# Patient Record
Sex: MALE | Race: WHITE | NOT HISPANIC OR LATINO | Employment: UNEMPLOYED | URBAN - METROPOLITAN AREA
[De-identification: names, ages, dates, MRNs, and addresses within clinical notes are randomized per-mention and may not be internally consistent; named-entity substitution may affect disease eponyms.]

---

## 2023-03-19 ENCOUNTER — HOSPITAL ENCOUNTER (EMERGENCY)
Facility: CLINIC | Age: 28
Discharge: STILL ADMITTED OR ADMINISTRATIVE | End: 2023-03-26
Attending: EMERGENCY MEDICINE | Admitting: EMERGENCY MEDICINE
Payer: MEDICARE

## 2023-03-19 DIAGNOSIS — F22 DELUSIONAL DISORDER (H): ICD-10-CM

## 2023-03-19 DIAGNOSIS — F25.0 SCHIZOAFFECTIVE DISORDER, BIPOLAR TYPE (H): ICD-10-CM

## 2023-03-19 DIAGNOSIS — F23 ACUTE PSYCHOSIS (H): ICD-10-CM

## 2023-03-19 LAB
ALBUMIN SERPL BCG-MCNC: 4.8 G/DL (ref 3.5–5.2)
ALP SERPL-CCNC: 64 U/L (ref 40–129)
ALT SERPL W P-5'-P-CCNC: 67 U/L (ref 10–50)
ANION GAP SERPL CALCULATED.3IONS-SCNC: 11 MMOL/L (ref 7–15)
AST SERPL W P-5'-P-CCNC: 44 U/L (ref 10–50)
BASOPHILS # BLD AUTO: 0 10E3/UL (ref 0–0.2)
BASOPHILS NFR BLD AUTO: 1 %
BILIRUB SERPL-MCNC: 0.3 MG/DL
BUN SERPL-MCNC: 16 MG/DL (ref 6–20)
CALCIUM SERPL-MCNC: 9.7 MG/DL (ref 8.6–10)
CHLORIDE SERPL-SCNC: 99 MMOL/L (ref 98–107)
CREAT SERPL-MCNC: 0.88 MG/DL (ref 0.67–1.17)
DEPRECATED HCO3 PLAS-SCNC: 26 MMOL/L (ref 22–29)
EOSINOPHIL # BLD AUTO: 0.1 10E3/UL (ref 0–0.7)
EOSINOPHIL NFR BLD AUTO: 1 %
ERYTHROCYTE [DISTWIDTH] IN BLOOD BY AUTOMATED COUNT: 12.8 % (ref 10–15)
GFR SERPL CREATININE-BSD FRML MDRD: >90 ML/MIN/1.73M2
GLUCOSE SERPL-MCNC: 98 MG/DL (ref 70–99)
HCT VFR BLD AUTO: 43.6 % (ref 40–53)
HGB BLD-MCNC: 14.2 G/DL (ref 13.3–17.7)
IMM GRANULOCYTES # BLD: 0 10E3/UL
IMM GRANULOCYTES NFR BLD: 0 %
LITHIUM SERPL-SCNC: 0.1 MMOL/L (ref 0.6–1.2)
LYMPHOCYTES # BLD AUTO: 1.1 10E3/UL (ref 0.8–5.3)
LYMPHOCYTES NFR BLD AUTO: 16 %
MCH RBC QN AUTO: 30 PG (ref 26.5–33)
MCHC RBC AUTO-ENTMCNC: 32.6 G/DL (ref 31.5–36.5)
MCV RBC AUTO: 92 FL (ref 78–100)
MONOCYTES # BLD AUTO: 0.7 10E3/UL (ref 0–1.3)
MONOCYTES NFR BLD AUTO: 10 %
NEUTROPHILS # BLD AUTO: 5.1 10E3/UL (ref 1.6–8.3)
NEUTROPHILS NFR BLD AUTO: 72 %
NRBC # BLD AUTO: 0 10E3/UL
NRBC BLD AUTO-RTO: 0 /100
PLATELET # BLD AUTO: 221 10E3/UL (ref 150–450)
POTASSIUM SERPL-SCNC: 3.8 MMOL/L (ref 3.4–5.3)
PROT SERPL-MCNC: 7.6 G/DL (ref 6.4–8.3)
RBC # BLD AUTO: 4.74 10E6/UL (ref 4.4–5.9)
SODIUM SERPL-SCNC: 136 MMOL/L (ref 136–145)
WBC # BLD AUTO: 7 10E3/UL (ref 4–11)

## 2023-03-19 PROCEDURE — 80053 COMPREHEN METABOLIC PANEL: CPT | Performed by: EMERGENCY MEDICINE

## 2023-03-19 PROCEDURE — 36415 COLL VENOUS BLD VENIPUNCTURE: CPT | Performed by: EMERGENCY MEDICINE

## 2023-03-19 PROCEDURE — 80178 ASSAY OF LITHIUM: CPT | Performed by: EMERGENCY MEDICINE

## 2023-03-19 PROCEDURE — 85025 COMPLETE CBC W/AUTO DIFF WBC: CPT | Performed by: EMERGENCY MEDICINE

## 2023-03-19 PROCEDURE — 90791 PSYCH DIAGNOSTIC EVALUATION: CPT

## 2023-03-19 ASSESSMENT — ACTIVITIES OF DAILY LIVING (ADL)
ADLS_ACUITY_SCORE: 33
ADLS_ACUITY_SCORE: 35

## 2023-03-20 ENCOUNTER — TELEPHONE (OUTPATIENT)
Dept: BEHAVIORAL HEALTH | Facility: CLINIC | Age: 28
End: 2023-03-20
Payer: MEDICARE

## 2023-03-20 LAB
ATRIAL RATE - MUSE: 97 BPM
DIASTOLIC BLOOD PRESSURE - MUSE: NORMAL MMHG
HOLD SPECIMEN: NORMAL
INTERPRETATION ECG - MUSE: NORMAL
P AXIS - MUSE: 73 DEGREES
PR INTERVAL - MUSE: 130 MS
QRS DURATION - MUSE: 80 MS
QT - MUSE: 340 MS
QTC - MUSE: 431 MS
R AXIS - MUSE: 98 DEGREES
SYSTOLIC BLOOD PRESSURE - MUSE: NORMAL MMHG
T AXIS - MUSE: 72 DEGREES
VENTRICULAR RATE- MUSE: 97 BPM

## 2023-03-20 PROCEDURE — 250N000011 HC RX IP 250 OP 636: Performed by: EMERGENCY MEDICINE

## 2023-03-20 PROCEDURE — 93005 ELECTROCARDIOGRAM TRACING: CPT | Mod: RTG

## 2023-03-20 PROCEDURE — 96372 THER/PROPH/DIAG INJ SC/IM: CPT | Performed by: EMERGENCY MEDICINE

## 2023-03-20 PROCEDURE — 250N000013 HC RX MED GY IP 250 OP 250 PS 637: Performed by: EMERGENCY MEDICINE

## 2023-03-20 RX ORDER — PROPRANOLOL HYDROCHLORIDE 10 MG/1
10 TABLET ORAL 3 TIMES DAILY PRN
Status: ON HOLD | COMMUNITY
Start: 2022-11-28 | End: 2023-03-27

## 2023-03-20 RX ORDER — LORAZEPAM 2 MG/ML
2 INJECTION INTRAMUSCULAR EVERY 8 HOURS PRN
Status: DISCONTINUED | OUTPATIENT
Start: 2023-03-20 | End: 2023-03-26 | Stop reason: HOSPADM

## 2023-03-20 RX ORDER — DIPHENHYDRAMINE HYDROCHLORIDE 50 MG/ML
50 INJECTION INTRAMUSCULAR; INTRAVENOUS EVERY 8 HOURS PRN
Status: DISCONTINUED | OUTPATIENT
Start: 2023-03-20 | End: 2023-03-26 | Stop reason: HOSPADM

## 2023-03-20 RX ORDER — ATOMOXETINE 40 MG/1
40 CAPSULE ORAL 2 TIMES DAILY
Status: ON HOLD | COMMUNITY
Start: 2022-11-06 | End: 2023-03-27

## 2023-03-20 RX ORDER — OLANZAPINE 10 MG/2ML
10 INJECTION, POWDER, FOR SOLUTION INTRAMUSCULAR ONCE
Status: DISCONTINUED | OUTPATIENT
Start: 2023-03-20 | End: 2023-03-20

## 2023-03-20 RX ORDER — OLANZAPINE 10 MG/2ML
10 INJECTION, POWDER, FOR SOLUTION INTRAMUSCULAR ONCE
Status: COMPLETED | OUTPATIENT
Start: 2023-03-20 | End: 2023-03-20

## 2023-03-20 RX ORDER — OLANZAPINE 5 MG/1
10 TABLET, ORALLY DISINTEGRATING ORAL
Status: COMPLETED | OUTPATIENT
Start: 2023-03-20 | End: 2023-03-20

## 2023-03-20 RX ORDER — HALOPERIDOL 5 MG/ML
5 INJECTION INTRAMUSCULAR EVERY 8 HOURS PRN
Status: DISCONTINUED | OUTPATIENT
Start: 2023-03-20 | End: 2023-03-26 | Stop reason: HOSPADM

## 2023-03-20 RX ORDER — LORAZEPAM 1 MG/1
2 TABLET ORAL EVERY 8 HOURS PRN
Status: DISCONTINUED | OUTPATIENT
Start: 2023-03-20 | End: 2023-03-26 | Stop reason: HOSPADM

## 2023-03-20 RX ORDER — DIPHENHYDRAMINE HCL 50 MG
50 CAPSULE ORAL EVERY 8 HOURS PRN
Status: DISCONTINUED | OUTPATIENT
Start: 2023-03-20 | End: 2023-03-26 | Stop reason: HOSPADM

## 2023-03-20 RX ORDER — HALOPERIDOL 5 MG/1
5 TABLET ORAL EVERY 8 HOURS PRN
Status: DISCONTINUED | OUTPATIENT
Start: 2023-03-20 | End: 2023-03-26 | Stop reason: HOSPADM

## 2023-03-20 RX ADMIN — OLANZAPINE 10 MG: 10 INJECTION, POWDER, FOR SOLUTION INTRAMUSCULAR at 08:00

## 2023-03-20 RX ADMIN — DIPHENHYDRAMINE HYDROCHLORIDE 50 MG: 50 INJECTION, SOLUTION INTRAMUSCULAR; INTRAVENOUS at 13:43

## 2023-03-20 RX ADMIN — OLANZAPINE 10 MG: 5 TABLET, ORALLY DISINTEGRATING ORAL at 16:08

## 2023-03-20 RX ADMIN — HALOPERIDOL LACTATE 5 MG: 5 INJECTION, SOLUTION INTRAMUSCULAR at 13:43

## 2023-03-20 RX ADMIN — LORAZEPAM 2 MG: 2 INJECTION INTRAMUSCULAR; INTRAVENOUS at 13:43

## 2023-03-20 ASSESSMENT — ACTIVITIES OF DAILY LIVING (ADL)
ADLS_ACUITY_SCORE: 35

## 2023-03-20 ASSESSMENT — COLUMBIA-SUICIDE SEVERITY RATING SCALE - C-SSRS
ATTEMPT LIFETIME: NO
6. HAVE YOU EVER DONE ANYTHING, STARTED TO DO ANYTHING, OR PREPARED TO DO ANYTHING TO END YOUR LIFE?: NO
2. HAVE YOU ACTUALLY HAD ANY THOUGHTS OF KILLING YOURSELF?: NO
TOTAL  NUMBER OF INTERRUPTED ATTEMPTS LIFETIME: NO
1. HAVE YOU WISHED YOU WERE DEAD OR WISHED YOU COULD GO TO SLEEP AND NOT WAKE UP?: NO
TOTAL  NUMBER OF ABORTED OR SELF INTERRUPTED ATTEMPTS LIFETIME: NO

## 2023-03-20 NOTE — CONSULTS
Mental Health Transition and Triage-Extended Care  Civil Commitment Status Plan of Care    Current commitment status is: under 72 Hour Hold expiring 3/23/23 at 0748     Writer spoke to [facility] as [place]. If commitment is being pursued paperwork should be completed for [facility] and submitted to [facility] PPS.     Writer updated Dr. Goetz. Exam statement to be completed tomorrow.  Writer will submit collateral and other supporting documentation in the morning of 3/21/23.     Writer provided verbal report to [facility] today around 1330.      OLU TSANG, [facility] HospitalSW, Psychotherapist  Extended Care- Triage & Transition Services  Callback: [phone]

## 2023-03-20 NOTE — TELEPHONE ENCOUNTER
R:    No appropriate [facility] IP beds available.      [facility] Access Inpatient Bed Call Log 3/20/2023 7:56 am      Intake has called facilities that have not updated their bed status within the last 12 hours.        Adults:        [facility] is posting 0 beds.      Pt will remain on [facility] worklist until an appropriate IP[facility] bed is available.
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
## 2023-03-20 NOTE — ED NOTES
Pt continues to be calm and cooperative. Restraints removed. CATHERINE Acevedo tech present for safety ad monitoring.

## 2023-03-20 NOTE — ED NOTES
5209 this RN spoke with Dr. Young prior to restraint order expiration. Dr. Young will renew restraint order as patient is unsafe to be completely removed from restraints at this time
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
## 2023-03-20 NOTE — ED NOTES
Mom called did not give any info per privacy , talked to pt , pt wants to call his mother and he wants to talk to her later to give her an update. He said it was ok to let her know he is here and he will call her back.

## 2023-03-20 NOTE — ED NOTES
Within an hour after restraint an in person face to face assessment was completed at 1355, including an evaluation of the patient's immediate reaction to the intervention, behavioral assessment and review/assessment of history, drugs and medications, recent labs, etc., and behavioral condition.  The patient experienced: No adverse physical outcome from seclusion/restraint initiation.  The intervention of restraint or seclusion needs to continue.     Michael Young,   03/20/23 2005

## 2023-03-20 NOTE — PHARMACY-ADMISSION MEDICATION HISTORY
Admission Medication History Completed by Pharmacy    See [facility] Admission Navigator for allergy information, preferred outpatient pharmacy, prior to admission medications and immunization status.     Medication History Sources:     Medication list retrieved from discharge summary from [facility]. Patient is not currently available for interview for agitation and code 21 called    Changes made to PTA medication list (reason):    Added: all medications added this admission    Deleted: None    Changed: None    Additional Information:    None    Prior to Admission medications    Medication Sig Last Dose Taking? Auth Provider Long Term End Date   atomoxetine (STRATTERA) 40 MG capsule Take 40 mg by mouth 2 times daily Unknown Yes Unknown, Entered By History     propranolol (INDERAL) 10 MG tablet Take 10 mg by mouth 3 times daily as needed for anxiety Unknown Yes Unknown, Entered By History Yes    paliperidone (INVEGA SUSTENNA) 234 MG/1.5ML JOSE Inject 234 mg into the muscle every 30 days 3/2/2023  Unknown, Entered By History Yes        Date completed: 03/20/23    Medication history completed by: Raheem Hutchinson [facility]
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
## 2023-03-20 NOTE — CONSULTS
[facility] COURT- PROBATE/MENTAL HEALTH DIVISION  [facility]    _________________________________________________________________________  PETITION FOR JUDICIAL COMMITMENT    In the Matter of the Civil Commitment of:   Sophie Steen  1995    D.C. File No. [id number]__   C.A. File No. _____________     OLU TSANG, HANNAH of [facility] is interested in the respondent as a Licensed Mental Health Professional, and to the best of his/her knowledge, information, and belief, petitioner respectfully represents:     1. Respondent was born on 1995    2. Respondent lives at [address] [address]  and has residence in [place], [place] for the purpose of judicial commitment;     3. Respondent s spouse and nearest barbara are:    (Rel.) Home Phone Work Phone Mobile Phone   Alonzo Zapata (Friend) -- -- [phone]       4. The observations of respondent s behavior showing that respondent is Mentally Ill  and there is no suitable alternative to involuntary commitment are set forth in Exhibit A and the examiner s statement attached.     5. An examiner s statement supporting respondent s commitment is attached.     WHEREFORE, Petitioner prays the Court commit the respondent to the Commissioner of Human Services, or other treatment facility according to law.     I declare under penalty of perjury under the laws of the [facility] that the foregoing is true and correct.     Executed on March 20, 2023 in the [facility]  in the [facility].         Signature: ________________________________           Examiner s name: ASIF RAINES, HANNAH          [facility] EMERGENCY DEPARTMENT  [address]  [id number]  [phone]  [phone]

## 2023-03-20 NOTE — CONSULTS
The patient was briefly interviewed (very delusional) and discussed with his nurse.   He has received IM Zyprexa 10 this morning and this afternoon he had 5 mg of Haldol with 2 mg Ativan. He does respond to both, but not for long.   You can use up to 3 doses per day of the 10 mg Zyprexa and 3 doses of the Haldol/Ativan (EKG was ordered; QTc 431 ms)  I return to see him in the morning to complete a consult and do commitment paper work.    Page me as needed.  Mt Goetz M.D.   [facility]   Contact information available via [facility] Paging/Directory  If I am not available, then [facility] CL line ([phone]) should know who   Is covering our consult service.
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
## 2023-03-20 NOTE — CONSULTS
"Diagnostic Evaluation Consultation  Crisis Assessment    Patient was assessed: Linda  Patient location: [place]  Was a release of information signed: No. Reason: did not witness      Referral Data and Chief Complaint  Sophie is a 27 year old, who uses they/them pronouns, and presents to the ED alone. Patient is referred to the ED by self. Patient is presenting to the ED for the following concerns: Pt presents wanting lithium checked and for concerns of \"people focusing chakras on [place]\".      Informed Consent and Assessment Methods     Patient is their own guardian. Writer met with patient and explained the crisis assessment process, including applicable information disclosures and limits to confidentiality, assessed understanding of the process, and obtained consent to proceed with the assessment. Patient was observed to be able to participate in the assessment as evidenced by alert and oriented. Assessment methods included conducting a formal interview with patient, review of medical records, collaboration with medical staff, and obtaining relevant collateral information from family and community providers when available..     Over the course of this crisis assessment provided reassurance, offered validation, engaged patient in problem solving and disposition planning, worked with patient on safety and aftercare planning, assisted in processing patient's thoughts and feeling relating to wanting to recruit people in the IP unit and provided psychoeducation. Patient's response to interventions was pt appears willing and open to interventions.     Summary of Patient Situation     Pt presents wanting lithium checked and for concerns of \"people focusing chakras on [place]\".  Pt reports they were in the inpatient unit in [place] until they were discharged today due to a personal crisis. Pt refused to go into this crisis. Pt reports they are not suicidal or homicidal. Pt took a long pause to answer that question and " "said, \"I am joking\". Pt reports they are coming to the hospital today due to, \"recruiting people for the draft. They like controlled environments, and behavioral health units are the perfect place for that\". When pt was asked for more information, they state, \"that is classified, you aren't authorized to know that\". When asked about pt level of functioning, they state, \"I track my sleep in an excel spreadsheet and I wish I could invest more and discover more discoveries. But I can't do that because I have some obligations. I have a bunch of reading material\". Pt reports they want their phone and reading materials on the inpatient unit and requests to go to [employer], \"they don't care, they won't remember in a week\". Pt denies SI/SIB/SA/HI and denies plans, means, or intent to harm themselves or others. Pt denies current hallucinations or delusions. Pt jokes that they are experiencing hallucinations and delusions, and then states, \"I was just joking, I was trying to pull one over on you\". Pt presents with a blonde, short haired wig and lopsided aviator sunglasses. Throughout assessment, pt adjusted their disguise and when comfortable, took them off. Pt presents as alert, oriented, and delusional. Pt was agreeable and cooperative in assessment, and appears to have significant delusions about himself and reality.     Brief Psychosocial History     Pt reports that they live in [place] and are in [place] for, \"official business. I work in [employer]\". Pt reports, \"it's what I do\" when asked how they enjoy their job. Pt reports, they are staying at, \"[employer] with you guys\" and reports when they are in [place], he lives with biological parents. Pt, \"declines to answer, we don't talk too much\", when asked how they get along with their family. Pt reports they have an adopted family. 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 When asked who is in their support network, they states, \"Everyone I ever grew up with\". 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 Pt reports they decline to answer to answer any questions regarding their service, \"[employer]. [employer]. I don't want to answer more\".    Significant Clinical History    Per chart review, pt has 38 ED visits with 17 inpatient stays related to paranoia, delusions, hallucinations, and schizophrenia. Per chart review, pt has a history of becoming physically aggressive in the ED, often refuses to engage in medication management and does not follow through with recommendations. Per chart review, pt last admission was on 2/22/2023 and pt refused multiple times to take his medication and was put on an Invega shot to increase compliance with medications. Per chart review, pt is due for next Invega shot on 3/30/2023. Per chart review, pt often comes to the ED wanting help, then refuses the care offered and recommendations made.      Collateral Information  None collected and pt did not identify anyone that could be contacted, \"it is classified\". Pt has no emergency contacts listed in Epic.     Risk Assessment  [place] Suicide Severity Rating Scale Full Clinical Version: 3/19/2023  Suicidal Ideation  1. Wish to be Dead (Lifetime): No  2. Non-Specific Active Suicidal Thoughts (Lifetime): No     Suicidal Behavior  Actual Attempt (Lifetime): No  Has subject engaged in non-suicidal self-injurious behavior? (Lifetime): No  Interrupted Attempts (Lifetime): No  Aborted or Self-Interrupted Attempt (Lifetime): No  Preparatory Acts or Behavior (Lifetime): No  C-SSRS Risk (Lifetime/Recent)  Calculated C-SSRS Risk Score (Lifetime/Recent): No Risk Indicated    [place] Suicide Severity Rating Scale Since Last Contact: n/a       Validity of evaluation is impacted by presenting factors during interview.   Comments regarding subjective versus objective responses to [place] tool: pt appears delusional and may not have accurate answers to assessment questions.  Environmental or Psychosocial Events: " "challenging interpersonal relationships and geographic isolation from supports  Chronic Risk Factors: history of psychiatric hospitalization and chronic and ongoing sleep difficulties   Warning Signs: seeking access to means to hurt or kill self, talking or writing about death, dying, or suicide, hopelessness, acting reckless or engaging in risky activities, no reason for living, no sense of purpose in life and engaging in self-destructive behavior  Protective Factors: help seeking  Interpretation of Risk Scoring, Risk Mitigation Interventions and Safety Plan:  Pt is recommended to inpatient.       Does the patient have thoughts of harming others? No     Is the patient engaging in sexually inappropriate behavior?  no        Current Substance Abuse     Is there recent substance abuse? no     Was a urine drug screen or blood alcohol level obtained: No       Mental Status Exam     Affect: Appropriate   Appearance: Disheveled    Attention Span/Concentration: Attentive  Eye Contact: Variable   Fund of Knowledge: Appropriate    Language /Speech Content: Fluent   Language /Speech Volume: Normal    Language /Speech Rate/Productions: Normal    Recent Memory: Variable   Remote Memory: Variable   Mood: Normal    Orientation to Person: Yes    Orientation to Place: Yes   Orientation to Time of Day: Yes    Orientation to Date: Yes    Situation (Do they understand why they are here?): Yes    Psychomotor Behavior: Normal    Thought Content: Delusions   Thought Form: Intact      History of commitment: Yes \"it's in the history books, you can't get rid of that\"       Medication    Psychotropic medications: Yes. Pt is currently taking Lithium. Medication compliant: Yes. Recent medication changes: Yes reports lithium was lowered  Medication changes made in the last two weeks: No       Current Care Team    Primary Care Provider: No  Psychiatrist: Kyara Silveira and Joan James @ [facility] in [place]  Therapist: \"No I " "don't\". Pt later stated, \"my therapists name is Kalee\"  : \"I know a number of them but I don't communicate\"     CTSS or ARMHS: No  ACT Team: No  Other: No      Diagnosis    295.90  (F20.9) Schizophrenia     Clinical Summary and Substantiation of Recommendations    It is the recommendation of this clinician that pt admit to IP MH for safety and stabilization. Pt displays the following risk factors that support IP admission: pt is actively delusional about himself and reality. 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 Pt denies SI/SIB/SA/HI and denies plans, means, or intent to harm themself or others. Pt denies current hallucinations or delusions.. Pt is unable to engage in safety planning to mitigate risk level in a non-secure setting. Lower levels of care have not been successful in mitigating risk. Due to this IP is the least restrictive option of care for pt. Pt should remain in IP until deemed safe to return to the community and engage in OP MH supports. Pt will need assistance establishing OP MH services prior to discharge.   Admission to Inpatient Level of Care is indicated due to:    1. Patient risk of severity of behavioral health disorder is appropriate to proposed level of care as indicated by:   Behavioral health disorder is present and appropriate for inpatient care with both of the following:     Severe psychiatric, behavioral or other comorbid conditions are appropriate for management at inpatient mental health as indicated by at least one of the following:   o Hallucinations; delusions; positive symptoms    Severe dysfunction in daily living is present as indicated by at least one of the following:   o Other evidence of severe dysfunction    2. Inpatient mental health services are necessary to meet patient needs and at least one of the following:  Specific condition related to " admission diagnosis is present and judged likely to further improve at proposed level of care    3. Situation and expectations are appropriate for inpatient care, as indicated by one of the following:   Biopsychosocial stresses potentially contributing to clinical presentation (co morbidities) have been assessed and are absent or manageable at proposed level of care    Disposition    Recommended disposition: Inpatient Mental Health       Reviewed case and recommendations with attending provider. Attending Name: Dr. Andrews       Attending concurs with disposition: Yes       Patient and/or validated legal guardian concurs with disposition: Yes       Final disposition: Inpatient mental health .     Inpatient Details (if applicable):   Is patient admitted voluntarily:Yes      Patient aware of potential for transfer if there is not appropriate placement? Yes       Patient is willing to travel outside of the [facility] for placement? No      Behavioral Intake Notified? Yes: Date: 3/20/2023 Time: 12:20AM.       Assessment Details    Patient interview started at: 11:42PM and completed at: 12:01AM.     Total duration spent on the patient case in minutes: .50 hrs      CPT code(s) utilized: 62493 - Psychotherapy for Crisis - 60 (30-74*) min       SEN Fonseca, MS, LPCC, LADC, Psychotherapist  DEC - Triage & Transition Services  Callback: [phone]

## 2023-03-20 NOTE — TELEPHONE ENCOUNTER
0308 Bed Search Update:    Awaiting availability at [facility].  Remains on wait list.

## 2023-03-20 NOTE — ED TRIAGE NOTES
Pt walk in with C/O 'needing lithium levels checked' and luisito campos d/t 'people focusing chakras on [place]'.  Pt denies pain, HI/SI, HA/VA, or other acute complaints.  VSS, pt is alert and in no distress.     Triage Assessment     Row Name 03/19/23 2016       Triage Assessment (Adult)    Airway WDL WDL       Respiratory WDL    Respiratory WDL WDL       Skin Circulation/Temperature WDL    Skin Circulation/Temperature WDL WDL       Cardiac WDL    Cardiac WDL WDL       Peripheral/Neurovascular WDL    Peripheral Neurovascular WDL WDL       Cognitive/Neuro/Behavioral WDL    Cognitive/Neuro/Behavioral WDL WDL;mood/behavior    Mood/Behavior hyperactive (agitated, impulsive);restless

## 2023-03-20 NOTE — PLAN OF CARE
Sophie Steen  March 20, 2023  Plan of Care Hand-off Note     Patient Care Path: Inpatient Mental Health    Plan for Care:     It is the recommendation of this clinician that pt admit to IP MH for safety and stabilization. Pt displays the following risk factors that support IP admission: pt is actively delusional about himself and reality. 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 Pt denies SI/SIB/SA/HI and denies plans, means, or intent to harm themself or others. Pt denies current hallucinations or delusions.. Pt is unable to engage in safety planning to mitigate risk level in a non-secure setting. Lower levels of care have not been successful in mitigating risk. Due to this IP is the least restrictive option of care for pt. Pt should remain in IP until deemed safe to return to the community and engage in OP MH supports. Pt will need assistance establishing OP MH services prior to discharge    Critical Safety Issues: current delusions about himself and reality    Overview:  This patient is a child/adolescent: No    This patient has additional special visitor precautions: No    Legal Status: Voluntary    Contacts:   None provided    Psychiatry Consult:  Psychiatry Consult not requested because pt did not consent    Updated Attending Provider regarding plan of care.    SEN Fonseca
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
## 2023-03-20 NOTE — ED NOTES
Adult Psychiatry inpatient consult line called in order to speak to provider, message was left with ED callback number.  [facility] number called [phone]

## 2023-03-21 ENCOUNTER — TELEPHONE (OUTPATIENT)
Dept: BEHAVIORAL HEALTH | Facility: CLINIC | Age: 28
End: 2023-03-21
Payer: MEDICARE

## 2023-03-21 PROCEDURE — 250N000013 HC RX MED GY IP 250 OP 250 PS 637: Performed by: PSYCHIATRY & NEUROLOGY

## 2023-03-21 PROCEDURE — 250N000013 HC RX MED GY IP 250 OP 250 PS 637: Performed by: EMERGENCY MEDICINE

## 2023-03-21 PROCEDURE — 99222 1ST HOSP IP/OBS MODERATE 55: CPT | Performed by: PSYCHIATRY & NEUROLOGY

## 2023-03-21 RX ORDER — RISPERIDONE 1 MG/1
1 TABLET, ORALLY DISINTEGRATING ORAL 2 TIMES DAILY
Status: DISCONTINUED | OUTPATIENT
Start: 2023-03-21 | End: 2023-03-26 | Stop reason: HOSPADM

## 2023-03-21 RX ORDER — PROPRANOLOL HYDROCHLORIDE 10 MG/1
10 TABLET ORAL 3 TIMES DAILY
Status: DISCONTINUED | OUTPATIENT
Start: 2023-03-21 | End: 2023-03-26 | Stop reason: HOSPADM

## 2023-03-21 RX ORDER — LORAZEPAM 2 MG/ML
2 INJECTION INTRAMUSCULAR ONCE
Status: COMPLETED | OUTPATIENT
Start: 2023-03-21 | End: 2023-03-22

## 2023-03-21 RX ORDER — HALOPERIDOL 5 MG/ML
5 INJECTION INTRAMUSCULAR ONCE
Status: COMPLETED | OUTPATIENT
Start: 2023-03-21 | End: 2023-03-22

## 2023-03-21 RX ADMIN — PROPRANOLOL HYDROCHLORIDE 10 MG: 10 TABLET ORAL at 14:31

## 2023-03-21 RX ADMIN — PROPRANOLOL HYDROCHLORIDE 10 MG: 10 TABLET ORAL at 19:13

## 2023-03-21 RX ADMIN — HALOPERIDOL 5 MG: 5 TABLET ORAL at 15:34

## 2023-03-21 RX ADMIN — RISPERIDONE 1 MG: 0.5 TABLET, ORALLY DISINTEGRATING ORAL at 19:13

## 2023-03-21 RX ADMIN — LORAZEPAM 2 MG: 0.5 TABLET ORAL at 05:46

## 2023-03-21 ASSESSMENT — ACTIVITIES OF DAILY LIVING (ADL)
ADLS_ACUITY_SCORE: 35

## 2023-03-21 NOTE — CONSULTS
Civil Commitment Status    County of Responsibility:  [facility] Primary Contact Information:  For [facility] this should be the 's office at [phone] to speak with the  of the Day.    Current commitment status is: pre-petition screening and civil commitment request was supported by [employer]    72hr-Hold Started: 3/20/23 at 0748   72hr-Hold Conclude: 3/23/23 at 0748    Received a copy of the PPS report: No   Paperwork requested to be sent to Extended Care e-mail  Is patient currently on a court hold: No   Paperwork requested to be sent to Extended Care e-mail   Court hold paperwork received by the county: No             Paperwork requested to be sent to Extended Care e-mail  Court hold paperwork and PPS report was sent to Intake and Stat: No     Sophie Steen was served court hold and PPS report as indicated by the county: No Paperwork requested to be sent to Extended Care e-mail.    Sophie Steen is currently on the inpatient mental health waiting list: Yes  Was there any contact with [facility] Central Intake today: No     Court dates are as follows:   Preliminary Court Date (First Court Date): TBD  Final Court Date (Second Court Date): TBD    Next steps:   -Continue with 72 hh   -Continue with IP recommendation  -Extended Care LM to follow for disposition and support.     ASIF CASTAÑEDA MSW, LICSW   [facility] EMERGENCY DEPARTMENT  [address] [place] [address]  [phone]
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
## 2023-03-21 NOTE — TELEPHONE ENCOUNTER
Bed search ([facility] only) @ 4AM:    [facility] @ cap    Pt remains on wait list pending appropriate placement availability

## 2023-03-21 NOTE — CONSULTS
Mental Health Transition and Triage-Extended Care  Civil Commitment Status Plan of Care    Current commitment status is: under 72 Hour Hold expiring 3/23/23 at 0748    72hr-Hold Started: 3/20/23 at 0748   72hr-Hold Conclude: 3/23/23 at 0748    [place] of Responsibility:  [place]    Pre-petition screening paperwork has been sent to [facility] Intake via Encrypted E-mail at 1045 and includes:  PREPETITIONFORMS: Commitment Petition, Examiner's Statement, Exhibit A, Holman Note, Holman Petition     Supplemental documentation has also been send to the [facility] including: Patient Facesheet, Patient hospital encounter notes, Patient 72 hour hold order, Patient MAR and Patient Labs    Pre-Petitions forms have been sent to Intake and Stat to be scanned to the EMR.  Yes    Verbal report given to [facility] intake team via phone on 3/20/23 at 1330     Next steps:   -Continue with 72 hh   -Continue with IP recommendation  -Extended Care LMHP to follow for disposition and support.     **[place] has supported the PPS request per Sandy TARIQ today at 1335.     OLU TSANG, LICSW, Psychotherapist  Extended Care- Triage & Transition Services  Callback: [phone]

## 2023-03-21 NOTE — CONSULTS
[facility] COURT- PROBATE/MENTAL HEALTH DIVISION: FOURTH ([place])   _________________________________________________________________________    In the Matter of the Civil Commitment of: Sophie Steen    :1995     Respondent: Sophie Steen     WRITTEN REQUEST FOR AUTHORIZATION TO IMPOSE TREATMENT AND REQUEST FOR HEARING  DC File No.  [id number]___ [place]  File No. __________       I, Mt Goetz MD to the best of my knowledge, information, and belief respectfully represent:    1.I am a member of the treatment team for the respondent.    2.Respondent was born on 1995     3.Respondent is presently receiving care and treatment at: [facility] and [facility]     4.Diagnostic studies performed by Mt Goetz MD on respondent appear to indicate that respondent suffers from:    295.90  (F20.9) Schizophrenia, by current exam ;      5.   Mt Goetz MD  has determined neuroleptic medication(s) to be medically necessary.    6.   I have determined that the benefits of administration of the proposed treatment to the respondent outweigh the risks and therefore this procedure is reasonable.    7.   Respondent s written informed consent to the administration of the above procedure has notbeen obtained because of incompetency to make a rational decision regarding the proposedtreatment.    8.The objective of the proposed treatment is to treat the symptoms of the mental illness that interfere with the respondent s ability to function.    9.Petitioner requests that a hearing be scheduled, and that authorization to administer the proposed treatment be granted according to law.     Dated: 2023    Providers s Signature: _________________________  Print Name: Mt Goetz MD (Psychiatrist)    [facility] EMERGENCY DEPARTMENT  [address] [place] [address]  [place] [address]  [phone]

## 2023-03-21 NOTE — TELEPHONE ENCOUNTER
R: 8pm Bed Search update:  [facility] only    No appropriate beds.  Pt remains on waitlist pending available bed.

## 2023-03-21 NOTE — TELEPHONE ENCOUNTER
Bed search ([facility] only) @ 8:05AM:     [facility] @ cap     Pt remains on wait list pending appropriate placement availability

## 2023-03-21 NOTE — TELEPHONE ENCOUNTER
R: Per Tammy with extended care, [facility] is supporting petition.  Awaiting paperwork.  Pt remains on intake work list awaiting appropriate placement.

## 2023-03-22 ENCOUNTER — TELEPHONE (OUTPATIENT)
Dept: BEHAVIORAL HEALTH | Facility: CLINIC | Age: 28
End: 2023-03-22
Payer: MEDICARE

## 2023-03-22 LAB
AMPHETAMINES UR QL SCN: NORMAL
BARBITURATES UR QL SCN: NORMAL
BENZODIAZ UR QL SCN: NORMAL
BZE UR QL SCN: NORMAL
CANNABINOIDS UR QL SCN: NORMAL
OPIATES UR QL SCN: NORMAL
SARS-COV-2 RNA RESP QL NAA+PROBE: NEGATIVE

## 2023-03-22 PROCEDURE — 250N000011 HC RX IP 250 OP 636: Performed by: EMERGENCY MEDICINE

## 2023-03-22 PROCEDURE — U0003 INFECTIOUS AGENT DETECTION BY NUCLEIC ACID (DNA OR RNA); SEVERE ACUTE RESPIRATORY SYNDROME CORONAVIRUS 2 (SARS-COV-2) (CORONAVIRUS DISEASE [COVID-19]), AMPLIFIED PROBE TECHNIQUE, MAKING USE OF HIGH THROUGHPUT TECHNOLOGIES AS DESCRIBED BY CMS-2020-01-R: HCPCS | Performed by: FAMILY MEDICINE

## 2023-03-22 PROCEDURE — 96372 THER/PROPH/DIAG INJ SC/IM: CPT | Performed by: EMERGENCY MEDICINE

## 2023-03-22 PROCEDURE — 80307 DRUG TEST PRSMV CHEM ANLYZR: CPT | Performed by: FAMILY MEDICINE

## 2023-03-22 PROCEDURE — 250N000013 HC RX MED GY IP 250 OP 250 PS 637: Performed by: PSYCHIATRY & NEUROLOGY

## 2023-03-22 PROCEDURE — 99285 EMERGENCY DEPT VISIT HI MDM: CPT | Performed by: EMERGENCY MEDICINE

## 2023-03-22 PROCEDURE — C9803 HOPD COVID-19 SPEC COLLECT: HCPCS | Performed by: EMERGENCY MEDICINE

## 2023-03-22 PROCEDURE — 99291 CRITICAL CARE FIRST HOUR: CPT | Performed by: EMERGENCY MEDICINE

## 2023-03-22 RX ADMIN — PROPRANOLOL HYDROCHLORIDE 10 MG: 10 TABLET ORAL at 21:12

## 2023-03-22 RX ADMIN — HALOPERIDOL LACTATE 5 MG: 5 INJECTION, SOLUTION INTRAMUSCULAR at 00:03

## 2023-03-22 RX ADMIN — PROPRANOLOL HYDROCHLORIDE 10 MG: 10 TABLET ORAL at 13:03

## 2023-03-22 RX ADMIN — DIPHENHYDRAMINE HYDROCHLORIDE 50 MG: 50 INJECTION, SOLUTION INTRAMUSCULAR; INTRAVENOUS at 00:04

## 2023-03-22 RX ADMIN — RISPERIDONE 1 MG: 0.5 TABLET, ORALLY DISINTEGRATING ORAL at 13:03

## 2023-03-22 RX ADMIN — LORAZEPAM 2 MG: 2 INJECTION INTRAMUSCULAR; INTRAVENOUS at 00:04

## 2023-03-22 RX ADMIN — RISPERIDONE 1 MG: 0.5 TABLET, ORALLY DISINTEGRATING ORAL at 21:12

## 2023-03-22 ASSESSMENT — ACTIVITIES OF DAILY LIVING (ADL)
ADLS_ACUITY_SCORE: 35

## 2023-03-22 NOTE — ED NOTES
Received call from [facility].  Patient has been placed on a court hold.  Paperwork will be faxed to the ED.    HANNAH Loomis

## 2023-03-22 NOTE — PROGRESS NOTES
AM meds held due to BP 89/39 when checked this AM. Rechecked /72 once transport arrived. Pt belongings including 3 bags given to transport. Pt compliant with cares this AM.

## 2023-03-22 NOTE — TELEPHONE ENCOUNTER
R: 3am- Bed search : [facility] only     [facility] has no appropriate beds.  Pt remains on waitlist pending available bed.

## 2023-03-22 NOTE — TELEPHONE ENCOUNTER
R:  No appropriate [facility] beds are available.  Pt will remain on the IP worklist until appropriate bed is available.

## 2023-03-22 NOTE — ED PROVIDER NOTES
[facility] ED Mental Health Handoff Note:       Brief HPI:  This is a 27 year old male signed out to me by Dr. Hamlin.  See initial ED Provider note for full details of the presentation. Interval history is pertinent for patient transferred from the [place] for boarding in the [facility].  Awaiting inpatient bed.  Presented with psychosis and has required several code 21 episodes at the [place].  Transfer uneventful, currently calm and cooperative.  A petition for commitment is in process and per the court will likely be supported however paperwork may not be available today.    Home meds reviewed and ordered/administered: Yes    Medically stable for inpatient mental health admission: Yes.    Evaluated by mental health: Yes. The recommendation is for inpatient mental health treatment. Bed search in process    Safety concerns: At the time I received sign out, the patient had been aggressive/combative/agitated, but has calmed.    Hold Status:  Active Orders   Legal    Emergency Hospitalization Hold (72 Hr Hold)     Frequency: Effective Now     Start Date/Time: 03/20/23 0748      Number of Occurrences: Until Specified            Exam:   Patient Vitals for the past 24 hrs:   BP Temp Temp src Pulse Resp SpO2   03/22/23 0910 112/72 -- -- 81 -- 96 %   03/22/23 0849 (!) 89/39 -- -- -- -- --   03/21/23 1431 120/78 -- -- -- -- 97 %   03/21/23 1101 112/66 97.7  F (36.5  C) Oral 120 16 96 %       General: Patient is in no acute distress and is resting comfortably.  HEENT: Normocephalic atraumatic  Neck: Supple  Cardiovascular: Heart rate normal  Pulmonary: Patient is in no respiratory distress  Extremities: No signs of any significant or life-threatening trauma.  Neurologic: No new focal neurologic deficits.      ED Course:    Medications   haloperidol lactate (HALDOL) injection 5 mg (5 mg Intramuscular $Given 3/20/23 1343)     And   LORazepam (ATIVAN) injection 2 mg (2 mg Intramuscular $Given 3/20/23 1343)     And    diphenhydrAMINE (BENADRYL) injection 50 mg (50 mg Intramuscular $Given 3/22/23 0004)   haloperidol (HALDOL) tablet 5 mg (5 mg Oral $Given 3/21/23 1534)     And   LORazepam (ATIVAN) tablet 2 mg (2 mg Oral $Given 3/21/23 0546)     And   diphenhydrAMINE (BENADRYL) capsule 50 mg (has no administration in time range)   risperiDONE (risperDAL M-TABS) ODT tab 1 mg (1 mg Sublingual $Given 3/21/23 1913)   propranolol (INDERAL) tablet 10 mg (10 mg Oral $Given 3/21/23 1913)   OLANZapine (zyPREXA) injection 10 mg (10 mg Intramuscular $Given 3/20/23 0800)   OLANZapine zydis (zyPREXA) ODT tab 10 mg (10 mg Oral $Given 3/20/23 1608)   haloperidol lactate (HALDOL) injection 5 mg (5 mg Intramuscular $Given 3/22/23 0003)   LORazepam (ATIVAN) injection 2 mg (2 mg Intramuscular $Given 3/22/23 0004)            There were no significant events during my shift.    Patient was signed out to the oncoming provider      Impression:    ICD-10-CM    1. Delusional disorder (H)  F22       2. Schizoaffective disorder, bipolar type (H)  F25.0       3. Acute psychosis (H)  F23           Plan:    1. Awaiting inpatient mental health admission/transfer.      RESULTS:   No results found for this visit on 03/19/23 (from the past 24 hour(s)).          MD Matilda Price David, MD  03/22/23 8960

## 2023-03-22 NOTE — ED PROVIDER NOTES
[facility] ED Mental Health Handoff Note:       Brief HPI:  This is a 27 year old male signed out to me by Dr. Lui.  See initial ED Provider note for full details of the presentation.  Patient did have escalation of behavior overnight intermittently redirectable by verbal attempt however did require intramuscular medications which did de-escalate acute psychosis transiently.  No other issues reported overnight.    Home meds reviewed and ordered/administered: Yes    Medically stable for inpatient mental health admission: Yes.    Evaluated by mental health: Yes. The recommendation is for inpatient mental health treatment. Bed search in process    Safety concerns: At the time I received sign out, the patient had been aggressive/combative/agitated, but has calmed.    Hold Status:  Active Orders   Legal    Emergency Hospitalization Hold (72 Hr Hold)     Frequency: Effective Now     Start Date/Time: 03/20/23 0748      Number of Occurrences: Until Specified           Exam:   Patient Vitals for the past 24 hrs:   BP Temp Temp src Pulse Resp SpO2   03/21/23 1431 120/78 -- -- -- -- 97 %   03/21/23 1101 112/66 97.7  F (36.5  C) Oral 120 16 96 %     No visible signs of external trauma.  Patient alert without signs of infectious source, active withdrawal or specific toxidrome.    ED Course:    Medications   haloperidol lactate (HALDOL) injection 5 mg (5 mg Intramuscular $Given 3/20/23 1343)     And   LORazepam (ATIVAN) injection 2 mg (2 mg Intramuscular $Given 3/20/23 1343)     And   diphenhydrAMINE (BENADRYL) injection 50 mg (50 mg Intramuscular $Given 3/22/23 0004)   haloperidol (HALDOL) tablet 5 mg (5 mg Oral $Given 3/21/23 1534)     And   LORazepam (ATIVAN) tablet 2 mg (2 mg Oral $Given 3/21/23 0546)     And   diphenhydrAMINE (BENADRYL) capsule 50 mg (has no administration in time range)   risperiDONE (risperDAL M-TABS) ODT tab 1 mg (1 mg Sublingual $Given 3/21/23 1913)   propranolol (INDERAL) tablet 10 mg (10 mg  Oral $Given 3/21/23 1913)   OLANZapine (zyPREXA) injection 10 mg (10 mg Intramuscular $Given 3/20/23 0800)   OLANZapine zydis (zyPREXA) ODT tab 10 mg (10 mg Oral $Given 3/20/23 1608)   haloperidol lactate (HALDOL) injection 5 mg (5 mg Intramuscular $Given 3/22/23 0003)   LORazepam (ATIVAN) injection 2 mg (2 mg Intramuscular $Given 3/22/23 0004)            I was informed by nursing leadership that tentative plan was to transfer patient to [facility] for more appropriate placement awaiting inpatient psychiatric hospitalization.  Patient at this time is remained calm and cooperative.  Case discussed with my colleague at [facility] emergency department with tentative plan.  Patient remains on a hold through tomorrow at 7:45 AM.  Psychiatry following.      Impression:    ICD-10-CM    1. Delusional disorder (H)  F22       2. Schizoaffective disorder, bipolar type (H)  F25.0       3. Acute psychosis (H)  F23           Plan:    1. Awaiting inpatient mental health admission/transfer.      RESULTS:   No results found for this visit on 03/19/23 (from the past 24 hour(s)).          MD Yakelin Rao, Alonzo Koenig MD  03/22/23 1003

## 2023-03-22 NOTE — PROGRESS NOTES
"Triage & Transition Services, Extended Care     Sophie Steen  March 22, 2023    Sophie is followed related to Long wait time for admission: 66+ hours in the ED and 72 Hour Hold: expires 3/23/23 at 0748. Please see initial DEC Crisis Assessment  for complete assessment information. Medical record is reviewed. Additional notes include: schizophrenia, actively delusional.     Writer attempted to see patient at 11:00am. Patient was asleep. Per report from bedside psych associate patient was recently transferred from [place] and has been cooperative since arrival. Patient     Writer saw patient in-person in room ED15 at 200pm. Patient was disengaged and often closed his eyes and turned his head away from Writer. When asked how he was patient reported \"im waiting on my phone and shoes\".     Plan:  Inpatient Mental Health: for safety and stabilization  72 hour hold expires 3/23/2023 at 0748  [facility] has supported petition for committment. Currently awaiting court hold.     Plan for Care reviewed with Assigned Medical Provider? Yes. Provider, Dr. Grey, response: acknowledged    Extended Care will follow and meet with patient/family/care team as able or requested.     Tammy Calvin, [facility], Extended Care   [phone]        "

## 2023-03-22 NOTE — ED PROVIDER NOTES
Over the past 2 hours, patient has been wandering the hallways in his had staff attempted to redirect him, which has been intermittently successful..     At this time, patient has declined taking oral medications multiple times and has made somewhat threatening stances at people but has not physically assaulted any staff currently.  Given patient's history during this observation of violent behavior, we will call security for physical backup and will need to provide patient with IM sedation of Haldol and Ativan.           Eugene Costello MD  03/21/23 0042

## 2023-03-22 NOTE — ED NOTES
Pt arrived on unit, slept most of morning. Pt was cooperative with admission, took medications, ate lunch and met with . Pt denied SI/HI. Pt appeared calm in mood, disheveled, requested use of shower and ability to pace the halls when feeling anxious.

## 2023-03-22 NOTE — TELEPHONE ENCOUNTER
R: 9pm- Bed search : [facility] only    [facility] has no appropriate beds.  Pt remains on waitlist pending available bed.

## 2023-03-23 ENCOUNTER — TELEPHONE (OUTPATIENT)
Dept: BEHAVIORAL HEALTH | Facility: CLINIC | Age: 28
End: 2023-03-23
Payer: MEDICARE

## 2023-03-23 PROCEDURE — 250N000013 HC RX MED GY IP 250 OP 250 PS 637: Performed by: PSYCHIATRY & NEUROLOGY

## 2023-03-23 PROCEDURE — 250N000013 HC RX MED GY IP 250 OP 250 PS 637: Performed by: INTERNAL MEDICINE

## 2023-03-23 PROCEDURE — 250N000013 HC RX MED GY IP 250 OP 250 PS 637: Performed by: EMERGENCY MEDICINE

## 2023-03-23 RX ORDER — ATOMOXETINE 40 MG/1
40 CAPSULE ORAL 2 TIMES DAILY
Status: DISCONTINUED | OUTPATIENT
Start: 2023-03-23 | End: 2023-03-26 | Stop reason: HOSPADM

## 2023-03-23 RX ORDER — ATOMOXETINE 40 MG/1
40 CAPSULE ORAL DAILY
Status: DISCONTINUED | OUTPATIENT
Start: 2023-03-23 | End: 2023-03-23

## 2023-03-23 RX ORDER — CALCIUM CARBONATE 500 MG/1
500 TABLET, CHEWABLE ORAL DAILY PRN
Status: DISCONTINUED | OUTPATIENT
Start: 2023-03-23 | End: 2023-03-26 | Stop reason: HOSPADM

## 2023-03-23 RX ADMIN — ATOMOXETINE 40 MG: 40 CAPSULE ORAL at 13:56

## 2023-03-23 RX ADMIN — PROPRANOLOL HYDROCHLORIDE 10 MG: 10 TABLET ORAL at 09:03

## 2023-03-23 RX ADMIN — PROPRANOLOL HYDROCHLORIDE 10 MG: 10 TABLET ORAL at 13:57

## 2023-03-23 RX ADMIN — ATOMOXETINE 40 MG: 40 CAPSULE ORAL at 19:07

## 2023-03-23 RX ADMIN — PROPRANOLOL HYDROCHLORIDE 10 MG: 10 TABLET ORAL at 19:07

## 2023-03-23 RX ADMIN — RISPERIDONE 1 MG: 0.5 TABLET, ORALLY DISINTEGRATING ORAL at 09:03

## 2023-03-23 RX ADMIN — RISPERIDONE 1 MG: 0.5 TABLET, ORALLY DISINTEGRATING ORAL at 19:07

## 2023-03-23 ASSESSMENT — ACTIVITIES OF DAILY LIVING (ADL)
ADLS_ACUITY_SCORE: 35

## 2023-03-23 NOTE — PROGRESS NOTES
"  Triage & Transition Services, Extended Care     Care Coordination Progress Note    Patient: Sophie goes by \"Sophie,\" uses they/them pronouns  Date of Service: March 23, 2023  Site of Service: [facility] ED    Individuals Present: Sophie & Pauline May    Session start: 5:00P  Session end: 5:10P  Session duration in minutes: 10min  Patient was seen in-person.     Sophie is being followed by Extended Care. Please see full DEC Assessment done by Tracey Pedraza on 03/19/2023 for further detail.     Details of Therapeutic Interaction:   Pt was pleasant and engaged. Pt shared areas of interest and long-term goals. Pt stated that they intend to drive back to [place] after his stay here.     Therapeutic Goals Addressed During Session:   Build Rapport, Exploration of perspective    Plan:  Recommended by [facility] for Inpatient Mental Health: for safety and stabilization  72 hour hold expires 3/23/2023 at 0748  [facility] has supported petition for committment. Currently awaiting court hold.     Pauline May 3/23/2023 6:59 PM  Extended Care Coordinator- [phone]            "

## 2023-03-23 NOTE — TELEPHONE ENCOUNTER
R: The patient is currently in the [place] ED awaiting placement. Patient is on 72HH and commitment has been filed.      Intake afternpon Bed Search (Done at 2:39 PM) Facilities that have not updated their [facility] access site in the last 12 hours have been contacted for bed availability.      [facility] is posting 0 beds.   [facility] is posting 0 beds.  [facility] is [facility] beds.  [facility] is posting 0 beds.  [facility] is [facility] 0 beds.  [facility] is posting 0 beds.  [facility] is posting 0 beds.  [facility] is posting 0 beds. Low acuity.     [facility] is posting 1 bed. Mixed unit 12+. Low acuity only. Patient not appropriate d/t acuity    [facility] is posting 0 beds. No aggression.   [facility] is posting 0 beds.   [facility] is posting 1 bed. Low acuity only. Patient not appropriate d/t acuity   [facility] is posting 0 beds. Low acuity only.   [facility] is posting 0 beds. 0 acute, 0 med psych, 0 mood disorder- very low acuity.   [facility] is posting 1 bed. Low acuity only. 72 hr hold required. Patient is not appropriate for open bed: commitment is exclusionary at facility.   [facility] is posting 0 beds. Low acuity.     [facility] is posting 4 beds. Vol only, No Hx of aggression, violence, or assault. No sexual offenders. No 72 hr holds.  Patient is not appropriate for open bed: patient on 72HH and commitment- not voluntary   [facility] is posting 0 beds. (Must have the cognitive ability to do programming. No aggressive or violent behavior or recent HX in the last 2 yrs. MH must be primary).  [facility] is posting 0 beds. Low acuity only. Violence and aggression capped.   [facility] is posting 0 beds. Low acuity, Neg Covid.   [facility] is posting 1 bed. Covid neg. Vol only. Combined adolescent and adult unit. No aggressive or violent behavior. No registered sex  offenders.  Patient is not appropriate for open bed: patient on 72HH and commitment- not voluntary   [facility] is posting 8 beds. No Covid needed. Call for details.  Patient is not appropriate for open bed: [place] and [place] be placed in [place].   [facility] is posting 3 beds. Mixed Unit (13+). Low acuity only. Patient not appropriate d/t acuity      [facility] location at capacity. The patient remains on the waitlist. Intake continues to identify appropriate bed placement.

## 2023-03-23 NOTE — ED PROVIDER NOTES
[facility] ED Mental Health Handoff Note:       Brief HPI:  Patient was signed out to me by previous physician see initial ED Provider note for full details of the presentation.   Home meds reviewed and ordered/administered: Yes    Medically stable for inpatient mental health admission: Yes.    Evaluated by mental health: Yes. The recommendation is for inpatient mental health treatment. Bed search in process    Safety concerns: At the time I received sign out, there were no safety concerns.  Emergency department course:  Patient was signed out to me by previous physician.  Currently awaiting transfer or admission for mental health.  There were no issues during my shift.  Patient care will be signed out to the oncoming physician.       Cristobal Steven,   03/23/23 0800

## 2023-03-23 NOTE — TELEPHONE ENCOUNTER
[facility] Access Inpatient Bed Call Log 3/23/2023 2:08 AM    Facilities that have not updated websites in the last 12 hours have been called.       Adults:    [facility] is posting 0 beds.  Negative covid.    [facility] is posting 0 beds. Negative covid.    [facility] is posting 0 beds. 72HH preferred. - 2:11 AM Per Mikayla, they are capped.      [facility] is posting 0 beds.     [facility] is posting 0 beds. Negative covid.    [facility] is posting 0 beds.     [facility] is posting 0 beds. Negative covid. *Low acuity*       [facility] is posting 2 beds. Mixed unit 12+. Low acuity only. Negative covid.     [facility] is positing 0 beds. No aggression.  Negative covid.     [facility] is posting 0 beds.  Negative covid.     [facility] is posting 1 beds. Low acuity only.  Negative covid.     [facility] is posting 0 beds. Negative covid.    [facility] is posting 0 beds. Low acuity. Negative covid.     [facility] is posting 0 beds. 72 HH hold preferred. Low acuity Only. - 2:13AM Per Astrid, they are capped. Call after 10am.      [facility] is posting 0 beds. Low acuity. Negative covid.     [facility] is posting 4 beds. Negative covid. Vol only, No Hx of aggression, violence, or assault. No sexual offenders. No 72 HH holds.        [facility] is posting 0 beds. Negative covid. (Must have the cognitive ability to do programming. No aggressive or violent behavior or recent HX in the last 2 yrs. MH must be primary.)      [facility] is posting 0 beds. Negative Covid. Low acuity only. Violence and aggression capped.       [facility] is posting 0 beds. Low acuity, Negative Covid.    [facility] is posting 0 beds. Covid Negative. Vol only. Combined adolescent and adult unit. No aggressive or violent behavior. No registered sex offenders.     [facility] is posting 6 beds. No covid test required. Per policy,  Intake does not present pt s on a 72HH to [facility].      [facility] is posting 3 beds.  Negative covid. (No lines, drains, or tubes (oxygen, CPAP, IV, etc.)     Pt remains on work list pending appropriate bed availability.

## 2023-03-23 NOTE — ED NOTES
Pt denied any thoughts of SI and hallucination. Alert and orientated x4. Wanted to know where he will be placed after here and how long it will take. Writer told him, they did not have a definite answer and placement was dependent on beds. Pt continues to have 1 to 1 attendant and ambulated in hallway multiple times. Steady on feet and independent with ADL. Behavior has been appropriate  and thoughts have been logical.

## 2023-03-24 ENCOUNTER — TELEPHONE (OUTPATIENT)
Dept: BEHAVIORAL HEALTH | Facility: CLINIC | Age: 28
End: 2023-03-24
Payer: MEDICARE

## 2023-03-24 PROCEDURE — 250N000013 HC RX MED GY IP 250 OP 250 PS 637: Performed by: INTERNAL MEDICINE

## 2023-03-24 PROCEDURE — 250N000013 HC RX MED GY IP 250 OP 250 PS 637: Performed by: PSYCHIATRY & NEUROLOGY

## 2023-03-24 RX ADMIN — PROPRANOLOL HYDROCHLORIDE 10 MG: 10 TABLET ORAL at 08:24

## 2023-03-24 RX ADMIN — ATOMOXETINE 40 MG: 40 CAPSULE ORAL at 08:24

## 2023-03-24 RX ADMIN — RISPERIDONE 1 MG: 0.5 TABLET, ORALLY DISINTEGRATING ORAL at 19:57

## 2023-03-24 RX ADMIN — PROPRANOLOL HYDROCHLORIDE 10 MG: 10 TABLET ORAL at 19:58

## 2023-03-24 RX ADMIN — RISPERIDONE 1 MG: 0.5 TABLET, ORALLY DISINTEGRATING ORAL at 08:24

## 2023-03-24 RX ADMIN — PROPRANOLOL HYDROCHLORIDE 10 MG: 10 TABLET ORAL at 14:22

## 2023-03-24 RX ADMIN — ATOMOXETINE 40 MG: 40 CAPSULE ORAL at 14:22

## 2023-03-24 ASSESSMENT — ACTIVITIES OF DAILY LIVING (ADL)
ADLS_ACUITY_SCORE: 35

## 2023-03-24 NOTE — ED NOTES
Patient is requesting to take second dose of Strattera at this time. RN waiting to speak to provider.

## 2023-03-24 NOTE — TELEPHONE ENCOUNTER
[facility] Access Inpatient Bed Call Log 3/24/2023 2:02 AM    Facilities that have not updated websites in the last 12 hours have been called.       Adults:    [facility] is posting 0 beds.  Negative covid.    [facility] is posting 0 beds. Negative covid.    [facility] is posting 0 beds. 72HH preferred.     [facility] is posting 0 beds.     [facility] is posting 0 beds. Negative covid.    [facility] is posting 0 beds.     [facility] is posting 0 beds. Negative covid. *Low acuity*       [facility] is posting 0 beds. Mixed unit 12+. Low acuity only. Negative covid.     [facility] is positing 0 beds. No aggression.  Negative covid.     [facility] is posting 0 beds.  Negative covid.     [facility] is posting 0 beds. Low acuity only.  Negative covid.     [facility] is posting 0 beds. Negative covid.    [facility] is posting 0 beds. Low acuity. Negative covid.     [facility] is posting 0 beds. 72 HH hold preferred. Low acuity Only.     [facility] is posting 0 beds. Low acuity. Negative covid.     [facility] is posting 1 beds. Negative covid. Vol only, No Hx of aggression, violence, or assault. No sexual offenders. No 72 HH holds.        [facility] is posting 4 beds. Negative covid. (Must have the cognitive ability to do programming. No aggressive or violent behavior or recent HX in the last 2 yrs. MH must be primary.)      [facility] is posting 0 beds. Negative Covid. Low acuity only. Violence and aggression capped.       [facility] is posting 0 beds. Low acuity, Negative Covid.    [facility] is posting 0 beds. Covid Negative. Vol only. Combined adolescent and adult unit. No aggressive or violent behavior. No registered sex offenders.     [facility] is posting 8 beds. No covid test required. Per policy, Intake does not present pt s on a 72HH to [facility].      [facility] is posting 3 beds.   Negative covid. (No lines, drains, or tubes (oxygen, CPAP, IV, etc.)     Pt remains on work list pending appropriate bed availability.

## 2023-03-24 NOTE — TELEPHONE ENCOUNTER
Inpatient Bed Call Log 3.20.23 6:30 pm    Adults:    [facility] is posting 0 bed.      [facility] is posting 0 beds.     [facility] is posting 0 beds.     [facility] is posting 0 beds.     [facility] is posting 0 beds.     [facility] is posting 0 beds.     [facility] is posting 0 beds.     [facility] is posting 0 beds.        [facility] is posting 0 bed. Mixed unit 12+. Low acuity only.      [facility] is positing 0 beds. No aggression.      [facility] is posting 0 beds.      [facility] is posting 2 bed. Low acuity only.     [facility] is posting 1 bed.     [facility] is posting 2 bed. Low acuity.      [facility] is posting 1 bed. 72 hr hold required. Low acuity. Neg COVID.     [facility] is posting 0 beds.  Low acuity only.    [facility] is posting 4 beds. Vol only, No Hx of aggression, violence, or assault. No sexual offenders. No 72 hr holds.     [facility] is posting 3 beds. (Must have the cognitive ability to do programming. No aggressive or violent behavior or recent HX in the last 2 yrs. MH must be primary.)     [facility] is posting 0 beds. Low acuity only. Violence and aggression capped.      [facility] is posting 1 beds. Low acuity, Neg Covid.      [facility] is posting 1 beds. Covid neg. Vol only. Combined adolescent and adult unit. No aggressive or violent behavior. No registered sex offenders.      [facility] is posting 12 beds. Call for details.     [facility] is posting 3 beds.     The pt remains on the waitlist. Intake continues to identify appropriate bed placement.

## 2023-03-24 NOTE — TELEPHONE ENCOUNTER
No appropriate beds within [place].  Bed search update 11:40PM    [facility]: @ cap per website  [facility]: @ cap per website  [facility]: @ cap per website  [facility]: @ cap per website  [facility]: @ cap per website  [facility]: @ cap per website  [facility]: @ cap per website  [facility]:  @ cap per website  [facility]: 0 beds posted  Mixed unit with children.  Not appropriate  [facility]: @ cap per website  [facility]: @ cap per website  [facility]: 2 beds posted.  No beds.    [facility]: 1 LOW ACUITY bed posted.  Not appropriate based on acuity.  [facility]: @ cap per website  [facility]: 1 LOW ACUITY bed posted.  Per Chantal, not appropriate.    [facility] [place] [facility]: @ [facility]: 4 LOW ACUITY beds available.  Voluntary only.  Not appropriate  [facility]: 4 LOW ACUITY beds available.  Not appropriate.  [facility]:  No beds available  [facility]: 0 beds posted  [facility]: 1 bed available.  Voluntary pts only.  Mixed unit with children.   Not appropriate  [facility]: 8 beds posted.  Out of state.  Not appropriate  [facility]: 3 LOW ACUITY beds posted.   Mixed unit with children.  Not appropriate    Pt remains on intake work list awaiting appropriate placement.

## 2023-03-24 NOTE — PHARMACY-ADMISSION MEDICATION HISTORY
Please see Pharmacy-Admission Medication History completed on 3/20/2023 under previous encounter at [facility] for information regarding prior to admission medications.     Burt Hdz, PharmD

## 2023-03-25 ENCOUNTER — TELEPHONE (OUTPATIENT)
Dept: BEHAVIORAL HEALTH | Facility: CLINIC | Age: 28
End: 2023-03-25
Payer: MEDICARE

## 2023-03-25 PROCEDURE — 250N000013 HC RX MED GY IP 250 OP 250 PS 637: Performed by: PSYCHIATRY & NEUROLOGY

## 2023-03-25 PROCEDURE — 250N000013 HC RX MED GY IP 250 OP 250 PS 637: Performed by: INTERNAL MEDICINE

## 2023-03-25 RX ADMIN — RISPERIDONE 1 MG: 0.5 TABLET, ORALLY DISINTEGRATING ORAL at 20:08

## 2023-03-25 RX ADMIN — PROPRANOLOL HYDROCHLORIDE 10 MG: 10 TABLET ORAL at 08:07

## 2023-03-25 RX ADMIN — ATOMOXETINE 40 MG: 40 CAPSULE ORAL at 14:12

## 2023-03-25 RX ADMIN — PROPRANOLOL HYDROCHLORIDE 10 MG: 10 TABLET ORAL at 20:08

## 2023-03-25 RX ADMIN — ATOMOXETINE 40 MG: 40 CAPSULE ORAL at 09:34

## 2023-03-25 RX ADMIN — RISPERIDONE 1 MG: 0.5 TABLET, ORALLY DISINTEGRATING ORAL at 08:07

## 2023-03-25 ASSESSMENT — ACTIVITIES OF DAILY LIVING (ADL)
ADLS_ACUITY_SCORE: 35

## 2023-03-25 NOTE — TELEPHONE ENCOUNTER
No appropriate beds are currently available within the  system. Bed search update (statewide) @ 5:20PM        [facility]: @ cap per website  [facility]: @ cap per website  [facility]: @ cap per website. Per Thomas @ 5:20PM they are full and don t expect any openings until Monday  [facility]: @ cap per website  [facility]: @ cap per website. Per Adela @ 5:21PM they do not have beds avail  [facility]: @ cap per website  [facility]: @ cap per website  [facility]: @ cap per website  [facility]: Posting 1 bed (Mixed unit/Low acuity only). Pt not appropriate d/t acuity  [facility]: @ cap per website  [facility]: @ cap per website  [facility]: @ cap per website  [facility]: @ cap per website  [facility]: Posting beds in [place] and [place]. Per Ariane @ 5:44PM, they only have LOW acuity beds in [place] and cannot accommodate pts with a hx of aggression. Pt not appropriate for current beds  [facility]: Posting 4 beds (No hx of aggression. Voluntary admissions only). Meets exclusionary d/t involuntary status  [facility]: Posting 5 beds (Low acuity only. Must have the cognitive ability to do programming. No aggressive or violent behavior or recent HX in the last 2 yrs. MH must be primary). Pt not appropriate d/t acuity   [facility]: @ cap per website  [facility]placed by [facility]: Posting 1 bed; Low acuity, Neg Covid. Per Linda @ 5:48PM, call back after 7:30PM  [facility]: Posting 1 bed (Covid neg. Voluntary only. Mixed unit. No aggressive or violent behavior. No registered sex offenders). Meets exclusionary d/t involuntary status  [facility]: Posting 12 beds. Facility is in [place]/out of state - pt is on a 72HH and commitment, cannot cross state lines  [facility]: Posting 3 beds (Mixed unit). Per call @ 5:49PM they do not have an appropriate (higher acuity) bed available tonight        Pt remains on work list  until appropriate placement is available

## 2023-03-25 NOTE — PROGRESS NOTES
"  Triage & Transition Services, Extended Care     Care Coordination Progress Note    Patient: Sophie goes by \"Ion,\" uses they/them pronouns  Date of Service: March 25, 2023  Site of Service: [facility]    Individuals Present: Sophie & Ariela Joseph    Session start: 4:48  Session end: 5:15  Session duration in minutes: 27  Patient was seen in-person.     Sophie is being followed by Extended Care. Please see full DEC Assessment done by Tracey Pedraza on 3/19/23 for further detail.     Details of Therapeutic Interaction:   I met with patient to complete a therapeutic check in.  I sought clarification of their name, and they stated that, while their given name was Liliana, they went by Ion.  They stated that they were missing their girlfriend.  I asked exploratory questions and they stated that they had \"just met\" in the \"[employer]\" and that she worked in a behavioral area.  They indicated that they had just met but that they didn't  Keep track of how long they were dating.  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  They stated that they were able to do so with their nephew as well.  They stated that their nephew a\was five at the time, but they have not been able to see their nephew in 3 years.  They expressed some sadness about this.  I validated.  I explored his plan moving forward and they stated that he had a 10 year plan to develop a game that can teach people at the same time.  They stated, more immediately, that they wanted to get up on a MH unit and that they wanted to spend more time thier girlfriend.  I thanked them for meeting with me and for their patience while waiting for a MH bed.  They were receptive    Therapeutic Goals Addressed During Session:   Rapport building, reflective listening, address if there were any concerns    Plan:  Recommended by [facility]: Tracey Joseph " 3/25/2023 5:48 PM  [facility]- [phone]

## 2023-03-25 NOTE — TELEPHONE ENCOUNTER
No appropriate beds are currently available within the  system. Bed search update @ 1AM:       [facility]: @ cap per website  [facility]: @ cap per website  [facility]: @ cap per website  [facility]: @ cap per website  [facility]: @ cap per website  [facility]: @ cap per website  [facility]: @ cap per website  [facility]: @ cap per website  [facility]: @ cap per website  [facility]: @ cap per website  [facility]: @ cap per website  [facility]: Posting 1 bed. Per Kitty @ 01:38 they are full and won t have an update until 10AM  [facility]: @ cap per website  [facility]: Posting beds in [place] and [place]. Per Cammie @ 01:02 they only have very low acuity beds available (No psychosis, aggression, catatonia). Pt not appropriate for current beds  [facility]: Posting 3 beds (No hx of aggression. Voluntary admissions only).   [facility]: Posting 3 beds (Low acuity only. Must have the cognitive ability to do programming. No aggressive or violent behavior or recent HX in the last 2 yrs. MH must be primary).  [facility]: @ cap per website  [facility]: Posting 1 bed. Low acuity, Neg Covid. Per call @ 0:18 they are full but suggests calling back later this AM  [facility]: Posting 1 bed (Covid neg. Voluntary only. Mixed unit. No aggressive or violent behavior. No registered sex offenders).   [facility]: @ cap per website    Pt remains on work list until appropriate placement is available

## 2023-03-25 NOTE — PROGRESS NOTES
Patient was calm and and cooperative during the shift. She denied pain and other psych symptoms. Patient contracted for safety. Patient was medication compliant. She attended group and participated. No major concerns during the shift. Will continue to monitor for behaviors.

## 2023-03-25 NOTE — ED NOTES
"28 yo Trans using \"They, Them\" admitted from ED per wheelchair. 1 to 1 staff in place w/ them. Disheveled , flat but w/ eye contact. Ate a sandwich and drink  then given orientation to unit . They ate well then began pacing up and down unit in a ritualistic manner; such as touching a particular spot on the wall every time. Occasionally they  will stop for a while but not interacting much. Stated they slept as much as they needed but 1 to 1 staff stated they did not sleep last night.   "

## 2023-03-25 NOTE — TELEPHONE ENCOUNTER
R: The patient is currently in the [facility] awaiting placement. Patient is on commitment.     Intake morning Bed Search (Done at 7:57 AM) Facilities that have not updated their [facility] access site in the last 12 hours have been contacted for bed availability.     [facility] is posting 0 beds. 7:57 AM Per Erik, no beds today, working on their own waitlist   [facility] is posting 0 beds.  [facility] is posting 0 beds. 7:57 AM Per Mikayla, no beds at this time   [facility] is posting 0 beds.  [facility] is posting 0 beds.  [facility] is posting 0 beds.  [facility] is posting 0 beds.   is posting 0 beds. Low acuity.    [facility] is posting 1 bed. Mixed unit 12+. Low acuity only. Patient not appropriate due to acuity  [facility] is posting 0 beds. No aggression.   [facility] is posting 0 beds.   [facility] is posting 1 bed. Low acuity only. 7:58 AM Per Pat, no beds at this time [facility] is posting 0 beds. Low acuity only.   [facility] is posting 1 bed. 0 acute, 0 med psych, 0 mood disorder- very low acuity. 7:58 AM Per Pat, no beds at this time [facility] is posting 0 beds. Low acuity only. 72 hr hold required.   [facility] is posting 0 beds. Low acuity. 7:58 AM Per Pat, no beds at this time [facility] [place] [facility] is posting 4 beds. Vol only, No Hx of aggression, violence, or assault. No sexual offenders. No 72 hr holds. Patient not appropriate for open bed: on 72HH   [facility] is posting 5 beds. (Must have the cognitive ability to do programming. No aggressive or violent behavior or recent HX in the last 2 yrs. MH must be primary).  [facility] is posting 0 beds. Low acuity only. Violence and aggression capped.   [facility] is posting 1 bed. Low acuity, Neg Covid. 7:52 AM Per call, facility [facility] is posting 1 bed. Covid  neg. Vol only. Combined adolescent and adult unit. No aggressive or violent behavior. No registered sex offenders. Patient not appropriate for open bed: on 72HH   [facility] [place] is posting 12 beds. No Covid needed. Call for details. Patient not appropriate for open bed: on 72HH and [place] commitment and must be placed in [place]. Facility in [place], [place]. [facility] is posting 3 beds. Mixed Unit (13+). Low acuity only. Patient not consenting to placement here, facility requires patient to have own ride home before review.     [facility] and [place] location at capacity. The patient remains on the waitlist. Intake continues to identify appropriate bed placement.

## 2023-03-25 NOTE — PROGRESS NOTES
"  Triage & Transition Services, Extended Care     Care Coordination Progress Note    Patient: Sophie goes by \"Sophie,\" uses they/them pronouns  Date of Service: March 24, 2023  Site of Service: [facility]    Individuals Present: Sophie & Kristina Loco    Session start: 7:30pm  Session end: 7:35  Session duration in minutes: 5  Patient was seen in-person.     Sophie is being followed by Extended Care. Please see full DEC Assessment done by Tracey Pedraza on 03/19/2023 for further detail.     Details of Therapeutic Interaction:   Writer attempted to meet with patient multiple times. Patient was sleeping. They engaged minimally with writer. Staff state patient is having a good day. Patient declines all items to distract offered at the moment. Encouraged patient to reach out if they wanted to talk again.     Therapeutic Goals Addressed During Session:   Build Rapport.    Plan:  Recommended by [facility]: for safety and stabilization  72 hour hold expires 3/23/2023 at 0748  [facility] has supported petition for committment. Currently awaiting court hold.    Kristina Loco 3/24/2023 8:06 PM  Extended Care Coordinator- [phone]        "

## 2023-03-25 NOTE — ED PROVIDER NOTES
[facility] ED Mental Health Handoff Note:       Brief HPI:  This is a 27 year old male signed out to me.  See initial ED Provider note for full details of the presentation. Interval history is pertinent for psychosis.  They/them pronouns.  On a court hold.    Home meds reviewed and ordered/administered: Yes    Medically stable for inpatient mental health admission: Yes.    Evaluated by mental health: Yes. The recommendation is for inpatient mental health treatment. Bed search in process    Safety concerns: At the time I received sign out, there were no safety concerns.    Hold Status:  Active Orders   Legal    Emergency Hospitalization Hold (72 Hr Hold)     Frequency: Effective Now     Start Date/Time: 03/20/23 0748      Number of Occurrences: Until Specified           Exam:   Patient Vitals for the past 24 hrs:   BP Temp Temp src Pulse Resp SpO2   03/25/23 0809 113/72 97.9  F (36.6  C) Oral 85 18 96 %   03/25/23 0528 110/65 -- -- 76 18 --   03/24/23 2000 115/62 -- -- 105 16 95 %   03/24/23 1524 113/74 98.2  F (36.8  C) Oral 91 -- 96 %           ED Course:    Medications   haloperidol lactate (HALDOL) injection 5 mg (5 mg Intramuscular $Given 3/20/23 1343)     And   LORazepam (ATIVAN) injection 2 mg (2 mg Intramuscular $Given 3/20/23 1343)     And   diphenhydrAMINE (BENADRYL) injection 50 mg (50 mg Intramuscular $Given 3/22/23 0004)   haloperidol (HALDOL) tablet 5 mg (5 mg Oral $Given 3/21/23 1534)     And   LORazepam (ATIVAN) tablet 2 mg (2 mg Oral $Given 3/21/23 0546)     And   diphenhydrAMINE (BENADRYL) capsule 50 mg (has no administration in time range)   risperiDONE (risperDAL M-TABS) ODT tab 1 mg (1 mg Sublingual $Given 3/25/23 0807)   propranolol (INDERAL) tablet 10 mg (10 mg Oral $Given 3/25/23 0807)   calcium carbonate (TUMS) chewable tablet 500 mg (has no administration in time range)   atomoxetine (STRATTERA) capsule 40 mg (40 mg Oral $Given 3/25/23 0934)   OLANZapine (zyPREXA) injection 10 mg  (10 mg Intramuscular $Given 3/20/23 0800)   OLANZapine zydis (zyPREXA) ODT tab 10 mg (10 mg Oral $Given 3/20/23 1608)   haloperidol lactate (HALDOL) injection 5 mg (5 mg Intramuscular $Given 3/22/23 0003)   LORazepam (ATIVAN) injection 2 mg (2 mg Intramuscular $Given 3/22/23 0004)            There were no significant events during my shift.    Patient was signed out to the oncoming provider.       Impression:    ICD-10-CM    1. Delusional disorder (H)  F22       2. Schizoaffective disorder, bipolar type (H)  F25.0       3. Acute psychosis (H)  F23           Plan:    1. Awaiting inpatient mental health admission/transfer.      RESULTS:   No results found for this visit on 03/19/23 (from the past 24 hour(s)).          MD Dc Guevara Cara, MD  03/25/23 8494

## 2023-03-26 ENCOUNTER — TELEPHONE (OUTPATIENT)
Dept: BEHAVIORAL HEALTH | Facility: CLINIC | Age: 28
End: 2023-03-26
Payer: MEDICARE

## 2023-03-26 ENCOUNTER — HOSPITAL ENCOUNTER (INPATIENT)
Facility: HOSPITAL | Age: 28
LOS: 16 days | Discharge: HOME | DRG: 885 | End: 2023-04-11
Attending: STUDENT IN AN ORGANIZED HEALTH CARE EDUCATION/TRAINING PROGRAM | Admitting: STUDENT IN AN ORGANIZED HEALTH CARE EDUCATION/TRAINING PROGRAM
Payer: MEDICARE

## 2023-03-26 VITALS
DIASTOLIC BLOOD PRESSURE: 74 MMHG | HEART RATE: 108 BPM | RESPIRATION RATE: 16 BRPM | BODY MASS INDEX: 22.35 KG/M2 | TEMPERATURE: 97.9 F | HEIGHT: 72 IN | WEIGHT: 165 LBS | OXYGEN SATURATION: 93 % | SYSTOLIC BLOOD PRESSURE: 110 MMHG

## 2023-03-26 DIAGNOSIS — F20.3 UNDIFFERENTIATED SCHIZOPHRENIA (H): Primary | ICD-10-CM

## 2023-03-26 PROCEDURE — 250N000013 HC RX MED GY IP 250 OP 250 PS 637: Performed by: INTERNAL MEDICINE

## 2023-03-26 PROCEDURE — 250N000013 HC RX MED GY IP 250 OP 250 PS 637: Performed by: PSYCHIATRY & NEUROLOGY

## 2023-03-26 PROCEDURE — 124N000001 HC R&B MH

## 2023-03-26 RX ORDER — ACETAMINOPHEN 325 MG/1
650 TABLET ORAL EVERY 4 HOURS PRN
Status: DISCONTINUED | OUTPATIENT
Start: 2023-03-26 | End: 2023-04-11 | Stop reason: HOSPADM

## 2023-03-26 RX ORDER — ATOMOXETINE 40 MG/1
40 CAPSULE ORAL 2 TIMES DAILY
Status: DISCONTINUED | OUTPATIENT
Start: 2023-03-26 | End: 2023-03-27

## 2023-03-26 RX ORDER — AMOXICILLIN 250 MG
1 CAPSULE ORAL 2 TIMES DAILY PRN
Status: DISCONTINUED | OUTPATIENT
Start: 2023-03-26 | End: 2023-04-11 | Stop reason: HOSPADM

## 2023-03-26 RX ORDER — TRAZODONE HYDROCHLORIDE 50 MG/1
50 TABLET, FILM COATED ORAL
Status: DISCONTINUED | OUTPATIENT
Start: 2023-03-26 | End: 2023-04-11 | Stop reason: HOSPADM

## 2023-03-26 RX ORDER — PROPRANOLOL HYDROCHLORIDE 10 MG/1
10 TABLET ORAL 3 TIMES DAILY
Status: DISCONTINUED | OUTPATIENT
Start: 2023-03-26 | End: 2023-04-11 | Stop reason: HOSPADM

## 2023-03-26 RX ORDER — OLANZAPINE 10 MG/1
10 TABLET ORAL EVERY 6 HOURS PRN
Status: DISCONTINUED | OUTPATIENT
Start: 2023-03-26 | End: 2023-04-02

## 2023-03-26 RX ORDER — HYDROXYZINE HYDROCHLORIDE 25 MG/1
25 TABLET, FILM COATED ORAL EVERY 6 HOURS PRN
Status: DISCONTINUED | OUTPATIENT
Start: 2023-03-26 | End: 2023-04-11 | Stop reason: HOSPADM

## 2023-03-26 RX ORDER — RISPERIDONE 1 MG/1
1 TABLET ORAL 2 TIMES DAILY
Status: DISCONTINUED | OUTPATIENT
Start: 2023-03-26 | End: 2023-03-29

## 2023-03-26 RX ORDER — MAGNESIUM HYDROXIDE/ALUMINUM HYDROXICE/SIMETHICONE 120; 1200; 1200 MG/30ML; MG/30ML; MG/30ML
30 SUSPENSION ORAL EVERY 4 HOURS PRN
Status: DISCONTINUED | OUTPATIENT
Start: 2023-03-26 | End: 2023-04-11 | Stop reason: HOSPADM

## 2023-03-26 RX ORDER — OLANZAPINE 10 MG/2ML
10 INJECTION, POWDER, FOR SOLUTION INTRAMUSCULAR EVERY 6 HOURS PRN
Status: DISCONTINUED | OUTPATIENT
Start: 2023-03-26 | End: 2023-04-02

## 2023-03-26 RX ADMIN — ATOMOXETINE 40 MG: 40 CAPSULE ORAL at 09:07

## 2023-03-26 RX ADMIN — PROPRANOLOL HYDROCHLORIDE 10 MG: 10 TABLET ORAL at 14:14

## 2023-03-26 RX ADMIN — ATOMOXETINE 40 MG: 40 CAPSULE ORAL at 14:15

## 2023-03-26 RX ADMIN — RISPERIDONE 1 MG: 0.5 TABLET, ORALLY DISINTEGRATING ORAL at 09:07

## 2023-03-26 RX ADMIN — PROPRANOLOL HYDROCHLORIDE 10 MG: 10 TABLET ORAL at 09:09

## 2023-03-26 ASSESSMENT — ACTIVITIES OF DAILY LIVING (ADL)
TOILETING_ISSUES: NO
ADLS_ACUITY_SCORE: 35
ADLS_ACUITY_SCORE: 35
DIFFICULTY_EATING/SWALLOWING: NO
ADLS_ACUITY_SCORE: 35
CHANGE_IN_FUNCTIONAL_STATUS_SINCE_ONSET_OF_CURRENT_ILLNESS/INJURY: NO
DRESSING/BATHING_DIFFICULTY: NO
DRESS: SCRUBS (BEHAVIORAL HEALTH);INDEPENDENT
ADLS_ACUITY_SCORE: 35
DOING_ERRANDS_INDEPENDENTLY_DIFFICULTY: NO
ADLS_ACUITY_SCORE: 35
ADLS_ACUITY_SCORE: 35
WEAR_GLASSES_OR_BLIND: NO
CONCENTRATING,_REMEMBERING_OR_MAKING_DECISIONS_DIFFICULTY: NO
FALL_HISTORY_WITHIN_LAST_SIX_MONTHS: NO
HYGIENE/GROOMING: INDEPENDENT
ORAL_HYGIENE: INDEPENDENT
WALKING_OR_CLIMBING_STAIRS_DIFFICULTY: NO
ADLS_ACUITY_SCORE: 41
LAUNDRY: UNABLE TO COMPLETE
ADLS_ACUITY_SCORE: 28

## 2023-03-26 NOTE — ED PROVIDER NOTES
[facility] ED Mental Health Handoff Note:       Brief HPI:  This is a 27 year old signed out to me by the previous provider.  See initial ED Provider note for full details of the presentation.   Interval history is pertinent for persistent psychosis.  They them pronouns.  Currently on a court order, but agreeable    Home meds reviewed and ordered/administered: Yes    Medically stable for inpatient mental health admission: Yes    Evaluated by mental health: Yes    Safety concerns: At the time I received sign out, there were no safety concerns.    Hold Status:  Active Orders   Legal    Emergency Hospitalization Hold (72 Hr Hold)     Frequency: Effective Now     Start Date/Time: 03/20/23 0748      Number of Occurrences: Until Specified       Exam:   Patient Vitals for the past 24 hrs:   BP Temp Temp src Pulse Resp SpO2   03/26/23 1032 113/74 -- -- 106 16 97 %   03/25/23 2008 104/72 97.9  F (36.6  C) Oral 120 17 95 %       GEN: resting in bed, calm  PULM: breathing comfortably, in no respiratory distress  PSYCH: Calm and cooperative, interactive    ED Course:    Medications   haloperidol lactate (HALDOL) injection 5 mg (5 mg Intramuscular $Given 3/20/23 1343)     And   LORazepam (ATIVAN) injection 2 mg (2 mg Intramuscular $Given 3/20/23 1343)     And   diphenhydrAMINE (BENADRYL) injection 50 mg (50 mg Intramuscular $Given 3/22/23 0004)   haloperidol (HALDOL) tablet 5 mg (5 mg Oral $Given 3/21/23 1534)     And   LORazepam (ATIVAN) tablet 2 mg (2 mg Oral $Given 3/21/23 0546)     And   diphenhydrAMINE (BENADRYL) capsule 50 mg (has no administration in time range)   risperiDONE (risperDAL M-TABS) ODT tab 1 mg (1 mg Sublingual $Given 3/26/23 0907)   propranolol (INDERAL) tablet 10 mg (10 mg Oral $Given 3/26/23 0909)   calcium carbonate (TUMS) chewable tablet 500 mg (has no administration in time range)   atomoxetine (STRATTERA) capsule 40 mg (40 mg Oral $Given 3/26/23 0907)   OLANZapine (zyPREXA) injection 10 mg (10  mg Intramuscular $Given 3/20/23 0800)   OLANZapine zydis (zyPREXA) ODT tab 10 mg (10 mg Oral $Given 3/20/23 1608)   haloperidol lactate (HALDOL) injection 5 mg (5 mg Intramuscular $Given 3/22/23 0003)   LORazepam (ATIVAN) injection 2 mg (2 mg Intramuscular $Given 3/22/23 0004)            There were no significant events during my shift.    Patient was signed out to the oncoming provider.      Impression:    ICD-10-CM    1. Delusional disorder (H)  F22       2. Schizoaffective disorder, bipolar type (H)  F25.0       3. Acute psychosis (H)  F23           Plan:    1. Awaiting inpatient mental health admission/transfer.      RESULTS:   No results found for this visit on 03/19/23 (from the past 24 hour(s)).    MD Zarina Slaughter Ashley, MD  03/26/23 4016

## 2023-03-26 NOTE — TELEPHONE ENCOUNTER
R:  No beds within [place].  Bed search completed @ 8:20am    [facility] has 0 appropriate beds available. Phone: [phone]  [facility] posting 0 available beds. Phone: [phone]  [facility] posting 0 available beds. Phone: [phone]  [facility] posting 0 available beds. Phone: [phone]  [place] posting 0 available beds. Phone: [phone]  [facility] posting 0 available beds. Phone:[phone]  [facility] posting 0 available beds. Phone: [phone]. Negative covid required.  [place] posting 0 available beds. Phone: [phone] Negative covid required. Low acuity only.   [facility] posting 0 available beds. Phone: [phone]. Need negative covid. Mixed unit 12+, low acuity  [place] posting 0 available beds. Phone: [phone]. No aggression. Negative covid required.   [facility] posting 0 available beds. Phone: [phone]  [facility] posting 0 available beds. Phone: [phone]. Covid negative.   [place] posting 0 available beds. Phone: [phone]. No current aggression. Negative covid required.   [facility] posting 0 available beds. Phone:[phone]  [facility] posting 0 available beds. Phone:[phone]  [facility] posting 0 available beds. Phone: [phone]  [facility] posting 4 available beds. Phone: [phone]. Voluntary patients only. Negative covid required.   [facility] posting 4 available beds. Phone: [phone]. No aggression, neg covid, low acuity, no CD (shared Mixed Unit)  Pt too high acute  [facility] posting 0 available beds. Phone: [phone]  [facility] posting 1 available beds. Phone: [phone]. Low acuity only. Negative covid required.   [facility] posting 1 available beds. Phone: [phone]. Covid negative, no CD, no aggression, NO 72HH-  Pt on a commitment  [facility] posting 1F Shared bed- Low acuity F. Phone: [phone]  [facility] posting 3 available beds. Phone: [phone]  (Pt on a  commitment)      [facility] called Intake @ 10:19am and have a SDU bed available due to a discharge.  Kerri reviewing pt for IPMH @ 10:20am -   Awaiting determination    Kerri called back from [facility] @ 11:50am and feels pt may be a good fit, but waiting now for MD to review chart.  Will call back after final review  -    Kerri called back from [facility] with determination @ 12:36 and accepted pt for IPMH.  Pt placed in queue and HI 5 unit called with dispositions.   [facility] B[facility] Nurse updated with placement

## 2023-03-26 NOTE — ED NOTES
Patient slept calmly through out the night shift and did not get up to use the bathroom. No signs of respiratory distress noted or observed. No behavioral concerns at this time. Continues on 1:1. Staff will continue to monitor.

## 2023-03-26 NOTE — ED NOTES
"Patient prefers to be called \"Ion\" and prefers to be referred to by he/him pronouns. Patient spent the evening out in the lounge pacing and talking to staff. Patient would go to each corner of the lounge and tap on the wall. Patient currently denies SI/HI and hallucinations. Patient requested to leave because he works for the [employer] and \"duty calls.\" It was explained to the patient that the court has supported a petition for commitment and because of that the patient is not able to leave at this time.The patient was accepting of this and said he had no other questions at this time. 1:1 staff is still maintained at this time for safety. VSS; med compliant. Patient has been calm, pleasant, and cooperative.  "

## 2023-03-26 NOTE — TELEPHONE ENCOUNTER
No appropriate beds are currently available within the  system. Bed search update (statewide) @ 12AM:        [facility]: @ cap per website  [facility]: @ cap per website  [facility]: @ cap per website. Per previous call w/ Thomas @ 5:20PM they are full and don t expect any openings until Monday  [facility]: @ cap per website  [facility]: @ cap per website  [facility]: @ cap per website  [facility]: @ cap per website  [facility]: @ cap per website  [facility]: @ cap per website  [facility]: @ cap per website  [facility]: @ cap per website  [facility]: @ cap per website  [facility]: @ cap per website  [facility]: Posting beds in [place] and [place]. Lindy @ 00:25 reports that all locations are completely at capacity tonight for adult beds.   [facility]: Posting 4 beds (No hx of aggression. Voluntary admissions only). Meets exclusionary d/t involuntary status   [facility]: Posting 5 beds (Low acuity only. Must have the cognitive ability to do programming. No aggressive or violent behavior or recent HX in the last 2 yrs. MH must be primary). Pt not appropriate d/t acuity  [facility]: @ cap per website  [facility]: Posting 1 bed; Low acuity, Neg Covid. Per Yue @ 00:26 they are not able to accept admissions overnight but suggests calling back after 8AM  [facility]: Posting 1 bed (Covid neg. Voluntary only. Mixed unit. No aggressive or violent behavior. No registered sex offenders). Meets exclusionary d/t involuntary status  [facility]: Posting 3 beds (Mixed unit). Per previous call @ 5:49PM they do not have any higher acuity beds available tonight        Pt remains on work list until appropriate placement is available

## 2023-03-26 NOTE — ED NOTES
Patient continues on a 1:1 observation for safety and has been asleep since the start of the shift. Respirations even and unlabored. Patient shows no signs of distress or discomfort at this time. Staff will continue to monitor.

## 2023-03-26 NOTE — PROGRESS NOTES
"  Triage & Transition Services, Extended Care     Care Coordination Progress Note    Patient: Sophie goes by \"Sophie,\" uses they/them pronouns  Date of Service: March 26, 2023  Site of Service: [facility]    Individuals Present: Sophie & Pauline May    Session start: 1:05P  Session end: 1:20P  Session duration in minutes: 15min  Patient was seen in-person.     Sophie is being followed by Extended Care. Please see full DEC Assessment done by Tracey Pedraza on 3/19/23 for further detail.     Details of Therapeutic Interaction:   Pt was walking in the lounge when writer approached. Pt was pleasant and engaged. Pt shared areas of interest with writer. Pt stated that they felt anxious d/t the long wait for an IP bed. Pt also stated that they missed their girlfriend and not having access to their phone has been difficult. Pt and writer discussed healthy coping skills and distraction activities.     Therapeutic Goals Addressed During Session:   Build Rapport, Provided active listening    Plan:  Recommended by [facility] for Inpatient Mental Health: for safety and stabilization  72 hour hold expires 3/23/2023 at 0748  [facility] has supported petition for committment. Currently awaiting court hold.     Pauline May 3/26/2023 3:03 PM  Extended Care Coordinator- [phone]            "

## 2023-03-26 NOTE — PROGRESS NOTES
Patient was pacing the unit most of the shift. She denied pain and other psych symptoms. He was medication compliant.  patient contracted for safety. Patient denied SI/HI, SIB. Patient is going to be transferred to [place] this afternoon. He remains on 1:1 for safety. Will continue to monitor for behaviors.

## 2023-03-27 PROCEDURE — 250N000013 HC RX MED GY IP 250 OP 250 PS 637: Performed by: NURSE PRACTITIONER

## 2023-03-27 PROCEDURE — 250N000011 HC RX IP 250 OP 636: Performed by: NURSE PRACTITIONER

## 2023-03-27 PROCEDURE — 250N000013 HC RX MED GY IP 250 OP 250 PS 637: Performed by: STUDENT IN AN ORGANIZED HEALTH CARE EDUCATION/TRAINING PROGRAM

## 2023-03-27 PROCEDURE — 99223 1ST HOSP IP/OBS HIGH 75: CPT | Mod: AI | Performed by: NURSE PRACTITIONER

## 2023-03-27 PROCEDURE — 250N000011 HC RX IP 250 OP 636: Performed by: STUDENT IN AN ORGANIZED HEALTH CARE EDUCATION/TRAINING PROGRAM

## 2023-03-27 PROCEDURE — 124N000001 HC R&B MH

## 2023-03-27 RX ORDER — ATOMOXETINE 40 MG/1
40 CAPSULE ORAL 2 TIMES DAILY
Status: ON HOLD | COMMUNITY
End: 2023-04-11

## 2023-03-27 RX ORDER — ONDANSETRON 4 MG/1
4 TABLET, ORALLY DISINTEGRATING ORAL EVERY 8 HOURS PRN
Status: DISCONTINUED | OUTPATIENT
Start: 2023-03-27 | End: 2023-04-11 | Stop reason: HOSPADM

## 2023-03-27 RX ORDER — ATOMOXETINE 40 MG/1
40 CAPSULE ORAL 2 TIMES DAILY
Status: DISCONTINUED | OUTPATIENT
Start: 2023-03-27 | End: 2023-03-29

## 2023-03-27 RX ADMIN — OLANZAPINE 10 MG: 10 INJECTION, POWDER, FOR SOLUTION INTRAMUSCULAR at 21:05

## 2023-03-27 RX ADMIN — ATOMOXETINE 40 MG: 40 CAPSULE ORAL at 16:02

## 2023-03-27 RX ADMIN — PROPRANOLOL HYDROCHLORIDE 10 MG: 10 TABLET ORAL at 14:00

## 2023-03-27 RX ADMIN — ALUMINUM HYDROXIDE, MAGNESIUM HYDROXIDE, AND SIMETHICONE 30 ML: 200; 200; 20 SUSPENSION ORAL at 18:57

## 2023-03-27 RX ADMIN — ONDANSETRON 4 MG: 4 TABLET, ORALLY DISINTEGRATING ORAL at 20:03

## 2023-03-27 RX ADMIN — ALUMINUM HYDROXIDE, MAGNESIUM HYDROXIDE, AND SIMETHICONE 30 ML: 200; 200; 20 SUSPENSION ORAL at 08:23

## 2023-03-27 RX ADMIN — RISPERIDONE 1 MG: 1 TABLET ORAL at 08:23

## 2023-03-27 RX ADMIN — ATOMOXETINE 40 MG: 40 CAPSULE ORAL at 08:23

## 2023-03-27 RX ADMIN — PROPRANOLOL HYDROCHLORIDE 10 MG: 10 TABLET ORAL at 08:23

## 2023-03-27 ASSESSMENT — ACTIVITIES OF DAILY LIVING (ADL)
ADLS_ACUITY_SCORE: 28
ORAL_HYGIENE: INDEPENDENT
ADLS_ACUITY_SCORE: 28
HYGIENE/GROOMING: INDEPENDENT
ADLS_ACUITY_SCORE: 28
DRESS: SCRUBS (BEHAVIORAL HEALTH)
ADLS_ACUITY_SCORE: 28
LAUNDRY: UNABLE TO COMPLETE

## 2023-03-27 NOTE — PLAN OF CARE
"Social Service Psychosocial Assessment    Presenting Problem: Pt presented from [facility] with increased mental health status including paranoia, delusions, and psychotic symptoms. Pt states he drove up form [place] but does not state why. He shares this is \"classified\". Pt came on a court hold with court dates for commitment through [facility].     Marital Status: Single     Spouse / Children: None     Psychiatric TX HX: This is the pt's first time on our unit. The pt has a hx of a previous hospitalization in [place] before coming to [place] and admitting himself in [facility].      Suicide Risk Assessment: Pt denies SI on admit. He denies a hx of SI, SIB, and SA's. Pt denies SI today during assessment.     Access to Lethal Means (explain): Denies     Family Psych HX: Unknown       A & Ox: x4      Medication Adherence: See H&P    Medical Issues: See H&P      Visual -Motor Functioning: Good    Communication Skills /Needs: Good    Ethnicity: White      Spirituality/Confucianist Affiliation: Unknown     Clergy Request: No      History: None reported      Living Situation: Pt states he lives with his parents in [place]     ADL s: Independent       Education: Unknown     Financial Situation: Pt states he receives [employer]    Occupation: Unemployed. Pt states \"it's classified\". In court pt states that he worked for the [employer] and was on a mission, hence why he was in [place].      Leisure & Recreation: Video Games     Childhood History: Stated in court \"it's classified.\"     Trauma Abuse HX: Stated in court \"it's classified.\"    Relationship / Sexuality: Pt shares that he has a relationship with a girl from the mental health unit in [place]. \"She looks like Sydney [facility]\".    Substance Use/ Abuse: Denies. utox negative.     Chemical Dependency Treatment HX: Denies     Legal Issues: Denies     Significant Life Events: Unknown     Strengths: Ability to communicate needs, in a safe environment, has insurance.     Challenges " /Limitation: Poor coping skills, current mental health symptoms, no services.     Patient Support Contact (Include name, relationship, number, and summary of conversation): No SABRINA's signed at this time.      Interventions:           Community-Based Programs- Would benefit       Medical/Dental Care- No PCP listed       Medication Management- Would benefit      Individual Therapy- Would benefit       Case Management- Will have through commitment       Insurance Coverage- Medicare/ Partly [employer] PPO      Financial Assistance- SSI      Commit/Holman Screening- Confined through [facility]      Suicide Risk Assessment- Pt denies SI on admit. He denies a hx of SI, SIB, and SA's. Pt denies SI today during assessment.      High Risk Safety Plan- Talk to supports; Call crisis lines; Go to local ER if feeling suicidal.    WHITLEY GRATN  3/27/2023  2:52 PM

## 2023-03-27 NOTE — PLAN OF CARE
"  Problem: Adult Behavioral Health Plan of Care  Goal: Patient-Specific Goal (Individualization)  Description: Patient will eat at least 50% of meals.   Patient will sleep at least 6 hours of sleep each NOC.   Patient will attend at least 50% of groups when able to wean out to open unit.   Patient will be compliant with treatment team recommendations.     3/27/2023: Patient may to wean at this time as long as behavior remains appropriate. Treatment team to reassess daily.     Patient is pleasant and cooperative. He is restless, he paces the MHICU lounge. Affect is full range, mood is calm. He denies SI, HI, anxiety, depression, hallucinations, and pain. States \"I've never had hallucinations, and I'm not delusional, I have PTSD\". States he slept well last night. He was given his book to read. Patient is able to wean to the open unit, and attend groups before lunch. Has been appropriate with staff and peers.    Face to face end of shift report communicated to oncoming RN.     Sonia Zurita RN  3/27/2023  2:52 PM    Outcome: Progressing     Problem: Thought Process Alteration  Goal: Optimal Thought Clarity  Outcome: Progressing   Goal Outcome Evaluation:    Plan of Care Reviewed With: patient                   "
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
## 2023-03-27 NOTE — PROGRESS NOTES
Pt had their examination and confinement hearing this afternoon. Pt presented cooperative though paranoid with delusions. Pt was confined to [place] on 3/27. Next hearing is commitment and Holman on 3/30 @ 2:15 PM.

## 2023-03-27 NOTE — PLAN OF CARE
Problem: Adult Behavioral Health Plan of Care  Goal: Patient-Specific Goal (Individualization)  Description: Patient will eat at least 50% of meals.   Patient will sleep at least 6 hours of sleep each NOC.   Patient will attend at least 50% of groups when able to wean out to open unit.   Patient will be compliant with treatment team recommendations.     3/26/2023: Patient unable to wean at this time d/t new admission, unpredictable behavior. Treatment team to reassess daily.   Outcome: Progressing   Goal Outcome Evaluation:       Face to face shift report received from RN. Rounding completed, pt observed.Client rested in room for 7 hours with eyes closed and respirations noted.Face to face report will be communicated to oncoming RN.    Terry Rubio RN  3/27/2023  5:54 AM

## 2023-03-27 NOTE — CARE PLAN
Prior to Admission Medication Reconciliation:     Medications added:   [x] None  [] As listed below:    Medications deleted:   [] None  [x] As listed below:    Propranolol 10 mg TID prn- discontinued while hospitalized 2/22-3/7 at [facility], pt confirmed not taking, was receiving doses at transferring facility     Changes made to existing medications:   [x] None  [] Updated time of day, strengths and frequencies to most current.     Pertinent notes/medications patient takes different than prescribed:     strattera- discontinued when pt hospitalized 2/22-3/7, pt reports it was restarted after.     invega- pt reports they no longer want to be on because they don't like the way it makes them feel.     Pt inquired about vitamin d supplementation.     Pt is not a good historian at this time.     Last times/dates taken verified with patient:  [x] Yes- completed myself   [] Nurse completed, no changes made (double checked entries)  [] Unable to review with patient at this time:    Allergy review:    []Did not review: reviewed by nursing  [x]Allergies reviewed and updated if needed.     Medication reconciliation sources:   [x]Patient  []Patient family member/emergency contact: **  [][facility] Report Review  [x]Epic Chart Review  [x]Care Everywhere review  [x]Pharmacy med list/phone call: [facility] (historical- all discontinued when pt was hospitalized at [facility] and [facility])    Ready for : strattera 10 mg daily, 40 mg daily, lithium carb er 300 mg at bedtime     invega sustenna 234 mg monthly never filled    risperdone 2 mg 2 tabs BID never filled    Lithium carb 450 mg at bedtime er never filled    prozac 10 mg daily never filled     Vitamin d 3 1000 units daily never filled   []Fill dates reflect compliancy. No concerns.  []Fill dates do not reflect compliancy on the following medications:   []Outside meds dispense report:   []Fill dates reflect compliancy. No concerns.  []Fill dates do not reflect  compliancy on the following medications:       []HomeCare medlist, Nursing home or Assisted Living MAR:  []Behavioral Health Provider:      []Other: **    Pharmacy desired at discharge: [facility]    Is patient on coumadin?   [x]No  []Yes      Fill dates and reported compliancy:  [] Compliant: fill dates reflect reported compliancy.   [] Not applicable. Patient is not taking any prescribed maintenance medications at this time.   [] Fill dates do not coincide with compliancy, but reports compliancy.    [] Fill dates reflect compliancy, but reports non-compliancy.   [x] Cannot assess at this time.     Historian accuracy:  [] Excellent- alert and oriented, understands why meds were prescribed and how to take, able to answer specifics  [] Good- alert and oriented, understands why meds were prescribed and how to take, some confusion   [x] Fair- alert and oriented, doesn't know medications without list, cannot answer specifics about medications, but has a decent process for which to take at home  [] Poor- does not know medications, may not have a process to take at home, may be cognitively unable to review at this time  []Medication management done by family member or facility, no concerns about historian accuracy.   [] Cannot assess at this time.     Medication Management:  [] Manages meds independently  [] Family member/ other party manages meds/assists:  [] Meds managed by staff at facility  [] Meds set up by home care, family/other party helps administer  [] Meds set up by home care, self administers  [x] Cannot assess at this time.     Other medications aside from PTA:  [x] Denies taking any other medications aside from those listed in PTA meds, this includes over-the-counter vitamins, supplements and analgesics.   [] Unable to confirm at this time.     Santa Del Cid on 3/27/2023 at 3:04 PM     Notifying appropriate party of changes/additions/discrepancies:  [x]No pertinent changes made, notification not necessary.   []  Notified attending provider via text page/phone call/sticky note or other:  [] Notified other:  [] Medications have not been reconciled by a provider yet, notification not necessary  [] Pt is not admitted to floor yet or patient is boarding, PTA meds completed before admission.     Medications Prior to Admission   Medication Sig Dispense Refill Last Dose     atomoxetine (STRATTERA) 40 MG capsule Take 40 mg by mouth 2 times daily   3/26/2023 at 1415 ED dose     [START ON 3/30/2023] paliperidone (INVEGA SUSTENNA) 234 MG/1.5ML JOSE Inject 234 mg into the muscle every 28 days   3/2/2023 at 1110

## 2023-03-27 NOTE — DISCHARGE INSTRUCTIONS
Behavioral Discharge Planning and Instructions    Summary: Sophie is a 27 year old patient who presented to the [facility] ED on 3/19 for admission to the behavioral health unit.    Main Diagnosis: Schizophrenia    Health Care Follow-up:     Homeless Shelter's in your area:    [facility]  Address: [address] [place] [address]  Phone: [phone]    [facility]  [address] [place] [address]  Phone: [phone]    [facility]  [address] [place] [address]  Phone: [phone]    [facility]  [address] [place] [address]  Phone: [phone]    Attend all scheduled appointments with your outpatient providers. Call at least 24 hours in advance if you need to reschedule an appointment to ensure continued access to your outpatient providers.     Major Treatments, Procedures and Findings:  You were provided with: a psychiatric assessment, assessed for medical stability, medication evaluation and/or management, group therapy, family therapy, individual therapy, CD evaluation/assessment, milieu management, and medical interventions    Symptoms to Report: feeling more aggressive, increased confusion, losing more sleep, mood getting worse, or thoughts of suicide    Early warning signs can include: increased depression or anxiety sleep disturbances increased thoughts or behaviors of suicide or self-harm  increased unusual thinking, such as paranoia or hearing voices    Safety and Wellness:  Take all medicines as directed.  Make no changes unless your doctor suggests them.      Follow treatment recommendations.  Refrain from alcohol and non-prescribed drugs.  Ask your support system to help you reduce your access to items that could harm yourself or others. Items could include:  Firearms  Medicines (both prescribed and over-the-counter)  Knives and other sharp objects  Ropes and like materials  Car keys  If there is a  "concern for safety, call 911. If there is a concern for safety, call 911.    Resources:   Crisis Intervention: 873.982.7429 or 486-373-5304 (TTY: 879.500.8395).  Call anytime for help.  National Hull on Mental Illness (www.[place].beka.org): 270.974.6591 or 491-525-1879.  [place] Association for Children's Mental Health (www.mac.org): 812.305.6925.  Alcoholics Anonymous (www.alcoholics-anonymous.org): Check your phone book for your local chapter.  Suicide Awareness Voices of Education (SAVE) (www.save.org): 532-335-UJJC (1005)  National Suicide Prevention Line (www.mentalhealthmn.org): 425-922-PKZI (4579)  Mental Health Consumer/Survivor Network of [place] (www.mhcsn.net): 306.790.3458 or 433-451-7113  Mental Health Association of [place] (www.mentalhealth.org): 970.967.5830 or 706-268-8841  Self- Management and Recovery Training., [employer]-- Toll free: 715.906.4310  www.[employer]  Text 4 Life: txt \"LIFE\" to 33885 for immediate support and crisis intervention  Crisis text line: Text \"[place]\" to 852301. Free, confidential, 24/7.  Crisis Intervention: 314.328.5145 or 382-342-0613. Call anytime for help.     General Medication Instructions:   See your medication sheet(s) for instructions.   Take all medicines as directed.  Make no changes unless your doctor suggests them.   Go to all your doctor visits.  Be sure to have all your required lab tests. This way, your medicines can be refilled on time.  Do not use any drugs not prescribed by your doctor.  Avoid alcohol.    Advance Directives:   Scanned document on file with [facility]? No scanned doc  Is document scanned? No. Copy Requested.  Honoring Choices Your Rights Handout: Informed and given  Was more information offered? Materials given    The Treatment team has appreciated the opportunity to work with you. If you have any questions or concerns about your recent admission, you can contact the unit which can receive your call 24 hours a day, 7 days a week. They will be able to get " in touch with a Provider if needed. The unit number is [phone] .
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
## 2023-03-28 PROCEDURE — 250N000013 HC RX MED GY IP 250 OP 250 PS 637: Performed by: NURSE PRACTITIONER

## 2023-03-28 PROCEDURE — 124N000001 HC R&B MH

## 2023-03-28 PROCEDURE — 99233 SBSQ HOSP IP/OBS HIGH 50: CPT | Performed by: NURSE PRACTITIONER

## 2023-03-28 PROCEDURE — 250N000013 HC RX MED GY IP 250 OP 250 PS 637: Performed by: STUDENT IN AN ORGANIZED HEALTH CARE EDUCATION/TRAINING PROGRAM

## 2023-03-28 RX ORDER — LOPERAMIDE HCL 2 MG
2 CAPSULE ORAL 4 TIMES DAILY PRN
Status: DISCONTINUED | OUTPATIENT
Start: 2023-03-28 | End: 2023-04-11 | Stop reason: HOSPADM

## 2023-03-28 RX ADMIN — PROPRANOLOL HYDROCHLORIDE 10 MG: 10 TABLET ORAL at 13:50

## 2023-03-28 RX ADMIN — PROPRANOLOL HYDROCHLORIDE 10 MG: 10 TABLET ORAL at 08:37

## 2023-03-28 RX ADMIN — ATOMOXETINE 40 MG: 40 CAPSULE ORAL at 13:50

## 2023-03-28 RX ADMIN — ATOMOXETINE 40 MG: 40 CAPSULE ORAL at 08:37

## 2023-03-28 RX ADMIN — LOPERAMIDE HYDROCHLORIDE 2 MG: 2 CAPSULE ORAL at 12:23

## 2023-03-28 RX ADMIN — RISPERIDONE 1 MG: 1 TABLET ORAL at 20:33

## 2023-03-28 RX ADMIN — PROPRANOLOL HYDROCHLORIDE 10 MG: 10 TABLET ORAL at 20:33

## 2023-03-28 RX ADMIN — LOPERAMIDE HYDROCHLORIDE 2 MG: 2 CAPSULE ORAL at 13:50

## 2023-03-28 RX ADMIN — RISPERIDONE 1 MG: 1 TABLET ORAL at 08:37

## 2023-03-28 ASSESSMENT — ACTIVITIES OF DAILY LIVING (ADL)
ADLS_ACUITY_SCORE: 28
HYGIENE/GROOMING: INDEPENDENT
ADLS_ACUITY_SCORE: 28
ADLS_ACUITY_SCORE: 28
HYGIENE/GROOMING: INDEPENDENT
ADLS_ACUITY_SCORE: 28
ORAL_HYGIENE: INDEPENDENT
DRESS: INDEPENDENT
ORAL_HYGIENE: INDEPENDENT
ADLS_ACUITY_SCORE: 28
DRESS: SCRUBS (BEHAVIORAL HEALTH);INDEPENDENT
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
## 2023-03-28 NOTE — PLAN OF CARE
Face to face shift report received from Terry OH RN. Rounding completed, pt observed.    Problem: Adult Behavioral Health Plan of Care  Goal: Patient-Specific Goal (Individualization)  Description: Patient will eat at least 50% of meals.   Patient will sleep at least 6 hours of sleep each NOC.   Patient will attend at least 50% of groups when able to wean out to open unit.   Patient will be compliant with treatment team recommendations.   Patient will maintain appropriate boundaries with peers and staff.  3/28/2023: Patient may not wean r/t unpredictable sexual behavior.Treatment team to reassess daily.   Outcome: Progressing  Note: Shift Summary:   Patient remains in a room in the MHICU r/t unpredictable behavior and poor boundaries.  Patient quiet and did not answer all assessment questions.  Compliant with AM scheduled medications.  C/o diarrhea x2.  Offered and accepted Imodium 2 mg po at 1223.  No wean this shift r/t inappropriate sexual behavior last PM shift.  Gait is steady and balanced.   1350:  Patient reported another loose stool.  Requested and given Imodium 2 mg po along with his regularly scheduled 1400 meds.  Patient requested one of his books from [employer].  Patient allowed one paperback book to read from his [employer] after safety check.  Problem: Thought Process Alteration  Goal: Optimal Thought Clarity  Description: Patient will report a decrease in auditory hallucinations by discharge.  Patient will be able to hold a reality based conversation.  Outcome: Progressing  Face to face report will be communicated to oncoming RN.    Evie Tavera RN  3/28/2023

## 2023-03-28 NOTE — PLAN OF CARE
"  Problem: Adult Behavioral Health Plan of Care  Goal: Patient-Specific Goal (Individualization)  Description: Patient will eat at least 50% of meals.   Patient will sleep at least 6 hours of sleep each NOC.   Patient will attend at least 50% of groups when able to wean out to open unit.   Patient will be compliant with treatment team recommendations.   Patient will maintain appropriate boundaries with peers and staff.  3/28/2023: Patient may not wean r/t unpredictable sexual behavior.Treatment team to reassess daily.   Outcome: Progressing  Note:     1700 Patient remains in room. Discussed with patient boundries and keeping hands to self. Patient responses are concerning. Discussed possibly weaning from MHICU with staff and patient to stay in MHICU at this time due to responses of \"yeah, I won't do that.\" Patient Has a blunted and intense facial affect. States he continues to have problems with diarrhea today but is hungry for supper meal also. Patient tells writer that he has this problem from time to time. Patient short with writer and others, irritable. Ate well for supper meal.    2100 Patient has remained in his room throughout the evening. Patient took HS medications without any issue. Patient denies having any need at this time. Eye contact is good and appropriate interaction with staff.    Face to face end of shift report communicated to 11-7 shift RN.     Maritza Hale RN  3/28/2023  9:32 PM          Problem: Thought Process Alteration  Goal: Optimal Thought Clarity  Description: Patient will report a decrease in auditory hallucinations by discharge.  Patient will be able to hold a reality based conversation.  Outcome: Progressing     Problem: Suicidal Behavior  Goal: Suicidal Behavior is Absent or Managed  Outcome: Progressing      Goal Outcome Evaluation:    Plan of Care Reviewed With: patient                   "

## 2023-03-28 NOTE — PLAN OF CARE
Problem: Adult Behavioral Health Plan of Care  Goal: Patient-Specific Goal (Individualization)  Description: Patient will eat at least 50% of meals.   Patient will sleep at least 6 hours of sleep each NOC.   Patient will attend at least 50% of groups when able to wean out to open unit.   Patient will be compliant with treatment team recommendations.     3/27/2023: Patient may to wean at this time as long as behavior remains appropriate. Treatment team to reassess daily.   Outcome: Progressing   Goal Outcome Evaluation:       Face to face shift report received from RN. Rounding completed, pt observed.Client rested in room for 7 hours with eyes closed and respirations noted.Face to face report will be communicated to oncoming RN.    Terry Rubio RN  3/28/2023  6:16 AM

## 2023-03-29 PROCEDURE — 250N000013 HC RX MED GY IP 250 OP 250 PS 637: Performed by: STUDENT IN AN ORGANIZED HEALTH CARE EDUCATION/TRAINING PROGRAM

## 2023-03-29 PROCEDURE — 250N000013 HC RX MED GY IP 250 OP 250 PS 637: Performed by: NURSE PRACTITIONER

## 2023-03-29 PROCEDURE — 124N000001 HC R&B MH

## 2023-03-29 PROCEDURE — 99233 SBSQ HOSP IP/OBS HIGH 50: CPT | Performed by: NURSE PRACTITIONER

## 2023-03-29 RX ORDER — RISPERIDONE 2 MG/1
2 TABLET ORAL AT BEDTIME
Status: DISCONTINUED | OUTPATIENT
Start: 2023-03-30 | End: 2023-03-31

## 2023-03-29 RX ORDER — RISPERIDONE 1 MG/1
1 TABLET ORAL DAILY
Status: DISCONTINUED | OUTPATIENT
Start: 2023-03-30 | End: 2023-03-31

## 2023-03-29 RX ORDER — RISPERIDONE 1 MG/1
1 TABLET ORAL AT BEDTIME
Status: COMPLETED | OUTPATIENT
Start: 2023-03-29 | End: 2023-03-29

## 2023-03-29 RX ORDER — RISPERIDONE 2 MG/1
2 TABLET ORAL 2 TIMES DAILY
Status: DISCONTINUED | OUTPATIENT
Start: 2023-03-29 | End: 2023-03-29

## 2023-03-29 RX ADMIN — LOPERAMIDE HYDROCHLORIDE 2 MG: 2 CAPSULE ORAL at 18:46

## 2023-03-29 RX ADMIN — LOPERAMIDE HYDROCHLORIDE 2 MG: 2 CAPSULE ORAL at 12:21

## 2023-03-29 RX ADMIN — PROPRANOLOL HYDROCHLORIDE 10 MG: 10 TABLET ORAL at 08:30

## 2023-03-29 RX ADMIN — OLANZAPINE 10 MG: 10 TABLET, FILM COATED ORAL at 12:21

## 2023-03-29 RX ADMIN — LOPERAMIDE HYDROCHLORIDE 2 MG: 2 CAPSULE ORAL at 09:04

## 2023-03-29 RX ADMIN — PROPRANOLOL HYDROCHLORIDE 10 MG: 10 TABLET ORAL at 20:39

## 2023-03-29 RX ADMIN — RISPERIDONE 2 MG: 2 TABLET ORAL at 08:30

## 2023-03-29 RX ADMIN — HYDROXYZINE HYDROCHLORIDE 25 MG: 25 TABLET, FILM COATED ORAL at 08:30

## 2023-03-29 RX ADMIN — PROPRANOLOL HYDROCHLORIDE 10 MG: 10 TABLET ORAL at 13:46

## 2023-03-29 RX ADMIN — LOPERAMIDE HYDROCHLORIDE 2 MG: 2 CAPSULE ORAL at 22:52

## 2023-03-29 RX ADMIN — RISPERIDONE 1 MG: 1 TABLET ORAL at 20:39

## 2023-03-29 ASSESSMENT — ACTIVITIES OF DAILY LIVING (ADL)
HYGIENE/GROOMING: INDEPENDENT
ADLS_ACUITY_SCORE: 28
ADLS_ACUITY_SCORE: 28
ORAL_HYGIENE: INDEPENDENT
ADLS_ACUITY_SCORE: 28
HYGIENE/GROOMING: INDEPENDENT
DRESS: INDEPENDENT

## 2023-03-29 NOTE — PLAN OF CARE
Problem: Adult Behavioral Health Plan of Care  Goal: Patient-Specific Goal (Individualization)  Description: Patient will eat at least 50% of meals.   Patient will sleep at least 6 hours of sleep each NOC.   Patient will attend at least 50% of groups when able to wean out to open unit.   Patient will be compliant with treatment team recommendations.   Patient will maintain appropriate boundaries with peers and staff.  3/28/2023: Patient may not wean r/t unpredictable sexual behavior.Treatment team to reassess daily.   Outcome: Progressing   Goal Outcome Evaluation:         Face to face shift report received from RN. Rounding completed, pt observed.Client rested in room for 7 hours with eyes closed and respirations noted.      Addendum: hours of sleep update by split-shift RN.

## 2023-03-29 NOTE — PROGRESS NOTES
Provided pt with their copy of their confinement paperwork this morning. Pt is quiet with a flat affect. He does not speak to this writer when prompted with questions. He denies signing an SABRINA for his parents this morning as well. Pt has their final hearing tomorrow 3/30 @ 2:15 PM.     Sent updated notes to [facility].

## 2023-03-29 NOTE — PLAN OF CARE
Face to face shift report received from Faith GATES RN. Rounding completed, pt observed.    Problem: Adult Behavioral Health Plan of Care  Goal: Patient-Specific Goal (Individualization)  Description: Patient will eat at least 50% of meals.   Patient will sleep at least 6 hours of sleep each NOC.   Patient will attend at least 50% of groups when able to wean out to open unit.   Patient will be compliant with treatment team recommendations.   Patient will maintain appropriate boundaries with peers and staff.  3/28/2023: Patient may not wean r/t unpredictable sexual behavior.Treatment team to reassess daily.   Outcome: Progressing  Note: Shift Summary:  Patient remains in a room in the MHICU r/t unpredictable behavior and the need for decreased stimulation.  Patient tense and does not respond verbally at times.  Took scheduled AM meds as well as Hydroxyzine 25 mg po at 0830. Patient allowed to wean.  Paced in hallway with minimal social interactions noted.  No inappropriate sexual behavior observed. Complained of diarrhea and not being able to eat much.  Imodium 2 mg administered at 0904 and 1221. Patient ate lunch in open unit lounge.  Mood more tense and less responses to nurse after lunch.  Offered and accepted Zyprexa 10 mg po at 1221 for agitation.  Resting in room at this time.     Problem: Thought Process Alteration  Goal: Optimal Thought Clarity  Description: Patient will report a decrease in auditory hallucinations by discharge.  Patient will be able to hold a reality based conversation.  Outcome: Progressing  Face to face report will be communicated to oncoming RN.    Evie Tavera RN  3/29/2023

## 2023-03-29 NOTE — PROGRESS NOTES
"[facility] PSYCHIATRY  PROGRESS NOTE     SUBJECTIVE     Prior to interviewing the patient, I met with nursing and reviewed patient's clinical condition. We discussed clinical care both before and after the interview. I have reviewed the patient's clinical course by review of records including previous notes, labs, and vital signs.     Per nursing, the patient had the following behavioral events over the last 24-hours: has been maintaining appropriate boundaries with staff. Can be tense though has been cooperative with staff.    The patient is up in the group room when I go to see him today. He is at the whiteboard writing down several different equations and making graphs. He does spend time trying to explain to me what exactly he is doing. When I attempt to change the subject he puts the marker down and states \"i'm fine\". He does not answer any other questions regarding his mental health. When asked if he had spoken to his parents recently he replies \"they are fine\". He has been weaning out of the MHICU for periods with no behavioral issues so far today. There has been some concern for poor boundaries with staff though none for the past 24 hours. Has been utilizing PRN medications appropriately. Based off of his last Invega Sustenna injection on 3/2, his next injection would be due tomorrow 3/30. If Holman is granted will order this to be given at that time.         MEDICATIONS   Scheduled Meds:    [Held by provider] paliperidone  234 mg Intramuscular Q30 Days     propranolol  10 mg Oral TID     risperiDONE  1 mg Oral At Bedtime     [START ON 3/30/2023] risperiDONE  1 mg Oral Daily     [START ON 3/30/2023] risperiDONE  2 mg Oral At Bedtime     PRN Meds:.acetaminophen, alum & mag hydroxide-simethicone, hydrOXYzine, loperamide, OLANZapine **OR** OLANZapine, ondansetron, senna-docusate, traZODone     ALLERGIES   No Known Allergies     MENTAL STATUS EXAM   Vitals: /56   Pulse 82   Temp 98.2  F (36.8  C) " "(Tympanic)   Resp 16   SpO2 97%     Appearance:  awake, alert, dressed in hospital scrubs, appeared as age stated and disheveled   Attitude: mostly cooperative  Eye Contact: limited, somewhat avoidant  Mood: \"fine\", tense  Affect: blunted  Speech:  clear, coherent  Psychomotor Behavior:  no evidence of tardive dyskinesia, dystonia, or tics  Thought Process:  disorganized, illogical and tangental  Associations:  loosening of associations present  Thought Content:  no evidence of suicidal ideation or homicidal ideation, is having paranoia and delusional thoughts  Insight:  limited  Judgment:  limited  Oriented to:  time, person, and place  Attention Span and Concentration:  limited  Recent and Remote Memory:  fair  Fund of Knowledge: difficult to assess d/t psychosis  Muscle Strength and Tone: normal  Gait and Station: Normal       LABS   No results found for this or any previous visit (from the past 24 hour(s)).      IMPRESSION     This is a 27 year old male with a PMH of psychosis and schizophrenia who was discharged from a  unit in [place] and drove himself 4 hours north to [place] to request behavioral health admission again. He was making delusional and paranoid statements. He talks about being part of the [employer] and the [employer]. Today he answers most of my questions with \"I can't talk about it\" or \"it's classified\". He was screened for commitment while in the ED, he had his confinement hearing this afternoon and will have his commitment and spaulding hearing on 3/30. His next Invega Sustenna injection is due on 3/30, however today he states that he does not want to take this. In review of records it appears that during most of his hospital stays he is usually placed back on Invega Sustenna and stabilizes. In the ED oral Risperdal was scheduled due to his level of agitation and aggression, so has been continued here to prevent any further episodes. It appears from notes that last episode of restraints was on 3/20.    "     DIAGNOSES     1. Schizophrenia       PLAN     Location: Unit 5  Legal Status: Orders Placed This Encounter      Court Hold    Safety Assessment:    Behavioral Orders   Procedures     Code 1 - Restrict to Unit     Routine Programming     As clinically indicated     Status 15     Every 15 minutes.      PTA psychotropic medications stopped:     -None    PTA psychotropic medications continued/changed:     -Invega Sustenna 234 mg IM due 3/30/23- currently refusing  -Strattera 40 mg BID-->discontinued 3/29    New medications tried and stopped:     -None    New medications initiated:     -Risperdal 1 mg BID- started while in ED to supplement Invega due to agitation and aggression towards staff-increase to 1 mg every day and 2 mg at HS  -Propranolol 10 mg TID for anxiety/akathisia- started while in ED    Today's Changes:    -No changes today  -Commitment and Holman hearing tomorrow afternoon 3/30  -If Holman approved will schedule Invega Sustenna 234 mg IM, as it appears this is the medication he has done the best with during several recent hospital stays.     Programming: Patient will be treated in a therapeutic milieu with appropriate individual and group therapies. Education will be provided on diagnoses, medications, and treatments.     Medical diagnoses:  Per medicine    Consult: None  Tests: None    Anticipated LOS: 2-3 weeks  Disposition: unclear at this time    Justification for hospitalization: reasons for hospitalization include potential safety risk to self or others within the last week, decreased functioning in outpatient setting and in the setting of no outpatient management, need for highly structured inpatient management for stabilization of psychiatric symptoms, need for psychiatric medication initiation and stabilization       ATTESTATION      Margaux Lynn NP

## 2023-03-30 ENCOUNTER — MEDICAL CORRESPONDENCE (OUTPATIENT)
Dept: HEALTH INFORMATION MANAGEMENT | Facility: CLINIC | Age: 28
End: 2023-03-30

## 2023-03-30 PROCEDURE — 250N000013 HC RX MED GY IP 250 OP 250 PS 637: Performed by: NURSE PRACTITIONER

## 2023-03-30 PROCEDURE — 250N000013 HC RX MED GY IP 250 OP 250 PS 637: Performed by: STUDENT IN AN ORGANIZED HEALTH CARE EDUCATION/TRAINING PROGRAM

## 2023-03-30 PROCEDURE — 99233 SBSQ HOSP IP/OBS HIGH 50: CPT | Performed by: NURSE PRACTITIONER

## 2023-03-30 PROCEDURE — 124N000001 HC R&B MH

## 2023-03-30 RX ORDER — SIMETHICONE 80 MG
80 TABLET,CHEWABLE ORAL EVERY 4 HOURS PRN
Status: DISCONTINUED | OUTPATIENT
Start: 2023-03-30 | End: 2023-04-11 | Stop reason: HOSPADM

## 2023-03-30 RX ORDER — SIMETHICONE 80 MG
80 TABLET,CHEWABLE ORAL EVERY 6 HOURS PRN
Status: DISCONTINUED | OUTPATIENT
Start: 2023-03-30 | End: 2023-03-30

## 2023-03-30 RX ADMIN — RISPERIDONE 1 MG: 1 TABLET ORAL at 08:47

## 2023-03-30 RX ADMIN — PROPRANOLOL HYDROCHLORIDE 10 MG: 10 TABLET ORAL at 13:44

## 2023-03-30 RX ADMIN — SIMETHICONE 80 MG: 80 TABLET, CHEWABLE ORAL at 21:19

## 2023-03-30 RX ADMIN — PROPRANOLOL HYDROCHLORIDE 10 MG: 10 TABLET ORAL at 20:28

## 2023-03-30 RX ADMIN — LOPERAMIDE HYDROCHLORIDE 2 MG: 2 CAPSULE ORAL at 18:10

## 2023-03-30 RX ADMIN — RISPERIDONE 2 MG: 2 TABLET ORAL at 20:28

## 2023-03-30 ASSESSMENT — ACTIVITIES OF DAILY LIVING (ADL)
ADLS_ACUITY_SCORE: 28
LAUNDRY: UNABLE TO COMPLETE
HYGIENE/GROOMING: INDEPENDENT
ADLS_ACUITY_SCORE: 28
DRESS: INDEPENDENT;SCRUBS (BEHAVIORAL HEALTH)
ADLS_ACUITY_SCORE: 28
ADLS_ACUITY_SCORE: 28
DRESS: INDEPENDENT;SCRUBS (BEHAVIORAL HEALTH)
ORAL_HYGIENE: INDEPENDENT
ADLS_ACUITY_SCORE: 28
ADLS_ACUITY_SCORE: 28
HYGIENE/GROOMING: INDEPENDENT;SHOWER
ADLS_ACUITY_SCORE: 28

## 2023-03-30 NOTE — PROGRESS NOTES
"[facility] PSYCHIATRY  PROGRESS NOTE     SUBJECTIVE     Prior to interviewing the patient, I met with nursing and reviewed patient's clinical condition. We discussed clinical care both before and after the interview. I have reviewed the patient's clinical course by review of records including previous notes, labs, and vital signs.     Per nursing, the patient had the following behavioral events over the last 24-hours: has been maintaining appropriate boundaries with staff. Less tense.    The patient is resting in bed when I see him today. He says very little, stating he is \"fine\". He denies having any questions prior to his court hearing this afternoon. He has been more withdrawn to his room today, when out he does not socialize with peers. He has been taking the scheduled Risperdal to prevent any further episodes of aggression towards staff. Has been maintaining appropriate boundaries with others. He will have his commitment and spaulding hearing this afternoon. Should this be granted will reorder the Invega Sustenna injection which he has, per review of available records, done well on in the past.           MEDICATIONS   Scheduled Meds:    [Held by provider] paliperidone  234 mg Intramuscular Q30 Days     propranolol  10 mg Oral TID     risperiDONE  1 mg Oral Daily     risperiDONE  2 mg Oral At Bedtime     PRN Meds:.acetaminophen, alum & mag hydroxide-simethicone, hydrOXYzine, loperamide, OLANZapine **OR** OLANZapine, ondansetron, senna-docusate, traZODone     ALLERGIES   No Known Allergies     MENTAL STATUS EXAM   Vitals: /62   Pulse 101   Temp 97  F (36.1  C) (Temporal)   Resp 16   SpO2 97%     Appearance:  awake, alert, dressed in hospital scrubs, appeared as age stated and disheveled   Attitude: isolative though cooperative  Eye Contact: limited, somewhat avoidant  Mood: \"fine\", less tense today  Affect: blunted  Speech:  clear, coherent  Psychomotor Behavior:  no evidence of tardive dyskinesia, " "dystonia, or tics  Thought Process:  disorganized, illogical and tangental  Associations:  loosening of associations present  Thought Content:  no evidence of suicidal ideation or homicidal ideation, is having paranoia and delusional thoughts  Insight:  limited  Judgment:  limited  Oriented to:  time, person, and place  Attention Span and Concentration:  limited  Recent and Remote Memory:  fair  Fund of Knowledge: difficult to assess d/t psychosis  Muscle Strength and Tone: normal  Gait and Station: Normal       LABS   No results found for this or any previous visit (from the past 24 hour(s)).      IMPRESSION     This is a 27 year old male with a PMH of psychosis and schizophrenia who was discharged from a  unit in [place] and drove himself 4 hours north to [place] to request behavioral health admission again. He was making delusional and paranoid statements. He talks about being part of the [employer] and the [employer]. Today he answers most of my questions with \"I can't talk about it\" or \"it's classified\". He was screened for commitment while in the ED, he had his confinement hearing this afternoon and will have his commitment and spaulding hearing on 3/30. His next Invega Sustenna injection is due on 3/30, however today he states that he does not want to take this. In review of records it appears that during most of his hospital stays he is usually placed back on Invega Sustenna and stabilizes. In the ED oral Risperdal was scheduled due to his level of agitation and aggression, so has been continued here to prevent any further episodes. It appears from notes that last episode of restraints was on 3/20.        DIAGNOSES     1. Schizophrenia       PLAN     Location: Unit 5  Legal Status: Orders Placed This Encounter      Court Hold    Safety Assessment:    Behavioral Orders   Procedures     Code 1 - Restrict to Unit     Routine Programming     As clinically indicated     Status 15     Every 15 minutes.      PTA psychotropic medications " stopped:     -None    PTA psychotropic medications continued/changed:     -Invega Sustenna 234 mg IM due 3/30/23- currently refusing  -Strattera 40 mg BID-->discontinued 3/29    New medications tried and stopped:     -None    New medications initiated:     -Risperdal 1 mg BID- started while in ED to supplement Invega due to agitation and aggression towards staff-increase to 1 mg every day and 2 mg at HS  -Propranolol 10 mg TID for anxiety/akathisia- started while in ED    Today's Changes:    -No changes today  -Commitment and Holman hearing this afternoon 3/30  -If Holman approved will schedule Invega Sustenna 234 mg IM, as it appears this is the medication he has done the best with during several recent hospital stays.     Programming: Patient will be treated in a therapeutic milieu with appropriate individual and group therapies. Education will be provided on diagnoses, medications, and treatments.     Medical diagnoses:  Per medicine    Consult: None  Tests: None    Anticipated LOS: 2-3 weeks  Disposition: unclear at this time.     Justification for hospitalization: reasons for hospitalization include potential safety risk to self or others within the last week, decreased functioning in outpatient setting and in the setting of no outpatient management, need for highly structured inpatient management for stabilization of psychiatric symptoms, need for psychiatric medication initiation and stabilization       ATTESTATION      Margaux Lynn NP

## 2023-03-30 NOTE — PLAN OF CARE
Problem: Adult Behavioral Health Plan of Care  Goal: Patient-Specific Goal (Individualization)  Description: Patient will eat at least 50% of meals.   Patient will sleep at least 6 hours of sleep each NOC.   Patient will attend at least 50% of groups when able to wean out to open unit.   Patient will be compliant with treatment team recommendations.   Patient will maintain appropriate boundaries with peers and staff.  3/29/2023: Patient may wean if behavior remains appropriate. Must comply with unit rules and maintain good boundaries with peers and staff.Treatment team to reassess daily.   Outcome: Progressing     Disorientated to situation, VSS, denies pain.  Flat and withdrawn affect-eye contact intermittent. Guarded--Gives short y/n answers to questions though cooperative.  Endorses auditory hallucinations-- pause noted at times before answering assessment questions. Weans to open unit-little to no interaction with peers. Walks in campo and reads some of his books on open unit. Pt goal is to get room on open unit so he can have his hard cover books.  Imodium x 2 doses this shift -pt complaints of loose stools. Fluids also encouraged.    Cooperative with medication.   Denies SI, SIB, or HI.      Problem: Thought Process Alteration  Goal: Optimal Thought Clarity  Description: Patient will report a decrease in auditory hallucinations by discharge.  Patient will be able to hold a reality based conversation.  Outcome: Progressing     Problem: Suicidal Behavior  Goal: Suicidal Behavior is Absent or Managed  Outcome: Progressing   Goal Outcome Evaluation:    Plan of Care Reviewed With: patient    Face to face end of shift report communicated to oncoming shift.     Ciara Momin RN  3/29/2023  8:52 PM

## 2023-03-30 NOTE — PLAN OF CARE
Face to face end of shift report will be communicated to oncoming RN.     Problem: Adult Behavioral Health Plan of Care  Goal: Patient-Specific Goal (Individualization)  Description: Patient will eat at least 50% of meals.   Patient will sleep at least 6 hours of sleep each NOC.   Patient will attend at least 50% of groups when able to wean out to open unit.   Patient will be compliant with treatment team recommendations.   Patient will maintain appropriate boundaries with peers and staff.  3/29/2023: Patient may wean if behavior remains appropriate. Must comply with unit rules and maintain good boundaries with peers and staff.Treatment team to reassess daily.   Outcome: Progressing     Problem: Thought Process Alteration  Goal: Optimal Thought Clarity  Description: Patient will report a decrease in auditory hallucinations by discharge.  Patient will be able to hold a reality based conversation.  Outcome: Progressing     Problem: Suicidal Behavior  Goal: Suicidal Behavior is Absent or Managed  Outcome: Progressing   Goal Outcome Evaluation:  Face to face end of shift report obtained from Ciara TAY RN. [facility]  15 minutes and PRN safety checks completed with no noted complains. No self harm or delusional comments noted or reported so far this shift.   0600-Pt awake must of the night. Pt may had slept one hour so far this shift. Pt had no complains and declined offered PRNs.

## 2023-03-30 NOTE — PLAN OF CARE
"Face to face report received from Faith MOORE. Pt. Observed.     Problem: Adult Behavioral Health Plan of Care  Goal: Patient-Specific Goal (Individualization)  Description: Patient will eat at least 50% of meals.   Patient will sleep at least 6 hours of sleep each NOC.   Patient will attend at least 50% of groups when able to wean out to open unit.   Patient will be compliant with treatment team recommendations.   Patient will maintain appropriate boundaries with peers and staff.  3/30/2023: Patient may wean if behavior remains appropriate. Must comply with unit rules and maintain good boundaries with peers and staff.Treatment team to reassess daily.   Outcome: Progressing  Pt. Denies HI, SI, Anxiety, depression, and hallucinations. Pt. Unable to keep his eyes open while talking to staff. When asking pt. Why he was unable to sleep last night, Pt. Reports \"I'm just tired. II slept all night.\" Pt. B/P 101/47 and pule 62, propranolol held. Provider updated. Pt. Cooperative with medications and nursing assessment. Pt. In agreement to update staff to thoughts feelings of wanting to harm self or others. Pt. Sleeping through, not eating breakfast.     Pt. Moved to open unit.      Face to face end of shift report communicated to hernandez MOORE.     Eva Foley RN  3/30/2023         "

## 2023-03-31 ENCOUNTER — MEDICAL CORRESPONDENCE (OUTPATIENT)
Dept: HEALTH INFORMATION MANAGEMENT | Facility: CLINIC | Age: 28
End: 2023-03-31

## 2023-03-31 LAB — SARS-COV-2 RNA RESP QL NAA+PROBE: NEGATIVE

## 2023-03-31 PROCEDURE — 250N000013 HC RX MED GY IP 250 OP 250 PS 637: Performed by: STUDENT IN AN ORGANIZED HEALTH CARE EDUCATION/TRAINING PROGRAM

## 2023-03-31 PROCEDURE — 250N000013 HC RX MED GY IP 250 OP 250 PS 637: Performed by: NURSE PRACTITIONER

## 2023-03-31 PROCEDURE — 99233 SBSQ HOSP IP/OBS HIGH 50: CPT | Performed by: NURSE PRACTITIONER

## 2023-03-31 PROCEDURE — 250N000011 HC RX IP 250 OP 636: Performed by: NURSE PRACTITIONER

## 2023-03-31 PROCEDURE — U0005 INFEC AGEN DETEC AMPLI PROBE: HCPCS | Performed by: PSYCHIATRY & NEUROLOGY

## 2023-03-31 PROCEDURE — 124N000001 HC R&B MH

## 2023-03-31 RX ORDER — BENZTROPINE MESYLATE 1 MG/1
1 TABLET ORAL 2 TIMES DAILY PRN
Status: DISCONTINUED | OUTPATIENT
Start: 2023-03-31 | End: 2023-04-11 | Stop reason: HOSPADM

## 2023-03-31 RX ORDER — RISPERIDONE 1 MG/1
1 TABLET ORAL 2 TIMES DAILY
Status: DISCONTINUED | OUTPATIENT
Start: 2023-03-31 | End: 2023-04-02

## 2023-03-31 RX ADMIN — PALIPERIDONE PALMITATE 234 MG: 234 INJECTION INTRAMUSCULAR at 13:51

## 2023-03-31 RX ADMIN — RISPERIDONE 1 MG: 1 TABLET ORAL at 09:00

## 2023-03-31 RX ADMIN — PROPRANOLOL HYDROCHLORIDE 10 MG: 10 TABLET ORAL at 13:52

## 2023-03-31 RX ADMIN — SIMETHICONE 80 MG: 80 TABLET, CHEWABLE ORAL at 17:39

## 2023-03-31 RX ADMIN — PROPRANOLOL HYDROCHLORIDE 10 MG: 10 TABLET ORAL at 10:30

## 2023-03-31 RX ADMIN — RISPERIDONE 1 MG: 1 TABLET ORAL at 10:30

## 2023-03-31 ASSESSMENT — ACTIVITIES OF DAILY LIVING (ADL)
ADLS_ACUITY_SCORE: 28
HYGIENE/GROOMING: INDEPENDENT
ADLS_ACUITY_SCORE: 28
ORAL_HYGIENE: INDEPENDENT
ADLS_ACUITY_SCORE: 28
ADLS_ACUITY_SCORE: 28
HYGIENE/GROOMING: INDEPENDENT
LAUNDRY: UNABLE TO COMPLETE
ADLS_ACUITY_SCORE: 28
DRESS: INDEPENDENT;SCRUBS (BEHAVIORAL HEALTH)
DRESS: INDEPENDENT;SCRUBS (BEHAVIORAL HEALTH)
ADLS_ACUITY_SCORE: 28

## 2023-03-31 NOTE — PLAN OF CARE
Face to face report received from Faith MOORE. Pt. Observed.     Problem: Adult Behavioral Health Plan of Care  Goal: Plan of Care Review  Outcome: Progressing  Flowsheets (Taken 3/31/2023 0900)  Patient Agreement with Plan of Care: agrees  Pt. Denies HI, SI, Anxiety, depression, and hallucinations. Pt. Has temp of 100 Pt. B/P 105/49 propranolol held. Provider updated. Covid test negative. Pt. Cooperative with medications and nursing assessment. Pt. In agreement to update staff to thoughts feelings of wanting to harm self or others. Pt. Sleeping through, not eating breakfast up and out to unit for lunch. Pt. Pacing, appears to be responding to internal stimuli. laughing and talking to self at inappropriate times.      Face to face end of shift report communicated to oncoming RN.     Eva Foley RN  3/31/2023  12:39 PM

## 2023-03-31 NOTE — PLAN OF CARE
Court paperwork came today. Pt was granted full commitment and spaulding effective 3/27.   Pt received paperwork and a copy was put in pt's chart.

## 2023-03-31 NOTE — PLAN OF CARE
"Problem: Adult Behavioral Health Plan of Care  Goal: Patient-Specific Goal (Individualization)  Description: Patient will eat at least 50% of meals.   Patient will sleep at least 6 hours of sleep each NOC.   Patient will attend at least 50% of groups when able to wean out to open unit.   Patient will be compliant with treatment team recommendations.   Patient will maintain appropriate boundaries with peers and staff.  Outcome: Progressing     Goal Outcome Evaluation:        Pt is withdrawn when up on the unit, keeps to himself and does not interact with peers. Paces in the halls, does not attend group. Pt denied SI, HI and hallucinations, although is noted to laugh out loud to himself while pacing or in his room alone. Pt ate 100% of supper, requested Imodium after eating and stated he had forgotten to show staff that he has diarrhea. Rcv'd Imodium at 1810. Pt showered and rested in bed. Up for snack and more pacing at 9 pm. Pt again requested Imodium, but when further assessed pt stated he did not have loose \"poop,\" but that he had \"bad farts.\" Provider order rcv'd and Simethicone given at 2119. Denied physical complaint, is guarded in conversation.     Problem: Thought Process Alteration  Goal: Optimal Thought Clarity  Description: Patient will report a decrease in auditory hallucinations by discharge.  Patient will be able to hold a reality based conversation.  Outcome: Progressing    Problem: Suicidal Behavior  Goal: Suicidal Behavior is Absent or Managed  Outcome: Progressing    2330 - Face to face end of shift report to be communicated to oncoming shift RN.     Corina Farmer RN  3/30/2023  9:49 PM              "

## 2023-03-31 NOTE — PLAN OF CARE
Face to face end of shift report will be communicated to oncoming RN.     Problem: Adult Behavioral Health Plan of Care  Goal: Patient-Specific Goal (Individualization)  Description: Patient will eat at least 50% of meals.   Patient will sleep at least 6 hours of sleep each NOC.   Patient will attend at least 50% of groups when able to wean out to open unit.   Patient will be compliant with treatment team recommendations.   Patient will maintain appropriate boundaries with peers and staff.    Outcome: Progressing     Problem: Thought Process Alteration  Goal: Optimal Thought Clarity  Description: Patient will report a decrease in auditory hallucinations by discharge.  Patient will be able to hold a reality based conversation.  Outcome: Progressing     Problem: Suicidal Behavior  Goal: Suicidal Behavior is Absent or Managed  Outcome: Progressing   Goal Outcome Evaluation:  Face to face end of shift report obtained from OSCAR Arriaga. Pt observed resting in bed.  15 minutes and PRN safety checks completed with no noted complains. No self harm or delusional comments noted or reported so far this shift.   0600-Pt appeared to had slept all night.

## 2023-04-01 PROCEDURE — 124N000001 HC R&B MH

## 2023-04-01 PROCEDURE — 250N000013 HC RX MED GY IP 250 OP 250 PS 637: Performed by: NURSE PRACTITIONER

## 2023-04-01 PROCEDURE — 99232 SBSQ HOSP IP/OBS MODERATE 35: CPT | Performed by: NURSE PRACTITIONER

## 2023-04-01 PROCEDURE — 250N000013 HC RX MED GY IP 250 OP 250 PS 637: Performed by: STUDENT IN AN ORGANIZED HEALTH CARE EDUCATION/TRAINING PROGRAM

## 2023-04-01 RX ADMIN — PROPRANOLOL HYDROCHLORIDE 10 MG: 10 TABLET ORAL at 14:02

## 2023-04-01 RX ADMIN — RISPERIDONE 1 MG: 1 TABLET ORAL at 08:15

## 2023-04-01 RX ADMIN — RISPERIDONE 1 MG: 1 TABLET ORAL at 20:51

## 2023-04-01 RX ADMIN — PROPRANOLOL HYDROCHLORIDE 10 MG: 10 TABLET ORAL at 08:15

## 2023-04-01 RX ADMIN — OLANZAPINE 10 MG: 10 TABLET, FILM COATED ORAL at 19:02

## 2023-04-01 RX ADMIN — PROPRANOLOL HYDROCHLORIDE 10 MG: 10 TABLET ORAL at 20:51

## 2023-04-01 ASSESSMENT — ACTIVITIES OF DAILY LIVING (ADL)
LAUNDRY: UNABLE TO COMPLETE
ADLS_ACUITY_SCORE: 28
LAUNDRY: UNABLE TO COMPLETE
ADLS_ACUITY_SCORE: 28
DRESS: INDEPENDENT
ADLS_ACUITY_SCORE: 28
HYGIENE/GROOMING: INDEPENDENT
ORAL_HYGIENE: INDEPENDENT
ORAL_HYGIENE: INDEPENDENT
ADLS_ACUITY_SCORE: 28
HYGIENE/GROOMING: INDEPENDENT
ADLS_ACUITY_SCORE: 28
DRESS: INDEPENDENT
ADLS_ACUITY_SCORE: 28

## 2023-04-01 NOTE — PROGRESS NOTES
"[facility] PSYCHIATRY  PROGRESS NOTE     SUBJECTIVE     Prior to interviewing the patient, I met with nursing and reviewed patient's clinical condition. We discussed clinical care both before and after the interview. I have reviewed the patient's clinical course by review of records including previous notes, labs, and vital signs.     Per nursing, the patient had the following behavioral events over the last 24-hours: spaulding approved, received invega sustenna injection today.    Patient has been in bed sleeping this morning, has been up pacing and laughing to self this afternoon. He did receive his commitment and chelly paperwork. Invega Sustenna has been ordered and will be administered this afternoon. He has attended some of the groups, he was in the group room playing [employer] just before I meet with him today. He is quite irritable with me when I attempt to speak with him this afternoon, however I believe that he has been more cooperative with the nursing staff. When asked how he is feeling today he intensely looks at me and states \"there is a chip in my brain that tells me what to do. So I am fine\". He then turns and walks away. I did check in later in the day with nursing and he did cooperate with the injection with no issues.           MEDICATIONS   Scheduled Meds:    paliperidone  234 mg Intramuscular Q28 Days     propranolol  10 mg Oral TID     risperiDONE  1 mg Oral BID     PRN Meds:.acetaminophen, alum & mag hydroxide-simethicone, benztropine, hydrOXYzine, loperamide, OLANZapine **OR** OLANZapine, ondansetron, senna-docusate, simethicone, traZODone     ALLERGIES   No Known Allergies     MENTAL STATUS EXAM   Vitals: /58   Pulse 91   Temp 98.4  F (36.9  C) (Tympanic)   Resp 14   Wt 66.7 kg (147 lb 1.6 oz)   SpO2 96%   BMI 19.95 kg/m      Appearance:  awake, alert, dressed in hospital scrubs, appeared as age stated and disheveled   Attitude: isolative though cooperative  Eye Contact: brief but " "intense  Mood: \"fine\"  Affect: blunted  Speech:  clear, coherent  Psychomotor Behavior:  no evidence of tardive dyskinesia, dystonia, or tics  Thought Process:  disorganized, illogical and tangental  Associations:  loosening of associations present  Thought Content:  no evidence of suicidal ideation or homicidal ideation, is having paranoia and delusional thoughts  Insight:  limited  Judgment:  limited  Oriented to:  time, person, and place  Attention Span and Concentration:  limited  Recent and Remote Memory:  fair  Fund of Knowledge: difficult to assess d/t psychosis  Muscle Strength and Tone: normal  Gait and Station: Normal       LABS   Recent Results (from the past 24 hour(s))   Asymptomatic COVID-19 Virus (Coronavirus) by PCR Nasopharyngeal    Collection Time: 03/31/23 10:38 AM    Specimen: Nasopharyngeal; Swab   Result Value Ref Range    SARS CoV2 PCR Negative Negative         IMPRESSION     This is a 27 year old male with a PMH of psychosis and schizophrenia who was discharged from a  unit in [place] and drove himself 4 hours north to [place] to request behavioral health admission again. He was making delusional and paranoid statements. He talks about being part of the [employer] and the [employer]. Today he answers most of my questions with \"I can't talk about it\" or \"it's classified\". He was screened for commitment while in the ED, he had his confinement hearing this afternoon and will have his commitment and spaulding hearing on 3/30. His next Invega Sustenna injection is due on 3/30, however today he states that he does not want to take this. In review of records it appears that during most of his hospital stays he is usually placed back on Invega Sustenna and stabilizes. In the ED oral Risperdal was scheduled due to his level of agitation and aggression, so has been continued here to prevent any further episodes. It appears from notes that last episode of restraints was on 3/20.        DIAGNOSES     1. Schizophrenia   "     PLAN     Location: Unit 5  Legal Status: Orders Placed This Encounter      Legal status Patient is Committed  Spaulding: Yes (Haldol, Zyprexa, Invega, Risperdal)    Safety Assessment:    Behavioral Orders   Procedures     Code 1 - Restrict to Unit     Routine Programming     As clinically indicated     Status 15     Every 15 minutes.      PTA psychotropic medications stopped:     -Strattera 40 mg BID-->discontinued 3/29    PTA psychotropic medications continued/changed:     -Invega Sustenna 234 mg IM due 3/30/23 (last received 3/2)- spaulding granted and received 3/31      New medications tried and stopped:     -None    New medications initiated:     -Risperdal 1 mg BID- started while in ED to supplement Invega due to agitation and aggression towards staff-increase to 1 mg every day and 2 mg at HS  -Propranolol 10 mg TID for anxiety/akathisia- started while in ED    Today's Changes:    -Commitment and Spaulding approved  -Schedule Invega Sustenna 234 mg IM today 3/31, then every 28 days. Will continue oral Risperdal for now. Patient did not receive adequate loading when starting injection on 3/2    Programming: Patient will be treated in a therapeutic milieu with appropriate individual and group therapies. Education will be provided on diagnoses, medications, and treatments.     Medical diagnoses:  Per medicine    Consult: None  Tests: None    Anticipated LOS: 2-3 weeks  Disposition: unclear at this time.     Justification for hospitalization: reasons for hospitalization include potential safety risk to self or others within the last week, decreased functioning in outpatient setting and in the setting of no outpatient management, need for highly structured inpatient management for stabilization of psychiatric symptoms, need for psychiatric medication initiation and stabilization       ATTESTATION      Margaux Lynn NP

## 2023-04-01 NOTE — PROGRESS NOTES
"[facility] PSYCHIATRY  PROGRESS NOTE     SUBJECTIVE     Prior to interviewing the patient, I met with nursing and reviewed patient's clinical condition. We discussed clinical care both before and after the interview. I have reviewed the patient's clinical course by review of records including previous notes, labs, and vital signs.     Per nursing, the patient had the following behavioral events over the last 24-hours: none    Patient is still resting in bed when I see him today. He offers very little in conversation, dismissive. He reports that he is \"fine\". He denies any issues after receiving the Invega Sustenna injection yesterday. When asked about any hallucinations he just blankly stares at me and does not reply. He then closes his eyes and stops responding to my attempts at interaction.          MEDICATIONS   Scheduled Meds:    paliperidone  234 mg Intramuscular Q28 Days     propranolol  10 mg Oral TID     risperiDONE  1 mg Oral BID     PRN Meds:.acetaminophen, alum & mag hydroxide-simethicone, benztropine, hydrOXYzine, loperamide, OLANZapine **OR** OLANZapine, ondansetron, senna-docusate, simethicone, traZODone     ALLERGIES   No Known Allergies     MENTAL STATUS EXAM   Vitals: /63 (BP Location: Left arm)   Pulse 87   Temp 98.6  F (37  C) (Tympanic)   Resp 16   Wt 66.7 kg (147 lb 1.6 oz)   SpO2 95%   BMI 19.95 kg/m      Appearance:  awake, alert, dressed in hospital scrubs, appeared as age stated and disheveled   Attitude: relatively cooperative, dismissive  Eye Contact: brief  Mood: \"fine\"  Affect: blunted  Speech:  clear, coherent  Psychomotor Behavior:  no evidence of tardive dyskinesia, dystonia, or tics  Thought Process:  offers very little in conversation today  Associations:  difficult to assess  Thought Content:  no evidence of suicidal ideation or homicidal ideation, has been having paranoia and delusional thoughts though today will only say a few words to me  Insight:  " "limited  Judgment:  limited  Oriented to:  time, person, and place  Attention Span and Concentration:  limited  Recent and Remote Memory:  fair  Fund of Knowledge: difficult to assess d/t psychosis  Muscle Strength and Tone: normal  Gait and Station: Normal       LABS   No results found for this or any previous visit (from the past 24 hour(s)).      IMPRESSION     This is a 27 year old male with a PMH of psychosis and schizophrenia who was discharged from a  unit in [place] and drove himself 4 hours north to [place] to request behavioral health admission again. He was making delusional and paranoid statements. He talks about being part of the [employer] and the [employer]. Today he answers most of my questions with \"I can't talk about it\" or \"it's classified\". He was screened for commitment while in the ED, he had his confinement hearing this afternoon and will have his commitment and spaulding hearing on 3/30. His next Invega Sustenna injection is due on 3/30, however today he states that he does not want to take this. In review of records it appears that during most of his hospital stays he is usually placed back on Invega Sustenna and stabilizes. In the ED oral Risperdal was scheduled due to his level of agitation and aggression, so has been continued here to prevent any further episodes. It appears from notes that last episode of restraints was on 3/20.        DIAGNOSES     1. Schizophrenia       PLAN     Location: Unit 5  Legal Status: Orders Placed This Encounter      Legal status Patient is Committed  Spaulding: Yes (Haldol, Zyprexa, Invega, Risperdal)    Safety Assessment:    Behavioral Orders   Procedures     Code 1 - Restrict to Unit     Routine Programming     As clinically indicated     Status 15     Every 15 minutes.      PTA psychotropic medications stopped:     -Strattera 40 mg BID-->discontinued 3/29    PTA psychotropic medications continued/changed:     -Invega Sustenna 234 mg IM due 3/30/23 (last received 3/2)- spaulding " granted and received 3/31      New medications tried and stopped:     -None    New medications initiated:     -Risperdal 1 mg BID- started while in ED to supplement Invega due to agitation and aggression towards staff  -Propranolol 10 mg TID for anxiety/akathisia- started while in ED    Today's Changes:    -Commitment and [place] approved  -Received Invega Sustenna 234 mg IM yesterday 3/31, then every 28 days. Will continue oral Risperdal for now to prevent any episodes of aggression/agitation. Patient did not receive adequate loading when starting injection on 3/2    Programming: Patient will be treated in a therapeutic milieu with appropriate individual and group therapies. Education will be provided on diagnoses, medications, and treatments.     Medical diagnoses:  Per medicine    Consult: None  Tests: None    Anticipated LOS: 2-3 weeks  Disposition: unclear at this time.     Justification for hospitalization: reasons for hospitalization include potential safety risk to self or others within the last week, decreased functioning in outpatient setting and in the setting of no outpatient management, need for highly structured inpatient management for stabilization of psychiatric symptoms, need for psychiatric medication initiation and stabilization       ATTESTATION      Margaux Lynn NP

## 2023-04-01 NOTE — PLAN OF CARE
Face to face end of shift report will be communicated to oncoming RN.    Problem: Adult Behavioral Health Plan of Care  Goal: Patient-Specific Goal (Individualization)  Description: Patient will eat at least 50% of meals.   Patient will sleep at least 6 hours of sleep each NOC.   Patient will attend at least 50% of groups when able to wean out to open unit.   Patient will be compliant with treatment team recommendations.   Patient will maintain appropriate boundaries with peers and staff.    Outcome: Progressing     Problem: Thought Process Alteration  Goal: Optimal Thought Clarity  Description: Patient will report a decrease in auditory hallucinations by discharge.  Patient will be able to hold a reality based conversation.  Outcome: Progressing     Problem: Suicidal Behavior  Goal: Suicidal Behavior is Absent or Managed  Outcome: Progressing   Goal Outcome Evaluation:  Face to face end of shift report obtained from OSCAR Arriaga. Pt observed awake in room.  15 minutes and PRN safety checks completed with no noted complains. No self harm or delusional comments noted or reported so far this shift.   0600-Pt appeared to had slept 6.5 hours so far this shift.

## 2023-04-01 NOTE — PLAN OF CARE
"Problem: Adult Behavioral Health Plan of Care  Goal: Patient-Specific Goal (Individualization)  Description: Patient will eat at least 50% of meals.   Patient will sleep at least 6 hours of sleep each NOC.   Patient will attend at least 50% of groups when able to wean out to open unit.   Patient will be compliant with treatment team recommendations.   Patient will maintain appropriate boundaries with peers and staff.  Outcome: Not Progressing     Goal Outcome Evaluation:     Pt continues to be withdrawn, is more watchful on the unit but not interacting with peers. Denied all mental health criteria, will not stop to talk with writer today and refused to sit for conversation. Pt paced halls much of the evening, noted to frequently pause to do odd stepping behavior. Requested and was given Simethicone at 1739, denied having loose stools today. Pt ate 100% of supper meal and rcv'd double entree. With HS medication pass, pt was given Propranolol and Risperdal in med cup. Pt put pills in his mouth and suddenly froze in a staring position, touched his left ear and after a long pause said \"The chip in my head just said I can't take these.\" Pt pulled pills out of his mouth and returned to med cup. Pt noted to be carefully watching staff as if paranoid. At 1030 pm pt came out of his room to pace once again. Writer asked if pt wanted HS medications at this time, pt paused quietly, then happily said \"yes - I had to get permission, so yes.\" Pt was given Propranolol and Risperdal. Resumed pacing and appeared to be responding to internal stimuli.     Problem: Thought Process Alteration  Goal: Optimal Thought Clarity  Description: Patient will report a decrease in auditory hallucinations by discharge.  Patient will be able to hold a reality based conversation.  Outcome: Not Progressing    Problem: Suicidal Behavior  Goal: Suicidal Behavior is Absent or Managed  Outcome: Progressing     Plan of Care Reviewed With: patient    2330 - " Face to face end of shift report to be communicated to oncoming shift RN.     Corina Farmer RN  3/31/2023  11:00 PM

## 2023-04-01 NOTE — PLAN OF CARE
"Face to face end of shift report received from Faith GATES RN. Rounding completed. Patient observed in hallway. Continued care of pt throughout afternoon shift ([id number])    Pt has been withdrawn, isolative to himself this shift. He appears guarded and suspicious. His eye contact is intense at times. Pt does engage in conversation/assessment appropriately though minimally. He denied any SI/HI and AH/VH. He denied pain. He took all medications as prescribed with thorough mouth checks. He allowed VS prior to med pass when he was initially refusing at beginning of shift. Pt smells strongly of urine- offered shower supplies and he did shower. Pt remains unkempt after shower.     Pt appears more disorganized this evening and suspicious of others. Pt intensely stares at staff/peers when they walk by. His boundaries have been appropriate, but peers have reported increased uncomfortableness. Offered pt Zyprexa 10 mg , but pt initially refused stating \"Who are you to  a person with an IQ of 259. You don't know any better. I don't need that\". Pt walked away and came back within 2 minutes stating \"The chip told me I better take that Zyprexa. [employer] needs me to take that\". Pt took medication and provided a journal- observed sitting in lounge with journal shortly afterwards. Pt took HS medications without any issues. Steady gait-no falls. He is able to make his needs known. Frequent rounding.     Problem: Adult Behavioral Health Plan of Care  Goal: Patient-Specific Goal (Individualization)  Description: Patient will eat at least 50% of meals.   Patient will sleep at least 6 hours of sleep each NOC.   Patient will attend at least 50% of groups when able to wean out to open unit.   Patient will be compliant with treatment team recommendations.   Patient will maintain appropriate boundaries with peers and staff.    Outcome: Progressing     Problem: Thought Process Alteration  Goal: Optimal Thought " Clarity  Description: Patient will report a decrease in auditory hallucinations by discharge.  Patient will be able to hold a reality based conversation.  Outcome: Progressing     Problem: Suicidal Behavior  Goal: Suicidal Behavior is Absent or Managed  Outcome: Progressing       Meghan Carmona RN  4/1/2023  3:46 PM

## 2023-04-02 PROCEDURE — 250N000013 HC RX MED GY IP 250 OP 250 PS 637: Performed by: NURSE PRACTITIONER

## 2023-04-02 PROCEDURE — 250N000013 HC RX MED GY IP 250 OP 250 PS 637: Performed by: STUDENT IN AN ORGANIZED HEALTH CARE EDUCATION/TRAINING PROGRAM

## 2023-04-02 PROCEDURE — 99232 SBSQ HOSP IP/OBS MODERATE 35: CPT | Performed by: NURSE PRACTITIONER

## 2023-04-02 PROCEDURE — 124N000001 HC R&B MH

## 2023-04-02 RX ORDER — OLANZAPINE 10 MG/2ML
10 INJECTION, POWDER, FOR SOLUTION INTRAMUSCULAR 2 TIMES DAILY PRN
Status: DISCONTINUED | OUTPATIENT
Start: 2023-04-02 | End: 2023-04-11 | Stop reason: HOSPADM

## 2023-04-02 RX ORDER — VITAMIN B COMPLEX
25 TABLET ORAL DAILY
Status: DISCONTINUED | OUTPATIENT
Start: 2023-04-02 | End: 2023-04-11 | Stop reason: HOSPADM

## 2023-04-02 RX ORDER — OLANZAPINE 5 MG/1
5 TABLET, ORALLY DISINTEGRATING ORAL 2 TIMES DAILY
Status: DISCONTINUED | OUTPATIENT
Start: 2023-04-02 | End: 2023-04-06

## 2023-04-02 RX ORDER — OLANZAPINE 10 MG/1
10 TABLET ORAL 2 TIMES DAILY PRN
Status: DISCONTINUED | OUTPATIENT
Start: 2023-04-02 | End: 2023-04-11 | Stop reason: HOSPADM

## 2023-04-02 RX ADMIN — PROPRANOLOL HYDROCHLORIDE 10 MG: 10 TABLET ORAL at 20:13

## 2023-04-02 RX ADMIN — PROPRANOLOL HYDROCHLORIDE 10 MG: 10 TABLET ORAL at 09:07

## 2023-04-02 RX ADMIN — PROPRANOLOL HYDROCHLORIDE 10 MG: 10 TABLET ORAL at 14:14

## 2023-04-02 RX ADMIN — Medication 25 MCG: at 14:14

## 2023-04-02 RX ADMIN — OLANZAPINE 10 MG: 10 TABLET, FILM COATED ORAL at 14:14

## 2023-04-02 RX ADMIN — RISPERIDONE 1 MG: 1 TABLET ORAL at 09:07

## 2023-04-02 RX ADMIN — OLANZAPINE 5 MG: 5 TABLET, ORALLY DISINTEGRATING ORAL at 20:15

## 2023-04-02 ASSESSMENT — ACTIVITIES OF DAILY LIVING (ADL)
ADLS_ACUITY_SCORE: 38
ADLS_ACUITY_SCORE: 28
HYGIENE/GROOMING: INDEPENDENT;PROMPTS
ADLS_ACUITY_SCORE: 28
ADLS_ACUITY_SCORE: 38
ADLS_ACUITY_SCORE: 28
ADLS_ACUITY_SCORE: 38
ADLS_ACUITY_SCORE: 28
ORAL_HYGIENE: INDEPENDENT;PROMPTS
ADLS_ACUITY_SCORE: 38
LAUNDRY: UNABLE TO COMPLETE
DRESS: INDEPENDENT
ADLS_ACUITY_SCORE: 38
ADLS_ACUITY_SCORE: 28

## 2023-04-02 NOTE — PLAN OF CARE
Problem: Adult Behavioral Health Plan of Care  Goal: Patient-Specific Goal (Individualization)  Description: Patient will eat at least 50% of meals.   Patient will sleep at least 6 hours of sleep each NOC.   Patient will attend at least 50% of groups when able to wean out to open unit.   Patient will be compliant with treatment team recommendations.   Patient will maintain appropriate boundaries with peers and staff.    Outcome: Progressing   Goal Outcome Evaluation:             Face to face shift report received from RN. Rounding completed, pt observed. Client rested in room for 7 hours with eyes closed and respirations noted.Face to face report will be communicated to oncoming RN.    Terry Rubio RN  4/2/2023  6:32 AM

## 2023-04-02 NOTE — PLAN OF CARE
"Face to face end of shift report received from Terry OH RN. Rounding completed. Patient observed in bed, awake.     Pt has been withdrawn, isolative to his room this morning. He has been calm, cooperative. Boundaries remain poor and he needed redirection to not touch peers. He became defensive stating he didn't touch anyone despite fellow RN watching him do so. He reports that he slept very well \"Maybe a little too well\". He denied any SI/HI and AH/VH. Denied pain. He did not make any delusional statements to this writer. He still appears guarded and suspicious of others. He took AM medications without any hesitation. Pt remained in his room until this afternoon when he came out to request shower supplies- UA entered clean utility room and this writer observed pt putting foot in door to hold open. Redirected him to let the door close and he did so. Pt offered Zyprexa this afternoon and he accepted with his 1400 medications. He has a steady gait- no falls. He is able to make his needs known. Frequent rounding.     Problem: Adult Behavioral Health Plan of Care  Goal: Patient-Specific Goal (Individualization)  Description: Patient will eat at least 50% of meals.   Patient will sleep at least 6 hours of sleep each NOC.   Patient will attend at least 50% of groups when able to wean out to open unit.   Patient will be compliant with treatment team recommendations.   Patient will maintain appropriate boundaries with peers and staff.    Outcome: Progressing     Problem: Thought Process Alteration  Goal: Optimal Thought Clarity  Description: Patient will report a decrease in auditory hallucinations by discharge.  Patient will be able to hold a reality based conversation.  Outcome: Progressing     Problem: Suicidal Behavior  Goal: Suicidal Behavior is Absent or Managed  Outcome: Progressing        Meghan Carmona RN  4/2/2023  11:04 AM    "

## 2023-04-02 NOTE — PLAN OF CARE
"  Problem: Adult Behavioral Health Plan of Care  Goal: Patient-Specific Goal (Individualization)  Description: Patient will eat at least 50% of meals.   Patient will sleep at least 6 hours of sleep each [facility].   Patient will attend at least 50% of groups when able to wean out to open unit.   Patient will be compliant with treatment team recommendations.   Patient will maintain appropriate boundaries with peers and staff.    Outcome: Progressing  Note: 15:35: Received end of shift report from OSCAR Christianson. Pt resting in bed upon arrival---prn olanzapine appears to be effective---    19:00: Withdrawn/paranoid activity,ambulates in halls post dinner, lying in bed awake @ present-- offers little communication, somewhat dismissive/argumentive, boundaries appropriate-- book from home given to patient--     20:20: Med compliant, suspicious/paranoid activity, poor eye contact, declines need for prn pharmacological interventions.      21:25: Conversing with staff up at nurses station, delusional thought process/content continues, grandeur re: chip placed by the [employer] @ the age of 24, \"it talks to me all day, et what to think about, tells me no\" \"I'm always having fun exercising my mind\"--- intermittent bouts of intense stares @ female peer- appropriate boundaries reinforced---     23:10: Pt returned to room for bed--     Face to face end of shift report communicated to on-coming [facility] staff.     Mary Castro RN  4/2/2023  11:46 PM      Problem: Thought Process Alteration  Goal: Optimal Thought Clarity  Description: Patient will report a decrease in auditory hallucinations by discharge.  Patient will be able to hold a reality based conversation.  Outcome: Progressing     Problem: Suicidal Behavior  Goal: Suicidal Behavior is Absent or Managed  Outcome: Progressing   Goal Outcome Evaluation:                        "

## 2023-04-03 PROCEDURE — 99232 SBSQ HOSP IP/OBS MODERATE 35: CPT | Performed by: NURSE PRACTITIONER

## 2023-04-03 PROCEDURE — 250N000013 HC RX MED GY IP 250 OP 250 PS 637: Performed by: NURSE PRACTITIONER

## 2023-04-03 PROCEDURE — 124N000001 HC R&B MH

## 2023-04-03 PROCEDURE — 250N000013 HC RX MED GY IP 250 OP 250 PS 637: Performed by: STUDENT IN AN ORGANIZED HEALTH CARE EDUCATION/TRAINING PROGRAM

## 2023-04-03 RX ADMIN — OLANZAPINE 5 MG: 5 TABLET, ORALLY DISINTEGRATING ORAL at 19:41

## 2023-04-03 RX ADMIN — PROPRANOLOL HYDROCHLORIDE 10 MG: 10 TABLET ORAL at 13:28

## 2023-04-03 RX ADMIN — Medication 25 MCG: at 09:28

## 2023-04-03 RX ADMIN — PROPRANOLOL HYDROCHLORIDE 10 MG: 10 TABLET ORAL at 19:40

## 2023-04-03 RX ADMIN — OLANZAPINE 5 MG: 5 TABLET, ORALLY DISINTEGRATING ORAL at 09:28

## 2023-04-03 RX ADMIN — PROPRANOLOL HYDROCHLORIDE 10 MG: 10 TABLET ORAL at 09:28

## 2023-04-03 ASSESSMENT — ACTIVITIES OF DAILY LIVING (ADL)
ADLS_ACUITY_SCORE: 38
DRESS: INDEPENDENT
ADLS_ACUITY_SCORE: 28
ADLS_ACUITY_SCORE: 38
ADLS_ACUITY_SCORE: 28
ADLS_ACUITY_SCORE: 38
LAUNDRY: UNABLE TO COMPLETE
ADLS_ACUITY_SCORE: 28
ORAL_HYGIENE: INDEPENDENT
ADLS_ACUITY_SCORE: 38
ADLS_ACUITY_SCORE: 38
HYGIENE/GROOMING: INDEPENDENT

## 2023-04-03 NOTE — PROGRESS NOTES
[facility] PSYCHIATRY  PROGRESS NOTE     SUBJECTIVE     Prior to interviewing the patient, I met with nursing and reviewed patient's clinical condition. We discussed clinical care both before and after the interview. I have reviewed the patient's clinical course by review of records including previous notes, labs, and vital signs.     Per nursing, the patient had the following behavioral events over the last 24-hours: poor boundaries, touching other patients and needing redirection    Patient is still resting in bed when I see him today. He again says very little in conversation. He does ask if he can have Vitamin D added to his medications, as he takes this at home. He reportedly has been touching peers, has needed redirection to keep his hands to himself. Does become defensive when this is discussed with him. Staff did report that he has responded well to Zyprexa when used as needed, will schedule this for now. He does appear preoccupied at times though does not make any delusional statements to me today. Did attempt to discuss Lithium, which he had been taking in the past, however patient shakes his head no that he does not want to take this again though does not express why.       MEDICATIONS   Scheduled Meds:    OLANZapine zydis  5 mg Oral BID     paliperidone  234 mg Intramuscular Q28 Days     propranolol  10 mg Oral TID     cholecalciferol  25 mcg Oral Daily     PRN Meds:.acetaminophen, alum & mag hydroxide-simethicone, benztropine, hydrOXYzine, loperamide, OLANZapine **OR** OLANZapine, ondansetron, senna-docusate, simethicone, traZODone     ALLERGIES   No Known Allergies     MENTAL STATUS EXAM   Vitals: /61   Pulse 92   Temp 97.5  F (36.4  C) (Temporal)   Resp 17   Wt 66.7 kg (147 lb 1.6 oz)   SpO2 96%   BMI 19.95 kg/m      Appearance:  awake, alert, dressed in hospital scrubs, appeared as age stated and disheveled   Attitude: relatively cooperative, dismissive  Eye Contact: brief  Mood:  "\"fine\"  Affect: blunted  Speech:  clear, coherent, offers little  Psychomotor Behavior:  no evidence of tardive dyskinesia, dystonia, or tics  Thought Process:  difficult to assess, offers very little in conversation today  Associations:  difficult to assess  Thought Content:  no evidence of suicidal ideation or homicidal ideation, has been having paranoia and delusional thoughts though today will only say a few words to me  Insight:  limited  Judgment:  limited  Oriented to:  time, person, and place  Attention Span and Concentration:  limited  Recent and Remote Memory:  fair  Fund of Knowledge: difficult to assess d/t psychosis  Muscle Strength and Tone: normal  Gait and Station: Normal       LABS   No results found for this or any previous visit (from the past 24 hour(s)).      IMPRESSION     This is a 27 year old male with a PMH of psychosis and schizophrenia who was discharged from a  unit in [place] and drove himself 4 hours north to [place] to request behavioral health admission again. He was making delusional and paranoid statements. He talks about being part of the [employer] and the [employer]. Today he answers most of my questions with \"I can't talk about it\" or \"it's classified\". He was screened for commitment while in the ED, he had his confinement hearing this afternoon and will have his commitment and spaulding hearing on 3/30. His next Invega Sustenna injection is due on 3/30, however today he states that he does not want to take this. In review of records it appears that during most of his hospital stays he is usually placed back on Invega Sustenna and stabilizes. In the ED oral Risperdal was scheduled due to his level of agitation and aggression, so has been continued here to prevent any further episodes. It appears from notes that last episode of restraints was on 3/20.        DIAGNOSES     1. Schizophrenia       PLAN     Location: Unit 5  Legal Status: Orders Placed This Encounter      Legal status Patient is " Committed  Spaulding: Yes (Haldol, Zyprexa, Invega, Risperdal)    Safety Assessment:    Behavioral Orders   Procedures     Code 1 - Restrict to Unit     Routine Programming     As clinically indicated     Status 15     Every 15 minutes.      PTA psychotropic medications stopped:     -Strattera 40 mg BID-->discontinued 3/29    PTA psychotropic medications continued/changed:     -Invega Sustenna 234 mg IM due 3/30/23 (last received 3/2)- spaulding granted and received 3/31      New medications tried and stopped:     -None    New medications initiated:     -Zyprexa 5 mg BID  -Propranolol 10 mg TID for anxiety/akathisia    Today's Changes:    -Commitment and Spaulding approved  -Received Invega Sustenna 234 mg IM on 3/2, then again on 3/31, scheduled every 28 days.   -Discussed adding a mood stabilizer however patient shakes his head 'no'    Programming: Patient will be treated in a therapeutic milieu with appropriate individual and group therapies. Education will be provided on diagnoses, medications, and treatments.     Medical diagnoses:  Per medicine    Consult: None  Tests: None    Anticipated LOS: 2-3 weeks  Disposition: unclear at this time. Likely home with family and outpatient services    Justification for hospitalization: reasons for hospitalization include potential safety risk to self or others within the last week, decreased functioning in outpatient setting and in the setting of no outpatient management, need for highly structured inpatient management for stabilization of psychiatric symptoms, need for psychiatric medication initiation and stabilization       ATTESTATION      Margaux Lynn NP

## 2023-04-03 NOTE — PLAN OF CARE
Problem: Adult Behavioral Health Plan of Care  Goal: Patient-Specific Goal (Individualization)  Description: Patient will eat at least 50% of meals.   Patient will sleep at least 6 hours of sleep each NOC.   Patient will attend at least 50% of groups when able to wean out to open unit.   Patient will be compliant with treatment team recommendations.   Patient will maintain appropriate boundaries with peers and staff.  Outcome: Progressing     Problem: Thought Process Alteration  Goal: Optimal Thought Clarity  Description: Patient will report a decrease in auditory hallucinations by discharge.  Patient will be able to hold a reality based conversation.  Outcome: Progressing     Problem: Suicidal Behavior  Goal: Suicidal Behavior is Absent or Managed  Outcome: Progressing     Face to face shift report received from [facility]. Rounding completed, pt observed laying in bed, appeared to be sleeping.    Patient received their breakfast in their room, proceeded to sleep. Patient woke up and was to the lounge about 0845 requesting shower supplies. Staff as able to obtain vitals as follows; Blood Pressure 138/84  Temp 97.5 Pulse 88 Respirations 17 SpO2 98% RA. Patient proceed to shower. Met with patient after his shower. Patient stated he felt very relaxed after his shower. He denies depression, SI/HI, hallucinations and pain. He stated he has some anxiety, not to the point of needing a PRN, he said that he normally doesn't have any anxiety, but is able to manage it ok so far. Patient agreed to let nursing know if it got to be too much. Patient took morning medications as ordered. He ate about 25% of his breakfast.    Patient laid in bed all morning, he did not attend group. Patient ate lunch in his room and ate about 25% of lunch. He was encouraged to come out of his room and attend groups. He did not sound too enthusiastic about this.     Patient's mid day Blood Pressure 136/61 Pulse 92 SpO2 96% RA, patient took  afternoon medications as ordered.     Patient was observed walking the halls at the end of the shift.    Face to face report will be communicated to oncoming RN.    Ciara David RN  4/3/2023  3:04 PM

## 2023-04-03 NOTE — PLAN OF CARE
Face to face end of shift report recived from Mary GATES RN.  Pt observed sleeping in bed.         Problem: Adult Behavioral Health Plan of Care  Goal: Patient-Specific Goal (Individualization)  Description: Patient will eat at least 50% of meals.   Patient will sleep at least 6 hours of sleep each NOC.   Patient will attend at least 50% of groups when able to wean out to open unit.   Patient will be compliant with treatment team recommendations.   Patient will maintain appropriate boundaries with peers and staff.    Outcome: Progressing     Problem: Thought Process Alteration  Goal: Optimal Thought Clarity  Description: Patient will report a decrease in auditory hallucinations by discharge.  Patient will be able to hold a reality based conversation.  Outcome: Progressing     Problem: Suicidal Behavior  Goal: Suicidal Behavior is Absent or Managed  Outcome: Progressing   Goal Outcome Evaluation:        Pt appeared to sleep 7 hours through the night.  Pt appeared to sleep with a normal breathing pattern. 15 min checks completed.  Pt did not express/exhibt any SI/self harm behaviors.  Pt makes needs known.  Face to face end of shift report communicated to oncoming RN.    Racquel Guzmán RN  4/3/2023

## 2023-04-03 NOTE — PLAN OF CARE
"Face to face end of shift report received from Ciara TUCKER RN. Rounding completed. Patient observed in hallway.     Pt withdrawn, isolative to self. He is observed walking the hallways smiling and talking to himself. Pt makes strange mannerisms with his hand and then states \"I'm throwing balls of fire\". Pt requests another book from personal belongings. He was provided this- pt went into his room and read. He denied any SI/HI and AH/VH. Denied pain. Pt eyesight remains intense and stares at other pts. He is appears guarded and mistrustful- though less than previous shifts. Pt appears unkempt- encouraged to shower. Pt requested HS medications early. Mouth checks performed. Steady gait-no falls. She is able to make her needs known. Frequent rounding.     Problem: Adult Behavioral Health Plan of Care  Goal: Patient-Specific Goal (Individualization)  Description: Patient will eat at least 50% of meals.   Patient will sleep at least 6 hours of sleep each NOC.   Patient will attend at least 50% of groups when able to wean out to open unit.   Patient will be compliant with treatment team recommendations.   Patient will maintain appropriate boundaries with peers and staff.    Outcome: Progressing     Problem: Thought Process Alteration  Goal: Optimal Thought Clarity  Description: Patient will report a decrease in auditory hallucinations by discharge.  Patient will be able to hold a reality based conversation.  Outcome: Progressing     Problem: Suicidal Behavior  Goal: Suicidal Behavior is Absent or Managed  Outcome: Progressing       Meghan Carmona RN  4/3/2023  4:24 PM    "

## 2023-04-03 NOTE — PROGRESS NOTES
"[facility] PSYCHIATRY  PROGRESS NOTE     SUBJECTIVE     Prior to interviewing the patient, I met with nursing and reviewed patient's clinical condition. We discussed clinical care both before and after the interview. I have reviewed the patient's clinical course by review of records including previous notes, labs, and vital signs.     Per nursing, the patient had the following behavioral events over the last 24-hours: isolative today.    Patient is resting in bed when I see them today. Patient states that they are waiting to speak with a  about finding an apartment in [place]. Patient continues to decline to allow staff to contact family in [place] or provide any contact numbers for family or friends to help coordinate discharge. Patient continues to offer little in conversation. When up walking the halls staff report that they appear to be smiling and laughing to self at times. Did review that patient would likely be assigned a  and further discussion of appropriate discharge disposition could then take place.        MEDICATIONS   Scheduled Meds:    OLANZapine zydis  5 mg Oral BID     paliperidone  234 mg Intramuscular Q28 Days     propranolol  10 mg Oral TID     cholecalciferol  25 mcg Oral Daily     PRN Meds:.acetaminophen, alum & mag hydroxide-simethicone, benztropine, hydrOXYzine, loperamide, OLANZapine **OR** OLANZapine, ondansetron, senna-docusate, simethicone, traZODone     ALLERGIES   No Known Allergies     MENTAL STATUS EXAM   Vitals: /62   Pulse 84   Temp 98.4  F (36.9  C) (Tympanic)   Resp 16   Wt 66.7 kg (147 lb 1.6 oz)   SpO2 96%   BMI 19.95 kg/m      Appearance:  awake, alert, dressed in hospital scrubs, appeared as age stated and disheveled   Attitude: relatively cooperative, dismissive  Eye Contact: brief  Mood: \"fine\"  Affect: blunted  Speech:  clear, coherent, offers little  Psychomotor Behavior:  no evidence of tardive dyskinesia, dystonia, or tics  Thought " "Process:  difficult to assess, offers very little in conversation today  Associations:  difficult to assess  Thought Content:  no evidence of suicidal ideation or homicidal ideation, has been having paranoia and delusional thoughts though today will only say a few words to me  Insight:  limited  Judgment:  limited  Oriented to:  time, person, and place  Attention Span and Concentration:  limited  Recent and Remote Memory:  fair  Fund of Knowledge: difficult to assess d/t psychosis  Muscle Strength and Tone: normal  Gait and Station: Normal       LABS   No results found for this or any previous visit (from the past 24 hour(s)).      IMPRESSION     This is a 27 year old male with a PMH of psychosis and schizophrenia who was discharged from a  unit in [place] and drove himself 4 hours north to [place] to request behavioral health admission again. He was making delusional and paranoid statements. He talks about being part of the [employer] and the [employer]. Today he answers most of my questions with \"I can't talk about it\" or \"it's classified\". He was screened for commitment while in the ED, he had his confinement hearing this afternoon and will have his commitment and spaulding hearing on 3/30. His next Invega Sustenna injection is due on 3/30, however today he states that he does not want to take this. In review of records it appears that during most of his hospital stays he is usually placed back on Invega Sustenna and stabilizes. In the ED oral Risperdal was scheduled due to his level of agitation and aggression, so has been continued here to prevent any further episodes. It appears from notes that last episode of restraints was on 3/20.        DIAGNOSES     1. Schizophrenia       PLAN     Location: Unit 5  Legal Status: Orders Placed This Encounter      Legal status Patient is Committed  Spaulding: Yes (Haldol, Zyprexa, Invega, Risperdal)    Safety Assessment:    Behavioral Orders   Procedures     Code 1 - Restrict to Unit     Routine " Programming     As clinically indicated     Status 15     Every 15 minutes.      PTA psychotropic medications stopped:     -Strattera 40 mg BID-->discontinued 3/29    PTA psychotropic medications continued/changed:     -Invega Sustenna 234 mg IM due 3/30/23 (last received 3/2)- spaulding granted and received 3/31      New medications tried and stopped:     -None    New medications initiated:     -Zyprexa 5 mg BID  -Propranolol 10 mg TID for anxiety/akathisia    Today's Changes:    -Commitment and Spaulding approved  -Received Invega Sustenna 234 mg IM on 3/2, then again on 3/31, scheduled every 28 days.     Programming: Patient will be treated in a therapeutic milieu with appropriate individual and group therapies. Education will be provided on diagnoses, medications, and treatments.     Medical diagnoses:  Per medicine    Consult: None  Tests: None    Anticipated LOS: 2-3 weeks  Disposition: unclear at this time. Likely home with family and outpatient services    Justification for hospitalization: reasons for hospitalization include potential safety risk to self or others within the last week, decreased functioning in outpatient setting and in the setting of no outpatient management, need for highly structured inpatient management for stabilization of psychiatric symptoms, need for psychiatric medication initiation and stabilization       TREATMENT TEAM CARE PLAN     Progress: Continued symptoms. Does appear preoccupied, offers little in conversation.     Continued Stay Criteria/Rationale: Continued symptoms without sufficient improvement/resolution. Restart Invega Sustenna.    Medical/Physical: See above.    Precautions: See above.     Plan: Continue inpatient care with unit support and medication management. Stabilize and hopefully return home with parents.    Rationale for change in precautions or plan: NA due to no change.    Participants: Margaux Lynn, NP, Nursing, SW, OT.    The patient's care was discussed with  the treatment team and chart notes were reviewed.         ATTESTATION      Margaux Lynn NP

## 2023-04-04 PROCEDURE — 99232 SBSQ HOSP IP/OBS MODERATE 35: CPT | Performed by: NURSE PRACTITIONER

## 2023-04-04 PROCEDURE — 250N000013 HC RX MED GY IP 250 OP 250 PS 637: Performed by: STUDENT IN AN ORGANIZED HEALTH CARE EDUCATION/TRAINING PROGRAM

## 2023-04-04 PROCEDURE — 250N000013 HC RX MED GY IP 250 OP 250 PS 637: Performed by: NURSE PRACTITIONER

## 2023-04-04 PROCEDURE — 124N000001 HC R&B MH

## 2023-04-04 RX ADMIN — Medication 25 MCG: at 08:16

## 2023-04-04 RX ADMIN — OLANZAPINE 5 MG: 5 TABLET, ORALLY DISINTEGRATING ORAL at 08:16

## 2023-04-04 RX ADMIN — OLANZAPINE 5 MG: 5 TABLET, ORALLY DISINTEGRATING ORAL at 20:37

## 2023-04-04 RX ADMIN — PROPRANOLOL HYDROCHLORIDE 10 MG: 10 TABLET ORAL at 08:16

## 2023-04-04 RX ADMIN — PROPRANOLOL HYDROCHLORIDE 10 MG: 10 TABLET ORAL at 13:17

## 2023-04-04 RX ADMIN — PROPRANOLOL HYDROCHLORIDE 10 MG: 10 TABLET ORAL at 20:37

## 2023-04-04 ASSESSMENT — ACTIVITIES OF DAILY LIVING (ADL)
DRESS: INDEPENDENT
ORAL_HYGIENE: INDEPENDENT
HYGIENE/GROOMING: INDEPENDENT
ADLS_ACUITY_SCORE: 28
LAUNDRY: UNABLE TO COMPLETE
ADLS_ACUITY_SCORE: 28

## 2023-04-04 NOTE — PHARMACY
Credited the patient for the Invega Sustenna 234 mg injection administered on 3/31/2023 due to receiving drug from the 's free trial program. The patient is eligible for 1 additional free unit this calendar year per program rules.     Candy BenitezD on 4/4/2023

## 2023-04-04 NOTE — PLAN OF CARE
"Face to face shift report received from Meghan GOMEZ RN.       Problem: Adult Behavioral Health Plan of Care  Goal: Patient-Specific Goal (Individualization)  Description: Patient will eat at least 50% of meals.   Patient will sleep at least 6 hours of sleep each NOC.   Patient will attend at least 50% of groups when able to wean out to open unit.   Patient will be compliant with treatment team recommendations.   Patient will maintain appropriate boundaries with peers and staff.  Outcome: Progressing  Medication compliant. Rambling and excessive during conversation. Grandiose at times, informed writer they are a great . Pt spoke to writer about attending college for [employer], however declined to answer questions regarding this when asked such as if they finished their degree. Frequently pacing in hallways, at times laughing and smiling inappropriately. Attends groups to \"write math equations on the white board.\"    Problem: Thought Process Alteration  Goal: Optimal Thought Clarity  Description: Patient will report a decrease in auditory hallucinations by discharge.  Patient will be able to hold a reality based conversation.  Outcome: Not Progressing       Problem: Suicidal Behavior  Goal: Suicidal Behavior is Absent or Managed  Outcome: Progressing   Free from self harm or injury this shift.       Face to face end of shift report to be communicated to oncoming RN.       "

## 2023-04-04 NOTE — PROGRESS NOTES
"Spoke with pt this afternoon. He states that he would like this writer to sit down with him and \"look through [facility] to find housing\". Explained to pt that this is not a possibility and that options are tight from here due to pt's insurance from [place]. Pt then asked if he can go to his car from here so that he can drive down to [place]. Let pt know this writer would discuss further of his plan with the county to make sure they are aware of his situation.   "

## 2023-04-04 NOTE — PLAN OF CARE
Face to face end of shift report received from Terry OH RN. Rounding completed. Patient observed in bed, awake.     Pt withdrawn, isolative to self. He paces the hallways frequently. He does walk into groups, but doesn't stay long. He does engage with staff when addressed. He has a bright affect today and doesn't appear as tense. Observed staring at peers while walking in the hallways still. He does appear to have improved since last evening shift even. He denied any SI/HI and AH/VH. Denied pain. Mouth checks performed. Steady gait-no falls. He is able to make his needs known. Frequent rounding.     Problem: Adult Behavioral Health Plan of Care  Goal: Patient-Specific Goal (Individualization)  Description: Patient will eat at least 50% of meals.   Patient will sleep at least 6 hours of sleep each NOC.   Patient will attend at least 50% of groups when able to wean out to open unit.   Patient will be compliant with treatment team recommendations.   Patient will maintain appropriate boundaries with peers and staff.    Outcome: Progressing     Problem: Thought Process Alteration  Goal: Optimal Thought Clarity  Description: Patient will report a decrease in auditory hallucinations by discharge.  Patient will be able to hold a reality based conversation.  Outcome: Progressing     Problem: Suicidal Behavior  Goal: Suicidal Behavior is Absent or Managed  Outcome: Progressing       Meghna Carmona RN  4/4/2023  7:43 AM

## 2023-04-04 NOTE — PLAN OF CARE
Problem: Adult Behavioral Health Plan of Care  Goal: Patient-Specific Goal (Individualization)  Description: Patient will eat at least 50% of meals.   Patient will sleep at least 6 hours of sleep each NOC.   Patient will attend at least 50% of groups when able to wean out to open unit.   Patient will be compliant with treatment team recommendations.   Patient will maintain appropriate boundaries with peers and staff.    Outcome: Progressing   Goal Outcome Evaluation:               Face to face shift report received from RN. Rounding completed, pt observed. Client rested for 7 hours this shift with eyes closed and respirations noted.Face to face report will be communicated to oncoming RN.    Terry Rubio RN  4/4/2023  6:10 AM

## 2023-04-05 PROCEDURE — 250N000013 HC RX MED GY IP 250 OP 250 PS 637: Performed by: NURSE PRACTITIONER

## 2023-04-05 PROCEDURE — 124N000001 HC R&B MH

## 2023-04-05 PROCEDURE — 250N000013 HC RX MED GY IP 250 OP 250 PS 637: Performed by: STUDENT IN AN ORGANIZED HEALTH CARE EDUCATION/TRAINING PROGRAM

## 2023-04-05 PROCEDURE — 99232 SBSQ HOSP IP/OBS MODERATE 35: CPT | Mod: 95 | Performed by: PSYCHIATRY & NEUROLOGY

## 2023-04-05 RX ORDER — HALOPERIDOL 5 MG/1
5 TABLET ORAL ONCE
Status: COMPLETED | OUTPATIENT
Start: 2023-04-05 | End: 2023-04-05

## 2023-04-05 RX ADMIN — TRAZODONE HYDROCHLORIDE 50 MG: 50 TABLET ORAL at 02:10

## 2023-04-05 RX ADMIN — HALOPERIDOL 5 MG: 5 TABLET ORAL at 21:22

## 2023-04-05 RX ADMIN — OLANZAPINE 10 MG: 10 TABLET, FILM COATED ORAL at 15:55

## 2023-04-05 RX ADMIN — PROPRANOLOL HYDROCHLORIDE 10 MG: 10 TABLET ORAL at 19:04

## 2023-04-05 RX ADMIN — PROPRANOLOL HYDROCHLORIDE 10 MG: 10 TABLET ORAL at 08:19

## 2023-04-05 RX ADMIN — OLANZAPINE 10 MG: 10 TABLET, FILM COATED ORAL at 23:35

## 2023-04-05 RX ADMIN — OLANZAPINE 5 MG: 5 TABLET, ORALLY DISINTEGRATING ORAL at 08:19

## 2023-04-05 RX ADMIN — OLANZAPINE 5 MG: 5 TABLET, ORALLY DISINTEGRATING ORAL at 19:04

## 2023-04-05 RX ADMIN — Medication 25 MCG: at 08:19

## 2023-04-05 ASSESSMENT — ACTIVITIES OF DAILY LIVING (ADL)
LAUNDRY: UNABLE TO COMPLETE
ADLS_ACUITY_SCORE: 28
ORAL_HYGIENE: INDEPENDENT
ADLS_ACUITY_SCORE: 28
HYGIENE/GROOMING: INDEPENDENT
ADLS_ACUITY_SCORE: 28
DRESS: INDEPENDENT

## 2023-04-05 NOTE — PLAN OF CARE
Face to face end of shift report received from Terry OH RN. Rounding completed. Patient observed in bed, awake.     Pt has been withdrawn, isolative to his room this morning. He came out for breakfast and this writer passed AM medications. Pt allowed mouth checks. He ate a couple bites of breakfast and then went back to his room. Pt showered. He paced the hallways for a short period of time, but then returned to his room. Pt did only ate a couple bites of his lunch today. He appears more paranoid this afternoon and keeps to himself more pacing the hallways. He would not allow staff to retake vitals- 1400 Propanolol not administered. Pt makes weird and incongruent comments to fellow RN. Boundaries are more appropriate this shift, but he continues to look at peers/staff intently. Steady gait- no falls. Frequent rounding.       Problem: Adult Behavioral Health Plan of Care  Goal: Patient-Specific Goal (Individualization)  Description: Patient will eat at least 50% of meals.   Patient will sleep at least 6 hours of sleep each NOC.   Patient will attend at least 50% of groups when able to wean out to open unit.   Patient will be compliant with treatment team recommendations.   Patient will maintain appropriate boundaries with peers and staff.    Outcome: Progressing     Problem: Thought Process Alteration  Goal: Optimal Thought Clarity  Description: Patient will report a decrease in auditory hallucinations by discharge.  Patient will be able to hold a reality based conversation.  Outcome: Progressing     Problem: Suicidal Behavior  Goal: Suicidal Behavior is Absent or Managed  Outcome: Progressing       Meghan Carmona RN  4/5/2023  9:57 AM

## 2023-04-05 NOTE — PLAN OF CARE
"Face to face shift report received from OSCAR Christianson.       Problem: Adult Behavioral Health Plan of Care  Goal: Patient-Specific Goal (Individualization)  Description: Patient will eat at least 50% of meals.   Patient will sleep at least 6 hours of sleep each NOC.   Patient will attend at least 50% of groups when able to wean out to open unit.   Patient will be compliant with treatment team recommendations.   Patient will maintain appropriate boundaries with peers and staff.  Outcome: Progressing  Pleasant. At beginning of shift pt reading what appears to be a [employer] mathematics textbook. Pt reports that while reading he is able to \"feel the chakra\" and \"essense\" of the author of this book.  Maintains appropriate boundaries with others, however observed to stare intensely at others at times. 75% of dinner eaten. No reports of pain.   1555:  Pt appears preoccupied, smiling inappropriately while pacing quickly around unit. Pt offered and agreeable to receive Zyprexa 10mg PO at this time.   1905: Pt requested HS medications at this time. When asked reasoning pt reports \"disregulation of my mind.\" Pt appeared preoccupied and observed to be pacing quickly and constantly in hallways. Administered early per pt request.   2030: Pt requesting Zyprexa due to \"disregulation of the mind\". Declined to explain symptoms further. Intense eye contact observed. Appears preoccupied. Pt reported minimal effect from previous PRN medications for psychosis. Kristina, RUBIA updated. Haldol 5mg ONCE ordered. Administered at 2122.   2223:Pt resting comfortably in bed.   2305: Pt requesting bottled water. Writer questioned pt about effect of Haldol 5mg PO. Pt reports \"It is interesting.\" Writer asked if pt continues to feel that his mind is \"disregulated.\" Pt reports \"no not anymore. I think I might have been just over thinking things. You know like just not wanting to be here anymore.\" Eye contact much less intense, more appropriate. "     Problem: Thought Process Alteration  Goal: Optimal Thought Clarity  Description: Patient will report a decrease in auditory hallucinations by discharge.  Patient will be able to hold a reality based conversation.  Outcome: Progressing         Problem: Suicidal Behavior  Goal: Suicidal Behavior is Absent or Managed  Outcome: Progressing   Free from self harm or injury this shift.     Face to face end of shift report to be communicated to oncoming RN.

## 2023-04-05 NOTE — PLAN OF CARE
Problem: Adult Behavioral Health Plan of Care  Goal: Patient-Specific Goal (Individualization)  Description: Patient will eat at least 50% of meals.   Patient will sleep at least 6 hours of sleep each NOC.   Patient will attend at least 50% of groups when able to wean out to open unit.   Patient will be compliant with treatment team recommendations.   Patient will maintain appropriate boundaries with peers and staff.    Outcome: Progressing   Goal Outcome Evaluation:       Face to face shift report received from RN. Rounding completed, pt observed. Client rested in room for 0 hours with eyes closed and respirations noted.

## 2023-04-06 PROCEDURE — 250N000013 HC RX MED GY IP 250 OP 250 PS 637: Performed by: STUDENT IN AN ORGANIZED HEALTH CARE EDUCATION/TRAINING PROGRAM

## 2023-04-06 PROCEDURE — 250N000013 HC RX MED GY IP 250 OP 250 PS 637: Performed by: NURSE PRACTITIONER

## 2023-04-06 PROCEDURE — 99232 SBSQ HOSP IP/OBS MODERATE 35: CPT | Mod: 95 | Performed by: PSYCHIATRY & NEUROLOGY

## 2023-04-06 PROCEDURE — 124N000001 HC R&B MH

## 2023-04-06 PROCEDURE — 250N000013 HC RX MED GY IP 250 OP 250 PS 637: Performed by: PSYCHIATRY & NEUROLOGY

## 2023-04-06 RX ORDER — OLANZAPINE 5 MG/1
5 TABLET, ORALLY DISINTEGRATING ORAL 3 TIMES DAILY
Status: DISCONTINUED | OUTPATIENT
Start: 2023-04-06 | End: 2023-04-09

## 2023-04-06 RX ADMIN — PROPRANOLOL HYDROCHLORIDE 10 MG: 10 TABLET ORAL at 20:13

## 2023-04-06 RX ADMIN — Medication 25 MCG: at 08:18

## 2023-04-06 RX ADMIN — OLANZAPINE 5 MG: 5 TABLET, ORALLY DISINTEGRATING ORAL at 20:13

## 2023-04-06 RX ADMIN — PROPRANOLOL HYDROCHLORIDE 10 MG: 10 TABLET ORAL at 14:52

## 2023-04-06 RX ADMIN — PROPRANOLOL HYDROCHLORIDE 10 MG: 10 TABLET ORAL at 08:18

## 2023-04-06 RX ADMIN — OLANZAPINE 10 MG: 10 TABLET, FILM COATED ORAL at 17:54

## 2023-04-06 RX ADMIN — OLANZAPINE 5 MG: 5 TABLET, ORALLY DISINTEGRATING ORAL at 08:18

## 2023-04-06 ASSESSMENT — ACTIVITIES OF DAILY LIVING (ADL)
ORAL_HYGIENE: INDEPENDENT
ADLS_ACUITY_SCORE: 28
DRESS: SCRUBS (BEHAVIORAL HEALTH)
ADLS_ACUITY_SCORE: 28
LAUNDRY: UNABLE TO COMPLETE
ADLS_ACUITY_SCORE: 28
DRESS: INDEPENDENT
ADLS_ACUITY_SCORE: 28
HYGIENE/GROOMING: INDEPENDENT
ORAL_HYGIENE: INDEPENDENT
HYGIENE/GROOMING: INDEPENDENT
LAUNDRY: UNABLE TO COMPLETE
ADLS_ACUITY_SCORE: 28

## 2023-04-06 NOTE — PROGRESS NOTES
"[facility] PSYCHIATRY  PROGRESS NOTE     SUBJECTIVE     Prior to interviewing the patient, I met with nursing and reviewed patient's clinical condition. We discussed clinical care both before and after the interview. I have reviewed the patient's clinical course by review of records including previous notes, labs, and vital signs.     Per nursing, the patient had the following behavioral events over the last 24-hours: none, mostly isolative to bed.    On psychiatric interview, patient is met within room. States they are tired and does not interact much. Reports no issues with medications. Wishes to go back to [place]. Denies SI.     MEDICATIONS   Scheduled Meds:    OLANZapine zydis  5 mg Oral BID     paliperidone  234 mg Intramuscular Q28 Days     propranolol  10 mg Oral TID     cholecalciferol  25 mcg Oral Daily     PRN Meds:.acetaminophen, alum & mag hydroxide-simethicone, benztropine, hydrOXYzine, loperamide, OLANZapine **OR** OLANZapine, ondansetron, senna-docusate, simethicone, traZODone     ALLERGIES   No Known Allergies     MENTAL STATUS EXAM   Vitals: /76   Pulse 65   Temp 97.8  F (36.6  C) (Temporal)   Resp 16   Wt 66.7 kg (147 lb 1.6 oz)   SpO2 96%   BMI 19.95 kg/m      Appearance:  awake, alert, dressed in hospital scrubs, appeared as age stated and disheveled   Attitude: relatively cooperative, dismissive  Eye Contact: brief  Mood: \"tired\"  Affect: blunted  Speech:  clear, coherent, is a little more interactive in conversation today  Psychomotor Behavior:  no evidence of tardive dyskinesia, dystonia, or tics  Thought Process:  difficult to assess, seems more be more rational and goal oriented  Associations:  difficult to assess  Thought Content:  no evidence of suicidal ideation or homicidal ideation, does not seem as paranoid or delusional or at least is verbalizing this less  Insight:  limited  Judgment:  limited  Oriented to:  time, person, and place  Attention Span and Concentration:  " "limited  Recent and Remote Memory:  fair  Fund of Knowledge: difficult to assess d/t psychosis  Muscle Strength and Tone: normal  Gait and Station: Normal       LABS   No results found for this or any previous visit (from the past 24 hour(s)).      IMPRESSION     This is a 27 year old male with a PMH of psychosis and schizophrenia who was discharged from a  unit in [place] and drove himself 4 hours north to [place] to request behavioral health admission again. He was making delusional and paranoid statements. He talks about being part of the [employer] and the [employer]. Today he answers most of my questions with \"I can't talk about it\" or \"it's classified\". He was screened for commitment while in the ED, he had his confinement hearing this afternoon and will have his commitment and spaulding hearing on 3/30. His next Invega Sustenna injection is due on 3/30, however today he states that he does not want to take this. In review of records it appears that during most of his hospital stays he is usually placed back on Invega Sustenna and stabilizes. In the ED oral Risperdal was scheduled due to his level of agitation and aggression, so has been continued here to prevent any further episodes. It appears from notes that last episode of restraints was on 3/20.     Interim summary: working on dismissal plan. Anticipate in the coming days, likely Thursday or Friday       DIAGNOSES     1. Schizophrenia       PLAN     Location: Unit 5  Legal Status: Orders Placed This Encounter      Legal status Patient is Committed  Spaulding: Yes (Haldol, Zyprexa, Invega, Risperdal)    Safety Assessment:    Behavioral Orders   Procedures     Code 1 - Restrict to Unit     Routine Programming     As clinically indicated     Status 15     Every 15 minutes.      PTA psychotropic medications stopped:     -Strattera 40 mg BID-->discontinued 3/29    PTA psychotropic medications continued/changed:     -Invega Sustenna 234 mg IM due 3/30/23 (last received 3/2)- spaulding " granted and received 3/31      New medications tried and stopped:     -None    New medications initiated:     -Zyprexa 5 mg BID  -Propranolol 10 mg TID for anxiety/akathisia    Today's Changes:    -Commitment and [place]man approved  -Received Invega Sustenna 234 mg IM on 3/2, then again on 3/31, scheduled every 28 days.     Programming: Patient will be treated in a therapeutic milieu with appropriate individual and group therapies. Education will be provided on diagnoses, medications, and treatments.     Medical diagnoses:  Per medicine    Consult: None  Tests: None    Anticipated LOS: 2-3 weeks  Disposition: Likely home with family and outpatient services vs. Remaining in [place] and establishing care       ATTESTATION    Santiago Rojo MD  [facility]   Psychiatry    Video Visit: Patient has given verbal consent for video visit?: Yes  Type of Service: video visit for mental health treatment  Reason for Video Visit: COVID-19 [place]  Originating Site (patient location): [facility]  Distant Site (provider location): Remote Location  Mode of Communication: Video Conference via [employer]  Time of Service: Date: 04/05/2023 , Start:10:00 end: 10:30

## 2023-04-06 NOTE — PROGRESS NOTES
Spoke with pt this afternoon, they were found laying in bed. Explained to pt that when speaking with the county they understand that he will be discharged to his vehicle and returning to [place] with his family. Pt appears disappointed when discussing that he will be here over the weekend. Overall pt is pleasant and appropriate with this writer.

## 2023-04-06 NOTE — PLAN OF CARE
Problem: Adult Behavioral Health Plan of Care  Goal: Patient-Specific Goal (Individualization)  Description: Patient will eat at least 50% of meals.   Patient will sleep at least 6 hours of sleep each NOC.   Patient will attend at least 50% of groups when able to wean out to open unit.   Patient will be compliant with treatment team recommendations.   Patient will maintain appropriate boundaries with peers and staff.    Outcome: Progressing     Problem: Thought Process Alteration  Goal: Optimal Thought Clarity  Description: Patient will report a decrease in auditory hallucinations by discharge.  Patient will be able to hold a reality based conversation.  Outcome: Progressing   Goal Outcome Evaluation:       Face to face shift report received from RN. Rounding completed, pt observed.Client rested in room for 6 hours with eyes closed and respirations noted.Client requested Zyprexa 10 mg po prn for feeling uncomfortable in the environment and having a chip in his head.Client paced hallways during the first part of shift but eventually went to room and rested.Face to face report will be communicated to oncoming RN.    Terry Rubio RN  4/6/2023  5:59 AM

## 2023-04-06 NOTE — PROGRESS NOTES
"[facility] PSYCHIATRY  PROGRESS NOTE     SUBJECTIVE     Prior to interviewing the patient, I met with nursing and reviewed patient's clinical condition. We discussed clinical care both before and after the interview. I have reviewed the patient's clinical course by review of records including previous notes, labs, and vital signs.     Per nursing, the patient had the following behavioral events over the last 24-hours: staff reporting worsening disorganization    On psychiatric interview, patient is met within room.he is very dismissive today. He is not organized and can't vocalize a plan to dismiss home that is safe. He is guarded and not willing to go into details. He does share that he feels that \"other people\" are sucking negative energy from him and preventing him from living a productive life.  He can't expand upon this further. He has no questions or concerns.      MEDICATIONS   Scheduled Meds:    OLANZapine zydis  5 mg Oral BID     paliperidone  234 mg Intramuscular Q28 Days     propranolol  10 mg Oral TID     cholecalciferol  25 mcg Oral Daily     PRN Meds:.acetaminophen, alum & mag hydroxide-simethicone, benztropine, hydrOXYzine, loperamide, OLANZapine **OR** OLANZapine, ondansetron, senna-docusate, simethicone, traZODone     ALLERGIES   No Known Allergies     MENTAL STATUS EXAM   Vitals: /74   Pulse 110   Temp 97.8  F (36.6  C) (Tympanic)   Resp 16   Wt 66.7 kg (147 lb 1.6 oz)   SpO2 98%   BMI 19.95 kg/m      Appearance:  awake, alert, dressed in hospital scrubs, appeared as age stated and disheveled   Attitude: relatively cooperative, dismissive  Eye Contact: brief  Mood: \"fine\"  Affect: blunted  Speech:  clear, coherent, is a little more interactive in conversation today  Psychomotor Behavior:  no evidence of tardive dyskinesia, dystonia, or tics  Thought Process:  difficult to assess, seems more be more rational and goal oriented  Associations:  difficult to assess  Thought Content:  no " "evidence of suicidal ideation or homicidal ideation, does not seem as paranoid or delusional or at least is verbalizing this less  Insight:  limited  Judgment:  limited  Oriented to:  time, person, and place  Attention Span and Concentration:  limited  Recent and Remote Memory:  fair  Fund of Knowledge: difficult to assess d/t psychosis  Muscle Strength and Tone: normal  Gait and Station: Normal       LABS   No results found for this or any previous visit (from the past 24 hour(s)).      IMPRESSION     This is a 27 year old male with a PMH of psychosis and schizophrenia who was discharged from a  unit in [place] and drove himself 4 hours north to [place] to request behavioral health admission again. He was making delusional and paranoid statements. He talks about being part of the [employer] and the [employer]. Today he answers most of my questions with \"I can't talk about it\" or \"it's classified\". He was screened for commitment while in the ED, he had his confinement hearing this afternoon and will have his commitment and spaulding hearing on 3/30. His next Invega Sustenna injection is due on 3/30, however today he states that he does not want to take this. In review of records it appears that during most of his hospital stays he is usually placed back on Invega Sustenna and stabilizes. In the ED oral Risperdal was scheduled due to his level of agitation and aggression, so has been continued here to prevent any further episodes. It appears from notes that last episode of restraints was on 3/20.     Interim summary: increasingly disorganized over the last few days. Will adjust his olanzapine to TID dosing from BID dosing.        DIAGNOSES     1. Schizophrenia       PLAN     Location: Unit 5  Legal Status: Orders Placed This Encounter      Legal status Patient is Committed  Spaulding: Yes (Haldol, Zyprexa, Invega, Risperdal)    Safety Assessment:    Behavioral Orders   Procedures     Code 1 - Restrict to Unit     Routine Programming     As " clinically indicated     Status 15     Every 15 minutes.      PTA psychotropic medications stopped:     -Strattera 40 mg BID-->discontinued 3/29    PTA psychotropic medications continued/changed:     -Invega Sustenna 234 mg IM due 3/30/23 (last received 3/2)- spaulding granted and received 3/31      New medications tried and stopped:     -None    New medications initiated:     -Zyprexa 5 mg BID  -Propranolol 10 mg TID for anxiety/akathisia    Today's Changes:    -Commitment and Spaulding approved  -Received Invega Sustenna 234 mg IM on 3/2, then again on 3/31, scheduled every 28 days.     Programming: Patient will be treated in a therapeutic milieu with appropriate individual and group therapies. Education will be provided on diagnoses, medications, and treatments.     Medical diagnoses:  Per medicine    Consult: None  Tests: None    Anticipated LOS: 2-3 weeks  Disposition: Likely home with family and outpatient services vs. Remaining in [place] and establishing care       ATTESTATION    Santiago Rojo MD  [facility]   Psychiatry    Video Visit: Patient has given verbal consent for video visit?: Yes  Type of Service: video visit for mental health treatment  Reason for Video Visit: COVID-[place]  Originating Site (patient location): [facility]  Distant Site (provider location): Remote Location  Mode of Communication: Video Conference via [employer]  Time of Service: Date: 04/06/2023 , Start:10:00 end: 10:30

## 2023-04-06 NOTE — PLAN OF CARE
Face to face end of shift report received from Terry OH RN. Rounding completed. Patient observed in bed, awake.     Pt has been withdrawn, isolative to his room this shift. He does come out to the lounge for meals, but stares blankly at his tray. He does not have much of an appetite and has only been eating a few bites of his meal. When questioned if something was wrong he stated that he was just tired. He appears guarded and mistrustful. He does appear paranoid of others and staff more so than previous shifts with him. He denied any SI/HI and AH/VH. Denied pain. He has not attended any groups this shift. He took all medications as prescribed and allowed mouth checks. Steady gait- no falls. He is able to make his needs known. Frequent rounding.       Problem: Adult Behavioral Health Plan of Care  Goal: Patient-Specific Goal (Individualization)  Description: Patient will eat at least 50% of meals.   Patient will sleep at least 6 hours of sleep each NOC.   Patient will attend at least 50% of groups when able to wean out to open unit.   Patient will be compliant with treatment team recommendations.   Patient will maintain appropriate boundaries with peers and staff.    Outcome: Progressing     Problem: Thought Process Alteration  Goal: Optimal Thought Clarity  Description: Patient will report a decrease in auditory hallucinations by discharge.  Patient will be able to hold a reality based conversation.  Outcome: Progressing     Problem: Suicidal Behavior  Goal: Suicidal Behavior is Absent or Managed  Outcome: Progressing       Meghan Carmona RN  4/6/2023  12:44 PM

## 2023-04-07 PROCEDURE — 250N000013 HC RX MED GY IP 250 OP 250 PS 637: Performed by: NURSE PRACTITIONER

## 2023-04-07 PROCEDURE — 250N000013 HC RX MED GY IP 250 OP 250 PS 637: Performed by: PSYCHIATRY & NEUROLOGY

## 2023-04-07 PROCEDURE — 99232 SBSQ HOSP IP/OBS MODERATE 35: CPT | Mod: 95 | Performed by: PSYCHIATRY & NEUROLOGY

## 2023-04-07 PROCEDURE — 250N000013 HC RX MED GY IP 250 OP 250 PS 637: Performed by: STUDENT IN AN ORGANIZED HEALTH CARE EDUCATION/TRAINING PROGRAM

## 2023-04-07 PROCEDURE — 124N000001 HC R&B MH

## 2023-04-07 RX ADMIN — PROPRANOLOL HYDROCHLORIDE 10 MG: 10 TABLET ORAL at 09:07

## 2023-04-07 RX ADMIN — PROPRANOLOL HYDROCHLORIDE 10 MG: 10 TABLET ORAL at 20:02

## 2023-04-07 RX ADMIN — OLANZAPINE 5 MG: 5 TABLET, ORALLY DISINTEGRATING ORAL at 09:08

## 2023-04-07 RX ADMIN — OLANZAPINE 5 MG: 5 TABLET, ORALLY DISINTEGRATING ORAL at 13:53

## 2023-04-07 RX ADMIN — BENZTROPINE MESYLATE 1 MG: 1 TABLET ORAL at 17:10

## 2023-04-07 RX ADMIN — Medication 25 MCG: at 09:07

## 2023-04-07 RX ADMIN — PROPRANOLOL HYDROCHLORIDE 10 MG: 10 TABLET ORAL at 13:53

## 2023-04-07 RX ADMIN — OLANZAPINE 5 MG: 5 TABLET, ORALLY DISINTEGRATING ORAL at 20:02

## 2023-04-07 ASSESSMENT — ACTIVITIES OF DAILY LIVING (ADL)
DRESS: SCRUBS (BEHAVIORAL HEALTH)
ADLS_ACUITY_SCORE: 29
LAUNDRY: UNABLE TO COMPLETE
ADLS_ACUITY_SCORE: 29
DRESS: SCRUBS (BEHAVIORAL HEALTH);INDEPENDENT
ADLS_ACUITY_SCORE: 29
ADLS_ACUITY_SCORE: 28
ADLS_ACUITY_SCORE: 28
HYGIENE/GROOMING: INDEPENDENT
ORAL_HYGIENE: INDEPENDENT
ADLS_ACUITY_SCORE: 28
HYGIENE/GROOMING: INDEPENDENT
ADLS_ACUITY_SCORE: 28
LAUNDRY: UNABLE TO COMPLETE
ADLS_ACUITY_SCORE: 28
ORAL_HYGIENE: INDEPENDENT
ADLS_ACUITY_SCORE: 28
ADLS_ACUITY_SCORE: 28

## 2023-04-07 NOTE — PLAN OF CARE
"Problem: Suicidal Behavior  Goal: Suicidal Behavior is Absent or Managed  Outcome: Progressing    Pt denies SI and reports      Problem: Thought Process Alteration  Goal: Optimal Thought Clarity  Description: Patient will report a decrease in auditory hallucinations by discharge.  Patient will be able to hold a reality based conversation.  Outcome: Progressing    Pt denies Hallucinations though appears preoccupied     Problem: Adult Behavioral Health Plan of Care  Goal: Patient-Specific Goal (Individualization)  Description: Patient will eat at least 50% of meals.   Patient will sleep at least 6 hours of sleep each NOC.   Patient will attend at least 50% of groups when able to wean out to open unit.   Patient will be compliant with treatment team recommendations.   Patient will maintain appropriate boundaries with peers and staff.    Outcome: Progressing   Goal Outcome Evaluation:    Plan of Care Reviewed With: patient      0730 Face to face rounding complete.  Pt introduced to nursing for the shift.    Pt was up and active most of the shift.  He spent time reading his differential equations book in the dayroom.  He would not answer my assessment questions stating, \"I cannot talk about any of that.\"  He appears preoccupied and does talk with himself at times. He denies having any hallucinations.  Pt paced the hallway all afternoon with a flat affect.    1500 Face to face end of shift report communicated to Evening Shift RN's along with Pt's fall risk.     Oscar Purcell RN  4/7/2023  2:49 PM      "

## 2023-04-07 NOTE — PROGRESS NOTES
"[facility] PSYCHIATRY  PROGRESS NOTE     SUBJECTIVE     Prior to interviewing the patient, I met with nursing and reviewed patient's clinical condition. We discussed clinical care both before and after the interview. I have reviewed the patient's clinical course by review of records including previous notes, labs, and vital signs.     Per nursing, the patient had the following behavioral events over the last 24-hours: staff reporting worsening disorganization over last couple of days     On psychiatric interview, patient is met within room. He is much less dismissive today. Willing to sign ROIs to his family . He tolerated increased dose of olanzapine to TID. He was encourage dto think about what resources he needs when he departs. He was encouraged to go to groups. Remains guarded.      MEDICATIONS   Scheduled Meds:    OLANZapine zydis  5 mg Oral TID     paliperidone  234 mg Intramuscular Q28 Days     propranolol  10 mg Oral TID     cholecalciferol  25 mcg Oral Daily     PRN Meds:.acetaminophen, alum & mag hydroxide-simethicone, benztropine, hydrOXYzine, loperamide, OLANZapine **OR** OLANZapine, ondansetron, senna-docusate, simethicone, traZODone     ALLERGIES   No Known Allergies     MENTAL STATUS EXAM   Vitals: /56   Pulse 89   Temp 97.9  F (36.6  C) (Temporal)   Resp 14   Wt 66.7 kg (147 lb 1.6 oz)   SpO2 96%   BMI 19.95 kg/m      Appearance:  awake, alert, dressed in hospital scrubs, appeared as age stated and disheveled   Attitude: relatively cooperative, dismissive  Eye Contact: brief  Mood: \"fine\"  Affect: blunted  Speech:  clear, coherent, is a little more interactive in conversation today  Psychomotor Behavior:  no evidence of tardive dyskinesia, dystonia, or tics  Thought Process:  difficult to assess, seems more be more rational and goal oriented  Associations:  difficult to assess  Thought Content:  no evidence of suicidal ideation or homicidal ideation, does not seem as paranoid or " "delusional or at least is verbalizing this less  Insight:  limited  Judgment:  limited  Oriented to:  time, person, and place  Attention Span and Concentration:  limited  Recent and Remote Memory:  fair  Fund of Knowledge: difficult to assess d/t psychosis  Muscle Strength and Tone: normal  Gait and Station: Normal       LABS   No results found for this or any previous visit (from the past 24 hour(s)).      IMPRESSION     This is a 27 year old male with a PMH of psychosis and schizophrenia who was discharged from a  unit in [place] and drove himself 4 hours north to [place] to request behavioral health admission again. He was making delusional and paranoid statements. He talks about being part of the [employer] and the [employer]. Today he answers most of my questions with \"I can't talk about it\" or \"it's classified\". He was screened for commitment while in the ED, he had his confinement hearing this afternoon and will have his commitment and spaulding hearing on 3/30. His next Invega Sustenna injection is due on 3/30, however today he states that he does not want to take this. In review of records it appears that during most of his hospital stays he is usually placed back on Invega Sustenna and stabilizes. In the ED oral Risperdal was scheduled due to his level of agitation and aggression, so has been continued here to prevent any further episodes. It appears from notes that last episode of restraints was on 3/20.     Interim summary:tolerating increase dose in olanzapine to 5 mg TID, is slightly more organized today.        DIAGNOSES     1. Schizophrenia       PLAN     Location: Unit 5  Legal Status: Orders Placed This Encounter      Legal status Patient is Committed  Spaulding: Yes (Haldol, Zyprexa, Invega, Risperdal)    Safety Assessment:    Behavioral Orders   Procedures     Code 1 - Restrict to Unit     Routine Programming     As clinically indicated     Status 15     Every 15 minutes.      PTA psychotropic medications stopped: "     -Strattera 40 mg BID-->discontinued 3/29    PTA psychotropic medications continued/changed:     -Invega Sustenna 234 mg IM due 3/30/23 (last received 3/2)- spaulding granted and received 3/31      New medications tried and stopped:     -None    New medications initiated:     -Zyprexa 5 mg BID --> increased to 5 TID on 4/6/23  -Propranolol 10 mg TID for anxiety/akathisia    Today's Changes:  -continue olanzapine 5 mg TID increase   -Commitment and Spaulding approved  -Received Invega Sustenna 234 mg IM on 3/2, then again on 3/31, scheduled every 28 days.     Programming: Patient will be treated in a therapeutic milieu with appropriate individual and group therapies. Education will be provided on diagnoses, medications, and treatments.     Medical diagnoses:  Per medicine    Consult: None  Tests: None    Anticipated LOS: 2-3 weeks  Disposition: Likely home with family and outpatient services vs. Remaining in [place] and establishing care       ATTESTATION    Santiago Rojo MD  [facility]   Psychiatry    Video Visit: Patient has given verbal consent for video visit?: Yes  Type of Service: video visit for mental health treatment  Reason for Video Visit: COVID-19 [place]  Originating Site (patient location): [facility]  Distant Site (provider location): Remote Location  Mode of Communication: Video Conference via [employer]  Time of Service: Date: 04/07/2023 , Start:10:00 end: 10:30

## 2023-04-07 NOTE — PLAN OF CARE
Problem: Thought Process Alteration  Goal: Optimal Thought Clarity  Description: Patient will report a decrease in auditory hallucinations by discharge.  Patient will be able to hold a reality based conversation.  Outcome: Progressing     Problem: Adult Behavioral Health Plan of Care  Goal: Patient-Specific Goal (Individualization)  Description: Patient will eat at least 50% of meals.   Patient will sleep at least 6 hours of sleep each NOC.   Patient will attend at least 50% of groups when able to wean out to open unit.   Patient will be compliant with treatment team recommendations.   Patient will maintain appropriate boundaries with peers and staff.    Outcome: Progressing     Patient has been calm, cooperative, and medication compliant.  He paced the hallways much of the day and keeps to himself with very little social interactions.  He again talked to this writer about being put on long acting Zyprexa.  Encouraged him to speak with his doctor tomorrow.  Patient has asked about this medication everyday but doesn't seem to retain the information.  Admits to having hallucination and anxiety.  Patient did request PRN and took Zyprexa 10 mg at 1754.  Patient states it was helpful.  No complaints of pain.  VS WNL.  Face to face end of shift report communicated to night shift RN.     Devika Jalloh RN  4/6/2023  8:45 PM

## 2023-04-08 PROCEDURE — 250N000013 HC RX MED GY IP 250 OP 250 PS 637: Performed by: NURSE PRACTITIONER

## 2023-04-08 PROCEDURE — 124N000001 HC R&B MH

## 2023-04-08 PROCEDURE — 99232 SBSQ HOSP IP/OBS MODERATE 35: CPT | Performed by: NURSE PRACTITIONER

## 2023-04-08 PROCEDURE — 250N000013 HC RX MED GY IP 250 OP 250 PS 637: Performed by: PSYCHIATRY & NEUROLOGY

## 2023-04-08 PROCEDURE — 250N000013 HC RX MED GY IP 250 OP 250 PS 637: Performed by: STUDENT IN AN ORGANIZED HEALTH CARE EDUCATION/TRAINING PROGRAM

## 2023-04-08 RX ADMIN — OLANZAPINE 5 MG: 5 TABLET, ORALLY DISINTEGRATING ORAL at 20:42

## 2023-04-08 RX ADMIN — PROPRANOLOL HYDROCHLORIDE 10 MG: 10 TABLET ORAL at 08:51

## 2023-04-08 RX ADMIN — HYDROXYZINE HYDROCHLORIDE 25 MG: 25 TABLET, FILM COATED ORAL at 15:51

## 2023-04-08 RX ADMIN — Medication 25 MCG: at 08:51

## 2023-04-08 RX ADMIN — PROPRANOLOL HYDROCHLORIDE 10 MG: 10 TABLET ORAL at 13:27

## 2023-04-08 RX ADMIN — OLANZAPINE 5 MG: 5 TABLET, ORALLY DISINTEGRATING ORAL at 08:51

## 2023-04-08 RX ADMIN — PROPRANOLOL HYDROCHLORIDE 10 MG: 10 TABLET ORAL at 20:42

## 2023-04-08 RX ADMIN — OLANZAPINE 5 MG: 5 TABLET, ORALLY DISINTEGRATING ORAL at 13:27

## 2023-04-08 RX ADMIN — OLANZAPINE 10 MG: 10 TABLET, FILM COATED ORAL at 11:31

## 2023-04-08 ASSESSMENT — ACTIVITIES OF DAILY LIVING (ADL)
DRESS: SCRUBS (BEHAVIORAL HEALTH)
ORAL_HYGIENE: INDEPENDENT
ADLS_ACUITY_SCORE: 29
ADLS_ACUITY_SCORE: 29
LAUNDRY: UNABLE TO COMPLETE
ADLS_ACUITY_SCORE: 29
DRESS: SCRUBS (BEHAVIORAL HEALTH);INDEPENDENT
ADLS_ACUITY_SCORE: 29
HYGIENE/GROOMING: INDEPENDENT
ADLS_ACUITY_SCORE: 29
ADLS_ACUITY_SCORE: 29
ORAL_HYGIENE: INDEPENDENT
LAUNDRY: UNABLE TO COMPLETE
ADLS_ACUITY_SCORE: 29
HYGIENE/GROOMING: INDEPENDENT
ADLS_ACUITY_SCORE: 29
ADLS_ACUITY_SCORE: 29

## 2023-04-08 NOTE — PLAN OF CARE
"  Problem: Thought Process Alteration  Goal: Optimal Thought Clarity  Description: Patient will report a decrease in auditory hallucinations by discharge.  Patient will be able to hold a reality based conversation.  Outcome: Progressing     Problem: Adult Behavioral Health Plan of Care  Goal: Patient-Specific Goal (Individualization)  Description: Patient will eat at least 50% of meals.   Patient will sleep at least 6 hours of sleep each NOC.   Patient will attend at least 50% of groups when able to wean out to open unit.   Patient will be compliant with treatment team recommendations.   Patient will maintain appropriate boundaries with peers and staff.    Outcome: Progressing     Patient has been calm, cooperative and medication compliant this shift.  He was out in the lounge much of the day but avoids social contact with peers.  He does seek out staff to talk.  When talking with staff he  refused to say where he went to college stating the information is \"classified.\"  Spent a lot of time working on math problems from his books.  Flat affect.  Reported a tremor in his finger and requested a PRN medication.  Patient was given 1 mg Cogentin at 1710.  No complaints of pain.  VS WNL.  Face to face end of shift report communicated to night shift RN.     Devika Jalloh RN  4/7/2023  8:34 PM         "

## 2023-04-08 NOTE — PLAN OF CARE
Problem: Adult Behavioral Health Plan of Care  Goal: Patient-Specific Goal (Individualization)  Description: Patient will eat at least 50% of meals.   Patient will sleep at least 6 hours of sleep each NOC.   Patient will attend at least 50% of groups when able to wean out to open unit.   Patient will be compliant with treatment team recommendations.   Patient will maintain appropriate boundaries with peers and staff.  Outcome: Progressing     Problem: Thought Process Alteration  Goal: Optimal Thought Clarity  Description: Patient will report a decrease in auditory hallucinations by discharge.  Patient will be able to hold a reality based conversation.  Outcome: Progressing     Problem: Suicidal Behavior  Goal: Suicidal Behavior is Absent or Managed  Outcome: Progressing     Face to face shift report received from [employer]. Rounding completed, pt observed in bed awake.    Patient appeared to be sleeping for approximately 4 hours since 0200.    Patient had no reported or observed suicidal behavior or self harm this shift.      Patient had no reported hallucinations or paranoia noted.    Patient was not observed to have been responding to internal stimuli.    Patient refused morning vitals.    Face to face report will be communicated to oncoming RN.    Ciara David RN  4/8/2023  6:38 AM

## 2023-04-08 NOTE — PLAN OF CARE
Problem: Suicidal Behavior  Goal: Suicidal Behavior is Absent or Managed  Outcome: Progressing    Pt denies SI     Problem: Thought Process Alteration  Goal: Optimal Thought Clarity  Description: Patient will report a decrease in auditory hallucinations by discharge.  Patient will be able to hold a reality based conversation.  Outcome: Progressing    Pt denies hallucinations though appears preoccupied     Problem: Adult Behavioral Health Plan of Care  Goal: Patient-Specific Goal (Individualization)  Description: Patient will eat at least 50% of meals.   Patient will sleep at least 6 hours of sleep each NOC.   Patient will attend at least 50% of groups when able to wean out to open unit.   Patient will be compliant with treatment team recommendations.   Patient will maintain appropriate boundaries with peers and staff.    Outcome: Progressing   Goal Outcome Evaluation:    Plan of Care Reviewed With: patient      0700 Face to face rounding complete.  Pt introduced to nursing for the shift.      1530 Face to face end of shift report communicated to night shift RN's along with Pt's fall risk.     Oscar Purcell RN  4/8/2023  2:55 PM

## 2023-04-08 NOTE — PROGRESS NOTES
"[facility] PSYCHIATRY  PROGRESS NOTE     SUBJECTIVE     Prior to interviewing the patient, I met with nursing and reviewed patient's clinical condition. We discussed clinical care both before and after the interview. I have reviewed the patient's clinical course by review of records including previous notes, labs, and vital signs.     Per nursing, the patient had the following behavioral events over the last 24-hours: staff reporting some improvement    On psychiatric interview, patient is met within room. I introduced myself to pt and he stood up and shook my hand. He stated he felt he needed to have his medication increased with the rational being \"I had to go ask for a PRN because of everything in my head\". Is tolerating TID dosing of Olanzapine 5 mg will increase to 7.5 TID.   Pt is unable to talk for any extended period of time, paranoia remains present however overall is improving from admission. Pt is no longer intrusive to others as he once had been-allowing others space.      MEDICATIONS   Scheduled Meds:    OLANZapine zydis  5 mg Oral TID     paliperidone  234 mg Intramuscular Q28 Days     propranolol  10 mg Oral TID     cholecalciferol  25 mcg Oral Daily     PRN Meds:.acetaminophen, alum & mag hydroxide-simethicone, benztropine, hydrOXYzine, loperamide, OLANZapine **OR** OLANZapine, ondansetron, senna-docusate, simethicone, traZODone     ALLERGIES   No Known Allergies     MENTAL STATUS EXAM   Vitals: /68   Pulse 117   Temp 99.3  F (37.4  C) (Tympanic)   Resp 16   Wt 66.7 kg (147 lb 1.6 oz)   SpO2 98%   BMI 19.95 kg/m      Appearance:  awake, alert, dressed in hospital scrubs, appeared as age stated and disheveled   Attitude: relatively cooperative, dismissive  Eye Contact: brief  Mood: \"fine\"  Affect: blunted  Speech:  clear, coherent, is a little more interactive in conversation today  Psychomotor Behavior:  no evidence of tardive dyskinesia, dystonia, or tics  Thought Process:  difficult " "to assess, seems more be more rational and goal oriented  Associations:  difficult to assess  Thought Content:  no evidence of suicidal ideation or homicidal ideation, does not seem as paranoid or delusional or at least is verbalizing this less  Insight:  limited  Judgment:  limited  Oriented to:  time, person, and place  Attention Span and Concentration:  limited  Recent and Remote Memory:  fair  Fund of Knowledge: difficult to assess d/t psychosis  Muscle Strength and Tone: normal  Gait and Station: Normal       LABS   No results found for this or any previous visit (from the past 24 hour(s)).      IMPRESSION     This is a 27 year old male with a PMH of psychosis and schizophrenia who was discharged from a  unit in [place] and drove himself 4 hours north to [place] to request behavioral health admission again. He was making delusional and paranoid statements. He talks about being part of the [employer] and the [employer]. Today he answers most of my questions with \"I can't talk about it\" or \"it's classified\". He was screened for commitment while in the ED, he had his confinement hearing this afternoon and will have his commitment and spaulding hearing on 3/30. His next Invega Sustenna injection is due on 3/30, however today he states that he does not want to take this. In review of records it appears that during most of his hospital stays he is usually placed back on Invega Sustenna and stabilizes. In the ED oral Risperdal was scheduled due to his level of agitation and aggression, so has been continued here to prevent any further episodes. It appears from notes that last episode of restraints was on 3/20.     Interim summary:tolerating increase dose in olanzapine to 5 mg TID, is slightly more organized today.  On 4/8/23- pt requesting increase in olanzapine       DIAGNOSES     1. Schizophrenia       PLAN     Location: Unit 5  Legal Status: Orders Placed This Encounter      Legal status Patient is Committed  Spaulding: Yes (Dennis, " Zyprexa, Invega, Risperdal)    Safety Assessment:    Behavioral Orders   Procedures     Code 1 - Restrict to Unit     Routine Programming     As clinically indicated     Status 15     Every 15 minutes.      PTA psychotropic medications stopped:     -Strattera 40 mg BID-->discontinued 3/29    PTA psychotropic medications continued/changed:     -Invega Sustenna 234 mg IM due 3/30/23 (last received 3/2)- spaulding granted and received 3/31      New medications tried and stopped:     -None    New medications initiated:     -Zyprexa 5 mg BID --> increased to 5 TID on 4/6/23 -> increased to 7.5 mg TID 4/8/23  -Propranolol 10 mg TID for anxiety/akathisia    Today's Changes:  -Increase olanzapine 5 mg TID to 7.5 mg TID  -Commitment and Spaulding approved  -Received Invega Sustenna 234 mg IM on 3/2, then again on 3/31, scheduled every 28 days.     Programming: Patient will be treated in a therapeutic milieu with appropriate individual and group therapies. Education will be provided on diagnoses, medications, and treatments.     Medical diagnoses:  Per medicine    Consult: None  Tests: None    Anticipated LOS: 2-3 weeks  Disposition: Likely home with family and outpatient services vs. Remaining in [place] and establishing care       ATTESTATION    Julissa PAULSON

## 2023-04-09 PROCEDURE — 124N000001 HC R&B MH

## 2023-04-09 PROCEDURE — 250N000013 HC RX MED GY IP 250 OP 250 PS 637: Performed by: NURSE PRACTITIONER

## 2023-04-09 PROCEDURE — 250N000013 HC RX MED GY IP 250 OP 250 PS 637: Performed by: PSYCHIATRY & NEUROLOGY

## 2023-04-09 PROCEDURE — 250N000013 HC RX MED GY IP 250 OP 250 PS 637: Performed by: STUDENT IN AN ORGANIZED HEALTH CARE EDUCATION/TRAINING PROGRAM

## 2023-04-09 RX ADMIN — PROPRANOLOL HYDROCHLORIDE 10 MG: 10 TABLET ORAL at 14:04

## 2023-04-09 RX ADMIN — OLANZAPINE 7.5 MG: 5 TABLET, ORALLY DISINTEGRATING ORAL at 14:04

## 2023-04-09 RX ADMIN — Medication 25 MCG: at 08:08

## 2023-04-09 RX ADMIN — PROPRANOLOL HYDROCHLORIDE 10 MG: 10 TABLET ORAL at 20:08

## 2023-04-09 RX ADMIN — OLANZAPINE 5 MG: 5 TABLET, ORALLY DISINTEGRATING ORAL at 07:57

## 2023-04-09 RX ADMIN — OLANZAPINE 7.5 MG: 5 TABLET, ORALLY DISINTEGRATING ORAL at 20:08

## 2023-04-09 RX ADMIN — PROPRANOLOL HYDROCHLORIDE 10 MG: 10 TABLET ORAL at 07:58

## 2023-04-09 RX ADMIN — OLANZAPINE 10 MG: 10 TABLET, FILM COATED ORAL at 10:02

## 2023-04-09 RX ADMIN — SIMETHICONE 80 MG: 80 TABLET, CHEWABLE ORAL at 17:20

## 2023-04-09 ASSESSMENT — ACTIVITIES OF DAILY LIVING (ADL)
ADLS_ACUITY_SCORE: 29
LAUNDRY: UNABLE TO COMPLETE
ADLS_ACUITY_SCORE: 29
DRESS: SCRUBS (BEHAVIORAL HEALTH)
HYGIENE/GROOMING: INDEPENDENT
ORAL_HYGIENE: INDEPENDENT

## 2023-04-09 NOTE — PLAN OF CARE
SHIFT NOTE: 1500 to 0730    Problem: Thought Process Alteration  Goal: Optimal Thought Clarity  Description: Patient will report a decrease in auditory hallucinations by discharge.  Patient will be able to hold a reality based conversation.  Outcome: Progressing     Problem: Adult Behavioral Health Plan of Care  Goal: Patient-Specific Goal (Individualization)  Description: Patient will eat at least 50% of meals.   Patient will sleep at least 6 hours of sleep each NOC.   Patient will attend at least 50% of groups when able to wean out to open unit.   Patient will be compliant with treatment team recommendations.   Patient will maintain appropriate boundaries with peers and staff.    Outcome: Progressing     Patient has been calm, cooperative, and medication complaint this shift.  He sat in the lounge reading books or pacing in the hallways much of the evening.  He reported feeling anxious and took hydroxyzine 25 mg at 1551 with good relief of symptoms.  Patient did briefly attend groups.  Affect is brighter today.  No delusional or paranoid statements noted.  Feels prepared to discharge next week.  No complaints of pain.  VS WNL.  Patient in bed at 2100 and slept through the night.  Face to face end of shift report communicated to day shift RN.     Devika Jalloh RN  4/9/2023  12:03 AM

## 2023-04-09 NOTE — PLAN OF CARE
Problem: Suicidal Behavior  Goal: Suicidal Behavior is Absent or Managed  Outcome: Progressing    Pt denies SI     Problem: Thought Process Alteration  Goal: Optimal Thought Clarity  Description: Patient will report a decrease in auditory hallucinations by discharge.  Patient will be able to hold a reality based conversation.  Outcome: Progressing    Pt denied hallucinations and did not appear preoccupied     Problem: Adult Behavioral Health Plan of Care  Goal: Patient-Specific Goal (Individualization)  Description: Patient will eat at least 50% of meals.   Patient will sleep at least 6 hours of sleep each NOC.   Patient will attend at least 50% of groups when able to wean out to open unit.   Patient will be compliant with treatment team recommendations.   Patient will maintain appropriate boundaries with peers and staff.    Outcome: Progressing   Goal Outcome Evaluation:    Plan of Care Reviewed With: (P) patient      0730 Face to face rounding complete.  Pt introduced to nursing for the shift.    Pt was up and active all shift and attended all of the groups but does not interact with peers much. 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  Pt showered today, combed his hair, and smiled often today.  He exhibited no signs of paranoid thinking and asked for a PRN dose of Zyprexa before lunch.      1500 Face to face end of shift report communicated to Evening Shift RN's along with Pt's fall risk..     Oscar Purcell RN  4/9/2023  3:13 PM

## 2023-04-10 PROCEDURE — 250N000013 HC RX MED GY IP 250 OP 250 PS 637: Performed by: NURSE PRACTITIONER

## 2023-04-10 PROCEDURE — 124N000001 HC R&B MH

## 2023-04-10 PROCEDURE — 250N000013 HC RX MED GY IP 250 OP 250 PS 637: Performed by: STUDENT IN AN ORGANIZED HEALTH CARE EDUCATION/TRAINING PROGRAM

## 2023-04-10 PROCEDURE — 99232 SBSQ HOSP IP/OBS MODERATE 35: CPT | Mod: 95 | Performed by: PSYCHIATRY & NEUROLOGY

## 2023-04-10 RX ADMIN — OLANZAPINE 7.5 MG: 5 TABLET, ORALLY DISINTEGRATING ORAL at 20:28

## 2023-04-10 RX ADMIN — PROPRANOLOL HYDROCHLORIDE 10 MG: 10 TABLET ORAL at 13:10

## 2023-04-10 RX ADMIN — Medication 25 MCG: at 08:43

## 2023-04-10 RX ADMIN — PROPRANOLOL HYDROCHLORIDE 10 MG: 10 TABLET ORAL at 08:43

## 2023-04-10 RX ADMIN — OLANZAPINE 7.5 MG: 5 TABLET, ORALLY DISINTEGRATING ORAL at 08:43

## 2023-04-10 RX ADMIN — PROPRANOLOL HYDROCHLORIDE 10 MG: 10 TABLET ORAL at 20:28

## 2023-04-10 RX ADMIN — OLANZAPINE 7.5 MG: 5 TABLET, ORALLY DISINTEGRATING ORAL at 13:10

## 2023-04-10 ASSESSMENT — ACTIVITIES OF DAILY LIVING (ADL)
ADLS_ACUITY_SCORE: 29
DRESS: INDEPENDENT
HYGIENE/GROOMING: INDEPENDENT
ADLS_ACUITY_SCORE: 29
ORAL_HYGIENE: INDEPENDENT
ADLS_ACUITY_SCORE: 29

## 2023-04-10 NOTE — PLAN OF CARE
SHIFT NOTE: 1500 to 0730      Problem: Thought Process Alteration  Goal: Optimal Thought Clarity  Description: Patient will report a decrease in auditory hallucinations by discharge.  Patient will be able to hold a reality based conversation.  Outcome: Progressing     Problem: Adult Behavioral Health Plan of Care  Goal: Patient-Specific Goal (Individualization)  Description: Patient will eat at least 50% of meals.   Patient will sleep at least 6 hours of sleep each NOC.   Patient will attend at least 50% of groups when able to wean out to open unit.   Patient will be compliant with treatment team recommendations.   Patient will maintain appropriate boundaries with peers and staff.    Outcome: Progressing     Patient has been calm, cooperative, and medication complaint this shift.  He is social in the lounge but spends much of the evening walking in the hallways.  He is able to have a reality based conversation but does have some flights of ideas.  Affect is brighter and he feels he will be ready to discharge soon.  No complaints of pain.  Vs WNL.  Patient in bed by 2200 and slept through the night with regular respirations and position changes noted.  Face to face end of shift report communicated to day shift RN.     Devika Jalloh RN  4/10/2023  12:33 AM

## 2023-04-10 NOTE — PLAN OF CARE
Face to face shift report received from Devika HERRON RN RN. Rounding completed, pt observed.    Problem: Adult Behavioral Health Plan of Care  Goal: Patient-Specific Goal (Individualization)  Description: Patient will eat at least 50% of meals.   Patient will sleep at least 6 hours of sleep each NOC.   Patient will attend at least 50% of groups when able to wean out to open unit.   Patient will be compliant with treatment team recommendations.   Patient will maintain appropriate boundaries with peers and staff.    Outcome: Progressing  Note: Shift Summary:  [facility] and pacing in hallway at the start of this shift. Patient was under the impression that he was discharging today at 0800 but no discharge order and no planned discharge today.  Patient okay with not going until later in the week.  Patient compliant with scheduled medications.  Denies pain or unwanted side effects.  Patient can easily make his needs known to staff.  Gait is balanced and steady.     Problem: Thought Process Alteration  Goal: Optimal Thought Clarity  Description: Patient will report a decrease in auditory hallucinations by discharge.  Patient will be able to hold a reality based conversation.  Outcome: Progressing  Face to face report will be communicated to oncoming RN.    Evie Tavera RN  4/10/2023

## 2023-04-11 VITALS
WEIGHT: 152.5 LBS | DIASTOLIC BLOOD PRESSURE: 57 MMHG | SYSTOLIC BLOOD PRESSURE: 101 MMHG | TEMPERATURE: 97.8 F | OXYGEN SATURATION: 95 % | BODY MASS INDEX: 20.68 KG/M2 | HEART RATE: 79 BPM | RESPIRATION RATE: 14 BRPM

## 2023-04-11 PROCEDURE — 250N000013 HC RX MED GY IP 250 OP 250 PS 637: Performed by: NURSE PRACTITIONER

## 2023-04-11 PROCEDURE — 99239 HOSP IP/OBS DSCHRG MGMT >30: CPT | Mod: 95 | Performed by: PSYCHIATRY & NEUROLOGY

## 2023-04-11 PROCEDURE — 250N000013 HC RX MED GY IP 250 OP 250 PS 637: Performed by: STUDENT IN AN ORGANIZED HEALTH CARE EDUCATION/TRAINING PROGRAM

## 2023-04-11 RX ORDER — OLANZAPINE 10 MG/1
10 TABLET ORAL AT BEDTIME
Qty: 30 TABLET | Refills: 0 | Status: SHIPPED | OUTPATIENT
Start: 2023-04-11 | End: 2023-04-12

## 2023-04-11 RX ORDER — VITAMIN B COMPLEX
25 TABLET ORAL DAILY
Qty: 30 TABLET | Refills: 0 | Status: ON HOLD | OUTPATIENT
Start: 2023-04-12 | End: 2023-05-31

## 2023-04-11 RX ORDER — PROPRANOLOL HYDROCHLORIDE 10 MG/1
10 TABLET ORAL 3 TIMES DAILY
Qty: 90 TABLET | Refills: 0 | Status: SHIPPED | OUTPATIENT
Start: 2023-04-11 | End: 2023-04-12

## 2023-04-11 RX ORDER — OLANZAPINE 5 MG/1
5 TABLET ORAL 2 TIMES DAILY WITH MEALS
Qty: 60 TABLET | Refills: 0 | Status: SHIPPED | OUTPATIENT
Start: 2023-04-11 | End: 2023-04-12

## 2023-04-11 RX ADMIN — PROPRANOLOL HYDROCHLORIDE 10 MG: 10 TABLET ORAL at 08:41

## 2023-04-11 RX ADMIN — Medication 25 MCG: at 08:41

## 2023-04-11 RX ADMIN — OLANZAPINE 7.5 MG: 5 TABLET, ORALLY DISINTEGRATING ORAL at 08:41

## 2023-04-11 ASSESSMENT — ACTIVITIES OF DAILY LIVING (ADL)
DRESS: INDEPENDENT
ORAL_HYGIENE: INDEPENDENT
ADLS_ACUITY_SCORE: 29
HYGIENE/GROOMING: INDEPENDENT
ADLS_ACUITY_SCORE: 29

## 2023-04-11 NOTE — PLAN OF CARE
Problem: Adult Behavioral Health Plan of Care  Goal: Patient-Specific Goal (Individualization)  Description: Patient will eat at least 50% of meals.   Patient will sleep at least 6 hours of sleep each NOC.   Patient will attend at least 50% of groups when able to wean out to open unit.   Patient will be compliant with treatment team recommendations.   Patient will maintain appropriate boundaries with peers and staff.    Outcome: Progressing   Goal Outcome Evaluation:       Face to face shift report received from RN. Rounding completed, pt observed. Client rested in room for 7 hours with eyes closed and respirations noted.Face to face report will be communicated to oncoming RN.    Terry Rubio RN  4/11/2023  6:16 AM

## 2023-04-11 NOTE — PLAN OF CARE
Problem: Adult Behavioral Health Plan of Care  Goal: Plan of Care Review  Outcome: Adequate for Care Transition  Flowsheets (Taken 4/11/2023 0916)  Patient Agreement with Plan of Care: agrees  Goal: Patient-Specific Goal (Individualization)  Description: Patient will eat at least 50% of meals.   Patient will sleep at least 6 hours of sleep each NOC.   Patient will attend at least 50% of groups when able to wean out to open unit.   Patient will be compliant with treatment team recommendations.   Patient will maintain appropriate boundaries with peers and staff.    4/11/2023 1044 by Evie Tavera RN  Outcome: Adequate for Care Transition  4/11/2023 0749 by Evie Tavera RN  Outcome: Progressing  Note: Shift Summary:  Goal: Individualized Daily Interaction Plan (IDIP)  Outcome: Adequate for Care Transition  Goal: Adheres to Safety Considerations for Self and Others  Outcome: Adequate for Care Transition  Intervention: Develop and Maintain Individualized Safety Plan  Recent Flowsheet Documentation  Taken 4/11/2023 0916 by Evie Tavera RN  Safety Measures:   environmental rounds completed   safety rounds completed   suicide assessment completed  Goal: Absence of New-Onset Illness or Injury  Outcome: Adequate for Care Transition  Intervention: Identify and Manage Fall Risk  Recent Flowsheet Documentation  Taken 4/11/2023 0916 by Evie Tavera RN  Safety Measures:   environmental rounds completed   safety rounds completed   suicide assessment completed  Goal: Optimized Coping Skills in Response to Life Stressors  Outcome: Adequate for Care Transition  Goal: Develops/Participates in Therapeutic Providence to Support Successful Transition  Outcome: Adequate for Care Transition  Intervention: Foster Therapeutic Providence  Recent Flowsheet Documentation  Taken 4/11/2023 0916 by Evie Tavera RN  Trust Relationship/Rapport:   care explained   choices provided   emotional support provided     Problem: Thought  Process Alteration  Goal: Optimal Thought Clarity  Description: Patient will report a decrease in auditory hallucinations by discharge.  Patient will be able to hold a reality based conversation.  4/11/2023 1044 by Evie Tavera RN  Outcome: Adequate for Care Transition  4/11/2023 0749 by Evie Tavera, RN  Outcome: Progressing     Problem: Suicidal Behavior  Goal: Suicidal Behavior is Absent or Managed  Outcome: Adequate for Care Transition   Goal Outcome Evaluation:    Plan of Care Reviewed With: patient

## 2023-04-11 NOTE — PROGRESS NOTES
"[facility] PSYCHIATRY  PROGRESS NOTE     SUBJECTIVE     Prior to interviewing the patient, I met with nursing and reviewed patient's clinical condition. We discussed clinical care both before and after the interview. I have reviewed the patient's clinical course by review of records including previous notes, labs, and vital signs.     Per nursing, the patient had the following behavioral events over the last 24-hours: staff reporting some improvement    On psychiatric interview, patient is met within the mileu. He is currently reading a calculus book doing \"Differential equations\". He is much less guarded and easier to engage with. He talks about his love for \"solving things\". He denies any side effects from increased dose of olanzapine over the weekend but does note that he is feeling better, he can't express why he feels this way.      MEDICATIONS   Scheduled Meds:    OLANZapine zydis  7.5 mg Oral TID     paliperidone  234 mg Intramuscular Q28 Days     propranolol  10 mg Oral TID     cholecalciferol  25 mcg Oral Daily     PRN Meds:.acetaminophen, alum & mag hydroxide-simethicone, benztropine, hydrOXYzine, loperamide, OLANZapine **OR** OLANZapine, ondansetron, senna-docusate, simethicone, traZODone     ALLERGIES   No Known Allergies     MENTAL STATUS EXAM   Vitals: /61 (BP Location: Right arm)   Pulse 97   Temp 98.3  F (36.8  C) (Tympanic)   Resp 18   Wt 69.2 kg (152 lb 8 oz)   SpO2 98%   BMI 20.68 kg/m      Appearance:  awake, alert, dressed in hospital scrubs, appeared as age stated and disheveled   Attitude: relatively cooperative, dismissive  Eye Contact: brief  Mood: \"good\"  Affect: blunted  Speech:  clear, coherent, is a little more interactive in conversation today  Psychomotor Behavior:  no evidence of tardive dyskinesia, dystonia, or tics  Thought Process:  difficult to assess, seems more be more rational and goal oriented  Associations:  difficult to assess  Thought Content:  no evidence " "of suicidal ideation or homicidal ideation, does not seem as paranoid or delusional or at least is verbalizing this less  Insight:  limited  Judgment:  limited  Oriented to:  time, person, and place  Attention Span and Concentration:  limited  Recent and Remote Memory:  fair  Fund of Knowledge: difficult to assess d/t psychosis  Muscle Strength and Tone: normal  Gait and Station: Normal       LABS   No results found for this or any previous visit (from the past 24 hour(s)).      IMPRESSION     This is a 27 year old male with a PMH of psychosis and schizophrenia who was discharged from a  unit in [place] and drove himself 4 hours north to [place] to request behavioral health admission again. He was making delusional and paranoid statements. He talks about being part of the [employer] and the [employer]. Today he answers most of my questions with \"I can't talk about it\" or \"it's classified\". He was screened for commitment while in the ED, he had his confinement hearing this afternoon and will have his commitment and spaulding hearing on 3/30. His next Invega Sustenna injection is due on 3/30, however today he states that he does not want to take this. In review of records it appears that during most of his hospital stays he is usually placed back on Invega Sustenna and stabilizes. In the ED oral Risperdal was scheduled due to his level of agitation and aggression, so has been continued here to prevent any further episodes. It appears from notes that last episode of restraints was on 3/20.     Interim summary:tolerating increase dose in olanzapine to 5 mg TID, is slightly more organized today.  On 4/8/23- pt requesting increase in olanzapine  4/10/23- much more organized after increase in olanzapine. Dual antipsychotics risks discussed.        DIAGNOSES     1. Schizophrenia       PLAN     Location: Unit 5  Legal Status: Orders Placed This Encounter      Legal status Patient is Committed  Spaulding: Yes (Haldol, Zyprexa, Invega, " Risperdal)    Safety Assessment:    Behavioral Orders   Procedures     Code 1 - Restrict to Unit     Routine Programming     As clinically indicated     Status 15     Every 15 minutes.      PTA psychotropic medications stopped:     -Strattera 40 mg BID-->discontinued 3/29    PTA psychotropic medications continued/changed:     -Invega Sustenna 234 mg IM due 3/30/23 (last received 3/2)- spaulding granted and received 3/31      New medications tried and stopped:     -None    New medications initiated:     -Zyprexa 5 mg BID --> increased to 5 TID on 4/6/23 -> increased to 7.5 mg TID 4/8/23  -Propranolol 10 mg TID for anxiety/akathisia    Today's Changes:  -Increase olanzapine 5 mg TID to 7.5 mg TID  -Commitment and Spaulding approved  -Received Invega Sustenna 234 mg IM on 3/2, then again on 3/31, scheduled every 28 days.     Programming: Patient will be treated in a therapeutic milieu with appropriate individual and group therapies. Education will be provided on diagnoses, medications, and treatments.     Medical diagnoses:  Per medicine    Consult: None  Tests: None    Anticipated LOS: 2-3 weeks  Disposition: Likely home with family and outpatient services vs. Remaining in [place] and establishing care       TREATMENT TEAM CARE PLAN     Progress: Symptoms improved.    Continued Stay Criteria/Rationale: Ongoing treatment and safe discharge planning.    Medical/Physical: See above.    Precautions: See above.     Plan: Continue inpatient care with unit support and medication management.    Rationale for change in precautions or plan: NA due to no change.    Participants: Santiago Rojo MD, Nursing, SW, OT.    The patient's care was discussed with the treatment team and chart notes were reviewed.           ATTESTATION    Santiago Rojo MD  [facility]   Psychiatry    Video Visit: Patient has given verbal consent for video visit?: Yes  Type of Service: video visit for mental health treatment  Reason for Video Visit: COVID-19  [place]  Originating Site (patient location): [facility]  Distant Site (provider location): Remote Location  Mode of Communication: Video Conference via [employer]  Time of Service: Date: 04/10/2023 , Start:  10:30 end: 11:00

## 2023-04-11 NOTE — PLAN OF CARE
Face to face shift report received from Terry OH RN. Rounding completed, pt observed.    Problem: Adult Behavioral Health Plan of Care  Goal: Patient-Specific Goal (Individualization)  Description: Patient will eat at least 50% of meals.   Patient will sleep at least 6 hours of sleep each NOC.   Patient will attend at least 50% of groups when able to wean out to open unit.   Patient will be compliant with treatment team recommendations.   Patient will maintain appropriate boundaries with peers and staff.    Outcome: Progressing  Note: Shift Summary:  Discharge Note    Patient Discharged to home on 4/11/2023 10:45 AM via [employer] accompanied by .     Patient informed of discharge instructions in AVS. patient verbalizes understanding and denies having any questions pertaining to AVS. Patient stable at time of discharge. Patient denies SI, HI, and thoughts of self harm at time of discharge. All personal belongings returned to patient. Discharge prescriptions sent to home pharmacy in [place] via electronic communication. Psych evaluation, history and physical, AVS, and discharge summary faxed to next level of care by SW.     Evie Tavera RN  4/11/2023  10:45 AM         Problem: Thought Process Alteration  Goal: Optimal Thought Clarity  Description: Patient will report a decrease in auditory hallucinations by discharge.  Patient will be able to hold a reality based conversation.  Outcome: Progressing   Face to face report will be communicated to oncoming RN.    Evie Tavera RN  4/11/2023

## 2023-04-11 NOTE — DISCHARGE SUMMARY
"  [facility] PSYCHIATRY  DISCHARGE SUMMARY     DISCHARGE DATA     Sophie Steen MRN# [id number]   Age: 27 year old YOB: 1995     Date of Admission: 3/26/2023  Date of Discharge: April 11, 2023  Discharge Provider: Santiago Rojo MD       REASON FOR ADMISSION   This is a 27 year old male with a PMH of psychosis and schizophrenia who was discharged from a  unit in [place] and drove himself 4 hours north to [place] to request behavioral health admission again. He was making delusional and paranoid statements. He talks about being part of the [employer] and the [employer]. Today he answers most of my questions with \"I can't talk about it\" or \"it's classified\". He was screened for commitment while in the ED, he had his confinement hearing this afternoon and will have his commitment and spaulding hearing on 3/30. His next Invega Sustenna injection is due on 3/30, however today he states that he does not want to take this. In review of records it appears that during most of his hospital stays he is usually placed back on Invega Sustenna and stabilizes. In the ED oral Risperdal was scheduled due to his level of agitation and aggression, so has been continued here to prevent any further episodes. It appears from notes that last episode of restraints was on 3/20.     During hospitalization, he was given Invega Sustenna injection on 3/31, with next injection due on 4/28/23.  Olanzapine was added during the day and his psychosis stabilized with dual antipsychotics.         DISCHARGE DIAGNOSES   #Schizophrenia      HOSPITAL COURSE   Patient was admitted to unit 5 due to the aforementioned presentation. The patient was placed under 15 minute checks to ensure patient safety. The patient participated in unit programming and groups as able.    Legal status during hospitalization was involuntary .Mr. Steen did require seclusion/restraint during hospitalization. He received a provisional discharge upon dismissal.     We " reviewed with Mr. Steen current and past medication trials including duration, dose, response and side effects. During this hospitalization, the following changes to the patient's psychotropic medications were made:    PTA psychotropic medications stopped:      -Strattera 40 mg BID-->discontinued 3/29     PTA psychotropic medications continued/changed:      -Invega Sustenna 234 mg IM due 3/30/23 (last received 3/2)- spaulding granted and received 3/31     New medications tried and stopped:      -None     New medications initiated:      -Zyprexa 5 mg BID --> increased to 5 TID on 4/6/23 -> increased to 7.5 mg TID 4/8/23 --> dosed at 5 mg BID and 10 mg at bedtime upon discharge (7.5 mg TID was too sedating)  -Propranolol 10 mg TID for anxiety/akathisia    Mr. Steen progressed slowly at first related to response to medication changes . By the time of discharge, his symptoms had improved adequately.  Psychosis improved with adequate outpatient plan in place. and Psychotropic medications utilized were found to be helpful without significant adverse side effects. The treatment goals (long-term goal/discharge criteria) were reached.     With the aforementioned changes and supports the patient noticed improvement in their symptoms and felt sufficiently ready for discharge. As a result, Sophie Steen was discharged. At the time of discharge, Sophie Steen was determined to not be a danger to self or others. The patient was also medically stable for discharge. At the current time of discharge, the patient does not meet criteria for involuntary hospitalization. On the day of discharge, the patient reports that they do not have suicidal or homicidal ideation. Steps taken to minimize risk include: assessing patient s behavior and thought process daily during hospital stay, discharging patient with adequate plan for follow up for mental and physical health and discussing safety plan of returning to the hospital  should the patient ever have thoughts of harming themselves or others. Therefore, based on all available evidence including the factors cited above, the patient does not appear to be at imminent risk for self-harm, and is appropriate for outpatient level of care. The acute crisis is resolved and Sophie is discharging in improved condition. Though Sophie's acute crisis has resolved, there remains a higher risk of suicide over the long term compared to the general population. Factors that may be associated with relapse include worsening of depressive symptoms, alcohol use, illicit substance use, not taking medications, lack of mental health follow-up appointments and work/family/relationship stressors. Sophie Steen has a plan in place to mitigate these stressors, is hopeful about the future ,and is not assessed to be a imminent risk to self or anyone else and thus is not assessed to be holdable.  Sophie has received maximal benefit from the current hospital stay. Sophie Steen set to discharge.        DISCHARGE MEDICATIONS     Current Discharge Medication List      START taking these medications    Details   !! OLANZapine (ZYPREXA) 10 MG tablet Take 1 tablet (10 mg) by mouth At Bedtime for 30 days  Qty: 30 tablet, Refills: 0    Associated Diagnoses: Undifferentiated schizophrenia (H)      !! OLANZapine (ZYPREXA) 5 MG tablet Take 1 tablet (5 mg) by mouth 2 times daily (with meals) for 30 days  Qty: 60 tablet, Refills: 0    Associated Diagnoses: Undifferentiated schizophrenia (H)      Vitamin D3 (CHOLECALCIFEROL) 25 mcg (1000 units) tablet Take 1 tablet (25 mcg) by mouth daily for 30 days  Qty: 30 tablet, Refills: 0    Associated Diagnoses: Undifferentiated schizophrenia (H)       !! - Potential duplicate medications found. Please discuss with provider.      CONTINUE these medications which have CHANGED    Details   paliperidone (INVEGA SUSTENNA) 234 MG/1.5ML JOSE Inject 1.5 mLs (234 mg) into the muscle every  "28 days for 1 dose  Qty: 1.5 mL, Refills: 0    Comments: Last injection on 3/31/23, next injection on 4/28/23  Associated Diagnoses: Undifferentiated schizophrenia (H)      propranolol (INDERAL) 10 MG tablet Take 1 tablet (10 mg) by mouth 3 times daily for 30 days  Qty: 90 tablet, Refills: 0    Associated Diagnoses: Undifferentiated schizophrenia (H)         STOP taking these medications       atomoxetine (STRATTERA) 40 MG capsule Comments:   Reason for Stopping:               Reason for two or more neuroleptics: is on Invega sustenna WHITEHEAD as well as oral olanzapine. Ion suffers from severe and persistent mental illness and efforts to have him on be monotherapy for neuroleptic medications were unsuccessful. He stabilized on dual neuroleptic therapy and benefits outweigh the risks for Ion to be on two neuroleptic medications. Discontinuing one would likely result in further decompensation and repeat hospitalization. No EPS noted (on propranolol for akathisia)        VITALS   Vitals: /57   Pulse 79   Temp 97.8  F (36.6  C) (Temporal)   Resp 14   Wt 69.2 kg (152 lb 8 oz)   SpO2 95%   BMI 20.68 kg/m       MENTAL STATUS EXAM   Appearance: Alert, oriented, dressed in hospital scrubs, appears stated age   Attitude: Cooperative   Eye Contact: Good  Mood: \"Better\"  Affect: Full range of affect  Speech: Normal rate and rhythm   Psychomotor Behavior: No tremor, rigidity, or psychomotor abnormality   Thought Process: Logical, goal directed   Associations: No loose associations   Thought Content: Denies SI or plan. No SIB. Denies A/V hallucinations. Delusional thought appears below baseline. Still guarded when it comes to his belief that other people are able to impact and control his life that are outside the realm of reality.   Insight: Good  Judgment: Good  Oriented to: Person, place, and time  Attention Span and Concentration: Intact  Recent and Remote Memory: Intact  Language: English with appropriate syntax " and vocabulary  Fund of Knowledge: Average  Muscle Strength and Tone: Grossly normal  Gait and Station: Grossly normal       DISCHARGE PLAN     1.  Education given regarding diagnostic and treatment options with risks, benefits and alternatives with adequate verbalization of understanding.  2.  Discharge to home. PD. Upon detailed review of risk factors, patient amenable for release.   3.  Continue aforementioned medications and associated medication changes with follow-up by outpatient provider.  4.  Crisis management planning in place.    5.  Nursing and  to review further discharge recommendations.   6.  Active issues: No active medical issues requiring immediate follow-up care.  7.  Patient is being discharged with the following appointments as detailed below:    See avs        DISCHARGE SERVICES PROVIDED     80 minutes spent on discharge services, including:  Final examination of patient.  Review and discussion of hospital stay.  Instructions for continued outpatient care/goals.  Preparation of discharge records.  Preparation of medications refills and new prescriptions.  Preparation of applicable referral forms.        ATTESTATION   Santiago Rojo MD  [facility]   Psychiatry    Video Visit: Patient has given verbal consent for video visit?: Yes  Type of Service: video visit for mental health treatment  Reason for Video Visit: COVID-19 [place]  Originating Site (patient location): [facility]  Distant Site (provider location): Remote Location  Mode of Communication: Video Conference via [employer]  Time of Service: Date: April 11, 2023 , Start: 08:00 end: 09:00     LABS THIS ADMISSION     Results for orders placed or performed during the hospital encounter of 03/26/23   Asymptomatic COVID-19 Virus (Coronavirus) by PCR Nasopharyngeal     Status: Normal    Specimen: Nasopharyngeal; Swab   Result Value Ref Range    SARS CoV2 PCR Negative Negative    Narrative     Testing was performed using the Xpert Xpress SARS-CoV-2 Assay on the Cepheid Gene-Xpert Instrument Systems. Additional information about this Emergency Use Authorization (EUA) assay can be found via the Lab Guide. This test should be ordered for the detection of SARS-CoV-2 in individuals who meet SARS-CoV-2 clinical and/or epidemiological criteria as well as from individuals without symptoms or other reasons to suspect COVID-19. Test performance for asymptomatic patients has only been established in anterior nasal swab specimens. This test is for in vitro diagnostic use under the FDA EUA for laboratories certified under CLIA to perform high complexity testing. This test has not been FDA cleared or approved. A negative result does not rule out the presence of PCR inhibitors in the specimen or target RNA concentration below the limit of detection for the assay. The possibility of a false negative should be considered if the patient's recent exposure or clinical presentation suggests COVID-19. This test was validated by [facility]. This laboratory is certified under the Clinical Laboratory Improvement Amendments (CLIA) as qualified to perform high complexity testing.

## 2023-04-11 NOTE — PROGRESS NOTES
Scheduled ride (no insurance available) with [employer] for today 4/11 @ 10:15 AM. RN and provider notified. Written on whiteboard. Pt is ride sharing, consent given to this writer.    Pt is discharging at the recommendation of the treatment team. Pt is discharging to his vehicle transported by [employer]. Pt denies having any thoughts of hurting themself or anyone else. Pt denies anxiety or depression. Pt has follow up with case managment. Discharge instructions, including; demographic sheet, psychiatric evaluation, discharge summary, and AVS were faxed to these next level of care providers.

## 2023-04-11 NOTE — PLAN OF CARE
"Face to face end of shift report communicated to oncoming shift.     Johanna Shelley RN  4/10/2023  11:12 PM        Problem: Adult Behavioral Health Plan of Care  Goal: Patient-Specific Goal (Individualization)  Description: Patient will eat at least 50% of meals.   Patient will sleep at least 6 hours of sleep each NOC.   Patient will attend at least 50% of groups when able to wean out to open unit.   Patient will be compliant with treatment team recommendations.   Patient will maintain appropriate boundaries with peers and staff.    Outcome: Progressing  Note: Patient up on unit walking in the halls.  Patient lets needs be known.  Requests this writer to assess rash on leg.  Red dots noted on right upper thigh, patient states they are not itchy or painful.  Denies any other symptoms related to rash, requesting doctor to assess the rash.      Patient denies pain.  Patient denies SI, HI, AH/VH and pain.    Patient's  on unit to meet with patient this shift.  Leaves paperwork for patient to sign and fax back to her.  Paperwork signed and faxed back to .  Patient   states she's agreeable with patient's discharge plan as \"there's no reason to keep him here\"      Patient eats 100% of dinner, patient does not attend groups this shift.    Patient takes all medications as prescribed.     Patient remains free falls this shift.    Goal Outcome Evaluation:                        "

## 2023-04-12 ENCOUNTER — HOSPITAL ENCOUNTER (EMERGENCY)
Facility: CLINIC | Age: 28
Discharge: HOME | End: 2023-04-12
Attending: FAMILY MEDICINE | Admitting: FAMILY MEDICINE
Payer: MEDICARE

## 2023-04-12 ENCOUNTER — HOSPITAL ENCOUNTER (EMERGENCY)
Facility: CLINIC | Age: 28
Discharge: PSYCHIATRIC OR SUBSTANCE USE FACILITY | End: 2023-04-15
Attending: EMERGENCY MEDICINE | Admitting: EMERGENCY MEDICINE
Payer: MEDICARE

## 2023-04-12 ENCOUNTER — TELEPHONE (OUTPATIENT)
Dept: BEHAVIORAL HEALTH | Facility: HOSPITAL | Age: 28
End: 2023-04-12

## 2023-04-12 VITALS
RESPIRATION RATE: 16 BRPM | SYSTOLIC BLOOD PRESSURE: 98 MMHG | DIASTOLIC BLOOD PRESSURE: 58 MMHG | BODY MASS INDEX: 20.61 KG/M2 | OXYGEN SATURATION: 95 % | HEART RATE: 111 BPM | WEIGHT: 152 LBS | TEMPERATURE: 98.3 F

## 2023-04-12 DIAGNOSIS — F22 DELUSIONS (H): ICD-10-CM

## 2023-04-12 DIAGNOSIS — F20.9 SCHIZOPHRENIA, UNSPECIFIED TYPE (H): ICD-10-CM

## 2023-04-12 DIAGNOSIS — F20.0 PARANOID SCHIZOPHRENIA (H): ICD-10-CM

## 2023-04-12 DIAGNOSIS — F20.3 UNDIFFERENTIATED SCHIZOPHRENIA (H): ICD-10-CM

## 2023-04-12 DIAGNOSIS — F22 PARANOIA (H): ICD-10-CM

## 2023-04-12 LAB
ANION GAP SERPL CALCULATED.3IONS-SCNC: 13 MMOL/L (ref 7–15)
APAP SERPL-MCNC: <5 UG/ML (ref 10–30)
BUN SERPL-MCNC: 17.8 MG/DL (ref 6–20)
CALCIUM SERPL-MCNC: 10 MG/DL (ref 8.6–10)
CHLORIDE SERPL-SCNC: 102 MMOL/L (ref 98–107)
CREAT SERPL-MCNC: 0.82 MG/DL (ref 0.67–1.17)
DEPRECATED HCO3 PLAS-SCNC: 25 MMOL/L (ref 22–29)
ERYTHROCYTE [DISTWIDTH] IN BLOOD BY AUTOMATED COUNT: 12.8 % (ref 10–15)
ETHANOL SERPL-MCNC: <0.01 G/DL
GFR SERPL CREATININE-BSD FRML MDRD: >90 ML/MIN/1.73M2
GLUCOSE SERPL-MCNC: 104 MG/DL (ref 70–99)
HCT VFR BLD AUTO: 43.8 % (ref 40–53)
HGB BLD-MCNC: 14.8 G/DL (ref 13.3–17.7)
MCH RBC QN AUTO: 30.5 PG (ref 26.5–33)
MCHC RBC AUTO-ENTMCNC: 33.8 G/DL (ref 31.5–36.5)
MCV RBC AUTO: 90 FL (ref 78–100)
PLATELET # BLD AUTO: 227 10E3/UL (ref 150–450)
POTASSIUM SERPL-SCNC: 4 MMOL/L (ref 3.4–5.3)
RBC # BLD AUTO: 4.85 10E6/UL (ref 4.4–5.9)
SALICYLATES SERPL-MCNC: <0.3 MG/DL
SODIUM SERPL-SCNC: 140 MMOL/L (ref 136–145)
WBC # BLD AUTO: 7.8 10E3/UL (ref 4–11)

## 2023-04-12 PROCEDURE — 36415 COLL VENOUS BLD VENIPUNCTURE: CPT | Performed by: EMERGENCY MEDICINE

## 2023-04-12 PROCEDURE — 99284 EMERGENCY DEPT VISIT MOD MDM: CPT | Performed by: FAMILY MEDICINE

## 2023-04-12 PROCEDURE — 80143 DRUG ASSAY ACETAMINOPHEN: CPT | Performed by: EMERGENCY MEDICINE

## 2023-04-12 PROCEDURE — 250N000013 HC RX MED GY IP 250 OP 250 PS 637: Performed by: FAMILY MEDICINE

## 2023-04-12 PROCEDURE — 82077 ASSAY SPEC XCP UR&BREATH IA: CPT | Performed by: EMERGENCY MEDICINE

## 2023-04-12 PROCEDURE — 85027 COMPLETE CBC AUTOMATED: CPT | Performed by: EMERGENCY MEDICINE

## 2023-04-12 PROCEDURE — 80048 BASIC METABOLIC PNL TOTAL CA: CPT | Performed by: EMERGENCY MEDICINE

## 2023-04-12 PROCEDURE — 99285 EMERGENCY DEPT VISIT HI MDM: CPT | Mod: 25

## 2023-04-12 PROCEDURE — 80179 DRUG ASSAY SALICYLATE: CPT | Performed by: EMERGENCY MEDICINE

## 2023-04-12 PROCEDURE — 90791 PSYCH DIAGNOSTIC EVALUATION: CPT

## 2023-04-12 RX ORDER — OLANZAPINE 5 MG/1
5 TABLET ORAL 2 TIMES DAILY WITH MEALS
Qty: 60 TABLET | Refills: 0 | Status: ON HOLD | OUTPATIENT
Start: 2023-04-12 | End: 2023-05-31

## 2023-04-12 RX ORDER — OLANZAPINE 10 MG/1
10 TABLET ORAL AT BEDTIME
Status: DISCONTINUED | OUTPATIENT
Start: 2023-04-12 | End: 2023-04-15 | Stop reason: HOSPADM

## 2023-04-12 RX ORDER — PROPRANOLOL HYDROCHLORIDE 10 MG/1
10 TABLET ORAL ONCE
Status: COMPLETED | OUTPATIENT
Start: 2023-04-12 | End: 2023-04-12

## 2023-04-12 RX ORDER — PROPRANOLOL HYDROCHLORIDE 10 MG/1
10 TABLET ORAL 3 TIMES DAILY
Qty: 90 TABLET | Refills: 0 | Status: ON HOLD | OUTPATIENT
Start: 2023-04-12 | End: 2023-05-31

## 2023-04-12 RX ORDER — OLANZAPINE 10 MG/1
10 TABLET ORAL 2 TIMES DAILY
Status: DISCONTINUED | OUTPATIENT
Start: 2023-04-12 | End: 2023-04-12

## 2023-04-12 RX ORDER — VITAMIN B COMPLEX
25 TABLET ORAL DAILY
Status: DISCONTINUED | OUTPATIENT
Start: 2023-04-13 | End: 2023-04-15 | Stop reason: HOSPADM

## 2023-04-12 RX ORDER — OLANZAPINE 5 MG/1
5 TABLET, ORALLY DISINTEGRATING ORAL ONCE
Status: COMPLETED | OUTPATIENT
Start: 2023-04-12 | End: 2023-04-12

## 2023-04-12 RX ORDER — ACETAMINOPHEN 325 MG/1
650 TABLET ORAL EVERY 4 HOURS PRN
Status: DISCONTINUED | OUTPATIENT
Start: 2023-04-12 | End: 2023-04-15 | Stop reason: HOSPADM

## 2023-04-12 RX ORDER — HYDROXYZINE HYDROCHLORIDE 25 MG/1
25 TABLET, FILM COATED ORAL EVERY 4 HOURS PRN
Status: DISCONTINUED | OUTPATIENT
Start: 2023-04-12 | End: 2023-04-15 | Stop reason: HOSPADM

## 2023-04-12 RX ORDER — OLANZAPINE 10 MG/1
10 TABLET ORAL AT BEDTIME
Qty: 30 TABLET | Refills: 0 | Status: ON HOLD | OUTPATIENT
Start: 2023-04-12 | End: 2023-05-31

## 2023-04-12 RX ORDER — PROPRANOLOL HYDROCHLORIDE 10 MG/1
10 TABLET ORAL 3 TIMES DAILY
Status: DISCONTINUED | OUTPATIENT
Start: 2023-04-13 | End: 2023-04-15 | Stop reason: HOSPADM

## 2023-04-12 RX ORDER — OLANZAPINE 2.5 MG/1
5 TABLET, FILM COATED ORAL 2 TIMES DAILY WITH MEALS
Status: DISCONTINUED | OUTPATIENT
Start: 2023-04-13 | End: 2023-04-15 | Stop reason: HOSPADM

## 2023-04-12 RX ADMIN — PROPRANOLOL HYDROCHLORIDE 10 MG: 10 TABLET ORAL at 09:45

## 2023-04-12 RX ADMIN — OLANZAPINE 5 MG: 5 TABLET, ORALLY DISINTEGRATING ORAL at 09:42

## 2023-04-12 ASSESSMENT — ACTIVITIES OF DAILY LIVING (ADL)
ADLS_ACUITY_SCORE: 35

## 2023-04-12 NOTE — ED PROVIDER NOTES
ED Provider Note  [facility]      History     Chief Complaint   Patient presents with     Mental Health Problem     Believes to be symptomatic of his schizophrenia and making it hard to function.     HPI  Sophie Steen is a 27 year old male who has a history of paranoid schizophrenia.  He was recently hospitalized at [facility] from March 26 2023 until yesterday.  Initially presented with paranoid delusions, was admitted, stabilized on Zyprexa and Invega Sustenna.  He had a commitment hearing and a [facility], is now on a commitment with provisional discharge.  He was discharged from there and plans to live in [facility].  It appears he has a [facility] based commitment and  he states that was the plan for him to live here, however when he went to  his prescriptions, they had been sent to the pharmacy in [place] which is where he is originally from.  Reviewing the chart I see those prescriptions are indeed sent electronically to [facility].  He states he would like the morning dose of his medications and would like his prescription switched to the pharmacy here.  Denies any homicidal or suicidal thoughts.  Denies feeling paranoid.  States he is in contact with a place for stable housing and has gone through the intake for case management services here.  Is not endorsing any hallucinations.  He made 1 statement about expecting a large payment to come from [employer] for music which he has posted on there, uncertain if this is based on a delusion.    Past Medical History  Past Medical History:   Diagnosis Date     Schizophrenia (H)      History reviewed. No pertinent surgical history.  OLANZapine (ZYPREXA) 10 MG tablet  OLANZapine (ZYPREXA) 5 MG tablet  [START ON 4/28/2023] paliperidone (INVEGA SUSTENNA) 234 MG/1.5ML JOSE  propranolol (INDERAL) 10 MG tablet  Vitamin D3 (CHOLECALCIFEROL) 25 mcg (1000 units) tablet      No Known Allergies  Family  History  No family history on file.  Social History          A medically appropriate review of systems was performed with pertinent positives and negatives noted in the HPI, and all other systems negative.    Physical Exam   BP: 98/58  Pulse: 111  Temp: 98.3  F (36.8  C)  Resp: 16  Weight: 68.9 kg (152 lb)  SpO2: 95 %  Physical Exam  Vitals and nursing note reviewed.   Constitutional:       General: He is not in acute distress.     Appearance: Normal appearance. He is not toxic-appearing.   HENT:      Head: Atraumatic.      Nose: No congestion.   Eyes:      General: No scleral icterus.     Conjunctiva/sclera: Conjunctivae normal.   Cardiovascular:      Rate and Rhythm: Normal rate.   Pulmonary:      Effort: Pulmonary effort is normal. No respiratory distress.   Abdominal:      General: Abdomen is flat.      Palpations: Abdomen is soft.   Musculoskeletal:         General: No swelling or tenderness.      Cervical back: Neck supple. No tenderness.   Lymphadenopathy:      Cervical: No cervical adenopathy.   Skin:     General: Skin is warm.      Capillary Refill: Capillary refill takes less than 2 seconds.      Findings: No rash.   Neurological:      Mental Status: He is alert.   Psychiatric:         Mood and Affect: Mood is anxious.         Speech: Speech normal.         Behavior: Behavior normal.         Thought Content: Thought content normal.         Cognition and Memory: Cognition normal.         Judgment: Judgment normal.           ED Course, Procedures, & Data      Procedures                 No results found for any visits on 04/12/23.  Medications   OLANZapine zydis (zyPREXA) ODT tab 5 mg (has no administration in time range)   propranolol (INDERAL) tablet 10 mg (has no administration in time range)     Labs Ordered and Resulted from Time of ED Arrival to Time of ED Departure - No data to display  No orders to display          Critical care was not performed.     Medical Decision Making  The patient's  presentation was of moderate complexity (a chronic illness mild to moderate exacerbation, progression, or side effect of treatment).    The patient's evaluation involved:  review of external note(s) from 1 sources (Discharge summary from [facility] dated 4/11/2023)    The patient's management necessitated moderate risk (prescription drug management including medications given in the ED).      Assessment & Plan    A 27-year-old with a history of chronic paranoid schizophrenia who was recently hospitalized for approximately 15 days at [facility] and discharged yesterday.  Unfortunately there was a mixup and his prescriptions were sent to [facility] which is his hometown.  He is now planning to live in [place], has case management services here.  He is requesting that I have the prescription sent to the [facility] pharmacy.  I gave him his morning dose as he did not have them last night.  He is anxious but does not appear significantly delusional and definitely has no statements or behavior which would suggest any risk of harm to self or others.  I did send his prescriptions to the [facility] pharmacy and will attempt to reach out to the  as I see his next injection of Invega Sustenna is scheduled for April 28 in [facility], and he states he will not be there.  Would like to make the  aware of that if possible.  Based on the clinical findings and the entire clinical scenario, the patient appears stable at this time to be treated symptomatically, and to follow-up as an outpatient for any further evaluation and treatment.  Discussed expected course, need for follow up, and indications for return with the patient.  See discharge instructions.      I have reviewed the nursing notes. I have reviewed the findings, diagnosis, plan and need for follow up with the patient.    Current Discharge Medication List          Final diagnoses:   Paranoid schizophrenia (H)       Mt TARIQ  [facility] EMERGENCY DEPARTMENT  4/12/2023     Mt Grey MD  04/12/23 0953

## 2023-04-12 NOTE — ED TRIAGE NOTES
BIBA from side of road. Patient was discharge from [place] [facility] today for mental health concerns and was driving home to [place] when he got a flat tire and called 911 d/t anxiety. Patient stated in triage that he feels that his medications need more adjusting so that he can be functional. Denies SI or HI.

## 2023-04-12 NOTE — ED NOTES
RN gave patient REX paperwork.  Patient searched by security 4 bags labeled and placed into DEC office.  Patient changed into behavioral scrubs.  1:1 sitter at bedside.

## 2023-04-12 NOTE — ED NOTES
Received request from  to reach patient's  and update them on the medication concern for patient.  His medications were sent to a pharmacy in [place], [place] and patient intends to live here.      Spoke with [facility] and received phone number for Silvia Montalvo ([phone]).      Spoke with Silvia and received update that she was the  and patient has been assigned to Papi Thakkar at [facility] since 4/3/23.  His number is [phone].  Left message.    Updated Dr. Grey.    Judy Huang [facility]    Addendum:  Received call back from , Papi.  CM will follow up with patient regarding his medications and plans.

## 2023-04-12 NOTE — ED PROVIDER NOTES
History     Chief Complaint:  Mental Health Problem       The history is limited by the condition of the patient (psychiatric disorder).      Sophie Steen is a 27 year old male with a history of schizophrenia, anxiety, and ADHD who presents with a mental health problem. He was recently released from a inpatient mental health facility 2 days ago, and was driving back home to [place] earlier today when he got a flat tire. This prompted him to call 911 and told the  that he was having a mental break down. At bedside, he claims that he works for [employer] and is currently in [place] to training. Additionally, he believes that he ranks 44th in the world for intelligence. He denies suicidal ideations.     Independent Historian: The patient    Review of External Notes: None      ROS:  Review of Systems   Unable to perform ROS: Psychiatric disorder     Allergies:  No Known Allergies     Medications:    Zyprexa   Invega Sustenna   Inderal     Past Medical History:    Schizophrenia   Anxiety   Paranoia   ADHD     Social History:  The patient presents to the ED alone via private vehicle   The patient is visiting [place] from [place]    Physical Exam     Patient Vitals for the past 24 hrs:   BP Temp Temp src Pulse Resp SpO2   04/12/23 1421 (!) 129/96 99.1  F (37.3  C) Temporal 112 18 96 %        Physical Exam  Constitutional: Vital signs reviewed.  Pleasant.  HEENT: Moist mucous membranes  Cardiovascular: Regular rate and rhythm  Pulmonary/Chest: Breathing comfortably on room air.  No audible wheezing  Musculoskeletal/Extremities: No bony deformities.  Moves all 4 extremities without difficulty.  Neurological: Alert.  No focal deficits.  Endo: No pitting edema  Skin: No visible rash.  Psychiatric: Pleasant. Delusional. Feels he is being controlled by a chip in the brain and thinks he works for [employer]. Denies suicidal ideations or homicidal ideations.      Emergency Department Course  "  Laboratory:  Labs Ordered and Resulted from Time of ED Arrival to Time of ED Departure   BASIC METABOLIC PANEL - Abnormal       Result Value    Sodium 140      Potassium 4.0      Chloride 102      Carbon Dioxide (CO2) 25      Anion Gap 13      Urea Nitrogen 17.8      Creatinine 0.82      Calcium 10.0      Glucose 104 (*)     GFR Estimate >90     ACETAMINOPHEN LEVEL - Abnormal    Acetaminophen <5.0 (*)    CBC WITH PLATELETS - Normal    WBC Count 7.8      RBC Count 4.85      Hemoglobin 14.8      Hematocrit 43.8      MCV 90      MCH 30.5      MCHC 33.8      RDW 12.8      Platelet Count 227     ETHYL ALCOHOL LEVEL - Normal    Alcohol ethyl <0.01     SALICYLATE LEVEL - Normal    Salicylate <0.3        Emergency Department Course & Assessments:    Interventions:  None      Independent Interpretation (X-rays, CTs, rhythm strip):  N/A     Consultations/Discussion of Management or Tests:  1956 I spoke with DEC.    Social Determinants of Health affecting care:  Hx of psychiatric disorder     Assessments:  1840 I obtained history and examined the patient as noted above.  2000 I rechecked the patient and explained findings.    Disposition:  The patient will be assessed by DEC.  He is currently on an REX given his delusions and paranoia  Impression & Plan    Medical Decision Making:  Patient presents stating that he was driving to [place] from his recent inpatient stay at [facility] for mental health.  He states that he is traveling this way because the chip in his brain is telling him to come here.  He states he works [employer] and he is always training and always being told what to do.  He also states that he is 44th in the world in terms of intelligence.  He denies any thoughts of self-harm or wanting to harm others.  He states that he just needs to get his meds fixed because he knows he has schizophrenia and \"that last forever.\"  He states that we can give him medications and he will just go home or we keep him in " the hospital.  He did get a flat tire on the way home and called and said he was having a mental breakdown which is why they came here.  He states his car is at [employer] and he can only stay there for 1 night.  He has no physical complaints at this time.  He is clearly delusional and does seem somewhat paranoid.  I do not feel he is safe to care for himself at this time.  As such I placed him on an REX.  He will be evaluated by DEC with further recommendations.  He will be signed out to my colleague once DEC is fully assessed him and come up with their plan.    Diagnosis:    ICD-10-CM    1. Delusions (H)  F22       2. Schizophrenia, unspecified type (H)  F20.9       3. Paranoia (H)  F22            Discharge Medications:  New Prescriptions    No medications on file          Scribe Disclosure:  I, Frederick Henderson, am serving as a scribe at 6:33 PM on 4/12/2023 to document services personally performed by Cruzito Hogan MD based on my observations and the provider's statements to me.    4/12/2023   Cruzito Hogan MD Walters, Brent Aaron, MD  04/12/23 4643

## 2023-04-12 NOTE — TELEPHONE ENCOUNTER
He was discharged to home on  4/11/23 from [facility]. I called today regarding the patient's discharge.    No answer was received. Unable to leave a message as the mail box was full.

## 2023-04-12 NOTE — ED TRIAGE NOTES
Triage Assessment     Row Name 04/12/23 0915       Triage Assessment (Adult)    Airway WDL WDL       Respiratory WDL    Respiratory WDL WDL       Skin Circulation/Temperature WDL    Skin Circulation/Temperature WDL WDL       Cardiac WDL    Cardiac WDL WDL       Peripheral/Neurovascular WDL    Peripheral Neurovascular WDL WDL       Cognitive/Neuro/Behavioral WDL    Cognitive/Neuro/Behavioral WDL WDL

## 2023-04-12 NOTE — DISCHARGE INSTRUCTIONS
Please follow-up with your  regarding timing and location for your next regular systemic injection which is due on April 28, 2023.    Indications for return to Emergency Department or to seek further assistance:  Thoughts of harm to self or others that you feel you may act on  Thoughts of suicide  Feeling unsafe  Major changes in mood, sleep or apetite  Difficulty concentrating or completing regular daily tasks/ functions  Feelings of paranoia, or feelings that you are losing touch with reality  Resources for Mental Health:  *(asterisk indicates free service)    *National Suicide Prevention Lifeline  1-973.845.8399     *[facility]'s National Help Line  (Treatment Referral Routing for Mental & Chemical Health)  1-687.680.7880      *Walk-In Counseling Center- [facility]  [address], [place]  [phone]  Mon, Wed, & Fri 1PM-3PM  Mon, Tues, Wed, & Thu 630PM-830PM  (no appointment needed)    *Walk-In Counseling Center- [facility]  [address], [place], [place]  Tue & Thu 6PM-8PM  (no appointment needed)    *[address], [place], [place]  [phone]  [email or web address]      [facility] Crisis Lines    [facility] Crisis Line  [phone] [facility] Crisis Line  [phone]   [facility] Crisis Line  [phone] [facility] Crisis Line  [phone]   [facility] Crisis Line  [phone] [facility] Crisis Line  [phone] for 8AM-430PM  [phone] for 430PM-8AM   [facility] Crisis Line  [phone] [facility] Line  [phone]     Outpatient Mental Health Clinics   *[facility]  [address] [place], [place]  [phone] *[facility]  [address] [place], [place]  [phone]    *[facility] ([facility])  [address] [place], [place]  [phone] Health Counseling Services  [address] [place] [address]  [phone]    [facility]  [address]  [place], [place]  [phone] [facility]  [address]  [place], [place]  [phone]   [facility]  [address] [place], [place]  [phone] [facility]  [address]  [place], [place]  [phone]   [facility]  [address], [place]  [phone] [facility]  [address]  [place], [place]   [phone]   [facility]  [address] [facility]   [phone] Behavioral Health and Wellness Clinic  [address] [facility] [address] [phone]   *[facility] Crisis: COPE: ([phone]) 24 hour mobile crisis support for people having a mental health crisis in [facility].   *Acute Psychiatric Services ([phone]). 24-hour walk-in crisis psychiatric support at [facility]; Emergency Medications Clinic available 7:30am - 2:00pm  *Crisis Connection: ([phone]) 24-hour confidential telephone counseling   *[facility] Emergency Room: [phone]  *[place] Recovery [facility] (MRC) : [facility] connects people seeking recovery to resources that help foster and sustain long-term recovery. Whether you are seeking resources for treatment, transportation, housing, job training, education, health or other pathways to recovery, [facility] is a great place to start. [phone] [email or web address]

## 2023-04-13 ENCOUNTER — TELEPHONE (OUTPATIENT)
Dept: BEHAVIORAL HEALTH | Facility: CLINIC | Age: 28
End: 2023-04-13
Payer: MEDICARE

## 2023-04-13 ENCOUNTER — HOSPITAL ENCOUNTER (INPATIENT)
Age: 28
End: 2023-04-13
Attending: PSYCHIATRY & NEUROLOGY
Payer: MEDICARE

## 2023-04-13 PROCEDURE — 80307 DRUG TEST PRSMV CHEM ANLYZR: CPT | Performed by: EMERGENCY MEDICINE

## 2023-04-13 PROCEDURE — U0005 INFEC AGEN DETEC AMPLI PROBE: HCPCS | Performed by: EMERGENCY MEDICINE

## 2023-04-13 PROCEDURE — 250N000013 HC RX MED GY IP 250 OP 250 PS 637: Performed by: EMERGENCY MEDICINE

## 2023-04-13 PROCEDURE — C9803 HOPD COVID-19 SPEC COLLECT: HCPCS

## 2023-04-13 RX ORDER — HYDROXYZINE HYDROCHLORIDE 25 MG/1
25 TABLET, FILM COATED ORAL EVERY 4 HOURS PRN
Status: CANCELLED | OUTPATIENT
Start: 2023-04-13

## 2023-04-13 RX ORDER — AMOXICILLIN 250 MG
1 CAPSULE ORAL 2 TIMES DAILY PRN
Status: CANCELLED | OUTPATIENT
Start: 2023-04-13

## 2023-04-13 RX ORDER — ACETAMINOPHEN 325 MG/1
650 TABLET ORAL EVERY 4 HOURS PRN
Status: CANCELLED | OUTPATIENT
Start: 2023-04-13

## 2023-04-13 RX ORDER — MAGNESIUM HYDROXIDE/ALUMINUM HYDROXICE/SIMETHICONE 120; 1200; 1200 MG/30ML; MG/30ML; MG/30ML
30 SUSPENSION ORAL EVERY 4 HOURS PRN
Status: CANCELLED | OUTPATIENT
Start: 2023-04-13

## 2023-04-13 RX ORDER — OLANZAPINE 10 MG/1
10 TABLET ORAL 3 TIMES DAILY PRN
Status: CANCELLED | OUTPATIENT
Start: 2023-04-13

## 2023-04-13 RX ORDER — OLANZAPINE 10 MG/2ML
10 INJECTION, POWDER, FOR SOLUTION INTRAMUSCULAR 3 TIMES DAILY PRN
Status: CANCELLED | OUTPATIENT
Start: 2023-04-13

## 2023-04-13 RX ORDER — LANOLIN ALCOHOL/MO/W.PET/CERES
3 CREAM (GRAM) TOPICAL
Status: CANCELLED | OUTPATIENT
Start: 2023-04-13

## 2023-04-13 RX ADMIN — OLANZAPINE 5 MG: 2.5 TABLET, FILM COATED ORAL at 17:48

## 2023-04-13 RX ADMIN — OLANZAPINE 5 MG: 2.5 TABLET, FILM COATED ORAL at 09:37

## 2023-04-13 RX ADMIN — PROPRANOLOL HYDROCHLORIDE 10 MG: 10 TABLET ORAL at 15:32

## 2023-04-13 RX ADMIN — OLANZAPINE 10 MG: 10 TABLET, FILM COATED ORAL at 20:38

## 2023-04-13 RX ADMIN — Medication 25 MCG: at 09:38

## 2023-04-13 RX ADMIN — PROPRANOLOL HYDROCHLORIDE 10 MG: 10 TABLET ORAL at 20:39

## 2023-04-13 RX ADMIN — PROPRANOLOL HYDROCHLORIDE 10 MG: 10 TABLET ORAL at 09:38

## 2023-04-13 ASSESSMENT — ACTIVITIES OF DAILY LIVING (ADL)
ADLS_ACUITY_SCORE: 35

## 2023-04-13 ASSESSMENT — COLUMBIA-SUICIDE SEVERITY RATING SCALE - C-SSRS
ATTEMPT LIFETIME: NO
2. HAVE YOU ACTUALLY HAD ANY THOUGHTS OF KILLING YOURSELF?: NO
1. HAVE YOU WISHED YOU WERE DEAD OR WISHED YOU COULD GO TO SLEEP AND NOT WAKE UP?: NO

## 2023-04-13 NOTE — ED NOTES
Pt making multiple requests for personal phone . Belongings searched for it. Writer unable to obtain. Pt notified. Pt not accepting.

## 2023-04-13 NOTE — TELEPHONE ENCOUNTER
3:14 PM  Fátima from [facility] reports that the Pt has been accepted to [address] [place] [address] under Dr Rojo/Kenia.     3:30 PM - Pt placed in queue for admission using transfer center.  guarantor established in patient station.     3:32 PM - Indicia completed     3:35 PM - [facility] ED RN informed of disposition, Pt placed on PPS admit board.     3:38 PM - email sent to [facility] registration for incoming admit

## 2023-04-13 NOTE — ED NOTES
Pt unable to go to [facility] due to Medicare not covering ambulance ride to places outside of the [facility]-- pt does not want to pay out of pocket.  Extended care is going to call back with a couple questions for RN/care team/patient to determine if they should only be looking for placement within the [facility].

## 2023-04-13 NOTE — ED NOTES
"Diagnostic Evaluation Consultation  Crisis Assessment    4/12/2023  Sophie Steen 1995     Writer consulted with ED provider, Dr. Hogan,  on this date at 8:00pm.  It was determined that pt would not benefit from assessment at this time due to Pt unable able to participate in assessment. Pt declined to do the assessment at this time, stating he will do it later after he sleeps. Pt stated that he is very tired and said he has not slept for \"50 hours\".  Per referring provider, pt is on a hold at this time and will be reevaluated when he is able to participate.      ED will call DEC at [phone] when pt is ready and able to participate in assessment.     HANNAH Peter                "

## 2023-04-13 NOTE — TELEPHONE ENCOUNTER
646pm - [facility] ED called, reports the pts [place], pt is not willing to pay out of pocket to transfer to [place]. Intake inquired if placement searches soul be limited to the [facility], it was unclear. Intake awaiting call back w clarification on where to seek placement   Admission cancelled   Pt removed from queue   649pm - unit notified

## 2023-04-13 NOTE — ED NOTES
Pt asking appropriately for maurice and crackers, polite in requests and redirectable. Meal tray arrived and placed at bedside.

## 2023-04-13 NOTE — CARE PLAN
Sophie Steen  2023  Plan of Care Hand-off Note     Patient Care Path: Inpatient Mental Health    Plan for Care:     The patient was discharged from an inpatient unit two days ago (was committed and provisionally discharged) and has spent much of those two days in the [facility] ED and the [facility] ED for psychosis, delusions, paranoia, and inability to care for himself. He was able to sleep overnight in the ED and does seem more organized and oriented currently but is unable to make an actual plan about where he can stay and how he will obtain outpatient providers and attend those appointments. His mother does not drive and can not come from [place] to pick him up and does not feel able to help him with his mental health needs.    Patient was placed on a 72 hour hold on 2023 at 0524 that will  on 2023 at 0524.     Spoke with case mangKaryna acevedo, [phone] who plans revoke patient's provisional discharge. CM will fax the paperwork to [facility] and to [facility] when complete.     Called intake at 1145 and patient was placed on waiting list for inpatient bed for safety and stabilization.       Critical Safety Issues:  Patient has been aggressive in the ED in the past and has needed restraints for safety.     Overview:  This patient is a child/adolescent: No    This patient has additional special visitor precautions: No    Legal Status: Patient was placed on a 72 hour hold on 2023 at 0524 that will  on 2023 at 0524.     Contacts:   Called patient's mother, Kal, [phone].     , Papi Thakkar at [facility], [phone].    Psychiatry Consult:  Adult Psychiatry Consult requested related to psychosis. Psychiatry IP Consult Order Placed: Yes    Updated RN and Consulting Psychiatric Provider regarding plan of care.    Yeimy Chaudhry, [facility] Hospital[facility]

## 2023-04-13 NOTE — ED NOTES
IP MH Referral Acuity Rating Score (RARS)    LMHP complete at referral to IP MH, with DEC; and, daily while awaiting IP MH placement. Call score to PPS.    CRITERIA SCORING Total    New 73 HH and Involuntary for IP MH (not adolescent) 1/1   Boarding over 24 hours 1/1   Vulnerable adult at least 55+ with multiple co morbidities; or, Patient age 11 or under 0/1   Suicide ideation without relief of precipitating factors 0/1   Current plan for suicide 0/1   Current plan for homicide 0/1   Imminent risk or actual attempt to seriously harm another without relief of factors precipitating the attempt 0/1   Severe dysfunction in daily living (ex: complete neglect for self care, extreme disruption in vegetative function, extreme deterioration in social interactions) 1/1   Recent (last 2 weeks) or current physical aggression in the ED 1/1   Restraints or seclusion episode in ED 0/1   Verbal aggression, agitation, yelling, etc., while in the ED 1/1   Active psychosis with psychomotor agitation or catatonia 1/1   Need for constant or near constant redirection (from leaving, from others, etc).  1/1   Intrusive or disruptive behaviors 1/1   TOTAL Acuity Total Score: 8

## 2023-04-13 NOTE — ED NOTES
Pt going to shower with security and ED tech. Pt given shower materials and new scrubs/socks to change into.

## 2023-04-13 NOTE — TELEPHONE ENCOUNTER
S: [facility] ED , DEC  Yeimy calling at 1146am about a 27 year old/Male presenting with psychosis         B: Pt arrived via EMS. Presenting problem, stressors: pt called 911, psychotic, reporting there is a chip in his head, that people are following him, etc.     Pt affect in ED: calm and cooperative. Pt was rather psychotic when he first arrived.   Pt Dx: Schizophrenia  Previous IP hx? Yes: a lot of admits, most recently at [facility] March 26-April 11th. Before that admitted in [place]  While there commit and Holman. PD on 4/11 to [facility]. Left there to go to [place]. This is the 2nd ER visit in the last two days.    called , who reports mom needs to  him or they are going to revoke PD. Mom said she can't come get him and can't take care of him.  plans to revoke.     Pt denies SI   Hx of suicide attempt? No  Pt denies SIB  Pt denies HI   Pt denies hallucinations .  reports the pt doesn't seem distracted upon arrival   Pt RARS Score: 8    Hx of aggression/violence, sexual offences, legal concerns, Epic care plan? describe: Hx of aggression, it seems he might have been in restraints during last IP    Current concerns for aggression this visit? No  Does pt have a history of Civil Commitment? Yes, currently committed, revocation pending   Is Pt their own guardian? Yes    Pt is prescribed medication. Is patient medication compliant? Yes  Pt is on injectable   Pt endorses OP services:  Papi Thakkar @ [facility] [phone]  CD concerns: None  Acute or chronic medical concerns: None  Does Pt present with specific needs, assistive devices, or exclusionary criteria? None    Pt is ambulatory  Pt is able to perform ADLs independently    A: Pt to be reviewed for [facility] admission. Pt is on a 72HH, initiated 7/13 @524am   Preferred placement: [facility]    COVID:Not yet ordered, Intake to request lab   Utox: Negative   CMP: Abnormalities: Glucose high @104  CBC: WNL  HCG: N/A    R:  Patient cleared and ready for behavioral bed placement: Yes  Pt placed on IPMH worklist? Yes

## 2023-04-13 NOTE — CONSULTS
Diagnostic Evaluation Consultation  Crisis Assessment    Patient was assessed: Linda  Patient location: [facility]   Was a release of information signed: Sent Fax with [facility] for Karyna Thakkar CM at [facility] (spoke with Radha MOORE at [phone] who will have patient sign and fax to [facility])     Referral Data and Chief Complaint  The patient is a 27 year old, who uses he/him pronouns, and presents to the ED via EMS. Patient is referred to the ED by self. Patient is presenting to the ED for the following concerns: psychosis.      Informed Consent and Assessment Methods     Patient is his own guardian. Writer met with patient and explained the crisis assessment process, including applicable information disclosures and limits to confidentiality, assessed understanding of the process, and obtained consent to proceed with the assessment. Patient was observed to be able to participate in the assessment as evidenced by verbalized understanding. Assessment methods included conducting a formal interview with patient, review of medical records, collaboration with medical staff, and obtaining relevant collateral information from family and community providers when available..     Over the course of this crisis assessment provided reassurance, offered validation, provided psychoeducation and assessed safety and ability to care for self. Patient's response to interventions was engaged.     Summary of Patient Situation & Collateral    The patient has a history of schizophrenia and was admitted to [facility] from 3/26/2023 to 4/11/2023 for psychosis. He was committed with a Holman order. He was provisionally discharged on 4/11/2023 to the [facility] with homeless shelter resources and a plan to drive to home in [place], [place]. His medicines were sent to a pharmacy in [place], [place]. He was assigned a , Karyna Thakkar, from [facility], [phone].     The patient left [place] on 4/11/2023 and presented to [facility]  "ED the next morning (4/12/2023) asking that his medications be filled in [place]. ED staff was able to fill them. He told staff he now plans to get an apartment in [place].     He left the [facility] ED and shortly after that (early afternoon on 4/12/2023) he called 911 from the side of the road and requested to be transported to a hospital for psychosis. Staff notes state he appeared delusional and paranoid and unable to care for himself. 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    Patient was able to sleep in the ED and was interviewed at 1104 on 4/13/2023. Patient was alert and oriented and able to participate in interview. The only delusion noted was that he claims he has a large \"income stream from [employer]\" but also noted he doesn't have enough money for a taxi ride. He can't explain the various changes in his plan since leaving [facility] inpatient mental health and can't explain why he hasn't been able to make it [place] yet as planned. He now states he needs a ride to a tire shop so he can fix his car and then drive to his Mother's house in [place], [place]. He doesn't have current outpatient mental health provides but states he will find some when he gets to [place].     Called patient's mother, Kal, [phone], who doesn't drive and is not able to come and get him. She states when he is at her home he \"barely talks,\" he \"talks to himself a lot,\" and she doesn't know how to help him with his mental health needs. When asked if has a history of aggression she was not able to give a clear answer.     Spoke with patient's , Papi Thakkar at [facility], [phone]. Papi states that it appears the patient is not able to safely care for himself and plans to revoke his provisional " discharge.     Brief Psychosocial History    Patient was born and raised in [place]. Has 3 siblings. Graduated high school in 2014. Attempted college though did not graduate.Patient's parents live in [place], [place]. He denies having any children.    Significant Clinical History    Patient has has a history of Schizophrenia and multiple past admissions.      Risk Assessment  [place] Suicide Severity Rating Scale Full Clinical Version: 4/13/2023  Suicidal Ideation  1. Wish to be Dead (Lifetime): No  2. Non-Specific Active Suicidal Thoughts (Lifetime): No     Suicidal Behavior  Actual Attempt (Lifetime): No  C-SSRS Risk (Lifetime/Recent)  Calculated C-SSRS Risk Score (Lifetime/Recent): No Risk Indicated    Validity of evaluation is not mpacted by presenting factors during interview.   Comments regarding subjective versus objective responses to [place] tool: Patient's report, presentation, and collateral, are consistent with her objective score.  Environmental or Psychosocial Events: other: psychosis  Chronic Risk Factors: other: delusions and paranoia   Warning Signs: other: psychosis  Protective Factors: under commitment  Interpretation of Risk Scoring, Risk Mitigation Interventions and Safety Plan:  Patient scored low risk on on the columbia scale. He does have risk factors of delusions and paranoia. He denies history of suicide attempts and currently denies suicidal ideation, intent, or plan. He currently does not have a sufficient safety plan as his mother doesn't feel she can adequately care for him, he doesn't have anywhere else to live, he doesn't have outpatient providers, and he does not appear able to care for himself.     Does the patient have thoughts of harming others? No     Is the patient engaging in sexually inappropriate behavior?  no     Current Substance Abuse     Is there recent substance abuse? no     Was a urine drug screen or blood alcohol level obtained: Yes - negative     Mental Status Exam      Affect: Appropriate   Appearance: Appropriate    Attention Span/Concentration: Attentive  Eye Contact: Engaged   Fund of Knowledge: Appropriate    Language /Speech Content: Fluent   Language /Speech Volume: Normal    Language /Speech Rate/Productions: Normal    Recent Memory: Intact   Remote Memory: Intact   Mood: Normal    Orientation to Person: Yes    Orientation to Place: Yes   Orientation to Time of Day: No    Orientation to Date: No    Situation (Do they understand why they are here?): No    Psychomotor Behavior: Normal    Thought Content: Delusions   Thought Form: Intact      History of commitment: Yes currently under commitment     Medication     Psychotropic medications: Yes  Medication changes made in the last two weeks: No     Current Care Team    Primary Care Provider: No  Psychiatrist: No  Therapist: No  : Papi Thakkar at [facility] [place], phone [phone] and fax [phone].     CTSS or ARMHS: No  ACT Team: No  Other: No    Diagnosis    295.90  (F20.9) Schizophrenia by history    Clinical Summary and Substantiation of Recommendations    The patient was discharged from an inpatient unit two days ago and has spent much of those two days in the [facility] ED and the [facility] ED for psychosis, delusions, paranoia, and inability to care for himself. He was able to sleep overnight in the ED and does seem more organized and oriented currently but is unable to make an actual plan about where he can stay and how he will obtain outpatient providers and attend those appointments. His mother does not drive and can not come from [place] to pick him up and does not feel able to help him with his mental health needs.  Patient was placed on a 72 hour hold on 2023 at 0524 that will  on 2023 at 0524.   Spoke with Karyna butterfield, [phone] who plans revoke patient's provisional discharge. CM will fax the paperwork to [facility] and to [facility] when complete.   Called  intake at 1145 and patient was placed on waiting list for inpatient bed for safety and stabilization.     Admission to Inpatient Level of Care is indicated due to:    1. Patient risk of severity of behavioral health disorder is appropriate to proposed level of care as indicated by:   Behavioral health disorder is present and appropriate for inpatient care with both of the following:     Severe psychiatric, behavioral or other comorbid conditions are appropriate for management at inpatient mental health as indicated by at least one of the following:   o Impaired impulse control, judgement, or insight    Severe dysfunction in daily living is present as indicated by at least one of the following:   o Complete inability to maintain any appropriate aspect of personal responsibility in any adult roles    2. Inpatient mental health services are necessary to meet patient needs and at least one of the following:  Specific condition related to admission diagnosis is present and judged likely to deteriorate in absence of treatment at proposed level of care    3. Situation and expectations are appropriate for inpatient care, as indicated by one of the following:   Voluntary treatment at lower level of care is not feasible    Disposition    Recommended disposition: Inpatient Mental Health       Reviewed case and recommendations with attending provider. Attending Name: MD Jordan       Attending concurs with disposition: Yes       Patient and/or validated legal guardian concurs with disposition: No: 72 hour hold       Final disposition: Inpatient mental health .     Inpatient Details (if applicable):   Is patient admitted voluntarily:No, 72 hr hold. Hold start date/time: . Patient was placed on a 72 hour hold on 2023 at 0524 that will  on 2023 at 0524.      Patient aware of potential for transfer if there is not appropriate placement? No       Patient is willing to travel outside of the [facility] for placement? NA       Behavioral Intake Notified? Yes: Date: 4/13/2023 Time: 1145.     Assessment Details    Patient interview started at: 1104 and completed at: 1116.     Total duration spent on the patient case in minutes: 1.50 hrs      CPT code(s) utilized: 71200 - Psychotherapy for Crisis - 60 (30-74*) min     HANNAH Downey   DEC - Triage & Transition Services   Callback: [phone]

## 2023-04-13 NOTE — PHARMACY-ADMISSION MEDICATION HISTORY
Pharmacist Admission Medication History    Admission medication history is complete. The information provided in this note is only as accurate as the sources available at the time of the update.    Medication reconciliation/reorder completed by provider prior to medication history? No    Information Source(s): Facility (U/NH/) medication list/MAR and CareEverywhere/SureScripts via N/A     Pertinent Information: Patient discharged from [facility] on 4/11/23, thus did not attempt to interview pt for med hx.     Next due for Invega Sustenna dose on 4/28/23    Changes made to PTA medication list:    Added: None    Deleted: None    Changed: None    Medication Affordability: Unable to assess.        Allergies reviewed with patient and updates made in EHR: unable to assess    Medication History Completed By:     Lori Sandra, PharmD, [facility]   Emergency Medicine Pharmacist  [phone] or Cyril  April 12, 2023    PTA Med List   Medication Sig Last Dose     OLANZapine (ZYPREXA) 10 MG tablet Take 1 tablet (10 mg) by mouth At Bedtime 4/11/2023 at hs     OLANZapine (ZYPREXA) 5 MG tablet Take 1 tablet (5 mg) by mouth 2 times daily (with meals) 4/12/2023 at am     propranolol (INDERAL) 10 MG tablet Take 1 tablet (10 mg) by mouth 3 times daily 4/12/2023 at am     Vitamin D3 (CHOLECALCIFEROL) 25 mcg (1000 units) tablet Take 1 tablet (25 mcg) by mouth daily for 30 days 4/12/2023 at am

## 2023-04-14 PROCEDURE — 250N000013 HC RX MED GY IP 250 OP 250 PS 637: Performed by: EMERGENCY MEDICINE

## 2023-04-14 RX ADMIN — OLANZAPINE 10 MG: 10 TABLET, FILM COATED ORAL at 21:09

## 2023-04-14 RX ADMIN — Medication 25 MCG: at 08:23

## 2023-04-14 RX ADMIN — PROPRANOLOL HYDROCHLORIDE 10 MG: 10 TABLET ORAL at 15:00

## 2023-04-14 RX ADMIN — OLANZAPINE 5 MG: 2.5 TABLET, FILM COATED ORAL at 18:07

## 2023-04-14 RX ADMIN — PROPRANOLOL HYDROCHLORIDE 10 MG: 10 TABLET ORAL at 08:23

## 2023-04-14 RX ADMIN — OLANZAPINE 5 MG: 2.5 TABLET, FILM COATED ORAL at 08:23

## 2023-04-14 RX ADMIN — PROPRANOLOL HYDROCHLORIDE 10 MG: 10 TABLET ORAL at 21:09

## 2023-04-14 ASSESSMENT — ACTIVITIES OF DAILY LIVING (ADL)
ADLS_ACUITY_SCORE: 35

## 2023-04-14 NOTE — TELEPHONE ENCOUNTER
Updated Bed Search @ 4:32 PM  Per chart review, intake can look in the metro for placement     [facility] has 0 appropriate beds available. Phone: [phone]  [facility] posting 0 available beds. Phone: [phone]  [facility] posting 0 available beds. Phone: [phone]  [facility] posting 0 available beds. Phone: [phone]  [facility] posting 0 available beds. Phone: [phone]  [facility] posting 0 available beds. Phone:[phone]  [facility] posting 0 available beds. Phone: [phone]. Negative covid required.       Pt remains on work list pending appropriate bed placement.

## 2023-04-14 NOTE — PROGRESS NOTES
Triage & Transition Services, Extended Care     Sophie Steen  April 14, 2023    Ki is followed related to Long wait time for admission: 44+ hours. Please see initial DEC Crisis Assessment completed for complete assessment information. Medical record is reviewed. While patient is in the ED, care team is working towards Learn and Demonstrate at [facility] [employer] Focused on Crisis Stabilization.     Additional notes include:    10:15am- spoke to patient's , Papi Thakkar at [facility] ([phone]), who stated she is working on revoking patient's provisional discharge.  She asked for further documentation, faxed a copy of the crisis assessment.   Papi will fax a copy of the revocation once completed. (verified SABRINA is signed/on file).      10:55am- writer entered patient's room, introduced self and explained role.  Patient is lying down on the gurney with eyes closed, patient does respond to verbal prompts, he does not sit up or engage with writer.  Patient acknowledges review of plan of care for IP MH admission, he asks if there are updates on placement, answered no identified placement yet, patient verbalizes understanding, he states he would like to rest more.  Writer concludes meeting.      There are not significant status changes.     Recommend to continue care coordination with  PPS at [phone] regarding iP MH admission.       Plan:  Inpatient Mental Health: For further safety and stabilization, patient presents with psychosis, delusions, paranoia, and inability to care for himself.    Plan for Care reviewed with Assigned Medical Provider? Yes. Provider, DUNG Guevara, response: verbalizes understanding of the plan     Extended Care will follow and meet with patient/family/care team as able or requested.     Mary Devlin, [facility], Extended Care   [phone]

## 2023-04-14 NOTE — TELEPHONE ENCOUNTER
[facility] Access Inpatient Bed Call Log 4/14/2023 2:01 AM    Facilities that have not updated websites in the last 12 hours have been called.       Adults:    [facility] is posting 0 beds.  Negative covid.    [facility] is posting 0 beds. Negative covid.    [facility] is posting 0 beds. 72HH preferred. 8:07PM Per Bishnu, they are capped.    [facility] is posting 0 beds.     [facility] is posting 0 beds. Negative covid.    [facility] is posting 0 beds.     [facility] is posting 0 beds. Negative covid. *Low acuity*       [facility] is posting 1 beds. Mixed unit 12+. Low acuity only. Negative covid.     [facility] is positing 0 beds. No aggression.  Negative covid.     [facility] is posting 0 beds.  Negative covid.     [facility] is posting 0 beds. Low acuity only.  Negative covid.     [facility] is posting 0 beds. Negative covid.    [facility] is posting 0 beds. Low acuity. Negative covid.     [facility] is posting 2 beds. 72 HH hold preferred. Low acuity Only.     [facility] is posting 0 beds. Low acuity. Negative covid.     [facility] is posting 5 beds. Negative covid. Vol only, No Hx of aggression, violence, or assault. No sexual offenders. No 72 HH holds.        [facility] is posting 2 beds. Negative covid. (Must have the cognitive ability to do programming. No aggressive or violent behavior or recent HX in the last 2 yrs. MH must be primary.)      [facility] is posting 0 beds. Negative Covid. Low acuity only. Violence and aggression capped.       [facility] is posting 0 beds. Low acuity, Negative Covid.    [facility] is posting 2 beds. Covid Negative. Vol only. Combined adolescent and adult unit. No aggressive or violent behavior. No registered sex offenders.     [facility] is posting 6 beds. No covid test required. Per policy, Intake does not present pt s on a 72HH to [facility]. - Per Tammy, they  are capped on F beds.       [facility] is posting 3 beds.  Negative covid. (No lines, drains, or tubes (oxygen, CPAP, IV, etc.) 8:09pm Per Cathy, may be able to review after 10pm.     Pt remains on work list pending appropriate bed availability.

## 2023-04-14 NOTE — ED NOTES
IP MH Referral Acuity Rating Score (RARS)    LMHP complete at referral to IP MH, with DEC; and, daily while awaiting IP MH placement. Call score to PPS.    CRITERIA SCORING Total    New 72 HH and Involuntary for IP MH (not adolescent) 1/1   Boarding over 24 hours 1/1   Vulnerable adult at least 55+ with multiple co morbidities; or, Patient age 11 or under 0/1   Suicide ideation without relief of precipitating factors 0/1   Current plan for suicide 0/1   Current plan for homicide 0/1   Imminent risk or actual attempt to seriously harm another without relief of factors precipitating the attempt 0/1   Severe dysfunction in daily living (ex: complete neglect for self care, extreme disruption in vegetative function, extreme deterioration in social interactions) 1/1   Recent (last 2 weeks) or current physical aggression in the ED 1/1   Restraints or seclusion episode in ED 0/1   Verbal aggression, agitation, yelling, etc., while in the ED 1/1   Active psychosis with psychomotor agitation or catatonia 0/1   Need for constant or near constant redirection (from leaving, from others, etc).  0/1   Intrusive or disruptive behaviors 0/1   TOTAL Acuity Total Score: 5

## 2023-04-15 ENCOUNTER — HOSPITAL ENCOUNTER (INPATIENT)
Facility: CLINIC | Age: 28
LOS: 47 days | Discharge: HOME | DRG: 885 | End: 2023-06-01
Attending: STUDENT IN AN ORGANIZED HEALTH CARE EDUCATION/TRAINING PROGRAM | Admitting: STUDENT IN AN ORGANIZED HEALTH CARE EDUCATION/TRAINING PROGRAM
Payer: MEDICARE

## 2023-04-15 VITALS
SYSTOLIC BLOOD PRESSURE: 119 MMHG | OXYGEN SATURATION: 98 % | WEIGHT: 155 LBS | HEART RATE: 72 BPM | BODY MASS INDEX: 20.54 KG/M2 | TEMPERATURE: 97.8 F | HEIGHT: 73 IN | RESPIRATION RATE: 18 BRPM | DIASTOLIC BLOOD PRESSURE: 70 MMHG

## 2023-04-15 DIAGNOSIS — R00.0 TACHYCARDIA: Primary | ICD-10-CM

## 2023-04-15 DIAGNOSIS — F25.0 SCHIZOAFFECTIVE DISORDER, BIPOLAR TYPE (H): ICD-10-CM

## 2023-04-15 DIAGNOSIS — J30.1 ALLERGIC RHINITIS DUE TO POLLEN, UNSPECIFIED SEASONALITY: ICD-10-CM

## 2023-04-15 DIAGNOSIS — B49 FUNGAL INFECTION: ICD-10-CM

## 2023-04-15 DIAGNOSIS — F20.3 UNDIFFERENTIATED SCHIZOPHRENIA (H): ICD-10-CM

## 2023-04-15 DIAGNOSIS — F20.0 PARANOID SCHIZOPHRENIA (H): ICD-10-CM

## 2023-04-15 PROBLEM — F29 PSYCHOSIS (H): Status: ACTIVE | Noted: 2023-04-15

## 2023-04-15 PROCEDURE — 124N000002 HC R&B MH UMMC

## 2023-04-15 PROCEDURE — 250N000013 HC RX MED GY IP 250 OP 250 PS 637

## 2023-04-15 RX ORDER — VITAMIN B COMPLEX
25 TABLET ORAL DAILY
Status: DISCONTINUED | OUTPATIENT
Start: 2023-04-15 | End: 2023-06-01 | Stop reason: HOSPADM

## 2023-04-15 RX ORDER — LANOLIN ALCOHOL/MO/W.PET/CERES
3 CREAM (GRAM) TOPICAL
Status: DISCONTINUED | OUTPATIENT
Start: 2023-04-15 | End: 2023-06-01 | Stop reason: HOSPADM

## 2023-04-15 RX ORDER — HYDROXYZINE HYDROCHLORIDE 25 MG/1
25 TABLET, FILM COATED ORAL EVERY 4 HOURS PRN
Status: DISCONTINUED | OUTPATIENT
Start: 2023-04-15 | End: 2023-04-18

## 2023-04-15 RX ORDER — PROPRANOLOL HYDROCHLORIDE 10 MG/1
10 TABLET ORAL 3 TIMES DAILY
Status: DISCONTINUED | OUTPATIENT
Start: 2023-04-15 | End: 2023-05-08

## 2023-04-15 RX ORDER — OLANZAPINE 10 MG/2ML
10 INJECTION, POWDER, FOR SOLUTION INTRAMUSCULAR 3 TIMES DAILY PRN
Status: DISCONTINUED | OUTPATIENT
Start: 2023-04-15 | End: 2023-06-01 | Stop reason: HOSPADM

## 2023-04-15 RX ORDER — POLYETHYLENE GLYCOL 3350 17 G/17G
17 POWDER, FOR SOLUTION ORAL DAILY PRN
Status: DISCONTINUED | OUTPATIENT
Start: 2023-04-15 | End: 2023-06-01 | Stop reason: HOSPADM

## 2023-04-15 RX ORDER — OLANZAPINE 5 MG/1
5 TABLET ORAL 2 TIMES DAILY WITH MEALS
Status: DISCONTINUED | OUTPATIENT
Start: 2023-04-15 | End: 2023-04-17

## 2023-04-15 RX ORDER — ACETAMINOPHEN 325 MG/1
650 TABLET ORAL EVERY 4 HOURS PRN
Status: DISCONTINUED | OUTPATIENT
Start: 2023-04-15 | End: 2023-06-01 | Stop reason: HOSPADM

## 2023-04-15 RX ORDER — OLANZAPINE 10 MG/1
10 TABLET ORAL AT BEDTIME
Status: DISCONTINUED | OUTPATIENT
Start: 2023-04-15 | End: 2023-04-17

## 2023-04-15 RX ORDER — MAGNESIUM HYDROXIDE/ALUMINUM HYDROXICE/SIMETHICONE 120; 1200; 1200 MG/30ML; MG/30ML; MG/30ML
30 SUSPENSION ORAL EVERY 4 HOURS PRN
Status: DISCONTINUED | OUTPATIENT
Start: 2023-04-15 | End: 2023-06-01 | Stop reason: HOSPADM

## 2023-04-15 RX ORDER — OLANZAPINE 10 MG/1
10 TABLET ORAL 3 TIMES DAILY PRN
Status: DISCONTINUED | OUTPATIENT
Start: 2023-04-15 | End: 2023-04-19

## 2023-04-15 RX ADMIN — PROPRANOLOL HYDROCHLORIDE 10 MG: 10 TABLET ORAL at 14:43

## 2023-04-15 RX ADMIN — OLANZAPINE 5 MG: 5 TABLET, FILM COATED ORAL at 18:23

## 2023-04-15 RX ADMIN — OLANZAPINE 10 MG: 10 TABLET, FILM COATED ORAL at 14:43

## 2023-04-15 RX ADMIN — OLANZAPINE 10 MG: 10 TABLET, FILM COATED ORAL at 21:05

## 2023-04-15 RX ADMIN — PROPRANOLOL HYDROCHLORIDE 10 MG: 10 TABLET ORAL at 19:30

## 2023-04-15 RX ADMIN — Medication 25 MCG: at 08:41

## 2023-04-15 RX ADMIN — OLANZAPINE 5 MG: 5 TABLET, FILM COATED ORAL at 08:41

## 2023-04-15 ASSESSMENT — ACTIVITIES OF DAILY LIVING (ADL)
DOING_ERRANDS_INDEPENDENTLY_DIFFICULTY: NO
DRESS: SCRUBS (BEHAVIORAL HEALTH);INDEPENDENT
ADLS_ACUITY_SCORE: 45
ADLS_ACUITY_SCORE: 28
DRESSING/BATHING_DIFFICULTY: NO
LAUNDRY: UNABLE TO COMPLETE
HYGIENE/GROOMING: INDEPENDENT;SHOWER
TOILETING_ISSUES: NO
ORAL_HYGIENE: INDEPENDENT
CONCENTRATING,_REMEMBERING_OR_MAKING_DECISIONS_DIFFICULTY: NO
ADLS_ACUITY_SCORE: 28
ADLS_ACUITY_SCORE: 45
HYGIENE/GROOMING: INDEPENDENT
WEAR_GLASSES_OR_BLIND: NO
ADLS_ACUITY_SCORE: 45
DIFFICULTY_EATING/SWALLOWING: NO
ADLS_ACUITY_SCORE: 45
WALKING_OR_CLIMBING_STAIRS_DIFFICULTY: NO
ORAL_HYGIENE: INDEPENDENT
ADLS_ACUITY_SCORE: 28
DRESS: INDEPENDENT
LAUNDRY: UNABLE TO COMPLETE
FALL_HISTORY_WITHIN_LAST_SIX_MONTHS: NO
ADLS_ACUITY_SCORE: 28
ADLS_ACUITY_SCORE: 35
CHANGE_IN_FUNCTIONAL_STATUS_SINCE_ONSET_OF_CURRENT_ILLNESS/INJURY: NO
ADLS_ACUITY_SCORE: 35

## 2023-04-15 NOTE — CARE CONFERENCE
Initial Psychosocial Assessment    Type of CM visit: Initial Assessment, Clinical Treatment Coordinator Role Introduction, Offer Discharge Planning    Information obtained from: [x]Patient   [x]Chart review  []Collateral Contacts  []Court Website    Hospitalization information:   Sophie Steen is a 27 year old who was admitted to unit 20 on 4/15/2023 due to paranoid delusions.    Patient Self-Assessment  Patient reported reason for admission: Pt reports he was at  and they felt he needed inpatient. He does not mind staying here and needs to be back in [place] by 5/6.    HPI  Sophie Steen is a 27 year old male who has a history of paranoid schizophrenia.  He was recently hospitalized at [facility] from March 26 2023 until yesterday.  Initially presented with paranoid delusions, was admitted, stabilized on Zyprexa and Invega Sustenna.  He had a commitment hearing and a Holman, is now on a commitment with provisional discharge.  He was discharged from there and plans to live in the [facility].  It appears he has a [facility] based commitment and  he states that was the plan for him to live here, however when he went to  his prescriptions, they had been sent to the pharmacy in [facility] which is where he is originally from.  Reviewing the chart I see those prescriptions are indeed sent electronically to [place].  He states he would like the morning dose of his medications and would like his prescription switched to the pharmacy here.  Denies any homicidal or suicidal thoughts.  Denies feeling paranoid.  States he is in contact with a place for stable housing and has gone through the intake for case management services here.  Is not endorsing any hallucinations.  He made 1 statement about expecting a large payment to come from [employer] for music which he has posted on there, uncertain if this is based on a delusion    He left the [facility] ED and shortly after that (early  "afternoon on 4/12/2023) he called 911 from the side of the road and requested to be transported to a hospital for psychosis. Staff notes state he appeared delusional and paranoid and unable to care for himself. 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     Patient reported symptoms of concern: []sadness    [x]anxiety     []anger    [x]poor sleep     []medications not working    []racing thoughts     []substance use     []agitation     []hearing voices     []hopelessness   []Eating concerns    []Self-injury      [] Other   Comments:    Current suicidal ideation:  [x]No    []Yes, no plan     []Yes, with plan (describe):          Comments: reports he is okay   Current homicidal ideation:  [x]No   [] Yes       Comments:          History of Mental Health:  Describe current and past mental health symptoms present? The patient has a history of schizophrenia and was admitted to [facility] from 3/26/2023 to 4/11/2023 for psychosis. He was committed with a Holman order. He was provisionally discharged on 4/11/2023 to the [facility].         Do you understand your mental health diagnosis? YES [x]   NO []    History of psychiatric hospitalizations?  YES [x]     NO  []  Details:  [place] [facility]   If YES, within the last 30 days? YES [x]     NO  []    History of commitment?  YES [x]     NO  []    Details:   History of ECT?  YES []     NO  [x]    Details:     History of Substance Use Disorder:  Have you used alcohol or substances in the past 12 months? YES []/ NO [x]              If Yes, Type  Frequency     Would you like a substance use disorder evaluation? YES [] / NO [x]    Previous Treatment? YES []/ NO [x]  Details:     Significant Life Events  (Illness, Death, Loss): Pt " reports he was stressed about being away from home and the long drive ahead of him.      Is there a history of abuse or psychological trauma:    [x]Denies       []Yes, present (type):         []Yes, past (type):        []Patient declined to answer    Identify current stressors:    []financial,    []legal issues,    []homelessness,    []housing,     []recent loss,    []relationships,    []substance use concerns,    []medical     []unemployment     []employment  concerns    []isolation,    []lack of resources,     []out of home placements,     []parenting issues     []domestic violence     []other:  Comments: pt reports none    Living Situation:     []House/apt    []Group Home    []IRTS     []Homeless     []Assisted Living     []Nursing home    []Lives alone    [x]Lives with : family                  []Other:          Family Composition: parents and sister    Children, ages and current location if minor:      Relationship status  [x]Single     []     []     []       []Significant Other   []Other:     Educational Background:  []Less Than High School     []High School     []GED     [x]College       Cognitive/learning concerns: denies     Financial Status: [x] Employed, status and location:  []Unemployment    []County Assistance     []SSI/SSDI      []Waivered services    []Other:    Legal status(present):   []Voluntary, [x]72-hour hold, []Commitment, []Guardianship, []Revocation, []Stay of commitment,    Details: Revocation pending    Other legal issues identified:  [x]None, []Arrest,  []Probation/[place],  []Driving under influence,  []Incarceration,  []Sexual offense (level):   []Child Protective Services,      []Other:      Details:    Ethnic/Cultural considerations:  None identified    Spiritual considerations: reports none     Service History:  []No     [x]Yes: details: Pt reports he works for [employer]     Social Functioning (organization, interests) and strengths: 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 Patient may benefit from an IRTS facility for further mental health stabilization.       Possible discharge plan: TBD, patient reports wanting services in [facility], or [place]        Barriers: Medication Management, Symptom Stabilization, Coordination of Care

## 2023-04-15 NOTE — PLAN OF CARE
Upon initial assessment this morning, pt presented as drowsy, which they attributed to a late arrival to the unit overnight. Affect was restricted with calm mood. Appeared disheveled with unkempt hair and poor hygiene. Pt demonstrated awareness/knowledge of their scheduled morning medications: he identified that he takes Vitamin D, Zyprexa and Propranolol but that his blood pressure tends to run low in the mornings. BP was 94/60 so Propanolol was held but pt took the remaining two scheduled medications. Ate breakfast and took shower without prompting. Has been mostly withdrawn and isolative to self today. He is polite and calm in his interactions with nursing staff. Pt was approved by on-call provider to have personal books and clothing; pt received these items this afternoon. Speech and behavior appear mostly organized but does appear guarded at times and evasive when assessment questions are asked. Vitals re-checked for 1400 Propanolol and all WDL. Also requested and received PRN Zyprexa d/t self-reported agitation.  Problem: Adult Behavioral Health Plan of Care  Goal: Plan of Care Review  Recent Flowsheet Documentation  Taken 4/15/2023 8251 by Jo Mejias, RN  Patient Agreement with Plan of Care: agrees

## 2023-04-15 NOTE — PLAN OF CARE
04/15/23 0903   Patient Belongings   Did you bring any home meds/supplements to the hospital?  Yes   Disposition of meds  Other (see comment);Sent to security/pharmacy per site process   Patient Belongings remains on inpatient care area;locker;sent to security per site process   Patient Belongings Put in Hospital Secure Location (Security or Locker, etc.) other (see comments)   Belongings Search Yes   Clothing Search Yes   Second Staff Santita S     Goal Outcome Evaluation:           Pt belongings in locker on [address]    17 Textbooks/books   3 Notebooks  1 Folder with papers inside  2 backpacks  Set of keys  Wallet   cord  Sunglasses  Misc. Toiletries  Phone  Sandals  Clothes  Duffel bag  Misc. Paperwork  Wig  Hairbrush  Medical insurance card  3 student ID cards  2 [place] ID cards    Belongings sent to security envelope #[id number]   debit card [id number]  Visa Debit Card [id number]  Visa debit card [phone]  Visa platinum [id number]  Visa signature [id number]    .A               Admission:  I am responsible for any personal items that are not sent to the safe or pharmacy.  [facility] is not responsible for loss, theft or damage of any property in my possession.    Signature:  _________________________________ Date: _______  Time: _____                                              Staff Signature:  ____________________________ Date: ________  Time: _____      2nd Staff person, if patient is unable/unwilling to sign:    Signature: ________________________________ Date: ________  Time: _____     Discharge:  [facility] has returned all of my personal belongings:    Signature: _________________________________ Date: ________  Time: _____                                          Staff Signature:  ____________________________ Date: ________  Time: _____

## 2023-04-15 NOTE — H&P
"Psychiatry History and Physical    Sophie Steen MRN# [id number]   Age: 27 year old YOB: 1995     Date of Admission:  4/15/2023  Admitting Physician:   Jr Delatorre MD          Contacts:     Primary Outpatient Psychiatrist: No  Primary Physician: No  Therapist: No  [facility] : Papi Thakkar at [facility] ([phone])  Family Members: Declined to sign SABRINA for parents         Chief Complaint:     \"I had some delusions but am clear now\"         History of Present Illness:     History obtained from patient and electronic chart    Ki Ion Steen is a 27 year old male (FTM) with a past psychiatric diagnosis of schizophrenia admitted from the ED on 04/15/2023 due to pscyhosis in the context of recent hospitalization 3/26-4/11/23 for psychosis and álvaro.    Per [facility] Note:  \"Summary of Patient Situation & Collateral  The patient has a history of schizophrenia and was admitted to [facility] from 3/26/2023 to 4/11/2023 for psychosis. He was committed with a Holman order. He was provisionally discharged on 4/11/2023 to the [facility] with homeless shelter resources and a plan to drive to home in [place], [place]. His medicines were sent to a pharmacy in [place], [place]. He was assigned a , Karyna Thakkar, from [facility], [phone].      The patient left [facility] on 4/11/2023 and presented to [facility] ED the next morning (4/12/2023) asking that his medications be filled in [place]. ED staff was able to fill them. He told staff he now plans to get an apartment in [place].      He left the [facility] ED and shortly after that (early afternoon on 4/12/2023) he called 911 from the side of the road and requested to be transported to a hospital for psychosis. Staff notes state he appeared delusional and paranoid and unable to care for himself. 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     Patient was able to sleep in the ED and was interviewed at 1104 on 4/13/2023. Patient was alert and oriented and able to participate in interview. The only delusion noted was that he claims he has a large \"income stream from [employer]\" but also noted he doesn't have enough money for a taxi ride. He can't explain the various changes in his plan since leaving [facility] inpatient mental health and can't explain why he hasn't been able to make it [place] yet as planned. He now states he needs a ride to a tire shop so he can fix his car and then drive to his Mother's house in [place], [place]. He doesn't have current outpatient mental health provides but states he will find some when he gets to [place].      Called patient's mother, Kal, [phone], who doesn't drive and is not able to come and get him. She states when he is at her home he \"barely talks,\" he \"talks to himself a lot,\" and she doesn't know how to help him with his mental health needs. When asked if has a history of aggression she was not able to give a clear answer.      Spoke with patient's , Papi Thakkar at [facility], [phone]. Papi states that it appears the patient is not able to safely care for himself and plans to revoke his provisional discharge.\"    ED/Hospital Course   In the ED, he was noted to be delusional and voiced various paranoid and grandiose delusions. He was calm and cooperative. He was medically cleared for admission to inpatient psychiatric unit.    Per patient report:    Tello Steen was interviewed in his bedroom. He reports that since his recent hospital discharge, he has been feeling well and denies experiencing any MH symptoms. He's not sure why he's in the hospital right now, but feels \"clear\" and ready to leave as soon as possible. He described being awake for 40 hours while and " "getting a flat tire while traveling to [place], and considers these as the main reasons he's in the hospital. He denied being told to come to the hospital by a chip in his brain. Denied feeling lack of need for sleep. He plans to return to [place] where he reports having housing planned. He reported being very intelligent and some type of niche . 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 Denies recent substance use.    Later in the conversation, he got very close to writer's laptop to introduce himself to the virtual attending. He states \"just so you know, there really is a chip in my brain.\" He's not sure who would have put it there. He denies it being bothersome, stating happily \"I love talking to it.\"     Patient's primary goal for this hospitalization is to discharge back to [place] as soon as possible.          Psychiatric Review of Systems:     Depressive:   Reports none  Dysregulation:    Reports none    Psychosis:    Reports delusions, auditory hallucinations and disorganized behavior  Sariah:    Reports increased energy, decreased sleep need and grandiosity  Anxiety:    Reports none  PTSD:    Reports none  ADHD: not assessed  Disordered Eating: not assessed  Cluster B: not assessed         Medical Review of Systems:     The Review of Systems is negative other than what is noted in the HPI.         Psychiatric History:     Prior diagnoses: Previous psychiatric diagnoses include schizophrenia.     Outpatient psychiatry: none    Therapy: none    Hospitalizations: Multiple. Most recent hospitalization was at  [facility] 3/26-4/11/23 for psychosis.    Court Committments: Yes, possibly in 2017, 2019, and 2021. Currently committed with Holman, currently on PD. Holman meds currently unknown, awaiting information from court.    Suicide attempts: None per chart review     Self-injurious behavior: None per chart review     Guns: unknown    Violence: None per chart review     ECT/TMS: None per " chart review    Past medications:   Per patient report: Reports longstanding Invega Sustenna, unsure of others  Per chart review: Lithium has been prescribed, but never filled. On chart review, he was started on [place] at [facility] in [place], but was discharged on only Invega.          Substance Use History:     Alcohol: Denies      Nicotine: Denies    Illicit Substances: Denies current or past substance use    Chemical Dependency Treatment: None per chart review         Social History:     Upbringing: Grew up in [place]. Has 3 siblings.    Family/Relationships: Does maintain contact with his family. Adoptive parents live in [place].    Living Situation: Housing situation currently unknown. Awaiting more information from [facility]. Possibly with his parents in [place]?     Education: Some college, did not graduate.    Occupation: Unknown. Patient believes he is working for the [employer].    Legal: No documented history of legal issues.     Abuse/Trauma: Unknown      Service: None    Spirituality: Unknown    Hobbies/Interests: Music, math         Past Medical History:   No known history of: head trauma with or without loss of consciousness and seizures    Past Medical History:   Diagnosis Date     Schizophrenia (H)      No past surgical history on file.       Allergies:    No Known Allergies       Medications:     No current outpatient medications on file.             Family History:   Psychiatric Family Hx: Unknown as patient was adopted         Labs and Imaging:   No results found for this or any previous visit (from the past 24 hour(s)).    BMP normal  CBC normal  UDS negative  Salicylate, alcohol, Tylenol negative  EKG 3/20: sinus rhythm, QTc 431         Psychiatric Examination:   /68   Pulse 91   Temp 97.6  F (36.4  C) (Oral)   Resp 16   Wt 69.7 kg (153 lb 9.6 oz)   SpO2 98%   BMI 20.27 kg/m      Mental Status Exam:  Oriented to:  Grossly Oriented  General:  Awake and Alert  Appearance:  appears stated age and  "Hair is long, unkempt, dressed in street clothes  Behavior/Attitude:  calm, cooperative, engaged and minimizing  Eye Contact:  intermittent  Psychomotor: normal and no evidence of tics, dystonia, or tardive dyskinesia no catatonia present  Speech:  appropriate volume/tone and slightly odd rhythm  Language: Fluent in English with appropriate syntax and vocabulary.  Mood:  \"clear\"  Affect:  Elevated, smiles and chuckles during interview. Otherwise unexpressive facial movements  Thought Process:  Loose associations at times, illogical  Thought Content:  No SI/SIB/HI. Denies AH, but endorses hearing the chip implanted in his brain.; delusions of paranoia, delusions of grandiosity and delusions of bizarre content  Associations:  questionable  Insight:  partial due to aware of past delusions and benefit from medications, but limited insight into current state  Judgment:  partial - requesting discharge, but cooperative, calm, taking medications   Impulse control: fair  Attention Span:  adequate for conversation  Concentration:  grossly intact  Recent and Remote Memory:  grossly intact  Fund of Knowledge:  average  Muscle Strength and Tone: normal  Gait and Station: Normal         Physical Exam:     See ED assessment note by ED physician on 4/12/23.        Assessment     Tello Steen is a 27 year old male (FTM) with a past psychiatric diagnosis of schizophrenia admitted from the ED on 04/15/2023 due to pscyhosis in the context of recent hospitalization 3/26-4/11/23 for psychosis and álvaro. Significant symptoms on admission include delusions about a chip in his brain, delusions of grandeur, delusions of having a special mission with the [employer], and elevated mood. The MSE on admission was pertinent for elevated affect and delusions previously mentioned. Biological contributions to mental health presentation include previous psychiatric diagnosis of schizophrenia. On chart review, there are also mentions of Lithium trials, " possibly indicating previous álvaro. Psychosocial contributions to mental health presentation include partial insight, unclear work, housing, and financial situation. Protective factors include medication adherence, lack of substance use, and supportive CCM, good frustration tolerance, and presence of perceived social supports.     In summary, the patient's reported symptoms of psychosis and álvaro in the context of historical schizophrenia diagnosis and recent hospitalization are consistent with schizophrenia vs schizoaffective disorder bipolar type. More information is needed to determine definitive diagnosis.     Given that he currently has psychosis and álvaro, patient warrants inpatient psychiatric hospitalization to maintain his safety. Disposition pending clinical stabilization, medication optimization and development of an appropriate discharge plan.    Risk for harm is moderate-high.  Risk factors: past behaviors  Protective factors: engaged in treatment     Principal psychiatric diagnosis:   - Schizophrenia, r/o schizoaffective disorder bipolar type          Plan     Admit to Unit 20 with Attending Physician Dr. Anupama M.D.    Medications:   Outpatient medications held:     None    Outpatient medications continued:   Current Facility-Administered Medications   Medication Dose Route Frequency     OLANZapine  10 mg Oral At Bedtime     OLANZapine  5 mg Oral BID w/meals     [START ON 4/28/2023] paliperidone  234 mg Intramuscular Q28 Days     propranolol  10 mg Oral TID     Vitamin D3  25 mcg Oral Daily     New scheduled medications initiated:   None    New PRN medications initiated:   Current Facility-Administered Medications   Medication Dose Route Frequency     acetaminophen  650 mg Oral Q4H PRN     alum & mag hydroxide-simethicone  30 mL Oral Q4H PRN     hydrOXYzine  25 mg Oral Q4H PRN     melatonin  3 mg Oral At Bedtime PRN     OLANZapine  10 mg Oral TID PRN    Or     OLANZapine  10 mg Intramuscular TID PRN      polyethylene glycol  17 g Oral Daily PRN       Medications: risks/benefits discussed with patient    Additional Plans:  Patient will be treated in therapeutic milieu with appropriate individual and group therapies.    Legal Status:   Orders Placed This Encounter      Legal status 72 Hour Hold  Currently committed with Holman, on provisional discharge. [facility] is working on PD revocation.     Safety Assessment:    Behavioral Orders   Procedures     Code 1 - Restrict to Unit     Routine Programming     As clinically indicated     Status 15     Every 15 minutes.      Pt has not required locked seclusion or restraints in the past 24 hours to maintain safety, please refer to RN documentation for further details.    Consults:  - none    Medical diagnoses to be addressed this admission: None    Dispo: TBD. Pending medication management and clinical stabilization    This patient was seen and discussed with my attending physician (virtual).  Catherine Alvarado MD  PGY-2 Psychiatry Resident    Psychiatry Attending Attestation:

## 2023-04-15 NOTE — PLAN OF CARE
Problem: Sleep Disturbance  Goal: Adequate Sleep/Rest  Outcome: Progressing     Problem: Depression  Goal: Improved Mood  Outcome: Progressing   Goal Outcome Evaluation:  Patient is a 27 years old prefers to be address They/Them or Ki, Pt alert and oriented x 3, admitted to station 20N  from [facility] at 04:10 AM via medical stretcher. Patient is on 72 HH that was initiated 4/13/23 at 05:25 AM, paperwork in chart, patient was cooperative with search and requested for snack, vitals BP  137/90 (BP Location: Right arm, Patient Position: Sitting, Cuff Size: Adult Regular)   Pulse 74   Temp 97.7  F (36.5  C) (Temporal)   Resp 18   Wt 69.7 kg (153 lb 9.6 oz)   SpO2 97%   BMI 20.27 kg/m  ,COVID and Utox negative, Pt denies pain, denies SI, SIB, HI and hallucination. Patient stated that sister was contacted and will update mother of inpatient admission, they did not want to continue with further admission questions. Patient was oriented to room and allow to rest.

## 2023-04-15 NOTE — PHARMACY-ADMISSION MEDICATION HISTORY
Admission Medication History Completed by Pharmacy    See [facility] Admission Navigator for allergy information, preferred outpatient pharmacy and prior to admission medications.     Medication History Sources:     [facility] 4/11 Discharge summary    Recent ED notes    Additional Information:    No changes made to PTA medication list since hospital discharge on 4/11.    Last Invega Sustenna dose given 3/31 at [place], next due 4/28    Prior to Admission medications    Medication Sig Last Dose       OLANZapine (ZYPREXA) 10 MG tablet Take 1 tablet (10 mg) by mouth At Bedtime     4/14/2023       OLANZapine (ZYPREXA) 5 MG tablet Take 1 tablet (5 mg) by mouth 2 times daily (with meals)     4/14/2023       propranolol (INDERAL) 10 MG tablet Take 1 tablet (10 mg) by mouth 3 times daily     4/14/2023       Vitamin D3 (CHOLECALCIFEROL) 25 mcg (1000 units) tablet Take 1 tablet (25 mcg) by mouth daily for 30 days     4/14/2023       paliperidone (INVEGA SUSTENNA) 234 MG/1.5ML JOSE Inject 1.5 mLs (234 mg) into the muscle every 28 days      3/31/2023         Date completed: 04/15/23    Medication history completed by:   Sandy Penaloza, Pharm.D., [facility]  Behavioral Health Inpatient Pharmacist  [facility] ([facility])  Phone: *[phone] ([employer]) or [phone]

## 2023-04-16 PROCEDURE — 250N000013 HC RX MED GY IP 250 OP 250 PS 637

## 2023-04-16 PROCEDURE — 124N000002 HC R&B MH UMMC

## 2023-04-16 RX ADMIN — PROPRANOLOL HYDROCHLORIDE 10 MG: 10 TABLET ORAL at 14:03

## 2023-04-16 RX ADMIN — PROPRANOLOL HYDROCHLORIDE 10 MG: 10 TABLET ORAL at 09:12

## 2023-04-16 RX ADMIN — OLANZAPINE 5 MG: 5 TABLET, FILM COATED ORAL at 18:05

## 2023-04-16 RX ADMIN — Medication 25 MCG: at 09:12

## 2023-04-16 RX ADMIN — PROPRANOLOL HYDROCHLORIDE 10 MG: 10 TABLET ORAL at 20:29

## 2023-04-16 RX ADMIN — OLANZAPINE 5 MG: 5 TABLET, FILM COATED ORAL at 09:12

## 2023-04-16 RX ADMIN — OLANZAPINE 10 MG: 10 TABLET, FILM COATED ORAL at 20:30

## 2023-04-16 RX ADMIN — OLANZAPINE 10 MG: 10 TABLET, FILM COATED ORAL at 12:21

## 2023-04-16 ASSESSMENT — ACTIVITIES OF DAILY LIVING (ADL)
ADLS_ACUITY_SCORE: 28
ADLS_ACUITY_SCORE: 28
ORAL_HYGIENE: INDEPENDENT
ADLS_ACUITY_SCORE: 28
DRESS: INDEPENDENT;STREET CLOTHES
HYGIENE/GROOMING: INDEPENDENT
ADLS_ACUITY_SCORE: 28
ADLS_ACUITY_SCORE: 28
HYGIENE/GROOMING: INDEPENDENT;SHOWER
ADLS_ACUITY_SCORE: 28
LAUNDRY: WITH SUPERVISION
ADLS_ACUITY_SCORE: 28
ADLS_ACUITY_SCORE: 28
DRESS: INDEPENDENT
ADLS_ACUITY_SCORE: 28
ADLS_ACUITY_SCORE: 28
LAUNDRY: UNABLE TO COMPLETE
ADLS_ACUITY_SCORE: 28
ORAL_HYGIENE: INDEPENDENT
ADLS_ACUITY_SCORE: 28

## 2023-04-16 NOTE — PLAN OF CARE
Problem: Psychotic Signs/Symptoms  Goal: Improved Behavioral Control (Psychotic Signs/Symptoms)  Outcome: Progressing   Goal Outcome Evaluation:         Pt.was isolative and withdrawn to his room throughout evening shift. Affect was flat/blunted. Appeared to be responding to internal stimuli. Denied SI/SIB. He was compliant with his scheduled medications. He continues to be on 72 hour hold since Thursday, April 13th. 72 Hour Hold will end on Tuesday, April 18th. Ki did not require prn medication or seclusion/restraint to manage behavior this shift. Endorsed normal bowel movement. No safety or behavioral concerns reported or observed. Will continue to monitor.

## 2023-04-16 NOTE — PLAN OF CARE
Problem: Sleep Disturbance  Goal: Adequate Sleep/Rest  Outcome: Progressing   Goal Outcome Evaluation:  Patient appeared to be resting most of the night, no safety concern, patient slept for 7 hours, no PRN given this shift.

## 2023-04-16 NOTE — PLAN OF CARE
Patient was withdrawn/isolated in room most of the shift today. Patient was seen earlier in the hallway. Patient was calm on approach, asked writer that they were told by  That they can have a chair in room, pointed to white heavy blue chair in hallway by the phone. Writer was asked by MD about patient request about wanted to have a stool in his room, unit does not have stool for patient's use per core staff when asked. Patient denied SI/SIB/HI/AVH. Patient appeared to be guarded with a flat/blunted affect. Patient was slow in responding to assessment questions. Patient was compliant with medications, appeared to be sleepy, patient's took few minutes before taking medication, ignored writer when they were asked to take medications at first. Patient is eating and drinking adequately. No aggressive behavior issues observed or reported. Patient did not attend group this shift. Patient continues on no roommate precaution. VSS. Will continue to monitor and assess needs.    Problem: Psychotic Signs/Symptoms  Goal: Improved Behavioral Control (Psychotic Signs/Symptoms)  Outcome: Progressing     Plan of Care Reviewed With: Patient

## 2023-04-16 NOTE — PLAN OF CARE
"Pt presents with a mostly flat affect but overall calm mood today. He has been mostly isolative and withdrawn to self; he has been observed reading various text books (from personal belongings) but does appear guarded when writer has attempted to make casual conversation about his books/reading. He is polite in his interactions with staff. Ate breakfast and lunch and showered without prompting, although does still appear disheveled. Around 1215, pt approached writer and requested PRN Zyprexa. When writer inquired about why he felt he needed it, he denied experiencing any hallucinations, delusional thinking or agitation. However, he stated: \"I feel the more I take this medication while I'm in the setting, the better I feel.\" Pt's level of insight and awareness into his symptoms was acknowledged. No behavioral or safety concerns today.       Problem: Adult Behavioral Health Plan of Care  Goal: Plan of Care Review  Recent Flowsheet Documentation  Taken 4/16/2023 1041 by Jo Mejias, RN  Patient Agreement with Plan of Care: agrees     "

## 2023-04-17 PROCEDURE — 99232 SBSQ HOSP IP/OBS MODERATE 35: CPT | Mod: GC | Performed by: STUDENT IN AN ORGANIZED HEALTH CARE EDUCATION/TRAINING PROGRAM

## 2023-04-17 PROCEDURE — 250N000013 HC RX MED GY IP 250 OP 250 PS 637

## 2023-04-17 PROCEDURE — G0177 OPPS/PHP; TRAIN & EDUC SERV: HCPCS

## 2023-04-17 PROCEDURE — 124N000002 HC R&B MH UMMC

## 2023-04-17 PROCEDURE — 250N000013 HC RX MED GY IP 250 OP 250 PS 637: Performed by: STUDENT IN AN ORGANIZED HEALTH CARE EDUCATION/TRAINING PROGRAM

## 2023-04-17 RX ORDER — OLANZAPINE 10 MG/1
10 TABLET ORAL AT BEDTIME
Status: DISCONTINUED | OUTPATIENT
Start: 2023-04-17 | End: 2023-04-18

## 2023-04-17 RX ORDER — OLANZAPINE 10 MG/2ML
5 INJECTION, POWDER, FOR SOLUTION INTRAMUSCULAR 2 TIMES DAILY WITH MEALS
Status: DISCONTINUED | OUTPATIENT
Start: 2023-04-17 | End: 2023-04-18

## 2023-04-17 RX ORDER — OLANZAPINE 10 MG/2ML
10 INJECTION, POWDER, FOR SOLUTION INTRAMUSCULAR AT BEDTIME
Status: DISCONTINUED | OUTPATIENT
Start: 2023-04-17 | End: 2023-04-18

## 2023-04-17 RX ORDER — OLANZAPINE 5 MG/1
5 TABLET ORAL 2 TIMES DAILY WITH MEALS
Status: DISCONTINUED | OUTPATIENT
Start: 2023-04-17 | End: 2023-04-18

## 2023-04-17 RX ADMIN — OLANZAPINE 10 MG: 10 TABLET, FILM COATED ORAL at 11:10

## 2023-04-17 RX ADMIN — OLANZAPINE 5 MG: 5 TABLET, FILM COATED ORAL at 08:52

## 2023-04-17 RX ADMIN — PROPRANOLOL HYDROCHLORIDE 10 MG: 10 TABLET ORAL at 14:33

## 2023-04-17 RX ADMIN — PROPRANOLOL HYDROCHLORIDE 10 MG: 10 TABLET ORAL at 21:05

## 2023-04-17 RX ADMIN — OLANZAPINE 5 MG: 5 TABLET, FILM COATED ORAL at 17:47

## 2023-04-17 RX ADMIN — OLANZAPINE 10 MG: 10 TABLET, FILM COATED ORAL at 21:05

## 2023-04-17 RX ADMIN — Medication 25 MCG: at 08:52

## 2023-04-17 RX ADMIN — PROPRANOLOL HYDROCHLORIDE 10 MG: 10 TABLET ORAL at 08:52

## 2023-04-17 ASSESSMENT — ACTIVITIES OF DAILY LIVING (ADL)
ADLS_ACUITY_SCORE: 28
ADLS_ACUITY_SCORE: 28
DRESS: INDEPENDENT
ADLS_ACUITY_SCORE: 28
DRESS: INDEPENDENT
LAUNDRY: WITH SUPERVISION
HYGIENE/GROOMING: INDEPENDENT
ADLS_ACUITY_SCORE: 28
ORAL_HYGIENE: INDEPENDENT
ORAL_HYGIENE: INDEPENDENT
LAUNDRY: WITH SUPERVISION
HYGIENE/GROOMING: INDEPENDENT;SHOWER
ADLS_ACUITY_SCORE: 28

## 2023-04-17 NOTE — PLAN OF CARE
04/17/23 1035   Individualization/Patient Specific Goals   Patient Personal Strengths community support;expressive of emotions;family/social support;humor;intellectual cognitive skills;positive educational history;relationship stability;resilient;resourceful   Patient Vulnerabilities history of unsuccessful treatment;lacks insight into illness;poor impulse control   Anxieties, Fears or Concerns None   Individualized Care Needs None   Interprofessional Rounds   Summary 1. Stabilization of thought disorder symptoms  2.Medication mgmt per MD's  3. Safe with self  4. Coordination of care with family/outpatient providers  5. Placement addressed  6. Psych f/u care in place   Participants CTC;nursing;patient;psychiatrist   Behavioral Team Discussion   Participants , Dr. Alvarado, Jo Mejias RN, Renay Kuhn MA.LP, Med student   Progress Initial assessment   Anticipated length of stay 10-14 days   Continued Stay Criteria/Rationale Acute psychosis, in need of IRTS placement   Medical/Physical None   Plan Patient will be seen by Psychiatry daily.  Meds will be reviewed/adjusted per MD as indicated.  Cty CM in process of revoking current PD.  Patient in need of supportive placement- likely IRTS.  Will contact family for coordination of care  CTC will continue to assess needs, ensure appropriate f/u care is in place   Rationale for change in precautions or plan No change in plan/precautions   Anticipated Discharge Disposition IRTS

## 2023-04-17 NOTE — PLAN OF CARE
Problem: Sleep Disturbance  Goal: Adequate Sleep/Rest  Outcome: Adequate for Care Transition   Goal Outcome Evaluation:  Patient appears to have slept for 7 hours tonight. No acute events during the shift. No requests for prn's. No concerns noted during routine safety checks.

## 2023-04-17 NOTE — PLAN OF CARE
The pt visible out on [place] occasionally walking on the hallway, sitting dinning room, and isolative/withdrawn not appeared interacting with peer/staff. On 1-1 the pt calm/distrustful in mood, flat in affect, speech pause/guarded, superficial in interaction and behaviorally controlled. The pt denied concern at the time, denied pain, AH, VH, SI, HI and contracted for safety. The pt took shower during the shift. The pt comply with med and care.    /76 (BP Location: Left arm, Patient Position: Sitting, Cuff Size: Adult Regular)   Pulse 91   Temp 97.2  F (36.2  C) (Oral)   Resp 16   Wt 68.9 kg (151 lb 14.4 oz)   SpO2 97%   BMI 20.04 kg/m    Problem: Adult Behavioral Health Plan of Care  Goal: Plan of Care Review  Outcome: Progressing  Flowsheets (Taken 4/17/2023 1610)  Patient Agreement with Plan of Care: agrees   Goal Outcome Evaluation:    Plan of Care Reviewed With: patient

## 2023-04-17 NOTE — PLAN OF CARE
"Pt has been mostly isolative to his room this shift with minimal interactions with other patients or staff. When writer interacted with him throughout the day, he was polite and calm but also evasive and short in his responses. There appears to be a slight delay when he responds to basic questions. Has not verbalized or indicated any evidence of delusional thought content. Speech and behavior appear organized and logical/relevant. Showered and used blow dryer to style hair; appears less disheveled than he appeared over the weekend. Later in the morning he requested and was given PRN Zyprexa (10mg); he stated he needed it because he was feeling \"on edge\" and reported that Zyprexa is usually quite effective for him to relieve this. No behavioral or safety concerns today.     Problem: Adult Behavioral Health Plan of Care  Goal: Plan of Care Review  Recent Flowsheet Documentation  Taken 4/17/2023 1057 by Jo Mejias, RN  Patient Agreement with Plan of Care: agrees     "

## 2023-04-17 NOTE — PLAN OF CARE
BEH Occupational Therapy Group Intervention Note     04/17/23 1349   Group Therapy Session   Group Attendance attended group session   Total Time (minutes) 75   Group Type task skill;psychoeducation   Group Topic Covered coping skills/lifestyle management;leisure exploration/use of leisure time;cognitive activities;relapse prevention   Group Session Detail 1) Mental health management group focused on goal setting for hopefulness and self-development related to meaningful occupations. Education was provided on SMART goals for increased follow-through and success.    2) Clinic - coping skill exploration, creative expression within personally meaningful activities, and observation of social, cognitive, and kinesthetic performance skills   Patient Response/Contribution cooperative with task;discussed personal experience with topic;listened actively   Patient Participation Detail 1) Attended group late (30mins - no charge). Identified reading his math book as a positive coping skill, which he would like to do on the unit. Able to utilize 'action plan' strategies to fulfill this coping skill such as use of ear plugs to increase concentration.     2) Congruent affect. IND to gather materials, organize work space, and attend to his math book. Appeared with a brighter affect as he requested/listened to his personal music via [employer].      Mary Andrade OT on 4/17/2023 at 1:50 PM

## 2023-04-17 NOTE — PROGRESS NOTES
"    ----------------------------------------------------------------------------------------------------------  [facility], [place]   Psychiatric Progress Note  Hospital Day #2    Identifier: Sophie Steen is a 27 year old male (FTM) with previous psychiatric diagnoses of schizophrenia vs schizoaffective disorder, bipolar type who presents with psychosis and álvaro in the context of recent hospitalizations 3/26-4/11/23 ([facility]) and 2/22-3/7/23 ([facility]) for the same. Assessment is that the current presentation is consistent with schizoaffective disorder, bipolar type, current álvaro and psychosis.      Interim History:   The patient's care was discussed with the treatment team and chart notes were reviewed.    Vitals: VSS  Sleep: 7 hours (04/17/23 0615)  Scheduled medications: Took all scheduled medications as prescribed  PRN medications: olanzapine daily over the weekend    Staff Report:   No acute events over the weekend. No behavioral concerns. Observed RTIS. Guarded, isolative, reading books in his room. Requested Zyprexa saying \"I feel the more I take this medication while I'm in this setting, the better I feel.\"      Subjective:     Patient Interview:  Sophie Steen was interviewed in his bedroom. He reports feeling \"okay\" today. He feels his current medications are working, and feels ready to leave the hospital. Reports having an exam coming up, but declined to share what the exam is for because \"the chip is telling me not to tell you.\" He states that he and the chip are \"one and the same\" and everything he does comes from the chip. 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 He's not sure why this would have happened to him, but is not bothered by the chip at all.     He shared that he is very smart, and as a child/teen competed nationally in " "[employer] competitions. School became challenging in high school, and he had to leave college often for \"training.\" He does describe previous episodes of psychosis, saying \"I have an exome gene that got activated from stress or something and now I have schizophrenia.\" He described previous delusions about people trying to harm him, feeling unsafe, feeling helpless/vulnerable, and \"feeling like I'm in a cartoon that I'm not supposed to be a part of.\"    Phone call with Mom:  Spoke with Kal, provided updates. She's been worried about Ki and reports that his MH has been worse for the past year or so. Endorses past álvaro - spending sprees, not sleeping, walking and talking to himself, and talking to voices by name. She reports many hospitalizations that don't seem to have fully helped, and is wondering if the Invega may no longer be working. Feels his current medications are \"not good.\" She states that the first time Invega was started, he returned \"to himself.\" She hopes that we'll start risperidone and feels that's been helpful in the past. She doesn't remember him ever being on Risperdal Consta or clozapine. She's not sure who his MH provider is, but thinks they might be at [facility] in [place], [place].     She was very surprised to hear that he's been going by \"Ki\" and states \"when he changes his name I know he's not okay.\" At home, mom refers to him as \"Sophie.\" Tello lives with his mom in [place], and mom confirmed that he was planning on going back to [place] after his most recent discharge. She said he's welcome to come back to her house once he's more stable.     She would like to talk to the , primarily about getting Tello's car back to the hospital.     Review of systems:   Patient has no bothersome physical symptoms  Patient denies acute concerns    Objective:   /67 (BP Location: Left arm, Patient Position: Sitting, Cuff Size: Adult Regular)   Pulse 102   Temp 97.7  F (36.5  C) (Oral)   Resp " "16   Wt 68.9 kg (151 lb 14.4 oz)   SpO2 95%   BMI 20.04 kg/m    Weight is 151 lbs 14.4 oz  Body mass index is 20.04 kg/m .    Mental Status Exam:  Oriented to:  Grossly Oriented  General:  Awake and Alert  Appearance:  appears stated age and Hair is unkempt, dressed in street clothes  Behavior/Attitude:  calm, cooperative, engaged and minimizing  Eye Contact:  intermittent  Psychomotor: normal and no evidence of tics, dystonia, or tardive dyskinesia no catatonia present  Speech:  appropriate volume/tone and slightly odd rhythm  Language: Fluent in English with appropriate syntax and vocabulary.  Mood:  \"okay\"  Affect:  Elevated, smiles and chuckles inappropriately during interview  Thought Process:  Loose associations at times, illogical  Thought Content:  No SI/SIB/HI. Denies AH, but endorses hearing the chip implanted in his brain.; delusions of paranoia, delusions of grandiosity and delusions of bizarre content  Associations:  questionable  Insight:  partial due to aware of past delusions and benefit from medications, but limited insight into current state  Judgment:  partial - requesting discharge, but cooperative, calm, taking medications   Impulse control: good  Attention Span:  adequate for conversation  Concentration:  grossly intact  Recent and Remote Memory:  grossly intact  Fund of Knowledge:  average  Muscle Strength and Tone: normal  Gait and Station: Normal      Allergies:   No Known Allergies     Labs and imaging:     Data this admission:  BMP normal  CBC normal  UDS negative  Salicylate, alcohol, Tylenol negative  EKG 3/20: sinus rhythm, QTc 431     Psychiatric Assessment and Plan   Primary psychiatric diagnosis:   Schizoaffective disorder, bipolar type, current maniaa    Diagnostic Impression:   Tello Steen is a 27 year old male (FTM) with a past psychiatric diagnosis of schizophrenia admitted from the ED on 04/15/2023 due to pscyhosis in the context of recent hospitalization 3/26-4/11/23 for " psychosis and álvaro. Significant symptoms on admission include delusions about a chip in his brain, delusions of grandeur, delusions of having a special mission with the [employer], and elevated mood. The MSE on admission was pertinent for elevated affect and delusions previously mentioned. Biological contributions to mental health presentation include previous psychiatric diagnosis of schizophrenia. On chart review, there are also mentions of Lithium trials, possibly indicating previous álvaro. Psychosocial contributions to mental health presentation include partial insight, unclear work, housing, and financial situation. Protective factors include medication adherence, lack of substance use, and supportive CCM, good frustration tolerance, and presence of perceived social supports.      In summary, the patient's reported symptoms of psychosis and álvaro in the context of historical schizophrenia diagnosis and recent hospitalization are consistent with schizophrenia vs schizoaffective disorder bipolar type. More information is needed to determine definitive diagnosis.     Psychiatric Hospital course:  Sophie Steen was admitted to [facility] on a 72 hour hold (committed with [facility], pending revocation of PD). All PTA medications were continued. Currently gathering collateral information to make informed decision about possible medication changes.    Discontinued Medications (& Rationale): none.    Today's changes:  None    Psychotropic Medications:  Scheduled:  Current Facility-Administered Medications   Medication Dose Route Frequency    OLANZapine  10 mg Oral At Bedtime    Or    OLANZapine  10 mg Intramuscular At Bedtime    OLANZapine  5 mg Oral BID w/meals    Or    OLANZapine  5 mg Intramuscular BID w/meals    [START ON 4/28/2023] paliperidone  234 mg Intramuscular Q28 Days    propranolol  10 mg Oral TID    Vitamin D3  25 mcg Oral Daily       PRN:  Current Facility-Administered Medications   Medication Dose Route  Frequency    acetaminophen  650 mg Oral Q4H PRN    alum & mag hydroxide-simethicone  30 mL Oral Q4H PRN    hydrOXYzine  25 mg Oral Q4H PRN    melatonin  3 mg Oral At Bedtime PRN    OLANZapine  10 mg Oral TID PRN    Or    OLANZapine  10 mg Intramuscular TID PRN    polyethylene glycol  17 g Oral Daily PRN       2. Pertinent Labs/Monitoring:   FEP work up:  BMP normal  CBC normal  UDS negative  EKG 3/20: sinus rhythm, QTc 431  TSH 3/9/23 normal  Lipids 2/24/23unremarkable  Hgb A1c 2/24/23 normal     3. Additional Plans:  Patient will be treated in therapeutic milieu with appropriate individual and group therapies as described.    Medical Assessment and Plan     Medical diagnoses to be addressed this admission:    None    Medical course: Patient was physically examined by the ED prior to being transferred to the unit and was found to be medically stable and appropriate for admission.     Consults: None    Checklist     Legal Status: 72-h Hold signed on 4/13 at 5:23AM . Committed on PD. [facility]: haldol, olanzapine, risperidone, paliperidone    Safety Assessment:   Behavioral Orders   Procedures    Code 1 - Restrict to Unit    Routine Programming     As clinically indicated    Status 15     Every 15 minutes.       Risk Assessment:  Risk for harm is elevated.  Risk factors: impulsive and past behaviors, multiple recent hospitalizations  Protective factors: family and engaged in treatment     SIO: No    Disposition: TBD. Pending stabilization, medication optimization, & development of a safe discharge plan.    Attestations     This patient was seen and discussed with my attending physician.  Catherine Alvarado MD  PGY-2 Psychiatry Resident Physician      This patient has been seen and evaluated by me, Johanna Altman DO.  I have discussed this patient with the team including the resident and medical student(s) and I agree with the findings and plan in this note.  Dr. Johanna Altman DO, CARIN

## 2023-04-18 ENCOUNTER — APPOINTMENT (OUTPATIENT)
Dept: MRI IMAGING | Facility: CLINIC | Age: 28
DRG: 885 | End: 2023-04-18
Attending: STUDENT IN AN ORGANIZED HEALTH CARE EDUCATION/TRAINING PROGRAM
Payer: MEDICARE

## 2023-04-18 LAB
ALBUMIN UR-MCNC: 10 MG/DL
AMORPH CRY #/AREA URNS HPF: ABNORMAL /HPF
APPEARANCE UR: ABNORMAL
BACTERIA #/AREA URNS HPF: ABNORMAL /HPF
BILIRUB UR QL STRIP: NEGATIVE
COLOR UR AUTO: YELLOW
ERYTHROCYTE [SEDIMENTATION RATE] IN BLOOD BY WESTERGREN METHOD: 2 MM/HR (ref 0–15)
FOLATE SERPL-MCNC: 12.7 NG/ML (ref 4.6–34.8)
GLUCOSE UR STRIP-MCNC: NEGATIVE MG/DL
HGB UR QL STRIP: NEGATIVE
HIV 1+2 AB+HIV1 P24 AG SERPL QL IA: NONREACTIVE
KETONES UR STRIP-MCNC: NEGATIVE MG/DL
LEUKOCYTE ESTERASE UR QL STRIP: NEGATIVE
NITRATE UR QL: NEGATIVE
PH UR STRIP: 8 [PH] (ref 5–7)
RBC URINE: 0 /HPF
SP GR UR STRIP: 1.02 (ref 1–1.03)
T PALLIDUM AB SER QL: NONREACTIVE
UROBILINOGEN UR STRIP-MCNC: NORMAL MG/DL
VIT B12 SERPL-MCNC: 753 PG/ML (ref 232–1245)
WBC URINE: 0 /HPF

## 2023-04-18 PROCEDURE — 82390 ASSAY OF CERULOPLASMIN: CPT | Performed by: STUDENT IN AN ORGANIZED HEALTH CARE EDUCATION/TRAINING PROGRAM

## 2023-04-18 PROCEDURE — 85652 RBC SED RATE AUTOMATED: CPT | Performed by: STUDENT IN AN ORGANIZED HEALTH CARE EDUCATION/TRAINING PROGRAM

## 2023-04-18 PROCEDURE — G1010 CDSM STANSON: HCPCS

## 2023-04-18 PROCEDURE — 86780 TREPONEMA PALLIDUM: CPT | Performed by: STUDENT IN AN ORGANIZED HEALTH CARE EDUCATION/TRAINING PROGRAM

## 2023-04-18 PROCEDURE — 250N000013 HC RX MED GY IP 250 OP 250 PS 637: Performed by: STUDENT IN AN ORGANIZED HEALTH CARE EDUCATION/TRAINING PROGRAM

## 2023-04-18 PROCEDURE — 82746 ASSAY OF FOLIC ACID SERUM: CPT | Performed by: STUDENT IN AN ORGANIZED HEALTH CARE EDUCATION/TRAINING PROGRAM

## 2023-04-18 PROCEDURE — 70551 MRI BRAIN STEM W/O DYE: CPT | Mod: 26 | Performed by: RADIOLOGY

## 2023-04-18 PROCEDURE — 250N000013 HC RX MED GY IP 250 OP 250 PS 637

## 2023-04-18 PROCEDURE — 86618 LYME DISEASE ANTIBODY: CPT | Performed by: STUDENT IN AN ORGANIZED HEALTH CARE EDUCATION/TRAINING PROGRAM

## 2023-04-18 PROCEDURE — 81001 URINALYSIS AUTO W/SCOPE: CPT | Performed by: STUDENT IN AN ORGANIZED HEALTH CARE EDUCATION/TRAINING PROGRAM

## 2023-04-18 PROCEDURE — 87389 HIV-1 AG W/HIV-1&-2 AB AG IA: CPT | Performed by: STUDENT IN AN ORGANIZED HEALTH CARE EDUCATION/TRAINING PROGRAM

## 2023-04-18 PROCEDURE — 82607 VITAMIN B-12: CPT | Performed by: STUDENT IN AN ORGANIZED HEALTH CARE EDUCATION/TRAINING PROGRAM

## 2023-04-18 PROCEDURE — 99232 SBSQ HOSP IP/OBS MODERATE 35: CPT | Mod: GC | Performed by: STUDENT IN AN ORGANIZED HEALTH CARE EDUCATION/TRAINING PROGRAM

## 2023-04-18 PROCEDURE — 124N000002 HC R&B MH UMMC

## 2023-04-18 PROCEDURE — 86038 ANTINUCLEAR ANTIBODIES: CPT | Performed by: STUDENT IN AN ORGANIZED HEALTH CARE EDUCATION/TRAINING PROGRAM

## 2023-04-18 PROCEDURE — 36415 COLL VENOUS BLD VENIPUNCTURE: CPT | Performed by: STUDENT IN AN ORGANIZED HEALTH CARE EDUCATION/TRAINING PROGRAM

## 2023-04-18 RX ORDER — PROPRANOLOL HYDROCHLORIDE 10 MG/1
10 TABLET ORAL 2 TIMES DAILY PRN
Status: DISCONTINUED | OUTPATIENT
Start: 2023-04-18 | End: 2023-05-23

## 2023-04-18 RX ORDER — OLANZAPINE 5 MG/1
5 TABLET ORAL 2 TIMES DAILY WITH MEALS
Status: COMPLETED | OUTPATIENT
Start: 2023-04-18 | End: 2023-04-18

## 2023-04-18 RX ORDER — CLOZAPINE 25 MG/1
25 TABLET ORAL AT BEDTIME
Status: CANCELLED | OUTPATIENT
Start: 2023-04-18

## 2023-04-18 RX ORDER — OLANZAPINE 10 MG/2ML
5 INJECTION, POWDER, FOR SOLUTION INTRAMUSCULAR 2 TIMES DAILY WITH MEALS
Status: COMPLETED | OUTPATIENT
Start: 2023-04-18 | End: 2023-04-18

## 2023-04-18 RX ORDER — RISPERIDONE 1 MG/1
1 TABLET ORAL AT BEDTIME
Status: DISCONTINUED | OUTPATIENT
Start: 2023-04-18 | End: 2023-04-21

## 2023-04-18 RX ORDER — OLANZAPINE 10 MG/2ML
5 INJECTION, POWDER, FOR SOLUTION INTRAMUSCULAR AT BEDTIME
Status: DISCONTINUED | OUTPATIENT
Start: 2023-04-18 | End: 2023-04-21

## 2023-04-18 RX ORDER — OLANZAPINE 5 MG/1
5 TABLET ORAL AT BEDTIME
Status: DISCONTINUED | OUTPATIENT
Start: 2023-04-18 | End: 2023-04-18

## 2023-04-18 RX ORDER — BENZTROPINE MESYLATE 0.5 MG/1
0.5 TABLET ORAL 2 TIMES DAILY PRN
Status: DISCONTINUED | OUTPATIENT
Start: 2023-04-18 | End: 2023-05-23

## 2023-04-18 RX ORDER — OLANZAPINE 10 MG/2ML
5 INJECTION, POWDER, FOR SOLUTION INTRAMUSCULAR AT BEDTIME
Status: DISCONTINUED | OUTPATIENT
Start: 2023-04-18 | End: 2023-04-18

## 2023-04-18 RX ADMIN — PROPRANOLOL HYDROCHLORIDE 10 MG: 10 TABLET ORAL at 14:02

## 2023-04-18 RX ADMIN — PROPRANOLOL HYDROCHLORIDE 10 MG: 10 TABLET ORAL at 09:56

## 2023-04-18 RX ADMIN — ALUMINUM HYDROXIDE, MAGNESIUM HYDROXIDE, AND DIMETHICONE 30 ML: 200; 20; 200 SUSPENSION ORAL at 11:07

## 2023-04-18 RX ADMIN — OLANZAPINE 5 MG: 5 TABLET, FILM COATED ORAL at 19:44

## 2023-04-18 RX ADMIN — PROPRANOLOL HYDROCHLORIDE 10 MG: 10 TABLET ORAL at 19:45

## 2023-04-18 RX ADMIN — Medication 25 MCG: at 09:56

## 2023-04-18 RX ADMIN — RISPERIDONE 1 MG: 1 TABLET ORAL at 21:29

## 2023-04-18 RX ADMIN — OLANZAPINE 5 MG: 5 TABLET, FILM COATED ORAL at 12:28

## 2023-04-18 ASSESSMENT — ACTIVITIES OF DAILY LIVING (ADL)
ORAL_HYGIENE: INDEPENDENT
ADLS_ACUITY_SCORE: 28
LAUNDRY: WITH SUPERVISION
DRESS: INDEPENDENT
DRESS: INDEPENDENT
ADLS_ACUITY_SCORE: 28
HYGIENE/GROOMING: INDEPENDENT
ADLS_ACUITY_SCORE: 28
ORAL_HYGIENE: INDEPENDENT
ADLS_ACUITY_SCORE: 28
HYGIENE/GROOMING: INDEPENDENT
ADLS_ACUITY_SCORE: 28

## 2023-04-18 NOTE — PROVIDER NOTIFICATION
"Patient reported having spasms in bilateral arms, \"I feel the need to keep moving my arms\", attributes it to the scheduled Zyprexa. Patient was in turn requesting Jordan, on-call provider updated about the  request.   "

## 2023-04-18 NOTE — PROGRESS NOTES
"    ----------------------------------------------------------------------------------------------------------  [facility], [place]   Psychiatric Progress Note  Hospital Day #3    Identifier: Tello Steen is a 27 year old male (FTM) with previous psychiatric diagnoses of schizophrenia vs schizoaffective disorder, bipolar type who presents with psychosis and álvaro in the context of recent hospitalizations 3/26-4/11/23 ([facility]) and 2/22-3/7/23 ([facility]) for the same. Assessment is that the current presentation is consistent with schizoaffective disorder, bipolar type, current álvaro and psychosis.      Interim History:   The patient's care was discussed with the treatment team and chart notes were reviewed.    Vitals: VSS  Sleep: 5.5 hours (04/18/23 0600)  Scheduled medications: Took all scheduled medications as prescribed  PRN medications: olanzapine x1    Staff Report:   \"Reported having spasms to bilateral arms (\"I feel the need to keep moving my arms\") at the time and was attributing this to taking Zyprexa. Patient was in turn requesting Cogentin(has none ordered) thus the on-call resident was updated. The resident deferred starting the Cogentin and instead recommenced offering the prn Hydroxyzine.\"    Isolative, withdrawn, guarded, polite and superficial in conversations  Denied all MH symptoms   Attended groups     Subjective:     Patient Interview:  Tello Steen was interviewed in their bedroom. They report that after taking their olanzapine last night their right arm felt like \"it got tight\" and they felt the strong need to \"punch a wall\". States that they found a position to lie in so their arm felt less uncomfortable, as they didn't want to harm themselves or others by punching something. The sensation has now resolved.    Tello asks why they need to be taking an oral medication, since they are already taking injectable paliperidone. We noted that they have been " "hospitalized several times in the past few months, and it seems that they may need additional or different medications. They reply that they are in and out of the hospital due to the training program that they are in for the [employer]. They state that until they are 28 they will be required to be frequently in the hospital in order to \"talk to other people with mental illness and help them\" and to \"go hard on medications, to see if I can handle it\". 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 Shares that there is nothing wrong with their brain and that the chip has been present for a long time. Notes that the chip is talking to them all the time.     Discussed medication options, including risperidone and clozapine. They state that \"you are the doctors, I am ok with doing what you want to do\" and again shares that part of their training is to receive many medications.     We asked about the name \"Ki\" and shared that their mother stated that Tello often uses different aliases when they are struggling with their mental health. Tello states that \"Ki\" is the name given by their adopted family, the \"escher clan\". Notes that they have not met their adopted family yet, but that they text often and will meet with them after they have completed more of their training. When asked about pronouns, Tello says \"well the root of that goes back to my childhood... but \"they\" works best, but really people can call me me whatever they see, whatever they want to call me.\"     Review of systems:   Patient has muscle stiffness (as above)  Patient denies acute concerns    Objective:   /79   Pulse 94   Temp 97.7  F (36.5  C) (Oral)   Resp 16   Wt 70.5 kg (155 lb 8 oz)   SpO2 96%   BMI 20.52 kg/m    Weight is 155 lbs 8 oz  Body mass index is 20.52 kg/m .    Mental Status Exam:  Oriented to:  Grossly Oriented  General:  Awake and Alert  Appearance:  appears stated age and Hair is unkempt, dressed in " "street clothes  Behavior/Attitude:  calm, cooperative, engaged and minimizing  Eye Contact:  intermittent, intense   Psychomotor: normal and no evidence of tics, dystonia, or tardive dyskinesia no catatonia present.   Speech:  appropriate volume/tone and slightly odd rhythm  Language: Fluent in English with appropriate syntax and vocabulary.  Mood:  \"good\"  Affect:  Elevated, smiles and chuckles inappropriately during interview  Thought Process:  Loose associations at times, rambling, tangential, illogical  Thought Content:  No SI/SIB/HI. Denies AH, but endorses hearing the chip implanted in his brain.; delusions of paranoia, delusions of grandiosity and delusions of bizarre content  Associations:  questionable  Insight:  partial due to aware of past delusions, diagnosis, and benefit from medications, but limited insight into current state. Believes they are in the hospital due to their intelligence training program.   Judgment:  partial - requesting discharge, but cooperative, calm, taking medications.  Impulse control: good  Attention Span:  adequate for conversation  Concentration:  grossly intact  Recent and Remote Memory:  grossly intact  Fund of Knowledge:  average  Muscle Strength and Tone: normal. Rigidity directly examined in both arms, no signs of rigidity bilaterally.  Gait and Station: Normal      Allergies:   No Known Allergies     Labs and imaging:     Data this admission:  BMP normal  CBC normal  UDS negative  TSH 3/9/23 normal  Lipids 2/24/23unremarkable  Hgb A1c 2/24/23 normal   UA - amorphous crystals, 10 protein albumin, elevated pH   HIV - pending  Ceruloplasmin  AILYN - pending  Lyme abs - pending  Treponema  - pending  Folate - normal   B12 - pending  ESR - normal Salicylate, alcohol, Tylenol negative  EKG 3/20: sinus rhythm, QTc 431     Psychiatric Assessment and Plan   Primary psychiatric diagnosis:   Schizoaffective disorder, bipolar type, current álvaro    Diagnostic Impression:   Ki D " Celsa is a 27 year old male (FTM) with a past psychiatric diagnosis of schizophrenia admitted from the ED on 04/15/2023 due to pscyhosis in the context of recent hospitalization 3/26-4/11/23 for psychosis and álvaro. Significant symptoms on admission include delusions about a chip in their brain, delusions of grandeur, delusions of having a special mission with the [employer], and elevated mood. The MSE on admission was pertinent for elevated affect and delusions previously mentioned. Biological contributions to mental health presentation include previous psychiatric diagnosis of schizophrenia. On chart review, there are also mentions of Lithium trials, possibly indicating previous álvaro. Psychosocial contributions to mental health presentation include partial insight, unclear work, housing, and financial situation. Protective factors include medication adherence, lack of substance use, and supportive CCM, good frustration tolerance, and presence of perceived social supports.      In summary, the patient's reported symptoms of psychosis and álvaro in the context of historical schizophrenia diagnosis and recent hospitalization are consistent with schizophrenia vs schizoaffective disorder bipolar type. More information is needed to determine definitive diagnosis.     Psychiatric Hospital course:  Tello Steen was admitted to [facility] on a 72 hour hold (committed with Holman, pending revocation of PD). All PTA medications were continued. This is their third hospitalization within 4 months, indicating that their symptoms are not well-controlled with injectable paliperidone. We are considering a new neuroleptic medication for them, either risperidone or clozapine. As they have not had sustained remission of symptoms with trials of two different neuroleptics, clozapine would likely be the most efficacious next choice. However, clozapine is not currently on their holman order, so we will submit an amendment to have it  added. We are also considering risperidone, which has the benefit of having a WHITEHEAD formulation.     In the meantime Ki has been experiencing akathisia in their arms with their evening olanzapine dose, so we will discontinue this dose of olanzapine and replace it with risperidone. We also added additional cogentin and propranolol PRNs to help manage any extrapyramidal side effects. We will continue to monitor Tello's symptoms in the coming days to make a definitive decision about which neuroleptic to switch to.    Today's changes:  - Discontinue 10 mg HS zyprexa, replace with 1 mg HS risperidone   - propranolol 10 mg BID PRN  - benztropine 0.5 mg BID PRN   - MRI   - Expanded FEP laboratory workup (see lab monitoring section)    1. Psychotropic Medications:  Scheduled:  Current Facility-Administered Medications   Medication Dose Route Frequency     OLANZapine  5 mg Oral BID w/meals    Or     OLANZapine  5 mg Intramuscular BID w/meals     risperiDONE  1 mg Oral At Bedtime    Or     OLANZapine  5 mg Intramuscular At Bedtime     [START ON 4/28/2023] paliperidone  234 mg Intramuscular Q28 Days     propranolol  10 mg Oral TID     Vitamin D3  25 mcg Oral Daily       PRN:  Current Facility-Administered Medications   Medication Dose Route Frequency     acetaminophen  650 mg Oral Q4H PRN     alum & mag hydroxide-simethicone  30 mL Oral Q4H PRN     benztropine  0.5 mg Oral BID PRN     melatonin  3 mg Oral At Bedtime PRN     OLANZapine  10 mg Oral TID PRN    Or     OLANZapine  10 mg Intramuscular TID PRN     polyethylene glycol  17 g Oral Daily PRN     propranolol  10 mg Oral BID PRN       2. Pertinent Labs/Monitoring:   FEP work up:  BMP normal  CBC normal  UDS negative  EKG 3/20: sinus rhythm, QTc 431  TSH 3/9/23 normal  Lipids 2/24/23unremarkable  Hgb A1c 2/24/23 normal   UA - amorphous crystals, 10 protein albumin, elevated pH   HIV - pending  Ceruloplasmin  AILYN - pending  Lyme abs - pending  Treponema  - pending  Folate -  normal   B12 - pending  ESR - normal     3. Additional Plans:  Patient will be treated in therapeutic milieu with appropriate individual and group therapies as described.    Medical Assessment and Plan     Medical diagnoses to be addressed this admission:    None    Medical course: Patient was physically examined by the ED prior to being transferred to the unit and was found to be medically stable and appropriate for admission.     Consults: None    Checklist     Legal Status: Committed on PD. Holman: haldol, olanzapine, risperidone, paliperidone    Safety Assessment:   Behavioral Orders   Procedures     Code 1 - Restrict to Unit     Routine Programming     As clinically indicated     Status 15     Every 15 minutes.       Risk Assessment:  Risk for harm is elevated.  Risk factors: impulsive and past behaviors, multiple recent hospitalizations  Protective factors: family and engaged in treatment     SIO: No    Disposition: TBD. Pending stabilization, medication optimization, & development of a safe discharge plan.    Attestations     This patient was seen and discussed with my attending physician.  Sally Georges MS3      This patient has been seen and evaluated by me, Johanna Altman DO.  I have discussed this patient with the team including the resident and medical student(s) and I agree with the findings and plan in this note.  Dr. Johanna Altman DO, CARIN

## 2023-04-18 NOTE — PLAN OF CARE
"Pt has been visible in milieu pacing the hallways and keeping to self. He was amenable to 1:1 interview wherein he admitted that \"I have schizophrenia.\" He said that WHITEHEAD antipsychotics have been helpful in the past. He spoke about \"a chip in my head\" and said that Psychiatrists may dismiss this \"as paranoia\" but that's \"It's real.\" He reported that the chip inhibits him from saying certain things, and that he is under instructions from the [employer]. He plans to discharge from here, take the SAT, and then be admitted to [facility] in [place]. He said he is currently here due to such NSA instructions. He also said he has worked for the [employer], and that he is a \"polymath\" who has mastery in all subjects.   He reported what he views as EPS from Zyprexa- \"My arm tightening and involuntarily moving.\" Pt aware of PRN to combat possible side effects. Pt is otherwise calm, cooperative and pleasant upon approach. Minimal I/J. Will continue to monitor and encourage participation.     Addendum: Pt complained of racing thoughts and took PRN. He denied EPSE when given with beta blocker.   MRI imaging dept contacted tonight. Per report they likely do not have space for pt tonight. Pt will get tomorrow. MRI checklist reviewed with pt again, as pt says he has a brain \"chip.\" He said it is of organic material, so will not interfere with MRI. He otherwise denies all other contraindications, and also cleared by day RN per chart review.    Addendum: Pt went to MRI with staff and security with out issue, leaving unit at 2150 and returning 2225.    Problem: Psychotic Signs/Symptoms  Goal: Improved Behavioral Control (Psychotic Signs/Symptoms)  Outcome: Progressing  Flowsheets (Taken 4/18/2023 1747)  Mutually Determined Action Steps (Improved Behavioral Control):    verbalizes personal treatment goal    identifies future-oriented goal  Goal: Optimal Cognitive Function (Psychotic Signs/Symptoms)  Outcome: Progressing  Goal: Increased " Participation and Engagement (Psychotic Signs/Symptoms)  Outcome: Progressing   Goal Outcome Evaluation:

## 2023-04-18 NOTE — PLAN OF CARE
Assessment/Intervention/Current Symptoms and Care Coordination  - chart review  - team meeting  - team rounds/pt interview addressed patient needs/concerns  - Paperwork regarding pt's provisional discharge being revoked was received and placed in chart.   - Current Symptoms include the following: Psychosis    Discharge Plan or Goal  Pending stabilization & development of a safe discharge plan.  Considerations include: Pt will likely return home to his mother in [place].      Barriers to Discharge   Patient requires further psychiatric stabilization due to current symptomology      Referral Status  None as [place] out of state      Legal Status  Patient is under MI commitment in [facility]

## 2023-04-18 NOTE — PLAN OF CARE
"Stated he has been having side effects from the Zyprexa, reports feeling shaky and having the urge to punch the wall after receiving Zyprexa.  Care team informed and Zyprexa held until after meeting with team completed.   Morning dose reluctantly taken at 12:30, stating \"They are playing with fire\".   Denies depression, anxiety, suicidal thoughts and hallucinations.  Answers questions with slow responses, hesitant with responses stating \"my life is very classified\".   Has spent most of the day in the dining area reading a math book stating \"I have to read 40 pages a day\".   Given MRI check list to complete, was unable to complete placing a large X through the questionnaire and writing on it, 'my team does not want any paper work for this'. Questionnaire completed by writer via chart review and questioning patient about is past medical history.    Problem: Anxiety  Goal: Anxiety Reduction or Resolution  Outcome: Progressing     Problem: Depression  Goal: Improved Mood  Outcome: Progressing     Problem: Sleep Disturbance  Goal: Adequate Sleep/Rest  Outcome: Progressing   Goal Outcome Evaluation:    Plan of Care Reviewed With: patient                   "

## 2023-04-18 NOTE — PLAN OF CARE
"  Problem: Sleep Disturbance  Goal: Adequate Sleep/Rest  Outcome: Progressing   Goal Outcome Evaluation:  Patient awake t the beginning of the shift. Reported having spasms to bilateral arms at the time and was attributing this to taking Zyprexa. Patient was in turn requesting Cogentin(has none ordered) thus the on-call resident was updated. The resident deferred starting the Cogentin and instead recommenced offering the prn Hydroxyzine. Patient however declined the Atarax and stating, \"never mind\". Patient denied any other symptoms.  Appears to have slept for 5.5 hours.                         "

## 2023-04-18 NOTE — PLAN OF CARE
04/17/23 1924   Group Therapy Session   Group Attendance refused to attend group session   Time Session Began 1615   Time Session Ended 1715   Total Time (minutes) 0   Total # Attendees 3   Group Type psychotherapeutic   Group Topic Covered emotions/expression;coping skills/lifestyle management   Group Session Detail Group members completed/discussed worksheet on stress exploration and coping strategies

## 2023-04-19 LAB — ANA SER QL IF: NEGATIVE

## 2023-04-19 PROCEDURE — 124N000002 HC R&B MH UMMC

## 2023-04-19 PROCEDURE — 250N000013 HC RX MED GY IP 250 OP 250 PS 637: Performed by: STUDENT IN AN ORGANIZED HEALTH CARE EDUCATION/TRAINING PROGRAM

## 2023-04-19 PROCEDURE — 99232 SBSQ HOSP IP/OBS MODERATE 35: CPT | Performed by: PSYCHIATRY & NEUROLOGY

## 2023-04-19 PROCEDURE — 250N000013 HC RX MED GY IP 250 OP 250 PS 637: Performed by: PSYCHIATRY & NEUROLOGY

## 2023-04-19 PROCEDURE — G0177 OPPS/PHP; TRAIN & EDUC SERV: HCPCS

## 2023-04-19 PROCEDURE — 250N000013 HC RX MED GY IP 250 OP 250 PS 637

## 2023-04-19 RX ORDER — RISPERIDONE 0.5 MG/1
0.5 TABLET, ORALLY DISINTEGRATING ORAL 2 TIMES DAILY PRN
Status: DISCONTINUED | OUTPATIENT
Start: 2023-04-19 | End: 2023-05-23

## 2023-04-19 RX ADMIN — RISPERIDONE 0.5 MG: 0.5 TABLET, ORALLY DISINTEGRATING ORAL at 12:10

## 2023-04-19 RX ADMIN — RISPERIDONE 1 MG: 1 TABLET ORAL at 19:15

## 2023-04-19 RX ADMIN — PROPRANOLOL HYDROCHLORIDE 10 MG: 10 TABLET ORAL at 13:46

## 2023-04-19 RX ADMIN — Medication 25 MCG: at 08:37

## 2023-04-19 RX ADMIN — PROPRANOLOL HYDROCHLORIDE 10 MG: 10 TABLET ORAL at 19:15

## 2023-04-19 RX ADMIN — PROPRANOLOL HYDROCHLORIDE 10 MG: 10 TABLET ORAL at 08:37

## 2023-04-19 ASSESSMENT — ACTIVITIES OF DAILY LIVING (ADL)
DRESS: INDEPENDENT
ADLS_ACUITY_SCORE: 28
HYGIENE/GROOMING: INDEPENDENT
ADLS_ACUITY_SCORE: 28
LAUNDRY: WITH SUPERVISION
DRESS: INDEPENDENT
ADLS_ACUITY_SCORE: 28
LAUNDRY: WITH SUPERVISION
ADLS_ACUITY_SCORE: 28
ORAL_HYGIENE: INDEPENDENT
ADLS_ACUITY_SCORE: 28
ORAL_HYGIENE: INDEPENDENT
ADLS_ACUITY_SCORE: 28
ADLS_ACUITY_SCORE: 28
HYGIENE/GROOMING: INDEPENDENT
ADLS_ACUITY_SCORE: 28

## 2023-04-19 NOTE — PLAN OF CARE
Problem: Sleep Disturbance  Goal: Adequate Sleep/Rest  Outcome: Progressing   Goal Outcome Evaluation:  Patient appears to have slept for 7 hours. No acute events overnight. No requests for prn's. No concerns noted during routine safety checks.

## 2023-04-19 NOTE — PLAN OF CARE
"  Problem: Adult Behavioral Health Plan of Care  Goal: Plan of Care Review  Recent Flowsheet Documentation  Taken 4/19/2023 0900 by Shreya Shafer RN  Patient Agreement with Plan of Care: agrees       Patient is flat and calm. They can be withdrawn and guarded. When patient is out in the milieu he isolates to self. Patient paces the hallways, works on math problems and reads his books. Attended groups. Patient stated \"the government put a chip in my head and has told me to keep taking the medications to help lower the frequency.\" He also said \"I need a prn to help lower the frequency.\" Patient denied the meaning of frequency being anxiety or depression. He denied SI/SIB/HI. Denied hallucinations. Patient is on the unit responding to internal stimuli. No complaints of pain or discomfort. Prn risperidone 0.5 mg.   "

## 2023-04-19 NOTE — PLAN OF CARE
"Mood and Affect:Patient describes mood as \"Ok.\" Affect is mostly guarded,  blunted and flat.    LOC and Orientation: Alert. Oriented to person, place, time and situation.     Behavior and Interaction: Patient is calm, pleasant, and cooperative with nursing assessment.    Patient observed pacing up and down the campo way.    Mental Health Symptoms: Patient denies all mental health symptoms.    Medical Concerns: None.  Patient's Other Concerns: Patient hopes to discharge soon, \"I hope to discharge by the 23 rd.\"     Medication Compliant: Patient is compliant with all scheduled medication.    PRN: None.    Medication Side Effects: Patient denies, none observed.    Food Intake: Good appetite. 100% dinner.    Elimination: Denies problems; last bowel movement, today.    Self Care: Independent, well groomed, showered and changed into clean clothes.    Vital Signs: Denies.  /69 (BP Location: Left arm, Patient Position: Sitting, Cuff Size: Adult Regular)   Pulse 87   Temp 97.3  F (36.3  C) (Temporal)   Resp 16   Wt 70.5 kg (155 lb 8 oz)   SpO2 98%   BMI 20.52 kg/m        Problem: Depression  Goal: Improved Mood  Outcome: Progressing     Problem: Psychotic Signs/Symptoms  Goal: Improved Behavioral Control (Psychotic Signs/Symptoms)  Outcome: Progressing     Problem: Suicidal Behavior  Goal: Suicidal Behavior is Absent or Managed  Outcome: Progressing   Goal Outcome Evaluation:    Plan of Care Reviewed With: patient                   "

## 2023-04-19 NOTE — PLAN OF CARE
Assessment/Intervention/Current Symtoms and Care Coordination  The patient's care was discussed with the treatment team and chart notes were reviewed.   Patient met with team.  Patient continues to report there being a chip in his head- and he follows the instructions the NSA tells him to do.  Patient has been isolative to self- spends time reading  CTC will contact CM to discuss disposition-     Discharge Plan or Goal  Unclear at this time whether patient will remain in [place].  Patient in need of close Psychiatric follow up care.    Barriers to Discharge   Severity of symptoms- need for stabilization.  Patient is acutely psychotic. Request to amend Holman sent to [facility] 4/19/23    Patient is an [place] resident with plan to return to parent's home in [place].    Referral Status  None today    Legal Status     Commitment and Holman order per [facility]- Provisional discharge revoked

## 2023-04-19 NOTE — PLAN OF CARE
[facility] Occupational Therapy Group Intervention Note     04/19/23 1437   Group Therapy Session   Group Attendance attended group session   Total Time (minutes) 45   Group Type task skill   Group Topic Covered cognitive activities;leisure exploration/use of leisure time;coping skills/lifestyle management   Group Session Detail Clinic - coping skill exploration, creative expression within personally meaningful activities, and observation of social, cognitive, and kinesthetic performance skills   Patient Response/Contribution cooperative with task   Patient Participation Detail Congruent affect. IND to gather materials and organize work space. Demonstrated focused attention to solving math problems on a white board for ~35mins. Engaged in conversation upon approach.      Mary Andrade OT on 4/19/2023 at 2:38 PM

## 2023-04-19 NOTE — PROGRESS NOTES
"    ----------------------------------------------------------------------------------------------------------  [facility], [place]   Psychiatric Progress Note  Hospital Day #4    Identifier: Tello Steen is a 27 year old male (FTM) with previous psychiatric diagnoses of schizophrenia vs schizoaffective disorder, bipolar type who presents with psychosis and álvaro in the context of recent hospitalizations 3/26-4/11/23 ([facility]) and 2/22-3/7/23 ([facility]) for the same. Assessment is that the current presentation is consistent with schizoaffective disorder, bipolar type, current álvaro and psychosis.      Interim History:   The patient's care was discussed with the treatment team and chart notes were reviewed.    Vitals: VSS  Sleep: 7 hours (04/19/23 0600)  Scheduled medications: Took all scheduled medications as prescribed  PRN medications: None    Staff Report:   - \"He spoke about \"a chip in my head\" and said that Psychiatrists may dismiss this \"as paranoia\" but that's \"It's real.\" He reported that the chip inhibits him from saying certain things, and that he is under instructions from the NSA.\"  - Complained of racing thoughts   - Remained cooperative      Subjective:     Patient Interview:  Tello Steen was interviewed in their bedroom.     Mood: During the interview, Tello reported feeling \"good\" and had no complaints overnight. They denied suicidal ideation.    Delusions: Tello described experiencing delusions related to a chip implanted in their head. The chip communicates with them, instructs them on medication intake, and rewards or punishes them for following or disobeying its plan. Tello reported being pulled in different directions by the chip when they disagreed with its plan. They also mentioned that the chip is trying to get to the correct \"lower frequency\" and provides rewards such as new friendships.    Ki expressed a desire to take the SAT exam soon and achieve a " "perfect score to obtain significant financial gain. They also mentioned plans to move to an apartment in [place].    Akathisia: Regarding their medication, Ki reported no further instances of movement irregularities after a recent switch to risperidone.    Review of systems:   Patient has muscle stiffness (as above)  Patient denies acute concerns    Objective:   /75   Pulse 78   Temp 97.7  F (36.5  C) (Oral)   Resp 16   Wt 70.5 kg (155 lb 8 oz)   SpO2 96%   BMI 20.52 kg/m    Weight is 155 lbs 8 oz  Body mass index is 20.52 kg/m .    Mental Status Exam:  Oriented to:  Grossly Oriented  General:  Awake and Alert  Appearance:  appears stated age and Hair is unkempt  Behavior/Attitude:  calm, cooperative, engaged, talkative  Eye Contact:  intermittent, intense   Psychomotor: large intentional movements noted,  no evidence of tics, dystonia, or tardive dyskinesia no catatonia present.   Speech:  appropriate volume/tone and slightly odd rhythm  Language: Fluent in English with inappropriate syntax and correct vocabulary - often switched the direct and indirect nouns in sentences, was unaware of having done this.   Mood:  \"good\"  Affect:  Elevated, smiles and chuckles inappropriately during interview (related to topics about chip in brain)  Thought Process:  Loose associations at times, rambling, tangential, illogical  Thought Content:  No SI/SIB/HI. Denies AH, but endorses hearing the chip implanted in his brain.; delusions of paranoia, delusions of grandiosity and delusions of bizarre content  Associations:  questionable  Insight:  partial due to aware of past delusions, diagnosis, and benefit from medications, but limited insight into current state. Believes they are in the hospital due to their intelligence training program.   Judgment:  partial - requesting discharge, but cooperative, calm, taking medications. They believe their intellect to very high  Impulse control: partial - seems to be at the " command of the voices in his head  Attention Span:  adequate for conversation  Concentration:  grossly intact  Recent and Remote Memory:  grossly intact  Fund of Knowledge:  average   Muscle Strength and Tone: appears normal  Gait and Station: Normal      Allergies:   No Known Allergies     Labs and imaging:     Data this admission:  BMP normal  CBC normal  UDS negative  TSH 3/9/23 normal  Lipids 2/24/23unremarkable  Hgb A1c 2/24/23 normal   UA - amorphous crystals, 10 protein albumin, elevated pH   HIV - pending  Ceruloplasmin  AILYN - pending  Lyme abs - pending  Treponema  - pending  Folate - normal   B12 - pending  ESR - normal Salicylate, alcohol, Tylenol negative  EKG 3/20: sinus rhythm, QTc 431  Brain MRI - normal      Psychiatric Assessment and Plan   Primary psychiatric diagnosis:   Schizoaffective disorder, bipolar type, current álvaro    Diagnostic Impression:   Tello Steen is a 27 year old male (FTM) with a past psychiatric diagnosis of schizophrenia admitted from the ED on 04/15/2023 due to pscyhosis in the context of recent hospitalization 3/26-4/11/23 for psychosis and álvaro. Significant symptoms on admission include delusions about a chip in their brain, delusions of grandeur, delusions of having a special mission with the [employer], and elevated mood. The MSE on admission was pertinent for elevated affect and delusions previously mentioned. Biological contributions to mental health presentation include previous psychiatric diagnosis of schizophrenia. On chart review, there are also mentions of Lithium trials, possibly indicating previous álvaro. Psychosocial contributions to mental health presentation include partial insight, unclear work, housing, and financial situation. Protective factors include medication adherence, lack of substance use, and supportive CCM, good frustration tolerance, and presence of perceived social supports.      In summary, the patient's reported symptoms of psychosis and álvaro in  the context of historical schizophrenia diagnosis and recent hospitalization are consistent with schizoaffective disorder bipolar type. More information is needed to determine definitive diagnosis.     Psychiatric Hospital course:  Tello Steen was admitted to [facility] on a 72 hour hold (committed with Holman). All PTA medications were continued. This is their third hospitalization within 4 months, indicating that their symptoms are not well-controlled with injectable paliperidone. We are considering a new neuroleptic medication for them, either risperidone or clozapine. As they have not had sustained remission of symptoms with trials of two different neuroleptics, clozapine would likely be the most efficacious next choice. However, clozapine is not currently on their [email or web address] order, so we will submit an amendment to have it added. We are also considering risperidone, which has the benefit of having a WHITEHEAD formulation.     In the meantime Tello has been experiencing akathisia in their arms with their evening olanzapine dose, so we will discontinue this dose of olanzapine and replace it with risperidone. We also added additional cogentin and propranolol PRNs to help manage any extrapyramidal side effects. We will continue to monitor Tello's symptoms in the coming days to make a definitive decision about which neuroleptic to switch to.    Today's changes:   - Ordered risperidone 0.5mg BID PRN    1. Psychotropic Medications:  Scheduled:  Current Facility-Administered Medications   Medication Dose Route Frequency     risperiDONE  1 mg Oral At Bedtime    Or     OLANZapine  5 mg Intramuscular At Bedtime     [START ON 4/28/2023] paliperidone  234 mg Intramuscular Q28 Days     propranolol  10 mg Oral TID     Vitamin D3  25 mcg Oral Daily       PRN:  Current Facility-Administered Medications   Medication Dose Route Frequency     acetaminophen  650 mg Oral Q4H PRN     alum & mag hydroxide-simethicone  30 mL Oral Q4H PRN      benztropine  0.5 mg Oral BID PRN     melatonin  3 mg Oral At Bedtime PRN     OLANZapine  10 mg Oral TID PRN    Or     OLANZapine  10 mg Intramuscular TID PRN     polyethylene glycol  17 g Oral Daily PRN     propranolol  10 mg Oral BID PRN       2. Pertinent Labs/Monitoring:   FEP work up:  BMP normal  CBC normal  UDS negative  EKG 3/20: sinus rhythm, QTc 431  TSH 3/9/23 normal  Lipids 2/24/23unremarkable  Hgb A1c 2/24/23 normal   UA - amorphous crystals, 10 protein albumin, elevated pH   HIV - pending  Ceruloplasmin  AILYN - pending  Lyme abs - pending  Treponema  - pending  Folate - normal   B12 - pending  ESR - normal     3. Additional Plans:  Patient will be treated in therapeutic milieu with appropriate individual and group therapies as described.    Medical Assessment and Plan     Medical diagnoses to be addressed this admission:    None    Medical course: Patient was physically examined by the ED prior to being transferred to the unit and was found to be medically stable and appropriate for admission.     Consults: None    Checklist     Legal Status: Committed on PD. Holman: haldol, olanzapine, risperidone, paliperidone    Safety Assessment:   Behavioral Orders   Procedures     Code 1 - Restrict to Unit     Code 2     OK to leave the floor for imaging procedures at the discretion of floor staff     Routine Programming     As clinically indicated     Status 15     Every 15 minutes.       Risk Assessment:  Risk for harm is elevated.  Risk factors: impulsive and past behaviors, multiple recent hospitalizations  Protective factors: family and engaged in treatment     SIO: No    Disposition: TBD. Pending stabilization, medication optimization, & development of a safe discharge plan.    Attestations     This patient was seen and discussed with my attending physician.  Jez Kelly MS3    I was present with the medical student who participated in the service and in the documentation of the note. I have verified  the history and personally performed the physical exam and medical decision making. I agree with the assessment and plan of care as documented in the note .    Renate Wallace MD

## 2023-04-20 LAB
B BURGDOR IGG+IGM SER QL: 0.14
CERULOPLASMIN SERPL-MCNC: 20 MG/DL (ref 20–60)

## 2023-04-20 PROCEDURE — 99232 SBSQ HOSP IP/OBS MODERATE 35: CPT | Performed by: STUDENT IN AN ORGANIZED HEALTH CARE EDUCATION/TRAINING PROGRAM

## 2023-04-20 PROCEDURE — G0177 OPPS/PHP; TRAIN & EDUC SERV: HCPCS

## 2023-04-20 PROCEDURE — 250N000013 HC RX MED GY IP 250 OP 250 PS 637: Performed by: STUDENT IN AN ORGANIZED HEALTH CARE EDUCATION/TRAINING PROGRAM

## 2023-04-20 PROCEDURE — 124N000002 HC R&B MH UMMC

## 2023-04-20 PROCEDURE — 250N000013 HC RX MED GY IP 250 OP 250 PS 637

## 2023-04-20 PROCEDURE — 250N000013 HC RX MED GY IP 250 OP 250 PS 637: Performed by: PSYCHIATRY & NEUROLOGY

## 2023-04-20 RX ORDER — LITHIUM CARBONATE 300 MG/1
600 TABLET, FILM COATED, EXTENDED RELEASE ORAL EVERY 12 HOURS SCHEDULED
Status: DISCONTINUED | OUTPATIENT
Start: 2023-04-20 | End: 2023-04-20

## 2023-04-20 RX ORDER — LITHIUM CARBONATE 300 MG/1
600 TABLET, FILM COATED, EXTENDED RELEASE ORAL AT BEDTIME
Status: DISCONTINUED | OUTPATIENT
Start: 2023-04-20 | End: 2023-04-25

## 2023-04-20 RX ADMIN — PROPRANOLOL HYDROCHLORIDE 10 MG: 10 TABLET ORAL at 19:41

## 2023-04-20 RX ADMIN — PROPRANOLOL HYDROCHLORIDE 10 MG: 10 TABLET ORAL at 09:23

## 2023-04-20 RX ADMIN — RISPERIDONE 1 MG: 1 TABLET ORAL at 21:34

## 2023-04-20 RX ADMIN — LITHIUM CARBONATE 600 MG: 300 TABLET, EXTENDED RELEASE ORAL at 21:34

## 2023-04-20 RX ADMIN — RISPERIDONE 0.5 MG: 0.5 TABLET, ORALLY DISINTEGRATING ORAL at 13:54

## 2023-04-20 RX ADMIN — Medication 25 MCG: at 09:23

## 2023-04-20 RX ADMIN — PROPRANOLOL HYDROCHLORIDE 10 MG: 10 TABLET ORAL at 13:54

## 2023-04-20 ASSESSMENT — ACTIVITIES OF DAILY LIVING (ADL)
ADLS_ACUITY_SCORE: 28
ADLS_ACUITY_SCORE: 28
ORAL_HYGIENE: INDEPENDENT
DRESS: INDEPENDENT
DRESS: INDEPENDENT
ADLS_ACUITY_SCORE: 28
ORAL_HYGIENE: INDEPENDENT
HYGIENE/GROOMING: INDEPENDENT
ADLS_ACUITY_SCORE: 28
LAUNDRY: WITH SUPERVISION
ADLS_ACUITY_SCORE: 28
HYGIENE/GROOMING: INDEPENDENT
ADLS_ACUITY_SCORE: 28
LAUNDRY: WITH SUPERVISION

## 2023-04-20 NOTE — PLAN OF CARE
Assessment/Intervention/Current Symtoms and Care Coordination  The patient's care was discussed with the treatment team and chart notes were reviewed.   Patient met with team.  Patient continues to report there being a chip in his head- and he follows the instructions the NSA tells him to do.Patient did not sleep last night and reported that the chip told him not to... Patient remains preoccupied with SAT exam he needs to take.  Patient has been observed in unge more today.   CTC left msg with newly assigned CM from [facility] ([phone])       Discharge Plan or Goal  Unclear at this time whether patient will remain in [place].  Patient in need of close Psychiatric follow up care.     Barriers to Discharge   Severity of symptoms- need for stabilization.  Patient is acutely psychotic. Request to amend Holman sent to [facility] 4/19/23     Patient is an [place] resident with plan to return to parent's home in [place].     Referral Status  None today     Legal Status     Commitment and Holman order per [facility]- Provisional discharge revoked

## 2023-04-20 NOTE — PROGRESS NOTES
"    ----------------------------------------------------------------------------------------------------------  [facility], [place]   Psychiatric Progress Note  Hospital Day #5    Identifier: Tello Steen is a 27 year old male with previous psychiatric diagnoses of schizophrenia vs schizoaffective disorder, bipolar type who presents with psychosis and álvaro in the context of recent hospitalizations 3/26-4/11/23 ([facility]) and 2/22-3/7/23 ([facility]) for the same. Assessment is that the current presentation is consistent with schizoaffective disorder, bipolar type, current álvaro and psychosis.      Interim History:   The patient's care was discussed with the treatment team and chart notes were reviewed.    Vitals: VSS  Sleep: 1.5 hours (04/20/23 0600)  Scheduled medications: Took all scheduled medications as prescribed  PRN medications: risperidone 0.5mg *1    Staff Report:   - calm, cooperative, and pleasant  - Denies all mental health symptoms  - describes mood as \"Ok\"  - \"Independent, well groomed, showered and changed into clean clothes\"  - Paced calmly through the hallways and spent most of the night reading a book     Subjective:     Patient Interview:  Tello Steen was interviewed in their bedroom.     Mood: During the interview, Tello reported feeling \"ok\" and had no complaints overnight.     Delusions: Tello continues to experience delusions related to a chip implanted in their head. The chip communicates with them, and rewards or punishes Tello for following or disobeying its plan. Tello reported being pulled in different directions by the chip when they disagreed with its plan. He did not sleep last night because the chip told Tello not to sleep. He reports to have a IQ of 218. Tello expressed a desire to take the SAT exam soon and leave the hospital by the 23rd. Tello said he will move back to [place] with his mother to study. He is concerned that he won't get a refund if he delays the " "exam. He said the chip can release substances that are good for him as a reward.     Medication Changes: Tello said the chip will allow for the addition of lithium as a medication.       Per discussion with Tello's mother, Tello is a new name for the patient. The original name is Sophie. The mom mentioned that the new name means the psychotic episode is very bad. She tells us that he is a biological male and that this gender pronoun change is new and could be a part of his delusion.     Review of systems:   Patient has no bothersome physical symptoms   Patient denies acute concerns    Objective:   /73 (BP Location: Right arm, Patient Position: Sitting)   Pulse 95   Temp 97.8  F (36.6  C) (Oral)   Resp 16   Wt 70.5 kg (155 lb 8 oz)   SpO2 95%   BMI 20.52 kg/m    Weight is 155 lbs 8 oz  Body mass index is 20.52 kg/m .    Mental Status Exam:  Oriented to:  Grossly Oriented  General:  Awake and Alert  Appearance:  appears stated age and Hair is unkempt  Behavior/Attitude:  calm, cooperative, engaged, talkative  Eye Contact:  intermittent, downcast, sporadic  Psychomotor: large intentional movements noted,  no evidence of tics, dystonia, or tardive dyskinesia no catatonia present.   Speech:  appropriate volume/tone and slightly odd rhythm  Language: Fluent in English with inappropriate syntax and correct vocabulary - often switched the direct and indirect nouns in sentences, was unaware of having done this.   Mood:  \"ok\"  Affect:  Elevated, smiles and chuckles inappropriately during interview (related to topics about chip in brain and his intellect)  Thought Process:  Loose associations at times, rambling, tangential, illogical  Thought Content:  No SI/SIB/HI. Denies AH, but endorses hearing the chip implanted in his brain.; delusions of paranoia, delusions of grandiosity and delusions of bizarre content  Associations:  questionable  Insight:  partial due to aware of past delusions, diagnosis, and benefit from " "medications, but limited insight into current state.Does recognize diagnosis of schizophrenia  Judgment:  partial - requesting discharge, but cooperative, calm, taking medications. They believe their intellect to very high and listens to \"chip in brain\"  Impulse control: partial - seems to be at the command of the voices in his head  Attention Span:  adequate for conversation  Concentration:  grossly intact  Recent and Remote Memory:  grossly intact  Fund of Knowledge:  average   Muscle Strength and Tone: appears normal  Gait and Station: Normal      Allergies:   No Known Allergies     Labs and imaging:     Data this admission:  BMP normal  CBC normal  UDS negative  TSH 3/9/23 normal  Lipids 2/24/23unremarkable  Hgb A1c 2/24/23 normal   UA - amorphous crystals, 10 protein albumin, elevated pH   HIV - pending  Ceruloplasmin  AILYN - pending  Lyme abs - pending  Treponema  - pending  Folate - normal   B12 - pending  ESR - normal Salicylate, alcohol, Tylenol negative  EKG 3/20: sinus rhythm, QTc 431  Brain MRI - normal      Psychiatric Assessment and Plan   Primary psychiatric diagnosis:   Schizoaffective disorder, bipolar type, current álvaro    Diagnostic Impression:   Tello Steen is a 27 year old male with a past psychiatric diagnosis of schizophrenia admitted from the ED on 04/15/2023 due to pscyhosis in the context of recent hospitalization 3/26-4/11/23 for psychosis and álvaro. Significant symptoms on admission include delusions about a chip in their brain, delusions of grandeur, delusions of having a special mission with the [employer], and elevated mood. The MSE on admission was pertinent for elevated affect and delusions previously mentioned. Biological contributions to mental health presentation include previous psychiatric diagnosis of schizophrenia. On chart review, there are also mentions of Lithium trials, possibly indicating previous álvaro. Psychosocial contributions to mental health presentation include partial " insight, unclear work, housing, and financial situation. Protective factors include medication adherence, lack of substance use, and supportive CCM, good frustration tolerance, and presence of perceived social supports.      In summary, the patient's reported symptoms of psychosis and álvaro in the context of historical schizophrenia diagnosis and recent hospitalization are consistent with schizoaffective disorder bipolar type. More information is needed to determine definitive diagnosis.     Psychiatric Hospital course:  Tello Steen was admitted to [facility] committed with a Holman. All PTA medications were continued. This is their third hospitalization within 4 months, indicating that their symptoms are not well-controlled with injectable paliperidone. We are considering a new neuroleptic medication for them, either risperidone or clozapine. As they have not had sustained remission of symptoms with trials of two different neuroleptics, clozapine would likely be the most efficacious next choice. However, clozapine is not currently on their holman order, so we had to submit an amendment to have it added. We are also considering risperidone, which has the benefit of having a WHITEHEAD formulation. Due to arm akathasias, Tello's olanzapine was discontinued and this was replaced with risperidone. We also added additional cogentin and propranolol PRNs to help manage any extrapyramidal side effects.     We started lithium 600 mg on 4/20 due ongoing delusions along with manic symptoms. We will continue to monitor Tello's symptoms in the coming days to make a definitive decision about which neuroleptic to switch to.     Today's changes:   - Ordered lithium 600mg at bedtime     1. Psychotropic Medications:  Scheduled:  Current Facility-Administered Medications   Medication Dose Route Frequency     lithium ER  600 mg Oral Q12H [facility] (08/20)     risperiDONE  1 mg Oral At Bedtime    Or     OLANZapine  5 mg Intramuscular At Bedtime      [START ON 4/28/2023] paliperidone  234 mg Intramuscular Q28 Days     propranolol  10 mg Oral TID     Vitamin D3  25 mcg Oral Daily       PRN:  Current Facility-Administered Medications   Medication Dose Route Frequency     acetaminophen  650 mg Oral Q4H PRN     alum & mag hydroxide-simethicone  30 mL Oral Q4H PRN     benztropine  0.5 mg Oral BID PRN     melatonin  3 mg Oral At Bedtime PRN     OLANZapine  10 mg Intramuscular TID PRN     polyethylene glycol  17 g Oral Daily PRN     propranolol  10 mg Oral BID PRN     risperiDONE  0.5 mg Sublingual BID PRN       2. Pertinent Labs/Monitoring:   FEP work up:  BMP normal  CBC normal  UDS negative  EKG 3/20: sinus rhythm, QTc 431  TSH 3/9/23 normal  Lipids 2/24/23unremarkable  Hgb A1c 2/24/23 normal   UA - amorphous crystals, 10 protein albumin, elevated pH   HIV - pending  Ceruloplasmin  AILYN - pending  Lyme abs - pending  Treponema  - pending  Folate - normal   B12 - pending  ESR - normal     3. Additional Plans:  Patient will be treated in therapeutic milieu with appropriate individual and group therapies as described.    Medical Assessment and Plan     Medical diagnoses to be addressed this admission:    None    Medical course: Patient was physically examined by the ED prior to being transferred to the unit and was found to be medically stable and appropriate for admission.     Consults: None    Checklist     Legal Status: Committed on PD. Holman: haldol, olanzapine, risperidone, paliperidone    Safety Assessment:   Behavioral Orders   Procedures     Code 1 - Restrict to Unit     Code 2     OK to leave the floor for imaging procedures at the discretion of floor staff     Routine Programming     As clinically indicated     Status 15     Every 15 minutes.       Risk Assessment:  Risk for harm is elevated.  Risk factors: impulsive and past behaviors, multiple recent hospitalizations  Protective factors: family and engaged in treatment     SIO: No    Disposition: TBD.  Pending stabilization, medication optimization, & development of a safe discharge plan.    Attestations     This patient was seen and discussed with my attending physician.  Jez Kelly MS3    This patient has been seen and evaluated by me, Johanna Altman DO.  I have discussed this patient with the team including the resident and medical student(s) and I agree with the findings and plan in this note.  Dr. Johanna Altman DO, CARIN

## 2023-04-20 NOTE — PLAN OF CARE
"  Problem: Sleep Disturbance  Goal: Adequate Sleep/Rest  Outcome: Not Progressing     Problem: Psychotic Signs/Symptoms  Goal: Improved Behavioral Control (Psychotic Signs/Symptoms)  Outcome: Not Progressing     Patient is flat, guarded and has disorganized thoughts. Patient continues to endorse that there is a chip in his brain and it tells him what to do. If he does not do what they say he stated \"I will be punished.\" When asked what does that mean he would not elaborate on what being punished ment. Patient only slept for 2 hours last night. Patient stated \" I stayed up until 530 in the morning due to the government telling me not to go to sleep.\" Out in the milieu talking to select peers and staff. Fixates on reading math books and working on math problems. Attends groups. This evening the patient begin to pace the hallways and responding to internal stimuli. Patient requested prn resperdone 0.5 mg. When asked if patient was having any psych symptoms. Patient stated \" the government wants me to load up on as much medication as possible. \"  Medication compliant. Showered but continues to have a disheveled look and smell. Contract for safety.     "

## 2023-04-20 NOTE — PLAN OF CARE
"[facility] Occupational Therapy Group Intervention Note     04/20/23 1217   Group Therapy Session   Group Attendance attended group session   Total Time (minutes) 90   Group Type task skill;psychoeducation   Group Topic Covered coping skills/lifestyle management;relapse prevention   Group Session Detail 1) Clinic - coping skill exploration, creative expression within personally meaningful activities, and observation of social, cognitive, and kinesthetic performance skills. 45m attendance.     2) Guided discussion and utilization of calming / grounding activities for self regulation. Education was provided on various types of sensory modalities and use within daily routine. 45m attendance.   Patient Response/Contribution cooperative with task;discussed personal experience with topic   Patient Participation Detail 1) Bright affect. Pt opted to spend this time focused on math problems and music. IND to gather materials, organize work space, sequence task, and reach task completion of 1 problem. Appeared especially enthusiastic today; paced with intent and expressed great interest / thirst for knowledge to \"know everything\".     2) Quiet excitable and perseverated of computer programming/mathematics - seemingly unrelated - throughout discussion. Nevertheless, Pt was receptive to additional information and use of modalities including engagement with canine therapist.      Mary Andrade, OT on 4/20/2023 at 12:18 PM    "

## 2023-04-20 NOTE — PLAN OF CARE
The pt visible out on Milieu pacing on hallway occasionally sitting in lounge and resting in room. Upon approach the pt flat/blunted in affect, suspicious/anxious in mood, guarded, kept to self, superficial in interaction, and dismissive of MH assessment verbalizing getting better. The pt denied anxiety, depression, AH, VH, SI, HI, no concern stated and contracted for safety. The pt intermittently appeared walking on hallway, talked to self/RTIS and not social with peer. The pt able to verbalize needs at nursing station. The pt provided with court order paper.    /73 (BP Location: Left arm, Patient Position: Sitting, Cuff Size: Adult Regular)   Pulse 91   Temp 97.7  F (36.5  C) (Oral)   Resp 16   Wt 70.9 kg (156 lb 3.2 oz)   SpO2 96%   BMI 20.61 kg/m      Problem: Psychotic Signs/Symptoms  Goal: Improved Behavioral Control (Psychotic Signs/Symptoms)  Outcome: Not Progressing     Problem: Adult Behavioral Health Plan of Care  Goal: Plan of Care Review  Outcome: Progressing  Flowsheets (Taken 4/20/2023 1600)  Patient Agreement with Plan of Care: agrees   Goal Outcome Evaluation:    Plan of Care Reviewed With: patient

## 2023-04-20 NOTE — PLAN OF CARE
Pt appears to have slept for 1.50 hours. No PRNs given or requested; refused PRN meds offered for sleep aid. Pt noted pacing calmly the hallways - spent most of the shift at the dinning area reading a book.  Denied SI/SIB or hallucinations. Denied pain. No behavioral concerns noted ; Will continue to monitor and offer support.     Problem: Sleep Disturbance  Goal: Adequate Sleep/Rest  Outcome: Progressing   Goal Outcome Evaluation:

## 2023-04-21 PROCEDURE — 250N000013 HC RX MED GY IP 250 OP 250 PS 637

## 2023-04-21 PROCEDURE — 250N000013 HC RX MED GY IP 250 OP 250 PS 637: Performed by: PSYCHIATRY & NEUROLOGY

## 2023-04-21 PROCEDURE — 250N000013 HC RX MED GY IP 250 OP 250 PS 637: Performed by: STUDENT IN AN ORGANIZED HEALTH CARE EDUCATION/TRAINING PROGRAM

## 2023-04-21 PROCEDURE — 99232 SBSQ HOSP IP/OBS MODERATE 35: CPT | Mod: GC | Performed by: PSYCHIATRY & NEUROLOGY

## 2023-04-21 PROCEDURE — 124N000002 HC R&B MH UMMC

## 2023-04-21 RX ORDER — OLANZAPINE 10 MG/2ML
5 INJECTION, POWDER, FOR SOLUTION INTRAMUSCULAR 2 TIMES DAILY
Status: DISCONTINUED | OUTPATIENT
Start: 2023-04-21 | End: 2023-04-25

## 2023-04-21 RX ORDER — RISPERIDONE 1 MG/1
1 TABLET ORAL 2 TIMES DAILY
Status: DISCONTINUED | OUTPATIENT
Start: 2023-04-21 | End: 2023-04-25

## 2023-04-21 RX ADMIN — PROPRANOLOL HYDROCHLORIDE 10 MG: 10 TABLET ORAL at 09:05

## 2023-04-21 RX ADMIN — LITHIUM CARBONATE 600 MG: 300 TABLET, EXTENDED RELEASE ORAL at 21:34

## 2023-04-21 RX ADMIN — RISPERIDONE 1 MG: 1 TABLET ORAL at 21:34

## 2023-04-21 RX ADMIN — PROPRANOLOL HYDROCHLORIDE 10 MG: 10 TABLET ORAL at 13:23

## 2023-04-21 RX ADMIN — Medication 25 MCG: at 09:06

## 2023-04-21 RX ADMIN — PROPRANOLOL HYDROCHLORIDE 10 MG: 10 TABLET ORAL at 21:34

## 2023-04-21 RX ADMIN — RISPERIDONE 0.5 MG: 0.5 TABLET, ORALLY DISINTEGRATING ORAL at 13:23

## 2023-04-21 ASSESSMENT — ACTIVITIES OF DAILY LIVING (ADL)
ADLS_ACUITY_SCORE: 28

## 2023-04-21 NOTE — PLAN OF CARE
Problem: Anxiety  Goal: Anxiety Reduction or Resolution  Intervention: Promote Anxiety Reduction  Recent Flowsheet Documentation  Taken 4/21/2023 1200 by Lorenza Ruano RN  Family/Support System Care: support provided     Problem: Psychotic Signs/Symptoms  Goal: Optimal Cognitive Function (Psychotic Signs/Symptoms)  Intervention: Support and Promote Cognitive Ability  Recent Flowsheet Documentation  Taken 4/21/2023 1200 by Lorenza Ruano RN  Trust Relationship/Rapport:   questions encouraged   thoughts/feelings acknowledged   reassurance provided   Goal Outcome Evaluation:    Patient up and visible in Milieu pacing campo frequently, responding to internal stimuli laughing and talking to self, patient was encourage to attend group activities but declined, they requested to use phone, order placed by Provider. Patient became anxious and demanding when staff attempted to charge phone and the charge was not working, they became irritable asking frequently for staff to locate another  wanting phone to be charge. Patient had a visit from  but did not comply with CW during visit, patient presented with disorganize thoughts stating that they will like to leave and drive out of state [place], patient eye contact was hesitant, later patient approached writer and requested PRN endorsing anxiety and agitation. PRN Risperdal given at 13:23 with schedule Propanolol, medication was effective as patient was observed resting in room, no further agitation or behavior concern noted. Patient was able to get phone  and appeared excited that the phone was getting charged. Patient denies depression, SI, SIB, HI and hallucination, compliant with medication regimen, continue current plan of care.

## 2023-04-21 NOTE — PLAN OF CARE
Pt appears to have slept for 7 hours. No PRNs given or requested.  Remained in is room the entire shift. No concerns noted this shift. Will continue to monitor and offer support.     Problem: Sleep Disturbance  Goal: Adequate Sleep/Rest  Outcome: Progressing   Goal Outcome Evaluation:

## 2023-04-21 NOTE — DISCHARGE INSTRUCTIONS
Behavioral Discharge Planning and Instructions    Summary:   You were admitted to [facility] on 4/15/23 with worsening delusions, paranoia under the care of Paulina Altman and Teri.   Due to concern for you safety, your provisiona discharge was revoked.  You met with the Psychiatry team daily for ongoing psychiatric assessment and medication management.  You had opportunities to participate in therapeutic groups on the unit.   At this time your symptoms are  stabilizing and you report you are not having thoughts or intent to harm yourself or others. You are returning home to [place] with your family. You are have been referred to [facility] in [place] and will follow up there as soon as you return to [place]    Disposition:  Home with family      Main Diagnosis:         Health Care Follow-up:     Psychiatry Appointment:  [facility]. PLease go to the Clinic on Monday 6/5/23 at 8:00am  [address] [facility] [address]  Phone: [phone]  Fax: [phone]  Walk ins  Monday/Wednesday/Thursday/Friday, 8:00 a.m. - 2:30 p.m.  Tuesday, 10:00 a.m. - 6:00 p.m.    Major Treatments, Procedures and Findings:   Medications were  managed throughout your stay. An internal medicine consult was completed during your stay. You had the opportunity to participate in treatment programming while on the unit including occupational therapy, mental health support and education and spiritual services.     Symptoms to Report:   Please report if you are experiencing increased aggression and/or confusion, problematic loss of sleep, worsening mood, or thoughts of suicide to your treatment team or notify your primary provider.   IF THE SYMPTOMS YOU ARE EXPERIENCING ARE A MEDICAL EMERGENCY, CALL 911 IMMEDIATELY    Lifestyle Adjustment:   1. Adjust your lifestyle to get enough sleep, relaxation, exercise and good nutrition.  Continue to develop healthy coping skills to decrease stress and promote a healthy and sober lifestyle.  2.  Abstain from all substances of abuse.  3. Take medications as prescribed.  Please work with your doctor to discuss any concerns you have with your medications or side effects you may be experiencing.  4. Follow up with appointments as scheduled.      Resources:   For non-emergency mental health needs, visit behavioral health urgent care or contact the Resource & Referral Line at [phone].    24-Hour Crisis Line [phone])    The 24-hour crisis line is available to anyone experiencing a mental health crisis. All calls are free, confidential, and have a trained professional on the other end.  23-Hour Crisis Observation ([address])    Crisis Observation is available to any adult over 18 years of age who is experiencing psychological or psychiatric stress that does not require hospitalization.  Mobile Crisis (Call 841)    These teams are staffed with professionals who are trained to help anyone in crisis, from children and teenagers to adults and can assist law enforcement on mental health-related emergency calls.  Community-Based Crisis Stabilization (Call [phone])    Community Based Crisis Stabilization provides supports to individuals and families in their own home with therapy and skill building services designed to meet the individual s immediate needs.  Residential-Based Crisis Stabilization (For children, call [phone], for adults, go to 23-Hour Crisis Observation ([address]))    Residential Based Crisis Stabilization provides a safe space outside of the home for individuals and families to access therapy and skill building services designed to meet the individual s immediate needs.    [facility] is the statewide crisis line that provides information and referral, counseling, crisis service coordination and linkages to crisis screening and mental health services, 24 hours a day, 7 days a week. Call [phone], text [phone] or chat at www.[facility].org.    General  Medication Instructions:   See your medication sheet(s) for instructions.   Take all medicines as directed.  Make no changes unless your doctor suggests them.   Go to all your doctor visits.  Be sure to have all your required lab tests. This way, your medicines can be refilled on time.  Do not use any drugs not prescribed by your doctor.    Advance Directives:   Scanned document on file with [employer]? No scanned doc  Is document scanned? Pt states no documents  Honoring Choices Your Rights Handout: Informed and given  Was more information offered? Materials given    The Treatment team has appreciated the opportunity to work with you. If you have any questions or concerns about your recent admission, you can contact the unit which can receive your call 24 hours a day, 7 days a week. They will be able to get in touch with a Provider if needed. The unit number is [phone]

## 2023-04-21 NOTE — PLAN OF CARE
Assessment/Intervention/Current Symtoms and Care Coordination  The patient's care was discussed with the treatment team and chart notes were reviewed.   Patient met with team.  Patient remains delusional, preoccupied. Patient met with newly assigned CM  ( Kiko Dempsey [phone] )this morning.  CM was unable to work on goal planning as patient was too ill- very delusional/distracted.  CM is recommending IRTS placement however patient continues to refuse- stating he plans to return to [place].    Awaiting Holman amendment hearing from [facility]- have not received any word as yet.   Writer will call [facility] Atty office to inquire.     Discharge Plan or Goal  Unclear at this time whether patient will remain in [place].  Patient in need of close Psychiatric follow up care.     Barriers to Discharge   Severity of symptoms- need for stabilization.  Patient remains acutely psychotic. Request to amend Holman sent to [facility] 4/19/23     Patient is an [place] resident with plan to return to parent's home in [place].     Referral Status  None today     Legal Status     Commitment and Holman order per [facility]- Provisional discharge revoked   Surgery

## 2023-04-21 NOTE — PROGRESS NOTES
"    ----------------------------------------------------------------------------------------------------------  [facility], [place]   Psychiatric Progress Note  Hospital Day #6    Identifier: Tello Steen is a 27 year old male with previous psychiatric diagnoses of schizophrenia vs schizoaffective disorder, bipolar type who presents with psychosis and álvaro in the context of recent hospitalizations 3/26-4/11/23 ([facility]) and 2/22-3/7/23 ([facility]) for the same. Assessment is that the current presentation is consistent with schizoaffective disorder, bipolar type, current álvaro and psychosis.      Interim History:   The patient's care was discussed with the treatment team and chart notes were reviewed.    Vitals: VSS  Sleep: 7 hours (04/21/23 0600)  Scheduled medications: Took all scheduled medications as prescribed  PRN medications: risperidone 0.5mg *1    Staff Report:   - cooperative, calm, guarded, suspicious, anxiety, RTIS  - Denies all mental health symptoms  - \"talked to self/RTIS and not social with peers\"  - Paced through the hallways     Subjective:     Patient Interview:  Tello Steen was interviewed in his bedroom.     Mood: During the interview, Tello reported feeling \"fine\" and had no concerns overnight.     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 This was not discussed in detail during today's interview. Tello wanted access too his phone to reschedule an apparent SAT exam in May. His phone won't charge. He is now comfortable with the idea behind a extended stay (1+ weeks) at the hospital if needed.     Lithium: Tello was unaware with Lithium being started last night. He remains cooperative with the team's plan moving forward.      Review of systems:   Patient has no bothersome physical symptoms   Patient denies acute concerns    Objective:   /86 (BP Location: Right arm, Patient Position: Sitting, Cuff Size: Adult " "Regular)   Pulse 87   Temp 97.5  F (36.4  C) (Oral)   Resp 16   Wt 70.9 kg (156 lb 3.2 oz)   SpO2 98%   BMI 20.61 kg/m    Weight is 156 lbs 3.2 oz  Body mass index is 20.61 kg/m .    Mental Status Exam:  Oriented to:  Grossly Oriented  General:  Awake and Alert  Appearance:  appears stated age, wearing street clothes, and hair is unkempt  Behavior/Attitude:  calm, cooperative (seemingly with reservations)  Eye Contact:  intermittent, downcast, sporadic  Psychomotor: large intentional movements noted,  no evidence of tics, dystonia, or tardive dyskinesia no catatonia present.   Speech:  appropriate volume/tone and slightly odd rhythm  Language: Fluent in English with inappropriate syntax and correct vocabulary - often switched the direct and indirect nouns in sentences, was unaware of having done this.   Mood:  \"fine\"  Affect:  Elevated, smiles and chuckles inappropriately during interview (related to topics about chip in brain and/or his intellect)  Thought Process:  Loose associations at times, rambling, tangential, illogical  Thought Content:  No SI/SIB/HI. Denies AH, but endorses hearing the chip implanted in his brain.; delusions of paranoia, delusions of grandiosity and delusions of bizarre content  Associations:  questionable  Insight:  limited due to aware of past delusions, diagnosis, and benefit from medications, but no insight into current psychosis or álvaro. Does recognize diagnosis of schizophrenia  Judgment:  limited - they believe their intellect to very high and listens to \"chip in brain\". The chip rewards and punishes certain behavior, but this has not interfered with care so far.   Impulse control: partial - seems to be at the command of the voices in his head  Attention Span:  adequate for conversation, can be distracted by thoughts  Concentration:  grossly intact  Recent and Remote Memory:  grossly intact  Fund of Knowledge:  average   Muscle Strength and Tone: appears normal  Gait and " Station: Normal      Allergies:   No Known Allergies     Labs and imaging:     Data this admission:  BMP normal  CBC normal  UDS negative  TSH 3/9/23 normal  Lipids 2/24/23unremarkable  Hgb A1c 2/24/23 normal   UA - amorphous crystals, 10 protein albumin, elevated pH   HIV - pending  Ceruloplasmin  AILYN - pending  Lyme abs - pending  Treponema  - pending  Folate - normal   B12 - pending  ESR - normal Salicylate, alcohol, Tylenol negative  EKG 3/20: sinus rhythm, QTc 431  Brain MRI - normal      Psychiatric Assessment and Plan   Primary psychiatric diagnosis:   Schizoaffective disorder, bipolar type, current ávlaro    Diagnostic Impression:   Tello Steen is a 27 year old male with a past psychiatric diagnosis of schizophrenia admitted from the ED on 04/15/2023 due to pscyhosis in the context of recent hospitalization 3/26-4/11/23 for psychosis and álvaro. Significant symptoms on admission include delusions about a chip in their brain, delusions of grandeur, delusions of having a special mission with the [employer], and elevated mood. The MSE on admission was pertinent for elevated affect and delusions previously mentioned. Biological contributions to mental health presentation include previous psychiatric diagnosis of schizophrenia. On chart review, there are also mentions of Lithium trials, possibly indicating previous álavro. Psychosocial contributions to mental health presentation include partial insight, unclear work, housing, and financial situation. Protective factors include medication adherence, lack of substance use, and supportive CCM, good frustration tolerance, and presence of perceived social supports.      In summary, the patient's reported symptoms of psychosis and álvaro in the context of historical schizophrenia diagnosis and recent hospitalization are consistent with schizoaffective disorder bipolar type. More information is needed to determine definitive diagnosis.     Per discussion with Tello's mother, Tello is  a new name for the patient. The original name is Sophie. The mom mentioned that the new name means the psychotic episode is significant. She tells us that he is a biological male and that this gender pronoun change is new and could be a part of his delusion.     Psychiatric Hospital course:  Tello Steen was admitted to [facility] committed with a Holman. All PTA medications were continued. This is their third hospitalization within 4 months, indicating that their symptoms are not well-controlled with injectable paliperidone. We are considering a new neuroleptic medication for them, either risperidone or clozapine. As they have not had sustained remission of symptoms with trials of two different neuroleptics, clozapine would likely be the most efficacious next choice. However, clozapine is not currently on their holman order, so we had to submit an amendment to have it added. We are also considering risperidone, which has the benefit of having a WHITEHEAD formulation. Due to arm akathasias, Tello's olanzapine was discontinued and this was replaced with risperidone. We also added additional cogentin and propranolol PRNs to help manage any extrapyramidal side effects.     We started lithium 600 mg on 4/20 due ongoing delusions along with manic symptoms. We will continue to monitor Tello's symptoms in the coming days to make a definitive decision about which neuroleptic to switch to. We increased risperidone to 1mg BID on 4/21.    Today's changes:   - Increased risperidone 1mg to BID  - ordered a Cr lab for 4/25     1. Psychotropic Medications:  Scheduled:  Current Facility-Administered Medications   Medication Dose Route Frequency     lithium ER  600 mg Oral At Bedtime     risperiDONE  1 mg Oral BID    Or     OLANZapine  5 mg Intramuscular BID     [START ON 4/28/2023] paliperidone  234 mg Intramuscular Q28 Days     propranolol  10 mg Oral TID     Vitamin D3  25 mcg Oral Daily       PRN:  Current Facility-Administered  Medications   Medication Dose Route Frequency     acetaminophen  650 mg Oral Q4H PRN     alum & mag hydroxide-simethicone  30 mL Oral Q4H PRN     benztropine  0.5 mg Oral BID PRN     melatonin  3 mg Oral At Bedtime PRN     OLANZapine  10 mg Intramuscular TID PRN     polyethylene glycol  17 g Oral Daily PRN     propranolol  10 mg Oral BID PRN     risperiDONE  0.5 mg Sublingual BID PRN       2. Pertinent Labs/Monitoring:   FEP work up:  BMP normal  CBC normal  UDS negative  EKG 3/20: sinus rhythm, QTc 431  TSH 3/9/23 normal  Lipids 2/24/23unremarkable  Hgb A1c 2/24/23 normal   UA - amorphous crystals, 10 protein albumin, elevated pH   HIV - pending  Ceruloplasmin  AILYN - pending  Lyme abs - pending  Treponema  - pending  Folate - normal   B12 - pending  ESR - normal     MRI - normal    Lithium level and Cr scheduled for 4/25    3. Additional Plans:  Patient will be treated in therapeutic milieu with appropriate individual and group therapies as described.    Medical Assessment and Plan     Medical diagnoses to be addressed this admission:    None    Medical course: Patient was physically examined by the ED prior to being transferred to the unit and was found to be medically stable and appropriate for admission.     Consults: None    Checklist     Legal Status: Committed on PD. Holman: haldol, olanzapine, risperidone, paliperidone    Safety Assessment:   Behavioral Orders   Procedures     Code 1 - Restrict to Unit     Code 2     OK to leave the floor for imaging procedures at the discretion of floor staff     Routine Programming     As clinically indicated     Status 15     Every 15 minutes.       Risk Assessment:  Risk for harm is elevated.  Risk factors: impulsive and past behaviors, multiple recent hospitalizations  Protective factors: family and engaged in treatment     SIO: No    Disposition: TBD. Pending stabilization, medication optimization, & development of a safe discharge plan.    Attestations     This  patient was seen and discussed with my attending physician.  Jez Kelly MS3    Resident Attestation:   I was present with the medical student who participated in the service and in the documentation of the note.  I have verified the history and personally performed the physical exam, mental status exam, and medical decision making. I agree with the assessment and plan of care as documented in the note.    Catherine Alvarado MD  PGY-2 Psychiatry Resident    Attending Attestation:

## 2023-04-22 PROCEDURE — 124N000002 HC R&B MH UMMC

## 2023-04-22 PROCEDURE — 250N000013 HC RX MED GY IP 250 OP 250 PS 637: Performed by: PSYCHIATRY & NEUROLOGY

## 2023-04-22 PROCEDURE — 250N000013 HC RX MED GY IP 250 OP 250 PS 637: Performed by: STUDENT IN AN ORGANIZED HEALTH CARE EDUCATION/TRAINING PROGRAM

## 2023-04-22 PROCEDURE — 250N000013 HC RX MED GY IP 250 OP 250 PS 637

## 2023-04-22 RX ADMIN — PROPRANOLOL HYDROCHLORIDE 10 MG: 10 TABLET ORAL at 13:48

## 2023-04-22 RX ADMIN — PROPRANOLOL HYDROCHLORIDE 10 MG: 10 TABLET ORAL at 09:14

## 2023-04-22 RX ADMIN — RISPERIDONE 1 MG: 1 TABLET ORAL at 09:14

## 2023-04-22 RX ADMIN — PROPRANOLOL HYDROCHLORIDE 10 MG: 10 TABLET ORAL at 19:49

## 2023-04-22 RX ADMIN — LITHIUM CARBONATE 600 MG: 300 TABLET, EXTENDED RELEASE ORAL at 20:10

## 2023-04-22 RX ADMIN — RISPERIDONE 0.5 MG: 0.5 TABLET, ORALLY DISINTEGRATING ORAL at 14:59

## 2023-04-22 RX ADMIN — Medication 25 MCG: at 09:14

## 2023-04-22 RX ADMIN — BENZTROPINE MESYLATE 0.5 MG: 0.5 TABLET ORAL at 15:11

## 2023-04-22 RX ADMIN — RISPERIDONE 1 MG: 1 TABLET ORAL at 19:48

## 2023-04-22 ASSESSMENT — ACTIVITIES OF DAILY LIVING (ADL)
ADLS_ACUITY_SCORE: 28
DRESS: INDEPENDENT
ADLS_ACUITY_SCORE: 28
HYGIENE/GROOMING: INDEPENDENT
ADLS_ACUITY_SCORE: 28
LAUNDRY: WITH SUPERVISION
ADLS_ACUITY_SCORE: 28
ORAL_HYGIENE: INDEPENDENT

## 2023-04-22 NOTE — PLAN OF CARE
Problem: Psychotic Signs/Symptoms  Goal: Improved Behavioral Control (Psychotic Signs/Symptoms)  Outcome: Progressing     Problem: Psychotic Signs/Symptoms  Goal: Optimal Cognitive Function (Psychotic Signs/Symptoms)  Intervention: Support and Promote Cognitive Ability  Recent Flowsheet Documentation  Taken 4/21/2023 1930 by Lorenza Ruano, RN  Trust Relationship/Rapport:    questions encouraged    thoughts/feelings acknowledged    reassurance provided  Taken 4/21/2023 1200 by Lorenza Ruano, RN  Trust Relationship/Rapport:    questions encouraged    thoughts/feelings acknowledged    reassurance provided   Goal Outcome Evaluation:    Patient occasionally pacing unit, socially isolative, did not participate in evening group, presented with flat and blunted affect, mood tense and depress. Patient was in dinning room eating dinner and abruptly grabbed food and went to room, staffs attempted to redirect patient but patient did not respond, patient was educated on food safety and unit routine, they did not respond. Patient dismissive with all mental health assessment questions,  requesting for staff to look up and print out information on housing, displaying inappropriate focus on discharge. Seeing laughing and talking to self, patient compliant with medication regimen, intake adequate and is well groom. Continue plan of care.

## 2023-04-22 NOTE — PLAN OF CARE
At the beginning of the shift the pt visible out on Milieu and pacing on hallway. Upon approach the pt visibly anxious/suspicious in mood, disorganized in thought, preoccupied, guarded, superficial in interaction, dismissive denial for psych symptoms, stated no concern at the time and contracted for safety. The pt isolative and withdrawn emotionally and did not social with peer. The pt comply with med and care. The pt appetite good and well hydrated.     /70 (BP Location: Left arm, Patient Position: Sitting, Cuff Size: Adult Regular)   Pulse 79   Temp 98.2  F (36.8  C) (Oral)   Resp 16   Wt 70.9 kg (156 lb 3.2 oz)   SpO2 97%   BMI 20.61 kg/m    Problem: Anxiety  Goal: Anxiety Reduction or Resolution  Outcome: Progressing  Intervention: Promote Anxiety Reduction  Recent Flowsheet Documentation  Taken 4/22/2023 1600 by Anu Benavides, RN  Family/Support System Care: self-care encouraged     Problem: Psychotic Signs/Symptoms  Goal: Improved Behavioral Control (Psychotic Signs/Symptoms)  Outcome: Progressing   Goal Outcome Evaluation:

## 2023-04-22 NOTE — PLAN OF CARE
Problem: Psychotic Signs/Symptoms  Goal: Improved Behavioral Control (Psychotic Signs/Symptoms)  4/22/2023 1316 by Lorenza Ruano, RN  Outcome: Progressing  4/22/2023 1209 by Lorenza Ruano, RN  Outcome: Progressing   Goal Outcome Evaluation:    Patient presented anxious and paranoid at the start of shift demanding to take a shower right away and asking why do staff need to check vitals. Vitals /73 (BP Location: Left arm, Patient Position: Sitting)   Pulse 88   Temp 98.4  F (36.9  C) (Oral)   Resp 18   Wt 70.9 kg (156 lb 3.2 oz)   SpO2 96%   BMI 20.61 kg/m , all schedule medications given during breakfast without any behavior noted. Patient pacing frequently on unit, requesting for staff to allow them use OT room as a private space to work on math, patient got agitated when staff did not allow them to use the room, patient respond was, staffs were just lying, nobody else will need to use the room because not everyone can do math as they do. Staff offered patient large art paper to substitute for black board but patient declined. Patient continue to pace unit, has limited interactions with selected peers. At the end of shift, patient requested for PRN reporting disrupting thoughts, they also took PRN Cogentin with reports of dystonia side effect from Risperidone. Patient has adequate intake and complaint with medications, continue to monitor.

## 2023-04-22 NOTE — PLAN OF CARE
Occupational Therapy Group Note:       04/22/23 1416   Group Therapy Session   Group Attendance attended group session   Time Session Began 1315   Time Session Ended 1400   Total Time (minutes) 10 (no charge)   Total # Attendees 3   Group Type recreation   Group Topic Covered leisure exploration/use of leisure time;structured socialization   Group Session Detail OT Leisure Group   Patient Response/Contribution listened actively;refused to participate   Patient Participation Detail Patient passively engaged in therapeutic leisure activity in order to promote: cognitive skills (attention, planning, sequencing), leisure exploration, and structured socialization. Patient entered group late. Writer offered opportunity to engage in group game; however, patient declined. Patient appeared restless; noted to be standing/walking around room. Responses to writer's inquiries were superficial and brief. Appeared guarded in interactions. Writer offered to explain the rules and purpose of game as patient was standing over group table observing game; patient agreed to learn the game. Patient demonstrated active listening; asking appropriate follow-up questions. However, patient still declined to participate. Writer showed/shared her hand of cards with patient to encourage active participation and patient would make intermittent recommendations to writer for strategic game play. Patient left group room without notice or reason. Flat affect. Reserved, guarded, calm during encounter.

## 2023-04-23 PROCEDURE — 250N000013 HC RX MED GY IP 250 OP 250 PS 637: Performed by: PSYCHIATRY & NEUROLOGY

## 2023-04-23 PROCEDURE — 124N000002 HC R&B MH UMMC

## 2023-04-23 PROCEDURE — 250N000013 HC RX MED GY IP 250 OP 250 PS 637: Performed by: STUDENT IN AN ORGANIZED HEALTH CARE EDUCATION/TRAINING PROGRAM

## 2023-04-23 PROCEDURE — 250N000013 HC RX MED GY IP 250 OP 250 PS 637

## 2023-04-23 RX ORDER — HYDROXYZINE HYDROCHLORIDE 25 MG/1
25 TABLET, FILM COATED ORAL
Status: COMPLETED | OUTPATIENT
Start: 2023-04-23 | End: 2023-04-23

## 2023-04-23 RX ADMIN — PROPRANOLOL HYDROCHLORIDE 10 MG: 10 TABLET ORAL at 09:15

## 2023-04-23 RX ADMIN — Medication 25 MCG: at 09:15

## 2023-04-23 RX ADMIN — RISPERIDONE 1 MG: 1 TABLET ORAL at 09:15

## 2023-04-23 RX ADMIN — PROPRANOLOL HYDROCHLORIDE 10 MG: 10 TABLET ORAL at 19:05

## 2023-04-23 RX ADMIN — BENZTROPINE MESYLATE 0.5 MG: 0.5 TABLET ORAL at 14:28

## 2023-04-23 RX ADMIN — RISPERIDONE 0.5 MG: 0.5 TABLET, ORALLY DISINTEGRATING ORAL at 14:26

## 2023-04-23 RX ADMIN — LITHIUM CARBONATE 600 MG: 300 TABLET, EXTENDED RELEASE ORAL at 19:05

## 2023-04-23 RX ADMIN — HYDROXYZINE HYDROCHLORIDE 25 MG: 25 TABLET, FILM COATED ORAL at 17:03

## 2023-04-23 RX ADMIN — PROPRANOLOL HYDROCHLORIDE 10 MG: 10 TABLET ORAL at 14:24

## 2023-04-23 RX ADMIN — RISPERIDONE 1 MG: 1 TABLET ORAL at 19:05

## 2023-04-23 ASSESSMENT — ACTIVITIES OF DAILY LIVING (ADL)
ADLS_ACUITY_SCORE: 28

## 2023-04-23 NOTE — PLAN OF CARE
Problem: Psychotic Signs/Symptoms  Goal: Improved Behavioral Control (Psychotic Signs/Symptoms)  4/23/2023 1717 by Lorenza Ruano RN  Outcome: Progressing  4/23/2023 1252 by Lorenza Ruano RN  Outcome: Progressing  Goal: Optimal Cognitive Function (Psychotic Signs/Symptoms)  Intervention: Support and Promote Cognitive Ability  Recent Flowsheet Documentation  Taken 4/23/2023 1200 by Lorenza Ruano RN  Trust Relationship/Rapport:    questions encouraged    thoughts/feelings acknowledged    reassurance provided  Goal: Enhanced Social, Occupational or Functional Skills (Psychotic Signs/Symptoms)  Intervention: Promote Social, Occupational and Functional Ability  Recent Flowsheet Documentation  Taken 4/23/2023 1200 by Lorenza Ruano RN  Trust Relationship/Rapport:    questions encouraged    thoughts/feelings acknowledged    reassurance provided   Goal Outcome Evaluation:    Patient continue to frequently pace unit, PRN Risperidone and Cogentin reported to have minimal effect, patient reported onset of tearfulness, sadness and stated that they were experiencing emotional distress, Patient requested for nurse to give PRN Lithium, writer educated patient on Lithium not being use as a PRN, patient demanded that  Doctor savannah be called and writer page on call provider, On Call reinforced education on Lithium, patient was offered one time dose of Hydroxyzine 25 mg given at 5pm. Patient reported some relief but continue to express sadness. Patient affect remains flat, mood depress, continue to dismiss mental health assessments, intake adequate. All HS medications given at 7 PM per patient request. Continue to monitor.    VItals /68 (BP Location: Right arm, Patient Position: Sitting, Cuff Size: Adult Regular)   Pulse 79   Temp 96.8  F (36  C) (Temporal)   Resp 18   Wt 70.9 kg (156 lb 3.2 oz)   SpO2 95%   BMI 20.61 kg/m

## 2023-04-23 NOTE — PLAN OF CARE
"  Problem: Psychotic Signs/Symptoms  Goal: Improved Behavioral Control (Psychotic Signs/Symptoms)  Outcome: Progressing     Problem: Psychotic Signs/Symptoms  Goal: Optimal Cognitive Function (Psychotic Signs/Symptoms)  Intervention: Support and Promote Cognitive Ability  Recent Flowsheet Documentation  Taken 4/23/2023 1200 by Lorenza Ruano RN  Trust Relationship/Rapport:    questions encouraged    thoughts/feelings acknowledged    reassurance provided   Goal Outcome Evaluation:     Patient up and visible on Unit pacing campo on and off this shift, patient vitals /73 (BP Location: Right arm, Patient Position: Sitting, Cuff Size: Adult Regular)   Pulse 103   Temp 97  F (36.1  C) (Tympanic)   Resp 18   Wt 70.9 kg (156 lb 3.2 oz)   SpO2 96%   BMI 20.61 kg/m ,denies pain and dismissive with mental health assessments questions stating \"I'm fine\". Patient complaint with medications, requesting to talk to Doctor to split Lithium dose into two so they can be taken in AM and at bedtime, writer reviewed medications safety, encouraged patient to discuss this concern with the Unit Providers, patient was hesitant wanting to know why can't the on call Provider make this adjustments, writer again repeated safety of Lithium, blood levels and medication management, patient was agreeable and will wait for Provider on Monday. Patient asked for writer to search up a record company payout policy stating that he is expecting to get $5000 from [employer] records but instead he received $5, he appeared anxious, writer assisted patient with multiple search but none could answer patient questions, patient was able to have access to personal phone for 30 minutes, observed laughing abruptly to self and display sudden changed of facial expression. Patient had shower this shift, requested for PRN Risperidone and Cogentin, observed shaking legs and feet stated that it's side effects from risperidone, patient sat in lounge area shortly but " remains isolative to self. Continue current plan of care.

## 2023-04-23 NOTE — PLAN OF CARE
Pt appears to have slept for 6.50 hours. No PRNs given or requested. Remained in her room the entire shift. No concerns noted this shift. Will continue to monitor and offer support.    Problem: Sleep Disturbance  Goal: Adequate Sleep/Rest  Outcome: Progressing   Goal Outcome Evaluation:

## 2023-04-24 PROCEDURE — 99232 SBSQ HOSP IP/OBS MODERATE 35: CPT | Mod: GC | Performed by: STUDENT IN AN ORGANIZED HEALTH CARE EDUCATION/TRAINING PROGRAM

## 2023-04-24 PROCEDURE — 124N000002 HC R&B MH UMMC

## 2023-04-24 PROCEDURE — 250N000013 HC RX MED GY IP 250 OP 250 PS 637: Performed by: STUDENT IN AN ORGANIZED HEALTH CARE EDUCATION/TRAINING PROGRAM

## 2023-04-24 PROCEDURE — 250N000013 HC RX MED GY IP 250 OP 250 PS 637

## 2023-04-24 PROCEDURE — 250N000013 HC RX MED GY IP 250 OP 250 PS 637: Performed by: PSYCHIATRY & NEUROLOGY

## 2023-04-24 RX ADMIN — PROPRANOLOL HYDROCHLORIDE 10 MG: 10 TABLET ORAL at 19:01

## 2023-04-24 RX ADMIN — PROPRANOLOL HYDROCHLORIDE 10 MG: 10 TABLET ORAL at 07:55

## 2023-04-24 RX ADMIN — Medication 25 MCG: at 07:55

## 2023-04-24 RX ADMIN — RISPERIDONE 1 MG: 1 TABLET ORAL at 07:55

## 2023-04-24 RX ADMIN — LITHIUM CARBONATE 600 MG: 300 TABLET, EXTENDED RELEASE ORAL at 19:02

## 2023-04-24 RX ADMIN — PROPRANOLOL HYDROCHLORIDE 10 MG: 10 TABLET ORAL at 14:59

## 2023-04-24 RX ADMIN — BENZTROPINE MESYLATE 0.5 MG: 0.5 TABLET ORAL at 17:04

## 2023-04-24 RX ADMIN — RISPERIDONE 1 MG: 1 TABLET ORAL at 19:05

## 2023-04-24 RX ADMIN — RISPERIDONE 0.5 MG: 0.5 TABLET, ORALLY DISINTEGRATING ORAL at 17:00

## 2023-04-24 ASSESSMENT — ACTIVITIES OF DAILY LIVING (ADL)
ADLS_ACUITY_SCORE: 28
ADLS_ACUITY_SCORE: 28
HYGIENE/GROOMING: INDEPENDENT
ADLS_ACUITY_SCORE: 28
DRESS: INDEPENDENT
ADLS_ACUITY_SCORE: 28
HYGIENE/GROOMING: INDEPENDENT
ADLS_ACUITY_SCORE: 28
DRESS: INDEPENDENT
ADLS_ACUITY_SCORE: 28
LAUNDRY: WITH SUPERVISION
ADLS_ACUITY_SCORE: 28
ORAL_HYGIENE: INDEPENDENT
LAUNDRY: WITH SUPERVISION
ADLS_ACUITY_SCORE: 28
ORAL_HYGIENE: INDEPENDENT

## 2023-04-24 NOTE — PLAN OF CARE
The pt visible out on Milieu pacing, reading in room and sitting dinning room. The pt remain visibly anxious in mood, pacing in the hallway appeared talked to self/RTIS, inappropriately laughing, guarded, superficial in interaction and behaviorally controlled. The pt promptly respond to MH assessment verbalizing no at the moment, denied all psych symptoms and contracted for safety. The pt later visibly anxious, pacing intense on unit, verbalized feel anxious, requested for PRN Risperedon with Cogentin for SE, could not stated what triggered they and helpful stated. The pt did not appear interaction with peer/staff. The pt appetite good and adequate fluid intake. The pt comply with med and care.    /75 (BP Location: Left arm, Patient Position: Sitting, Cuff Size: Adult Regular)   Pulse 81   Temp 97.9  F (36.6  C) (Oral)   Resp 16   Wt 70.9 kg (156 lb 3.2 oz)   SpO2 96%   BMI 20.61 kg/m      Problem: Anxiety  Goal: Anxiety Reduction or Resolution  Outcome: Progressing  Intervention: Promote Anxiety Reduction  Recent Flowsheet Documentation  Taken 4/24/2023 1600 by Anu Benavides, RN  Family/Support System Care: self-care encouraged     Problem: Psychotic Signs/Symptoms  Goal: Improved Behavioral Control (Psychotic Signs/Symptoms)  Outcome: Progressing   Goal Outcome Evaluation:    Plan of Care Reviewed With: patient

## 2023-04-24 NOTE — PLAN OF CARE
Pt appears to have slept for 7 hours. No PRNs given or requested; No concerns noted this shift. Will continue to monitor and offer support.    Problem: Sleep Disturbance  Goal: Adequate Sleep/Rest  Outcome: Progressing   Goal Outcome Evaluation:

## 2023-04-24 NOTE — PROGRESS NOTES
"    ----------------------------------------------------------------------------------------------------------  [facility], [place]   Psychiatric Progress Note  Hospital Day #9    Identifier: Tello Steen is a 27 year old male with previous psychiatric diagnoses of schizophrenia vs schizoaffective disorder, bipolar type who presents with psychosis and álvaro in the context of recent hospitalizations 3/26-4/11/23 ([facility]) and 2/22-3/7/23 ([facility]) for the same. Assessment is that the current presentation is consistent with schizoaffective disorder, bipolar type, current álvaro and psychosis.      Interim History:   The patient's care was discussed with the treatment team and chart notes were reviewed.    Vitals: VSS  Sleep: 7 hours (04/24/23 0600)  Scheduled medications: Took all scheduled medications as prescribed  PRN medications: risperidone, hydroxyzine, benztropine (\"prophylatically\")    Staff Report:   - frequent pacing  - reported onset of tearfulness and sadness  - seen laughing to himself  - still unkempt, despite showers  - requested prn Lithium or Lithium BID dosing  - attended group, declined to participate     Subjective:     Patient Interview:  Tello Steen was interviewed in his bedroom.     Mood: During the interview, Tello reported feeling \"good\" and had no concerns over the weekend.     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  Unless asked directly, Tello did not bring up those voices in conversation. He said that the chip has talked to him constantly since he was 24 years old.    Lithium: Tello said that the lithium makes him feel \"supercharged.\" He is mostly likely referring to a delusion about batteries in the chip in his head. He said \"I like Lithium\". He was told about his morning lab draws tomorrow and waiting on splitting the medication at this time.      Movement: Tello said he took the benztropine as a " "\"prophylaxis\" to motor symptoms. He believed that you have to take benztropine and risperidone together. He was educated about this not being necessary.  There was also no involuntary movement abnormalities. Denies feeling restless or uncomfortable.    As the interview ended, Ki asked the group \"don't you have any questions for someone with a 218 IQ?\" and was disappointed by writers asking what his plans were for the day.     Review of systems:   Patient has no bothersome physical symptoms   Patient denies acute concerns    Objective:   /83 (BP Location: Left arm, Patient Position: Sitting, Cuff Size: Adult Regular)   Pulse 81   Temp 97.9  F (36.6  C) (Oral)   Resp 18   Wt 70.9 kg (156 lb 3.2 oz)   SpO2 97%   BMI 20.61 kg/m    Weight is 156 lbs 3.2 oz  Body mass index is 20.61 kg/m .    Mental Status Exam:  Oriented to:  Grossly Oriented  General:  Awake and Alert  Appearance:  appears stated age, wearing street clothes, and hair is unkempt despite daily showers  Behavior/Attitude:  calm, cooperative (seemingly with reservations)  Eye Contact:  intermittent, downcast, sporadic (improving)  Psychomotor: large volitional movements noted, no evidence of tics, dystonia, akithisia, or tardive dyskinesia no catatonia present.   Speech:  appropriate volume/tone and slightly odd rhythm  Language: Fluent in English with inappropriate syntax and correct vocabulary   Mood:  \"supercharged\"  Affect:  Elevated, smiles and chuckles inappropriately during interview  Thought Process:  Loose associations at times, rambling, tangential, illogical   Thought Content:  No SI/SIB/HI. Denies AH, but endorses hearing the chip implanted in his brain.; delusions of paranoia, delusions of grandiosity and delusions of bizarre content  Associations:  questionable  Insight:  limited due to aware of past delusions, diagnosis, and benefit from medications, but no insight into current psychosis or álvaro. Does recognize diagnosis of " "schizophrenia  Judgment:  limited - they believe their intellect to very high and listens to \"chip in brain\". The chip rewards and punishes certain behavior, but this has not interfered with care so far.   Impulse control: partial - seems to be at the command of the voices in his head  Attention Span:  adequate for conversation, can be distracted by thoughts  Concentration:  grossly intact  Recent and Remote Memory:  grossly intact  Fund of Knowledge:  average   Muscle Strength and Tone: appears normal  Gait and Station: Normal      Allergies:   No Known Allergies     Labs and imaging:     Data this admission:  BMP normal  CBC normal  UDS negative  TSH 3/9/23 normal  Lipids 2/24/23unremarkable  Hgb A1c 2/24/23 normal   UA - amorphous crystals, 10 protein albumin, elevated pH   HIV - pending  Ceruloplasmin  AILYN - pending  Lyme abs - pending  Treponema  - pending  Folate - normal   B12 - pending  ESR - normal Salicylate, alcohol, Tylenol negative  EKG 3/20: sinus rhythm, QTc 431  Brain MRI - normal      Psychiatric Assessment and Plan   Primary psychiatric diagnosis:   Schizoaffective disorder, bipolar type, current álvaro    Diagnostic Impression:   Tello Steen is a 27 year old male with a past psychiatric diagnosis of schizophrenia admitted from the ED on 04/15/2023 due to pscyhosis in the context of recent hospitalization 3/26-4/11/23 for psychosis and álvaro. Significant symptoms on admission include delusions about a chip in their brain, delusions of grandeur, delusions of having a special mission with the [employer], and elevated mood. The MSE on admission was pertinent for elevated affect and delusions previously mentioned. Biological contributions to mental health presentation include previous psychiatric diagnosis of schizophrenia. On chart review, there are also mentions of Lithium trials, possibly indicating previous álvaro. Psychosocial contributions to mental health presentation include partial insight, unclear " work, housing, and financial situation. Protective factors include medication adherence, lack of substance use, and supportive CCM, good frustration tolerance, and presence of perceived social supports.      In summary, the patient's reported symptoms of psychosis and álvaro in the context of historical schizophrenia diagnosis and recent hospitalization are consistent with schizoaffective disorder bipolar type.     Per discussion with Tello's mother, Tello is a new name for the patient. The original name is Sophie. The mom mentioned that the new name means the psychotic episode is significant. She tells us that he is a biological male and that this gender pronoun change is new and could be a part of his delusion.     Psychiatric Hospital course:  Tello Steen was admitted to [facility] committed with a Holman. All PTA medications were continued. This is his third hospitalization within 4 months, indicating that his symptoms are not adequately controlled with injectable paliperidone. We are considering a new neuroleptic medication for him, either risperidone or clozapine. As they have not had sustained remission of symptoms with trials of two different neuroleptics, clozapine would likely be the most efficacious next choice. However, clozapine is not currently on their holman order, so we had to submit an amendment to have it added. We are also considering risperidone, which has the benefit of having a WHITEHEAD formulation. Due to arm akathasias, Tello's olanzapine was discontinued and this was replaced with risperidone. We also added additional cogentin and propranolol PRNs to help manage any extrapyramidal side effects. Tolerating risperidone well thus far - there have been some concerns about akithisia due to patient's apparent restlessness and psychomotor agitation, but this movement appears volitional rather than akithetic or dystonic.     We started lithium 600 mg on 4/20 due ongoing grandiose delusions along with manic  symptoms. We increased risperidone to 1mg BID on 4/21.     Today's changes:   - None    Psychotropic Medications:  Scheduled:  Current Facility-Administered Medications   Medication Dose Route Frequency    lithium ER  600 mg Oral At Bedtime    risperiDONE  1 mg Oral BID    Or    OLANZapine  5 mg Intramuscular BID    [START ON 4/28/2023] paliperidone  234 mg Intramuscular Q28 Days    propranolol  10 mg Oral TID    Vitamin D3  25 mcg Oral Daily       PRN:  Current Facility-Administered Medications   Medication Dose Route Frequency    acetaminophen  650 mg Oral Q4H PRN    alum & mag hydroxide-simethicone  30 mL Oral Q4H PRN    benztropine  0.5 mg Oral BID PRN    melatonin  3 mg Oral At Bedtime PRN    OLANZapine  10 mg Intramuscular TID PRN    polyethylene glycol  17 g Oral Daily PRN    propranolol  10 mg Oral BID PRN    risperiDONE  0.5 mg Sublingual BID PRN       2. Pertinent Labs/Monitoring:   FEP work up:  BMP normal  CBC normal  UDS negative  EKG 3/20: sinus rhythm, QTc 431  TSH 3/9/23 normal  Lipids 2/24/23unremarkable  Hgb A1c 2/24/23 normal   UA - amorphous crystals, 10 protein albumin, elevated pH   HIV - negative   Ceruloplasmin -normal   AILYN - negative   Lyme abs - normal   Treponema  - negative   Folate - normal   B12 - normal   ESR - normal     MRI - normal    Lithium level and Cr scheduled for 4/25    3. Additional Plans:  Patient will be treated in therapeutic milieu with appropriate individual and group therapies as described.    Medical Assessment and Plan     Medical diagnoses to be addressed this admission:    None    Medical course: Patient was physically examined by the ED prior to being transferred to the unit and was found to be medically stable and appropriate for admission.     Consults: None    Checklist     Legal Status: Committed on PD. Holman: haldol, olanzapine, risperidone, paliperidone. Pending Holman ammendment to add clozapine.    Safety Assessment:   Behavioral Orders   Procedures     Code 1 - Restrict to Unit    Code 2     OK to leave the floor for imaging procedures at the discretion of floor staff    Routine Programming     As clinically indicated    Status 15     Every 15 minutes.       Risk Assessment:  Risk for harm is moderate-high.  Risk factors: impulsive and past behaviors, multiple recent hospitalizations  Protective factors: family and engaged in treatment     SIO: No    Disposition: TBD, complicated by patient being from [place]. Pending stabilization, medication optimization, & development of a safe discharge plan.    Attestations     This patient was seen and discussed with my attending physician.  Jez Kelly MS3    Resident Attestation:   I was present with the medical student who participated in the service and in the documentation of the note.  I have verified the history and personally performed the physical exam, mental status exam, and medical decision making. I agree with the assessment and plan of care as documented in the note.    Catherine Alvarado MD  PGY-2 Psychiatry Resident    Attending Attestation:    This patient has been seen and evaluated by me, Johanna Altman DO.  I have discussed this patient with the team including the resident and medical student(s) and I agree with the findings and plan in this note.  Dr. Johanna Altman DO, CARIN

## 2023-04-24 NOTE — PROGRESS NOTES
[facility] Occupational Therapy Group Intervention Note     04/24/23 1308   Group Therapy Session   Group Attendance attended group session   Total Time (minutes) 65   Group Type task skill   Group Topic Covered coping skills/lifestyle management;relapse prevention;leisure exploration/use of leisure time   Group Session Detail 1) Clinic - coping skill exploration, creative expression within personally meaningful activities, and observation of social, cognitive, and kinesthetic performance skills    2) Pt attended general health and coping group focused on the '8 Dimensions of Wellness'. Discussion-based group was used to facilitate insight development on holistic whole-person wellness related to occupational performance and satisfaction.   Patient Response/Contribution cooperative with task   Patient Participation Detail 1) Opted to work on math problems on the white board. Slight inappropriate laughter and restless/distractible pacing. Motivated to listen to personal playlists on [employer] which brightened his mood.     2) Intermittently attentive. Excused self from group 2x. Throughout attendance, appeared engaged in conversation and identified 3 activities to fulfill dimensional wellness: computer science, mathematics, and programming.   Attendance time: 20 min - no charge.      Mary Andrade OT on 4/24/2023 at 1:10 PM

## 2023-04-24 NOTE — PLAN OF CARE
04/24/23 0919   Individualization/Patient Specific Goals   Patient Personal Strengths community support;expressive of emotions;family/social support;humor;intellectual cognitive skills;positive educational history;relationship stability;resilient;resourceful   Patient Vulnerabilities history of unsuccessful treatment;lacks insight into illness;poor impulse control   Anxieties, Fears or Concerns None   Individualized Care Needs Patient has delusion that there is a chip implanted in his brain   Interprofessional Rounds   Summary 1. Stabilization of thought disorder symptoms 2.Medication mgmt per MD's 3. Safe with self 4. Coordination of care with family/outpatient providers 5. Placement addressed 6. Psych f/u care in place   Participants CTC;nursing;patient;psychiatrist   Behavioral Team Discussion   Participants , Dr. Alvarado, Jo Mejias RN, Renay Kuhn MA.LP, Med students   Progress Patient remains acutely psychotic with delusion that there is a chip implanted in his brain and he follows the directions that he is told per the chip.  Patient is perseverating on needing to get hometo take the SAT   Anticipated length of stay 7-10 days   Continued Stay Criteria/Rationale Acute psychosis, in need of IRTS placement   Medical/Physical None   Plan Patient will continue to be seen by  Psychiatry daily.  Meds continues to be reviewed/adjusted per MD as indicated.  Patient in need of supportive placement- however is an [place] resident so will need to look into options.  Will contact family for coordination of care  CTC will continue to assess needs, ensure appropriate f/u care is in place   Rationale for change in precautions or plan No change in plan/precautions   Anticipated Discharge Disposition IRTS;home with family

## 2023-04-25 LAB
CREAT SERPL-MCNC: 0.84 MG/DL (ref 0.67–1.17)
GFR SERPL CREATININE-BSD FRML MDRD: >90 ML/MIN/1.73M2
LITHIUM SERPL-SCNC: 0.6 MMOL/L (ref 0.6–1.2)

## 2023-04-25 PROCEDURE — 124N000002 HC R&B MH UMMC

## 2023-04-25 PROCEDURE — 36415 COLL VENOUS BLD VENIPUNCTURE: CPT | Performed by: STUDENT IN AN ORGANIZED HEALTH CARE EDUCATION/TRAINING PROGRAM

## 2023-04-25 PROCEDURE — 250N000013 HC RX MED GY IP 250 OP 250 PS 637: Performed by: STUDENT IN AN ORGANIZED HEALTH CARE EDUCATION/TRAINING PROGRAM

## 2023-04-25 PROCEDURE — 80178 ASSAY OF LITHIUM: CPT | Performed by: STUDENT IN AN ORGANIZED HEALTH CARE EDUCATION/TRAINING PROGRAM

## 2023-04-25 PROCEDURE — 250N000013 HC RX MED GY IP 250 OP 250 PS 637

## 2023-04-25 PROCEDURE — 82565 ASSAY OF CREATININE: CPT | Performed by: STUDENT IN AN ORGANIZED HEALTH CARE EDUCATION/TRAINING PROGRAM

## 2023-04-25 PROCEDURE — 99232 SBSQ HOSP IP/OBS MODERATE 35: CPT | Mod: GC | Performed by: STUDENT IN AN ORGANIZED HEALTH CARE EDUCATION/TRAINING PROGRAM

## 2023-04-25 PROCEDURE — G0177 OPPS/PHP; TRAIN & EDUC SERV: HCPCS

## 2023-04-25 RX ORDER — OLANZAPINE 10 MG/2ML
5 INJECTION, POWDER, FOR SOLUTION INTRAMUSCULAR AT BEDTIME
Status: DISCONTINUED | OUTPATIENT
Start: 2023-04-25 | End: 2023-04-27

## 2023-04-25 RX ORDER — OLANZAPINE 10 MG/2ML
5 INJECTION, POWDER, FOR SOLUTION INTRAMUSCULAR DAILY
Status: DISCONTINUED | OUTPATIENT
Start: 2023-04-26 | End: 2023-04-27

## 2023-04-25 RX ORDER — LITHIUM CARBONATE 450 MG
900 TABLET, EXTENDED RELEASE ORAL AT BEDTIME
Status: DISCONTINUED | OUTPATIENT
Start: 2023-04-25 | End: 2023-05-01

## 2023-04-25 RX ORDER — RISPERIDONE 1 MG/1
1 TABLET ORAL DAILY
Status: DISCONTINUED | OUTPATIENT
Start: 2023-04-26 | End: 2023-04-27

## 2023-04-25 RX ADMIN — LITHIUM CARBONATE 900 MG: 450 TABLET, EXTENDED RELEASE ORAL at 19:00

## 2023-04-25 RX ADMIN — ALUMINUM HYDROXIDE, MAGNESIUM HYDROXIDE, AND DIMETHICONE 30 ML: 200; 20; 200 SUSPENSION ORAL at 17:54

## 2023-04-25 RX ADMIN — Medication 25 MCG: at 08:43

## 2023-04-25 RX ADMIN — RISPERIDONE 1 MG: 1 TABLET ORAL at 08:43

## 2023-04-25 RX ADMIN — PROPRANOLOL HYDROCHLORIDE 10 MG: 10 TABLET ORAL at 19:00

## 2023-04-25 RX ADMIN — PROPRANOLOL HYDROCHLORIDE 10 MG: 10 TABLET ORAL at 14:08

## 2023-04-25 RX ADMIN — PROPRANOLOL HYDROCHLORIDE 10 MG: 10 TABLET ORAL at 08:43

## 2023-04-25 RX ADMIN — RISPERIDONE 1.5 MG: 1 TABLET ORAL at 19:00

## 2023-04-25 ASSESSMENT — ACTIVITIES OF DAILY LIVING (ADL)
ADLS_ACUITY_SCORE: 28
HYGIENE/GROOMING: HANDWASHING;INDEPENDENT
ADLS_ACUITY_SCORE: 28
ORAL_HYGIENE: INDEPENDENT
ADLS_ACUITY_SCORE: 28
DRESS: INDEPENDENT
HYGIENE/GROOMING: INDEPENDENT
ADLS_ACUITY_SCORE: 28
DRESS: INDEPENDENT
ORAL_HYGIENE: INDEPENDENT
ADLS_ACUITY_SCORE: 28
LAUNDRY: WITH SUPERVISION

## 2023-04-25 NOTE — PROGRESS NOTES
[facility] Occupational Therapy Group Intervention Note     04/25/23 3955   Group Therapy Session   Group Attendance attended group session   Total Time (minutes) 45   Group Type task skill;life skill   Group Topic Covered coping skills/lifestyle management;relapse prevention;cognitive activities;leisure exploration/use of leisure time   Group Session Detail Clinic - coping skill exploration, creative expression within personally meaningful activities, and observation of social, cognitive, and kinesthetic performance skills   Patient Response/Contribution cooperative with task;did not share thoughts verbally   Patient Participation Detail Congruent-bright affect. Motivated to engage in conversation with writer and peers r/t music and computer programming interests. Opted to just listen to music versus a hands-on activity or math problems. Excused self early from group w/o return.      Mary Andrade OT on 4/25/2023 at 11:56 AM

## 2023-04-25 NOTE — PROGRESS NOTES
"    ----------------------------------------------------------------------------------------------------------  [facility], [place]   Psychiatric Progress Note  Hospital Day #10    Identifier: Tello Steen is a 27 year old male with previous psychiatric diagnoses of schizophrenia vs schizoaffective disorder, bipolar type who presents with psychosis and álvaro in the context of recent hospitalizations 3/26-4/11/23 ([facility]) and 2/22-3/7/23 ([facility]) for the same. Assessment is that the current presentation is consistent with schizoaffective disorder, bipolar type, current álvaro and psychosis.      Interim History:   The patient's care was discussed with the treatment team and chart notes were reviewed.    Vitals: VSS  Sleep: 6 hours (04/25/23 0600)  Scheduled medications: Took all scheduled medications as prescribed  PRN medications: risperidone, benztropine (\"prophylatically\")    Staff Report:   - frequent pacing  - appears anxious, RTIS, seen talking and laughing to himself, guarded  - still unkempt, despite showers     Subjective:     Patient Interview:  Tello Steen was interviewed in his bedroom. He reports feeling \"okay\" today and responded to most questions with one-word answers. We discussed expectations for discharge (waiting for Holman ammendment, clozapine titration, creating disposition plan). He denies having any other questions, concerns, or medication side effects. He's not sure what his plans for the day are. Denies anything being on his mind. Declined to share what the chip in his brain has been saying lately.     Agrees to risperidone dose increase tonight.     Review of systems:   Patient has no bothersome physical symptoms   Patient denies acute concerns    Objective:   /75 (BP Location: Left arm, Patient Position: Sitting, Cuff Size: Adult Regular)   Pulse 81   Temp 97.9  F (36.6  C) (Oral)   Resp 16   Wt 71.1 kg (156 lb 11.2 oz)   SpO2 96%   " "BMI 20.67 kg/m    Weight is 156 lbs 11.2 oz  Body mass index is 20.67 kg/m .    Mental Status Exam:  Oriented to:  Grossly Oriented  General:  Awake and Alert  Appearance:  appears stated age, wearing street clothes, and hair is unkempt despite daily showers  Behavior/Attitude:  calm, cooperative (seemingly with reservations)  Eye Contact:  intermittent, downcast, sporadic (improving)  Psychomotor: fewer volitional movements noted, no evidence of tics, dystonia, akithisia, or tardive dyskinesia no catatonia present.   Speech:  appropriate volume/tone and slightly odd rhythm  Language: Fluent in English with inappropriate syntax and correct vocabulary   Mood:  \"okay\"  Affect:  Neutral, did not smile during interview like previously  Thought Process:  Difficult to assess given limited participation in interview.  Thought Content:  No expressed SI/SIB/HI. Endorses hearing the chip implanted in his brain. Did not mention past delusions of paranoia, delusions of grandiosity and delusions of bizarre content  Associations:  questionable  Insight:  limited due to aware of past delusions, diagnosis, and benefit from medications, but no insight into current psychosis or álvaro. Does recognize diagnosis of schizophrenia  Judgment:  limited - listens to \"chip in brain\"  Impulse control: partial -appropriate behaviors, though seems to be at the command of the voices in his head  Attention Span:  adequate for conversation, can be distracted by thoughts  Concentration:  grossly intact  Recent and Remote Memory:  grossly intact  Fund of Knowledge:  average   Muscle Strength and Tone: appears normal  Gait and Station: Normal      Allergies:   No Known Allergies     Labs and imaging:     Data this admission:  BMP normal  CBC normal  UDS negative  TSH 3/9/23 normal  Lipids 2/24/23 unremarkable  Hgb A1c 2/24/23 normal   UA - amorphous crystals, 10 protein albumin, elevated pH   HIV - negative   Ceruloplasmin normal   AILYN - normal   Lyme " abs - normal   Treponema  - negative   Folate - normal   B12 - normal   ESR - normal Salicylate, alcohol, Tylenol negative  EKG 3/20: sinus rhythm, QTc 431  Brain MRI - normal     Lithium 4/25: 0.60, Cr without significant change     Psychiatric Assessment and Plan   Primary psychiatric diagnosis:   Schizoaffective disorder, bipolar type, current álvaro    Diagnostic Impression:   Tello Steen is a 27 year old male with a past psychiatric diagnosis of schizophrenia admitted from the ED on 04/15/2023 due to pscyhosis in the context of recent hospitalization 3/26-4/11/23 for psychosis and álvaro. Significant symptoms on admission include delusions about a chip in their brain, delusions of grandeur, delusions of having a special mission with the [employer], and elevated mood. The MSE on admission was pertinent for elevated affect and delusions previously mentioned. Biological contributions to mental health presentation include previous psychiatric diagnosis of schizophrenia. On chart review, there are also mentions of Lithium trials, possibly indicating previous álvaro. Psychosocial contributions to mental health presentation include partial insight, unclear work, housing, and financial situation. Protective factors include medication adherence, lack of substance use, and supportive CCM, good frustration tolerance, and presence of perceived social supports.      In summary, the patient's reported symptoms of psychosis and álvaro in the context of historical schizophrenia diagnosis and recent hospitalization are consistent with schizoaffective disorder bipolar type.     Per discussion with Tello's mother, Tello is a new name for the patient. The original name is Sophie. The mom mentioned that the new name means the psychotic episode is significant. She tells us that he is a biological male and that this gender pronoun change is new and could be a part of his delusion.     Psychiatric Hospital course:  Tello Steen was admitted to  [facility] committed with a Spaulding. All PTA medications were continued. This is his third hospitalization within 4 months, indicating that his symptoms are not adequately controlled with injectable paliperidone. We are considering a new neuroleptic medication for him, either risperidone or clozapine. As they have not had sustained remission of symptoms with trials of two different neuroleptics, clozapine would likely be the most efficacious next choice. However, clozapine is not currently on their spaulding order, so we had to submit an amendment to have it added. We are also considering risperidone, which has the benefit of having a WHITEHEAD formulation.     Due to arm dystonia, Tello's olanzapine was discontinued and this was replaced with risperidone on 4/21. We also added additional cogentin and propranolol PRNs to help manage any extrapyramidal side effects. Tolerating risperidone titration well thus far - there have been some concerns about akithisia due to patient's apparent restlessness and psychomotor agitation, but this movement appears volitional and more related to álvaro rather than akithetic or dystonic. Lithium was started on 4/20 for álvaro, tolerating titration well thus far.     Today's changes:   -Increase Lithium to 900mg at bedtime  -Increase risperidone to 1mg qAM and 1.5mg qPM    Psychotropic Medications:  Scheduled:  Current Facility-Administered Medications   Medication Dose Route Frequency    lithium ER  900 mg Oral At Bedtime    [START ON 4/26/2023] risperiDONE  1 mg Oral Daily    Or    [START ON 4/26/2023] OLANZapine  5 mg Intramuscular Daily    risperiDONE  1.5 mg Oral At Bedtime    Or    OLANZapine  5 mg Intramuscular At Bedtime    [START ON 4/28/2023] paliperidone  234 mg Intramuscular Q28 Days    propranolol  10 mg Oral TID    Vitamin D3  25 mcg Oral Daily   Plan to switch risperidone to clozapine when Spaulding ammended.    PRN:  Current Facility-Administered Medications   Medication Dose Route  Frequency    acetaminophen  650 mg Oral Q4H PRN    alum & mag hydroxide-simethicone  30 mL Oral Q4H PRN    benztropine  0.5 mg Oral BID PRN    melatonin  3 mg Oral At Bedtime PRN    OLANZapine  10 mg Intramuscular TID PRN    polyethylene glycol  17 g Oral Daily PRN    propranolol  10 mg Oral BID PRN    risperiDONE  0.5 mg Sublingual BID PRN       2. Pertinent Labs/Monitoring:   FEP work up:  BMP normal  CBC normal  UDS negative  EKG 3/20: sinus rhythm, QTc 431  TSH 3/9/23 normal  Lipids 2/24/23unremarkable  Hgb A1c 2/24/23 normal   UA - amorphous crystals, 10 protein albumin, elevated pH   HIV - negative   Ceruloplasmin -normal   AILYN - negative   Lyme abs - normal   Treponema  - negative   Folate - normal   B12 - normal   ESR - normal     MRI - normal    Lithium 4/25: 0.60, Cr without significant change  Repeat Lithium level and Cr scheduled for 4/30    3. Additional Plans:  Patient will be treated in therapeutic milieu with appropriate individual and group therapies as described.    Medical Assessment and Plan     Medical diagnoses to be addressed this admission:    None    Medical course: Patient was physically examined by the ED prior to being transferred to the unit and was found to be medically stable and appropriate for admission.     Consults: None    Checklist     Legal Status: Committed on PD. Holman: haldol, olanzapine, risperidone, paliperidone. Pending Holman ammendment to add clozapine.    Safety Assessment:   Behavioral Orders   Procedures    Code 1 - Restrict to Unit    Code 2     OK to leave the floor for imaging procedures at the discretion of floor staff    Routine Programming     As clinically indicated    Status 15     Every 15 minutes.       Risk Assessment:  Risk for harm is moderate-high.  Risk factors: impulsive and past behaviors, multiple recent hospitalizations  Protective factors: family and engaged in treatment     SIO: No    Disposition: TBD, complicated by patient being from [place].  Pending stabilization, medication optimization, & development of a safe discharge plan.    Attestations     This patient was seen and discussed with my attending physician.  Catherine Alvarado MD  PGY-2 Psychiatry Resident    Attending Attestation:  This patient has been seen and evaluated by me, Johanna Altman DO.  I have discussed this patient with the team including the resident and medical student(s) and I agree with the findings and plan in this note.  Dr. Johanna Altman DO, CARIN

## 2023-04-25 NOTE — PLAN OF CARE
Assessment/Intervention/Current Symtoms and Care Coordination  The patient's care was discussed with the treatment team and chart notes were reviewed.   Patient met with team.  Patient remains delusional, preoccupied. Patient remains focus on numerical connections.   Awaiting Holman amendment hearing from [facility].    Discharge Plan or Goal  Unclear at this time whether patient will remain in [place].  Patient in need of close Psychiatric follow up care.     Barriers to Discharge   Severity of symptoms- need for stabilization.  Patient remains acutely psychotic. Request to amend Holman sent to [facility] 4/24/23     Patient is an [place] resident with plan to return to parent's home in [place].     Referral Status  None today     Legal Status     Commitment and Holman order per [facility]- Provisional discharge revoked

## 2023-04-25 NOTE — PLAN OF CARE
Noted pacing calmly up and down the halls at the beginning of the shift. . Refused any PRN offered. Denied SI/SIB or hallucinations. Denied pain or any mental health symptoms .  Pt appears to have slept for 6 hours. Will continue to monitor and offer support.    Problem: Sleep Disturbance  Goal: Adequate Sleep/Rest  Outcome: Progressing   Goal Outcome Evaluation:

## 2023-04-25 NOTE — PLAN OF CARE
Pt has been more isolative to his room today, he attended one OT group. He tends to be socially withdrawn and does not interact with peers. Pt is med compliant without issue, he received PRN risperdone and cogentin. He took a shower this morning but still appears mildly unkempt. He endorses anxiety, denies all other mental health symptoms. Pt is guarded during assessment, did not elaborate on anxiety symptoms and walked away from writer when writer tried to ask more about events prior to admission.     Problem: Anxiety  Goal: Anxiety Reduction or Resolution  Outcome: Progressing   Goal Outcome Evaluation:    Plan of Care Reviewed With: patient

## 2023-04-26 PROCEDURE — 99232 SBSQ HOSP IP/OBS MODERATE 35: CPT | Mod: GC | Performed by: PSYCHIATRY & NEUROLOGY

## 2023-04-26 PROCEDURE — 93005 ELECTROCARDIOGRAM TRACING: CPT

## 2023-04-26 PROCEDURE — 93010 ELECTROCARDIOGRAM REPORT: CPT | Performed by: INTERNAL MEDICINE

## 2023-04-26 PROCEDURE — 250N000013 HC RX MED GY IP 250 OP 250 PS 637: Performed by: STUDENT IN AN ORGANIZED HEALTH CARE EDUCATION/TRAINING PROGRAM

## 2023-04-26 PROCEDURE — 250N000013 HC RX MED GY IP 250 OP 250 PS 637

## 2023-04-26 PROCEDURE — 250N000013 HC RX MED GY IP 250 OP 250 PS 637: Performed by: PSYCHIATRY & NEUROLOGY

## 2023-04-26 PROCEDURE — 124N000002 HC R&B MH UMMC

## 2023-04-26 RX ADMIN — RISPERIDONE 1.5 MG: 1 TABLET ORAL at 19:12

## 2023-04-26 RX ADMIN — BENZTROPINE MESYLATE 0.5 MG: 0.5 TABLET ORAL at 19:12

## 2023-04-26 RX ADMIN — PROPRANOLOL HYDROCHLORIDE 10 MG: 10 TABLET ORAL at 14:07

## 2023-04-26 RX ADMIN — PROPRANOLOL HYDROCHLORIDE 10 MG: 10 TABLET ORAL at 19:12

## 2023-04-26 RX ADMIN — RISPERIDONE 0.5 MG: 0.5 TABLET, ORALLY DISINTEGRATING ORAL at 14:07

## 2023-04-26 RX ADMIN — LITHIUM CARBONATE 900 MG: 450 TABLET, EXTENDED RELEASE ORAL at 18:21

## 2023-04-26 RX ADMIN — Medication 25 MCG: at 08:50

## 2023-04-26 RX ADMIN — RISPERIDONE 1 MG: 1 TABLET ORAL at 08:50

## 2023-04-26 RX ADMIN — PROPRANOLOL HYDROCHLORIDE 10 MG: 10 TABLET ORAL at 08:50

## 2023-04-26 ASSESSMENT — ACTIVITIES OF DAILY LIVING (ADL)
ADLS_ACUITY_SCORE: 28
ADLS_ACUITY_SCORE: 28
HYGIENE/GROOMING: INDEPENDENT
ADLS_ACUITY_SCORE: 28
LAUNDRY: WITH SUPERVISION
DRESS: INDEPENDENT;STREET CLOTHES
ORAL_HYGIENE: INDEPENDENT
ADLS_ACUITY_SCORE: 28
HYGIENE/GROOMING: HANDWASHING;INDEPENDENT
ADLS_ACUITY_SCORE: 28
ORAL_HYGIENE: INDEPENDENT

## 2023-04-26 NOTE — PLAN OF CARE
Problem: Anxiety  Goal: Anxiety Reduction or Resolution  Outcome: Progressing     Problem: Depression  Goal: Improved Mood  Outcome: Progressing     Problem: Suicidal Behavior  Goal: Suicidal Behavior is Absent or Managed  Outcome: Progressing   Goal Outcome Evaluation:    Plan of Care Reviewed With: patient        Flat, withdrawn, no interactions with peers, visible on the unit, mostly pacing in the hallway. Patient was able to communicate needs, brief and dismissive during MH assessment, denied anxiety or depression, denied SI/HI/SIB. Patient denied any hallucinations or delusions, appeared talking to self while pacing the hallway. He denied pain or discomfort. Cooperative and compliant with medications, PRN Simethicone given per his request for gas.

## 2023-04-26 NOTE — PLAN OF CARE
Problem: Anxiety  Goal: Anxiety Reduction or Resolution  Outcome: Progressing     Problem: Depression  Goal: Improved Mood  Outcome: Progressing     Problem: Adult Behavioral Health Plan of Care  Goal: Absence of New-Onset Illness or Injury  Outcome: Progressing  Intervention: Identify and Manage Fall Risk  Recent Flowsheet Documentation  Taken 4/26/2023 1630 by Juan Geuvara, RN  Safety Measures:    environmental rounds completed    safety rounds completed   Goal Outcome Evaluation:    Plan of Care Reviewed With: patient        Visible in milieu, mostly pacing in the hallway, more interactive this shift. Pleasant, bright, cooperative and compliant with medication. No paranoid or delusional statements made or reported. Patient denied any pain or discomfort, ADLs WNL, adequate PO intake. Patient denied anxiety or depression, no SI/HI, contracted for safety. Able to ask for scheduled medications, requested to take Lithium before scheduled time.

## 2023-04-26 NOTE — PLAN OF CARE
Pt appears to have slept for 7 hours. No PRNs given or requested. Remained in his room the entire shift. No concerns noted. Will continue to monitor and offer support.    Problem: Sleep Disturbance  Goal: Adequate Sleep/Rest  Outcome: Progressing   Goal Outcome Evaluation:

## 2023-04-26 NOTE — PLAN OF CARE
"Pt has been mostly isolative and withdrawn to self today. He presents with flat affect and overall calm mood. Intermittent and hesitant eye contact and responds mostly with one-word answers. Appears unkempt and disheveled. This afternoon he had several questions for writer regarding upcoming medication changes. He initially stated he did not see the need to change his medications because his WHITEHEAD \"has been working for 6 years.\" Writer explained that his previous medication regimen appears to no longer be as effective as it once had been, as evidenced by his multiple, recent hospitalizations. Pt asked about why he would need regular blood draws done once starting Clozaril. This was explained to him and he communicated understanding. He then asked, \"Is this medication dangerous?\" Writer explained that because his team was recommending it, the likely benefits outweigh any possible risks and that he will be monitored closely for any possible side effects. This seemed to reassure him and he seemed to indicate understanding. Pt requested and received PRN Risperidone (0.5mg) this afternoon; he seemed tense, somewhat agitated but also guarded about the specific reason he was requesting the PRN.     Problem: Psychotic Signs/Symptoms  Goal: Improved Behavioral Control (Psychotic Signs/Symptoms)  Outcome: Progressing     Problem: Adult Behavioral Health Plan of Care  Goal: Plan of Care Review  Recent Flowsheet Documentation  Taken 4/26/2023 1011 by Jo Mejias RN  Patient Agreement with Plan of Care: agrees     "

## 2023-04-26 NOTE — PROGRESS NOTES
"    ----------------------------------------------------------------------------------------------------------  [facility], [place]   Psychiatric Progress Note  Hospital Day #11    Identifier: Tello Steen is a 27 year old male with previous psychiatric diagnoses of schizophrenia vs schizoaffective disorder, bipolar type who presents with psychosis and álvaro in the context of recent hospitalizations 3/26-4/11/23 ([facility]) and 2/22-3/7/23 ([facility]) for the same. Assessment is that the current presentation is consistent with schizoaffective disorder, bipolar type, current álvaro and psychosis.      Interim History:   The patient's care was discussed with the treatment team and chart notes were reviewed.    Vitals: VSS  Sleep: 7 hours (04/26/23 0600)  Scheduled medications: Took all scheduled medications as prescribed  PRN medications: None    Staff Report:   - pacing the hallways  - seems to be withdrawn, no interactions with peers  - continues to deny hallucinations/delusions, dismissive  - appeared talking to self while pacing the hallway     Subjective:     Patient Interview:    Delusions: Tello has been talking less about his delusions. However, after questioning, Tello reports that the chip in his brain still continues to communicate with him, declined to elaborate further. Tlelo also mentions that he has to pace the hallways \"to get disability services.\" He plans to write a book about \"all of the things that I find interesting about human life and the 7 areas of human experience.\"     Lithium: Tello was curious as to how his lithium medication was increased based on his recent lithium level. After our explanation, Tello understood and remains cooperative with the medication plan.     Food: Tello also wanted a double portion of snack/deserts.     Review of systems:   Patient has no bothersome physical symptoms   Patient denies acute concerns    Objective:   /78 (BP Location: Right arm, " "Patient Position: Sitting, Cuff Size: Adult Regular)   Pulse 78   Temp 98  F (36.7  C)   Resp 18   Wt 71.1 kg (156 lb 11.2 oz)   SpO2 95%   BMI 20.67 kg/m    Weight is 156 lbs 11.2 oz  Body mass index is 20.67 kg/m .    Mental Status Exam:  Oriented to:  Grossly Oriented  General:  Awake and Alert  Appearance:  appears stated age, wearing street clothes, and hair is unkempt despite daily showers  Behavior/Attitude:  calm, cooperative (seemingly with reservations)  Eye Contact:  intermittent, downcast, sporadic   Psychomotor: less psychomotor agitation than previous, no evidence of tics, dystonia, akithisia, or tardive dyskinesia no catatonia present.  Speech:  appropriate volume/tone and slightly odd rhythm  Language: Fluent in English with inappropriate syntax and correct vocabulary   Mood:  \"good\"  Affect: Appears somewhat down, largely blunted but did smile when talking about plans to write a book  Thought Process:  Patient has been listening to \"chip in brain\" and generally has logical questions about medicines, but also has illogical statements about the benefits/consequences of other actions (pacing the hallway gives disability benefits)  Thought Content:  No expressed SI/SIB/HI. Endorses hearing the chip implanted in his brain. Did not mention past delusions of paranoia, delusions of grandiosity and delusions of bizarre content (improving)  Associations:  questionable  Insight:  limited due to aware of past delusions, diagnosis, and benefit from medications, but no insight into current psychosis or álvaro. Does recognize diagnosis of schizophrenia  Judgment:  limited - listens to \"chip in brain\"   Impulse control: partial - appropriate behaviors  Attention Span:  adequate for conversation  Concentration:  grossly intact  Recent and Remote Memory:  grossly intact  Fund of Knowledge:  average, but has medical knowledge beyond that of a normal patient  Muscle Strength and Tone: appears normal  Gait and " Station: Normal      Allergies:   No Known Allergies     Labs and imaging:     Data this admission:  BMP normal  CBC normal  UDS negative  TSH 3/9/23 normal  Lipids 2/24/23 unremarkable  Hgb A1c 2/24/23 normal   UA - amorphous crystals, 10 protein albumin, elevated pH   HIV - negative   Ceruloplasmin normal   AILYN - normal   Lyme abs - normal   Treponema  - negative   Folate - normal   B12 - normal   ESR - normal Salicylate, alcohol, Tylenol negative  EKG 3/20: sinus rhythm, QTc 431  Brain MRI - normal     Lithium 4/25: 0.60, Cr without significant change     Psychiatric Assessment and Plan   Primary psychiatric diagnosis:   Schizoaffective disorder, bipolar type, current álvaro    Diagnostic Impression:   Tello Steen is a 27 year old male with a past psychiatric diagnosis of schizophrenia admitted from the ED on 04/15/2023 due to pscyhosis in the context of recent hospitalization 3/26-4/11/23 for psychosis and álvaro. Significant symptoms on admission include delusions about a chip in their brain, delusions of grandeur, delusions of having a special mission with the [employer], and elevated mood. The MSE on admission was pertinent for elevated affect and delusions previously mentioned. Biological contributions to mental health presentation include previous psychiatric diagnosis of schizophrenia. On chart review, there are also mentions of Lithium trials, possibly indicating previous álvaro. Psychosocial contributions to mental health presentation include partial insight, unclear work, housing, and financial situation. Protective factors include medication adherence, lack of substance use, and supportive CCM, good frustration tolerance, and presence of perceived social supports.      In summary, the patient's reported symptoms of psychosis and álvaro in the context of historical schizophrenia diagnosis and recent hospitalization are consistent with schizoaffective disorder bipolar type.     Per discussion with Tello's mother,  Tello is a new name for the patient. The original name is Sophie. The mom mentioned that the new name means the psychotic episode is significant. She tells us that he is a biological male and that this gender pronoun change is new and could be a part of his delusion.     Psychiatric Hospital course:  Tello Steen was admitted to [facility] committed with a Holman. All PTA medications were continued. This is his third hospitalization within 4 months, indicating that his symptoms are not adequately controlled with injectable paliperidone. We are considering a new neuroleptic medication for him, either risperidone or clozapine. As they have not had sustained remission of symptoms with trials of two different neuroleptics, clozapine would likely be the most efficacious next choice. However, clozapine is not currently on their holman order, so we had to submit an amendment to have it added. We are also considering risperidone, which has the benefit of having a WHITEHEAD formulation.     Due to arm dystonia, Tello's olanzapine was discontinued and this was replaced with risperidone on 4/21. We also added additional cogentin and propranolol PRNs to help manage any extrapyramidal side effects. Tolerating risperidone titration well thus far - there have been some concerns about akithisia due to patient's apparent restlessness and psychomotor agitation, but this movement appears volitional and more related to álvaro rather than akithetic or dystonic. Lithium was started on 4/20 for álvaro, tolerating titration well thus far. Lithium increased from 600 to 900mg on 4/25/23 due to a 0.6 lithium level.      Today's changes:   - Ordered EKG for routine monitoring    1. Psychotropic Medications:  Scheduled:  Current Facility-Administered Medications   Medication Dose Route Frequency     lithium ER  900 mg Oral At Bedtime     risperiDONE  1 mg Oral Daily    Or     OLANZapine  5 mg Intramuscular Daily     risperiDONE  1.5 mg Oral At Bedtime     Or     OLANZapine  5 mg Intramuscular At Bedtime     [START ON 4/28/2023] paliperidone  234 mg Intramuscular Q28 Days     propranolol  10 mg Oral TID     Vitamin D3  25 mcg Oral Daily   Plan to switch risperidone to clozapine when Holman ammended.    PRN:  Current Facility-Administered Medications   Medication Dose Route Frequency     acetaminophen  650 mg Oral Q4H PRN     alum & mag hydroxide-simethicone  30 mL Oral Q4H PRN     benztropine  0.5 mg Oral BID PRN     melatonin  3 mg Oral At Bedtime PRN     OLANZapine  10 mg Intramuscular TID PRN     polyethylene glycol  17 g Oral Daily PRN     propranolol  10 mg Oral BID PRN     risperiDONE  0.5 mg Sublingual BID PRN       2. Pertinent Labs/Monitoring:   FEP work up:  BMP normal  CBC normal  UDS negative  EKG 3/20: sinus rhythm, QTc 431  TSH 3/9/23 normal  Lipids 2/24/23unremarkable  Hgb A1c 2/24/23 normal   UA - amorphous crystals, 10 protein albumin, elevated pH   HIV - negative   Ceruloplasmin -normal   AILYN - negative   Lyme abs - normal   Treponema  - negative   Folate - normal   B12 - normal   ESR - normal     MRI - normal    Lithium 4/25: 0.60, Cr without significant change  Repeat Lithium level and Cr scheduled for 4/30    EKG 4/26 - pending    3. Additional Plans:  Patient will be treated in therapeutic milieu with appropriate individual and group therapies as described.    Medical Assessment and Plan     Medical diagnoses to be addressed this admission:    None    Medical course: Patient was physically examined by the ED prior to being transferred to the unit and was found to be medically stable and appropriate for admission.     Consults: None    Checklist     Legal Status: Committed on PD. Holman: haldol, olanzapine, risperidone, paliperidone. Pending Holman ammendment to add clozapine.    Safety Assessment:   Behavioral Orders   Procedures     Code 1 - Restrict to Unit     Code 2     OK to leave the floor for imaging procedures at the discretion of floor  staff     Routine Programming     As clinically indicated     Status 15     Every 15 minutes.       Risk Assessment:  Risk for harm is moderate-high.  Risk factors: impulsive and past behaviors, multiple recent hospitalizations  Protective factors: family and engaged in treatment     SIO: No    Disposition: TBD, complicated by patient being from [place]. Pending stabilization, medication optimization, & development of a safe discharge plan.    Attestations     Jez Kelly, MS3  [employer]    Resident Attestation:   I was present with the medical student who participated in the service and in the documentation of the note.  I have verified the history and personally performed the physical exam, mental status exam, and medical decision making. I agree with the assessment and plan of care as documented in the note.    Catherine Alvarado MD  PGY-2 Psychiatry Resident    Attending Attestation:

## 2023-04-26 NOTE — PLAN OF CARE
Assessment/Intervention/Current Symtoms and Care Coordination  The patient's care was discussed with the treatment team and chart notes were reviewed.   Patient met with team.  Patient remains delusional - reports that the chip in his brain still continues to communicate with him and also mentions that he has to pace the hallways to get disability services..  Patient continues to state he plans to return to [place].    Awaiting Holman amendment hearing from [facility]- Unfortunately request was not received 4/19.  It was resent 4/24/23    Discharge Plan or Goal  Unclear at this time whether patient will remain in [place].  Patient in need of close Psychiatric follow up care.     Barriers to Discharge   Severity of symptoms- need for stabilization.  Patient remains psychotic. Request to amend Holman sent to [facility] 4/19/23  Patient is an [place] resident with plan to return to parent's home in [place].     Referral Status  None today     Legal Status     Commitment and Holman order per [facility]- Provisional discharge revoked

## 2023-04-27 LAB
ATRIAL RATE - MUSE: 78 BPM
BASOPHILS # BLD AUTO: 0.1 10E3/UL (ref 0–0.2)
BASOPHILS NFR BLD AUTO: 1 %
DIASTOLIC BLOOD PRESSURE - MUSE: NORMAL MMHG
EOSINOPHIL # BLD AUTO: 0.2 10E3/UL (ref 0–0.7)
EOSINOPHIL NFR BLD AUTO: 3 %
ERYTHROCYTE [DISTWIDTH] IN BLOOD BY AUTOMATED COUNT: 12.6 % (ref 10–15)
HCT VFR BLD AUTO: 42.4 % (ref 40–53)
HGB BLD-MCNC: 14.2 G/DL (ref 13.3–17.7)
IMM GRANULOCYTES # BLD: 0 10E3/UL
IMM GRANULOCYTES NFR BLD: 0 %
INTERPRETATION ECG - MUSE: NORMAL
LYMPHOCYTES # BLD AUTO: 1.9 10E3/UL (ref 0.8–5.3)
LYMPHOCYTES NFR BLD AUTO: 26 %
MCH RBC QN AUTO: 30.3 PG (ref 26.5–33)
MCHC RBC AUTO-ENTMCNC: 33.5 G/DL (ref 31.5–36.5)
MCV RBC AUTO: 90 FL (ref 78–100)
MONOCYTES # BLD AUTO: 1 10E3/UL (ref 0–1.3)
MONOCYTES NFR BLD AUTO: 13 %
NEUTROPHILS # BLD AUTO: 4 10E3/UL (ref 1.6–8.3)
NEUTROPHILS NFR BLD AUTO: 57 %
NRBC # BLD AUTO: 0 10E3/UL
NRBC BLD AUTO-RTO: 0 /100
P AXIS - MUSE: 70 DEGREES
PLATELET # BLD AUTO: 187 10E3/UL (ref 150–450)
PR INTERVAL - MUSE: 148 MS
QRS DURATION - MUSE: 86 MS
QT - MUSE: 366 MS
QTC - MUSE: 417 MS
R AXIS - MUSE: 96 DEGREES
RBC # BLD AUTO: 4.69 10E6/UL (ref 4.4–5.9)
SYSTOLIC BLOOD PRESSURE - MUSE: NORMAL MMHG
T AXIS - MUSE: 77 DEGREES
VENTRICULAR RATE- MUSE: 78 BPM
WBC # BLD AUTO: 7.2 10E3/UL (ref 4–11)

## 2023-04-27 PROCEDURE — 124N000002 HC R&B MH UMMC

## 2023-04-27 PROCEDURE — 250N000013 HC RX MED GY IP 250 OP 250 PS 637

## 2023-04-27 PROCEDURE — 36415 COLL VENOUS BLD VENIPUNCTURE: CPT | Performed by: STUDENT IN AN ORGANIZED HEALTH CARE EDUCATION/TRAINING PROGRAM

## 2023-04-27 PROCEDURE — 99232 SBSQ HOSP IP/OBS MODERATE 35: CPT | Performed by: STUDENT IN AN ORGANIZED HEALTH CARE EDUCATION/TRAINING PROGRAM

## 2023-04-27 PROCEDURE — 85004 AUTOMATED DIFF WBC COUNT: CPT | Performed by: STUDENT IN AN ORGANIZED HEALTH CARE EDUCATION/TRAINING PROGRAM

## 2023-04-27 PROCEDURE — 250N000013 HC RX MED GY IP 250 OP 250 PS 637: Performed by: STUDENT IN AN ORGANIZED HEALTH CARE EDUCATION/TRAINING PROGRAM

## 2023-04-27 RX ORDER — RISPERIDONE 1 MG/1
1 TABLET ORAL AT BEDTIME
Status: COMPLETED | OUTPATIENT
Start: 2023-04-28 | End: 2023-04-28

## 2023-04-27 RX ORDER — CLOZAPINE 25 MG/1
25 TABLET ORAL AT BEDTIME
Status: COMPLETED | OUTPATIENT
Start: 2023-04-27 | End: 2023-04-27

## 2023-04-27 RX ORDER — RISPERIDONE 0.5 MG/1
0.5 TABLET ORAL AT BEDTIME
Status: COMPLETED | OUTPATIENT
Start: 2023-04-29 | End: 2023-04-30

## 2023-04-27 RX ORDER — CLOZAPINE 100 MG/1
100 TABLET ORAL AT BEDTIME
Status: COMPLETED | OUTPATIENT
Start: 2023-04-30 | End: 2023-04-30

## 2023-04-27 RX ORDER — CLOZAPINE 50 MG/1
50 TABLET ORAL AT BEDTIME
Status: COMPLETED | OUTPATIENT
Start: 2023-04-28 | End: 2023-04-28

## 2023-04-27 RX ORDER — OLANZAPINE 10 MG/2ML
5 INJECTION, POWDER, FOR SOLUTION INTRAMUSCULAR DAILY
Status: COMPLETED | OUTPATIENT
Start: 2023-04-28 | End: 2023-04-29

## 2023-04-27 RX ORDER — OLANZAPINE 10 MG/2ML
5 INJECTION, POWDER, FOR SOLUTION INTRAMUSCULAR AT BEDTIME
Status: COMPLETED | OUTPATIENT
Start: 2023-04-27 | End: 2023-04-27

## 2023-04-27 RX ORDER — RISPERIDONE 1 MG/1
1 TABLET ORAL DAILY
Status: COMPLETED | OUTPATIENT
Start: 2023-04-28 | End: 2023-04-29

## 2023-04-27 RX ORDER — SENNOSIDES 8.6 MG
8.6 TABLET ORAL 2 TIMES DAILY PRN
Status: DISCONTINUED | OUTPATIENT
Start: 2023-04-27 | End: 2023-06-01 | Stop reason: HOSPADM

## 2023-04-27 RX ORDER — RISPERIDONE 0.5 MG/1
0.5 TABLET ORAL DAILY
Status: COMPLETED | OUTPATIENT
Start: 2023-04-30 | End: 2023-05-01

## 2023-04-27 RX ADMIN — PROPRANOLOL HYDROCHLORIDE 10 MG: 10 TABLET ORAL at 14:31

## 2023-04-27 RX ADMIN — CLOZAPINE 25 MG: 25 TABLET ORAL at 20:18

## 2023-04-27 RX ADMIN — Medication 25 MCG: at 08:52

## 2023-04-27 RX ADMIN — PROPRANOLOL HYDROCHLORIDE 10 MG: 10 TABLET ORAL at 08:52

## 2023-04-27 RX ADMIN — PROPRANOLOL HYDROCHLORIDE 10 MG: 10 TABLET ORAL at 19:01

## 2023-04-27 RX ADMIN — RISPERIDONE 1.5 MG: 1 TABLET ORAL at 20:19

## 2023-04-27 RX ADMIN — RISPERIDONE 1 MG: 1 TABLET ORAL at 08:52

## 2023-04-27 RX ADMIN — LITHIUM CARBONATE 900 MG: 450 TABLET, EXTENDED RELEASE ORAL at 18:27

## 2023-04-27 ASSESSMENT — ACTIVITIES OF DAILY LIVING (ADL)
ADLS_ACUITY_SCORE: 28
DRESS: STREET CLOTHES;INDEPENDENT
ADLS_ACUITY_SCORE: 28
DRESS: SCRUBS (BEHAVIORAL HEALTH);INDEPENDENT
ADLS_ACUITY_SCORE: 28
ADLS_ACUITY_SCORE: 28
HYGIENE/GROOMING: SHOWER;INDEPENDENT
ADLS_ACUITY_SCORE: 28
ADLS_ACUITY_SCORE: 28
ORAL_HYGIENE: INDEPENDENT
HYGIENE/GROOMING: INDEPENDENT
ADLS_ACUITY_SCORE: 28
ORAL_HYGIENE: INDEPENDENT
ADLS_ACUITY_SCORE: 28

## 2023-04-27 NOTE — PROGRESS NOTES
"    ----------------------------------------------------------------------------------------------------------  [facility], [place]   Psychiatric Progress Note  Hospital Day #12    Identifier: Tlelo Steen is a 27 year old male with previous psychiatric diagnoses of schizophrenia vs schizoaffective disorder, bipolar type who presents with psychosis and álvaro in the context of recent hospitalizations 3/26-4/11/23 ([facility]) and 2/22-3/7/23 ([facility]) for the same. Assessment is that the current presentation is consistent with schizoaffective disorder, bipolar type, current álvaro and psychosis.      Interim History:   The patient's care was discussed with the treatment team and chart notes were reviewed.    Vitals: VSS  Sleep: 6.75 hours (04/27/23 0642)  Scheduled medications: Took all scheduled medications as prescribed  PRN medications: None    Staff Report:   - pacing the hallways  - more interactive with peers  - \"Pleasant, bright, cooperative and compliant with medication\"  - continues to deny hallucinations/delusions, dismissive  - appeared talking to self while pacing the hallway     Subjective:     Patient Interview:  Tello was interviewed in the work room.     Delusions:  Tello did not want to talk about his delusions. He was about to say \"chip in his brain\". However, he visibly stopped himself from saying that to the team. It seemed as if that \"chip in his brain\" told him not to tell us anything today.     Two hours later, Tello returned to the work room. We were anticipating Tello to talk about his Holman, but he talked to us about the Salvadorean lettering system and how it relates to different people. He brought in a piece of paper where he made a graphical plot of all the letters and tried to explain this to the team. He said that \"chip\" wanted to tell the team this information \"in case I don't make\". On clarification, Tello said that is not an insinuation to anyone harming him or " "harming himself. He said he works for the [employer] and they gave him a medication that changed his personality at the age of 21. He started to refer to himself as the months of the year and the [place] symbols.     Mood:   He is said he was doing \"ok\" and slept same every other night     Clozapine  - Tello was curious if he could take clozapine voluntarily to speed up the court process. Later in the day, clozapine was added to the Holman and he was amendable to start the medication and taper his risperidone as soon as possible.      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       Review of systems:   Patient has no bothersome physical symptoms   Patient denies acute concerns    Objective:   /72 (BP Location: Left arm, Patient Position: Standing, Cuff Size: Adult Regular)   Pulse 95   Temp 97.7  F (36.5  C) (Oral)   Resp 16   Wt 71.4 kg (157 lb 6.4 oz)   SpO2 97%   BMI 20.77 kg/m    Weight is 157 lbs 6.4 oz  Body mass index is 20.77 kg/m .    Mental Status Exam:  Oriented to:  Grossly Oriented  General:  Awake and Alert  Appearance:  appears stated age, wearing street clothes, and hair is unkempt despite daily showers  Behavior/Attitude:  calm, cooperative (seemingly with reservations at times)  Eye Contact:  when calm, eye contact is intermittent and downcast, but while discussing delusions, eye contact becomes intense   Psychomotor: psychomotor agitation, visible tremor while discussing delusions, no evidence of tics, dystonia, akithisia, or tardive dyskinesia no catatonia present.  Speech:  appropriate volume/tone and slightly odd rhythm  Language: Fluent in English and " "correct vocabulary   Mood:  \"okay\"  Affect: broad/full, fluctuating between aggressive and calm  Thought Process:  Patient has been listening to \"chip in brain\" but generally has logical questions about medicines, has several delusions about working for the [employer] and \"academic work\"  Thought Content:  No expressed SI/SIB/HI. Endorses hearing the chip implanted in his brain and working for the [employer].   Associations:  questionable  Insight:  limited due to aware of past delusions, diagnosis, and benefit from medications, but no insight into current psychosis or álvaro. Does recognize diagnosis of schizophrenia  Judgment:  limited - listens to \"chip in brain\"   Impulse control: partial - appropriate behaviors  Attention Span:  adequate for conversation  Concentration:  grossly intact  Recent and Remote Memory:  grossly intact  Fund of Knowledge:  average, but has medical knowledge beyond that of a regular patient  Muscle Strength and Tone: appears normal  Gait and Station: Normal      Allergies:   No Known Allergies     Labs and imaging:     Data this admission:  BMP normal  CBC normal  UDS negative  TSH 3/9/23 normal  Lipids 2/24/23 unremarkable  Hgb A1c 2/24/23 normal   UA - amorphous crystals, 10 protein albumin, elevated pH   HIV - negative   Ceruloplasmin normal   AILYN - normal   Lyme abs - normal   Treponema  - negative   Folate - normal   B12 - normal   ESR - normal Salicylate, alcohol, Tylenol negative  EKG 3/20: sinus rhythm, QTc 431  Brain MRI - normal     Lithium 4/25: 0.60, Cr without significant change  EKG 4/26 - normal     Psychiatric Assessment and Plan   Primary psychiatric diagnosis:   Schizoaffective disorder, bipolar type, current álvaro    Diagnostic Impression:   Tello Steen is a 27 year old male with a past psychiatric diagnosis of schizophrenia admitted from the ED on 04/15/2023 due to pscyhosis in the context of recent hospitalization 3/26-4/11/23 for psychosis and álvaro. Significant symptoms on " admission include delusions about a chip in their brain, delusions of grandeur, delusions of having a special mission with the [employer], and elevated mood. The MSE on admission was pertinent for elevated affect and delusions previously mentioned. Biological contributions to mental health presentation include previous psychiatric diagnosis of schizophrenia. On chart review, there are also mentions of Lithium trials, possibly indicating previous álvaro. Psychosocial contributions to mental health presentation include partial insight, unclear work, housing, and financial situation. Protective factors include medication adherence, lack of substance use, and supportive CCM, good frustration tolerance, and presence of perceived social supports.      In summary, the patient's reported symptoms of psychosis and álvaro in the context of historical schizophrenia diagnosis and recent hospitalization are consistent with schizoaffective disorder bipolar type.     Per discussion with Tello's mother, Tello is a new name for the patient. The original name is Sophie. The mom mentioned that the new name means the psychotic episode is significant. She tells us that he is a biological male and that this gender pronoun change is new and could be a part of his delusion.     Psychiatric Hospital course:  Tello Steen was admitted to [facility] committed with a Holman. All PTA medications were continued. This is his third hospitalization within 4 months, indicating that his symptoms are not adequately controlled with injectable paliperidone. We are considering a new neuroleptic medication for him, either risperidone or clozapine. As they have not had sustained remission of symptoms with trials of two different neuroleptics, clozapine would likely be the most efficacious next choice. However, clozapine is not currently on their holman order, so we had to submit an amendment to have it added. We are also considering risperidone, which has the benefit  of having a WHITEHEAD formulation.     Due to arm dystonia, Ki's olanzapine was discontinued and this was replaced with risperidone on 4/21. We also added additional cogentin and propranolol PRNs to help manage any extrapyramidal side effects. Tolerating risperidone titration well thus far - there have been some concerns about akithisia due to patient's apparent restlessness and psychomotor agitation, but this movement appears volitional and more related to álvaro rather than akithetic or dystonic. Lithium was started on 4/20 for álvaro, tolerating titration well thus far. Lithium increased from 600 to 900mg on 4/25/23 due to a 0.6 lithium level.      Today's changes:   - Holding the paliperidone injection  - Follow along clozapine titration and risperidone taper (detialed below)  - Ordered a CBC w/platelet differential  - Start clozapine (pending CBC)     ---    Clozapine titration:  4/27 - Clozapine 25mg HS, Risperidone 1mg AM & 1.5mg PM   4/28 - Cl: 50mg HS, Risperidone 1mg AM & 1mg PM   4/29 - Cl: 75mg HS, Risperidone 1mg AM & 0.5mg PM   4/30 - Cl: 100mg HS, Risperidone 0.5mg AM & 0.5mg PM   5/1 - Cl: 125mg HS, Risperidone 0.5mg AM  5/2 - Cl: 150mg HS, discontinued risperidone      1. Psychotropic Medications:  Scheduled:  Current Facility-Administered Medications   Medication Dose Route Frequency     lithium ER  900 mg Oral At Bedtime     risperiDONE  1 mg Oral Daily    Or     OLANZapine  5 mg Intramuscular Daily     risperiDONE  1.5 mg Oral At Bedtime    Or     OLANZapine  5 mg Intramuscular At Bedtime     [Held by provider] paliperidone  234 mg Intramuscular Q28 Days     propranolol  10 mg Oral TID     Vitamin D3  25 mcg Oral Daily   Plan to switch risperidone to clozapine when Holman ammended.    PRN:  Current Facility-Administered Medications   Medication Dose Route Frequency     acetaminophen  650 mg Oral Q4H PRN     alum & mag hydroxide-simethicone  30 mL Oral Q4H PRN     benztropine  0.5 mg Oral BID PRN      melatonin  3 mg Oral At Bedtime PRN     OLANZapine  10 mg Intramuscular TID PRN     polyethylene glycol  17 g Oral Daily PRN     propranolol  10 mg Oral BID PRN     risperiDONE  0.5 mg Sublingual BID PRN       2. Pertinent Labs/Monitoring:   FEP work up:  BMP normal  CBC normal  UDS negative  EKG 3/20: sinus rhythm, QTc 431  TSH 3/9/23 normal  Lipids 2/24/23unremarkable  Hgb A1c 2/24/23 normal   UA - amorphous crystals, 10 protein albumin, elevated pH   HIV - negative   Ceruloplasmin -normal   AILYN - negative   Lyme abs - normal   Treponema  - negative   Folate - normal   B12 - normal   ESR - normal     MRI - normal    Lithium 4/25: 0.60, Cr without significant change  Repeat Lithium level and Cr scheduled for 4/30    EKG 4/26 - normal    3. Additional Plans:  Patient will be treated in therapeutic milieu with appropriate individual and group therapies as described.    Medical Assessment and Plan     Medical diagnoses to be addressed this admission:    None    Medical course: Patient was physically examined by the ED prior to being transferred to the unit and was found to be medically stable and appropriate for admission.     Consults: None    Checklist     Legal Status: Committed on PD. Holman: haldol, olanzapine, risperidone, paliperidone. Pending Holman ammendment to add clozapine.    Safety Assessment:   Behavioral Orders   Procedures     Code 1 - Restrict to Unit     Code 2     OK to leave the floor for imaging procedures at the discretion of floor staff     Routine Programming     As clinically indicated     Status 15     Every 15 minutes.       Risk Assessment:  Risk for harm is moderate-high.  Risk factors: impulsive and past behaviors, multiple recent hospitalizations  Protective factors: family and engaged in treatment     SIO: No    Disposition: TBD, complicated by patient being from [place]. Pending stabilization, medication optimization, & development of a safe discharge plan.    Attestations     Jez  Leticia, MS3  [employer]      Attending Attestation:    This patient has been seen and evaluated by me, Johanna Altman DO.  I have discussed this patient with the team including the resident and medical student(s) and I agree with the findings and plan in this note.  Dr. Johanna Altman DO, CARIN

## 2023-04-27 NOTE — PLAN OF CARE
No PRNs given or requested; remained in his room the entire shift. No concerns noted. Pt appears to have slept for 6.75 hours. Will continue to monitor and offer support    Problem: Sleep Disturbance  Goal: Adequate Sleep/Rest  Outcome: Progressing   Goal Outcome Evaluation:

## 2023-04-27 NOTE — PLAN OF CARE
Assessment/Intervention/Current Symtoms and Care Coordination  The patient's care was discussed with the treatment team and chart notes were reviewed.   Patient met with team.  Jez- med student spoke to patient's mother who stated that she does not feel that patient should return home when discharged. Per patient- he called his Mom this am and she said he can come home.?  Patient is unwilling to go to an IRTS- insists hes going to go home. Patient would need to apply for [place] which he is unwilling to do.  Writer will call [facility] to discuss further    Patient is showing some improvement- has not been overtly psychotic. Appears to be more able to control himself re: talking about chip in head etc.    Awaiting Holman amendment hearing from [facility]- Unfortunately request was not received 4/19.  It was resent 4/24/23     Discharge Plan or Goal  Unclear at this time whether patient will remain in [place].  Patient in need of close Psychiatric follow up care.     Barriers to Discharge   Severity of symptoms- need for stabilization.  Patient remains psychotic. Request to amend Holman sent to [facility] 4/19/23  Patient is an [place] resident with plan to return to parent's home in [place].     Referral Status  None today     Legal Status     Commitment and Holman order per [facility]- Provisional discharge revoked

## 2023-04-27 NOTE — PLAN OF CARE
"Out in the milieu intermittently. Paces the hallway frequently.  Minimal social interactions with peers.  Answers questions with 1-2 words or nods his head, at times declines to answer questions, looking downward with no verbal response.   Intermittent to poor eye contact.  Affect is flat and blunted.  Declined to have blood draw this afternoon stating \"she's dangerous\". Suspicious and paranoid. Verbally agreed to have labs drawn by another technician. Lab called and is aware of need to complete labs today.  Reinforced with patient the importance of completing the labs in order to continue with medication changes.     Problem: Psychotic Signs/Symptoms  Goal: Improved Behavioral Control (Psychotic Signs/Symptoms)  Outcome: Not Progressing  Goal: Optimal Cognitive Function (Psychotic Signs/Symptoms)  Outcome: Not Progressing  Goal: Increased Participation and Engagement (Psychotic Signs/Symptoms)  Outcome: Not Progressing  Goal: Improved Mood Symptoms  Outcome: Not Progressing  Goal: Decreased Sensory Symptoms (Psychotic Signs/Symptoms)  Outcome: Not Progressing  Goal: Enhanced Social, Occupational or Functional Skills (Psychotic Signs/Symptoms)  Outcome: Not Progressing     Problem: Psychotic Signs/Symptoms  Goal: Improved Psychomotor Symptoms (Psychotic Signs/Symptoms)  Outcome: Progressing  Goal: Improved Sleep (Psychotic Signs/Symptoms)  Outcome: Progressing     Problem: Adult Behavioral Health Plan of Care  Goal: Plan of Care Review  Recent Flowsheet Documentation  Taken 4/27/2023 1237 by Aixa Irving RN  Patient Agreement with Plan of Care: agrees   Goal Outcome Evaluation:    Plan of Care Reviewed With: patient                   "

## 2023-04-28 PROCEDURE — 124N000002 HC R&B MH UMMC

## 2023-04-28 PROCEDURE — 99232 SBSQ HOSP IP/OBS MODERATE 35: CPT | Mod: GC | Performed by: PSYCHIATRY & NEUROLOGY

## 2023-04-28 PROCEDURE — 250N000013 HC RX MED GY IP 250 OP 250 PS 637: Performed by: STUDENT IN AN ORGANIZED HEALTH CARE EDUCATION/TRAINING PROGRAM

## 2023-04-28 PROCEDURE — H2032 ACTIVITY THERAPY, PER 15 MIN: HCPCS

## 2023-04-28 PROCEDURE — 250N000013 HC RX MED GY IP 250 OP 250 PS 637

## 2023-04-28 RX ADMIN — LITHIUM CARBONATE 900 MG: 450 TABLET, EXTENDED RELEASE ORAL at 18:43

## 2023-04-28 RX ADMIN — PROPRANOLOL HYDROCHLORIDE 10 MG: 10 TABLET ORAL at 10:13

## 2023-04-28 RX ADMIN — Medication 25 MCG: at 10:13

## 2023-04-28 RX ADMIN — PROPRANOLOL HYDROCHLORIDE 10 MG: 10 TABLET ORAL at 14:48

## 2023-04-28 RX ADMIN — PROPRANOLOL HYDROCHLORIDE 10 MG: 10 TABLET ORAL at 19:10

## 2023-04-28 RX ADMIN — CLOZAPINE 50 MG: 50 TABLET ORAL at 19:09

## 2023-04-28 RX ADMIN — RISPERIDONE 1 MG: 1 TABLET ORAL at 10:13

## 2023-04-28 RX ADMIN — RISPERIDONE 1 MG: 1 TABLET ORAL at 19:09

## 2023-04-28 ASSESSMENT — ACTIVITIES OF DAILY LIVING (ADL)
ADLS_ACUITY_SCORE: 28
DRESS: STREET CLOTHES
ADLS_ACUITY_SCORE: 28
ADLS_ACUITY_SCORE: 28
HYGIENE/GROOMING: INDEPENDENT
LAUNDRY: WITH SUPERVISION
ADLS_ACUITY_SCORE: 28
ORAL_HYGIENE: INDEPENDENT
ADLS_ACUITY_SCORE: 28
ADLS_ACUITY_SCORE: 28

## 2023-04-28 NOTE — PLAN OF CARE
Problem: Sleep Disturbance  Goal: Adequate Sleep/Rest  Outcome: Progressing   Goal Outcome Evaluation:  Patient had an uneventful night. Appears to have slept for 6.25 hours. No requests for prn's. No concerns noted during routine safety checks.

## 2023-04-28 NOTE — PLAN OF CARE
"Assessment/Intervention/Current Symtoms and Care Coordination  The patient's care was discussed with the treatment team and chart notes were reviewed.   Patient met with team. He remains quite psychotic - when given the opportunity to talk.  He is overtly responding to internal stimuli. Patient discussed elaborate thoughts re: neurochemistry, ancestry/biology of brain.   Holman amendment granted to include Clozaril.   Patient states that he declined initial  blood draw yesterday because he was told by \"chip\" that phlebotomist was dangerous. Later agreed when another phlebotomist came. Patient was started on Clozaril last night. Denied any adverse effects.    Writer spoke to Thad BURKS to discuss dispo.  He will come to meet with myself/patient next week.     Discharge Plan or Goal  Unclear at this time whether patient will remain in [place].  Patient in need of close Psychiatric follow up care.     Barriers to Discharge   Severity of symptoms- need for stabilization.  Patient remains psychotic.  Patient is an [place] resident with plan to return to parent's home in [place].     Referral Status  None today     Legal Status     Commitment and Holman order per [facility]- Provisional discharge revoked  "

## 2023-04-28 NOTE — PLAN OF CARE
Problem: Anxiety  Goal: Anxiety Reduction or Resolution  Outcome: Progressing     Problem: Depression  Goal: Improved Mood  Outcome: Progressing     Problem: Suicidal Behavior  Goal: Suicidal Behavior is Absent or Managed  Outcome: Progressing   Goal Outcome Evaluation:    Plan of Care Reviewed With: patient        Aert and oriented x3, able to communicate needs and verbalize feelings. Brief during  assessment. He denied any anxiety or depression, no SI/HI/SIB. No observed delusions or paranoia, denied having any. He denied any pain or discomfort, ADLs WNL, adequate PO intake. Visible in milieu, watched brief [employer] draft but was mostly pacing the hallway. No interactions with peers. Pleasant, cooperative and compliant with medications.

## 2023-04-28 NOTE — PLAN OF CARE
Pt slept in later this morning than has been typical for him. Upon initially interacting with pt this morning, he appeared evasive, guarded and dismissive in her responses. He provided only one-word answers to assessment questions and appeared irritable when being asked to get his vitals taken. Appears disheveled with unkempt hair. However, he did shower later in the afternoon. Pt appeared tense/irritable at different times today when interacting with staff to make requests. His provider team reported that he appeared highly focused on his delusional thoughts/beliefs today when they interviewed him. Medication compliant with no observed or reported side effects. Holman amendment was approved for Clozaril.       Problem: Adult Behavioral Health Plan of Care  Goal: Plan of Care Review  Recent Flowsheet Documentation  Taken 4/28/2023 1115 by Jo Mejias, RN  Patient Agreement with Plan of Care: agrees

## 2023-04-28 NOTE — PROGRESS NOTES
"     04/28/23 1800   Group Therapy Session   Time Session Began 1315   Time Session Ended 1500   Total Time (minutes) 45   Total # Attendees 5   Group Type expressive therapy   Group Topic Covered balanced lifestyle;emotions/expression;problem-solving   Group Session Detail Instrument [facility]   Patient Response/Contribution cooperative with task   Patient Participation Detail Actively participate in ukulele, keyboard and guitar music-making options.  Was able to focus on intervention and work to build skills of self-esteem and mastery on the instruments through chord charts, coaching by MT, and personal experimentation with the instruments.  Engaged through playing guitar and singing at times.  At one point, he earnestly sang \"What if I killed a cow?  What if I killed a pig?\"... He was the only participant in group at that point.  His thinking and expression does appear to be non-linear and bizarre at this point.         "

## 2023-04-28 NOTE — PROGRESS NOTES
"    ----------------------------------------------------------------------------------------------------------  [facility], [place]   Psychiatric Progress Note  Hospital Day #13    Identifier: Tello Steen is a 27 year old male with previous psychiatric diagnoses of schizophrenia vs schizoaffective disorder, bipolar type who presents with psychosis and álvaro in the context of recent hospitalizations 3/26-4/11/23 ([facility]) and 2/22-3/7/23 ([facility]) for the same. Assessment is that the current presentation is consistent with schizoaffective disorder, bipolar type, current álvaro and psychosis.      Interim History:   The patient's care was discussed with the treatment team and chart notes were reviewed.    Vitals: VSS  Sleep: 6.25 hours (04/28/23 0639)  Scheduled medications: Took all scheduled medications as prescribed  PRN medications: None    Staff Report:   - denied any anxiety or depression, no SI/HI/SIB.   - denied delusions or paranoia  - pacing the hallway  - Minimal interactions with peers.   - Pleasant on approach, cooperative and adherent to medications.   - declined lab draw yesterday afternoon due to paranoia about specific       Subjective:     Patient Interview:  Tello was interviewed in the conference room.     Delusions:  Tello began discussing how to think through life and \"consolidate thoughts.\" He wanted to discuss various topics about the mind and how to process information. His \"chip in his brain\" is still actively communicating with him. He would take long pauses between certain sentences as if he was waiting for the \"chip\" to give him permission to speak. He said that the \"chip\" told him that the  yesterday was dangerous and refused the blood draw. He agreed to the blood draw later in the evening. He was trying to make several philosophical points that were hard to follow.     Mood:   When asked about his mood, Tello said he was \"person\" and " "slept like a \"human.\"    Medications:   Ki said the medications are doing \"well\". Denies any side effects including dystonia, akathisia, or constipation.    Review of systems:   Patient has no bothersome physical symptoms   Patient denies acute concerns    Objective:   /83 (BP Location: Left arm, Patient Position: Sitting, Cuff Size: Adult Regular)   Pulse 103   Temp (P) 97.6  F (36.4  C) (Temporal)   Resp (P) 16   Wt 71.4 kg (157 lb 6.4 oz)   SpO2 (P) 98%   BMI 20.77 kg/m    Weight is 157 lbs 6.4 oz  Body mass index is 20.77 kg/m .    Mental Status Exam:  Oriented to:  Grossly Oriented  General:  Awake and Alert  Appearance:  appears stated age and hair is unkempt despite daily showers with faint odor  Behavior/Attitude:  calm, cooperative (seemingly with reservations at times likely due to delusions)  Eye Contact:  downcast and intermittent staring while discussing his ideas and delusions   Psychomotor: psychomotor excitability, visible intermittent tremor (unclear if voluntary), no evidence of tics, dystonia, akithisia, or tardive dyskinesia no catatonia present.  Speech:  appropriate volume/tone and slightly odd rhythm, took random long pauses between a few sentences  Language: Fluent in English and correct vocabulary   Mood:  \"fine\"  Affect: broad/full, fluctuating between intense/elevated and calm  Thought Process:  Patient has been listening to \"chip in brain.\" Tangential, loose associations at times.  Thought Content:  No expressed SI/SIB/HI. Endorses hearing the chip implanted in his brain and has many \"academic\" ideas. Grandiose delusions.  Insight:  limited due to aware of past delusions, diagnosis, and benefit from medications, but no insight into current psychosis or álvaro. Does recognize diagnosis of schizophrenia  Judgment:  limited - listens to \"chip in brain\"   Impulse control: partial - appropriate behaviors  Attention Span:  adequate for conversation  Concentration:  grossly " intact  Recent and Remote Memory:  grossly intact  Fund of Knowledge:  average, but has medical knowledge beyond that of a regular patient  Muscle Strength and Tone: appears normal  Gait and Station: Normal      Allergies:   No Known Allergies     Labs and imaging:     Data this admission:  BMP normal  CBC normal  UDS negative  TSH 3/9/23 normal  Lipids 2/24/23 unremarkable  Hgb A1c 2/24/23 normal   UA - amorphous crystals, 10 protein albumin, elevated pH   HIV - negative   Ceruloplasmin normal   AILYN - normal   Lyme abs - normal   Treponema  - negative   Folate - normal   B12 - normal   ESR - normal Salicylate, alcohol, Tylenol negative  EKG 3/20: sinus rhythm, QTc 431  Brain MRI - normal     Lithium 4/25: 0.60, Cr without significant change  EKG 4/26 - normal     Psychiatric Assessment and Plan   Primary psychiatric diagnosis:   Schizoaffective disorder, bipolar type, current álvaro    Diagnostic Impression:   Tello Steen is a 27 year old male with a past psychiatric diagnosis of schizophrenia admitted from the ED on 04/15/2023 due to pscyhosis in the context of recent hospitalization 3/26-4/11/23 for psychosis and álvaro. Significant symptoms on admission include delusions about a chip in their brain, delusions of grandeur, delusions of having a special mission with the [employer], and elevated mood. The MSE on admission was pertinent for elevated affect and delusions previously mentioned. Biological contributions to mental health presentation include previous psychiatric diagnosis of schizophrenia. On chart review, there are also mentions of Lithium trials, possibly indicating previous álvaro. Psychosocial contributions to mental health presentation include partial insight, unclear work, housing, and financial situation. Protective factors include medication adherence, lack of substance use, and supportive CCM, good frustration tolerance, and presence of perceived social supports.      In summary, the patient's reported  symptoms of psychosis and álvaro in the context of historical schizophrenia diagnosis and recent hospitalization are consistent with schizoaffective disorder bipolar type.     Per discussion with Tello's mother, Tello is a new name for the patient. His original name is Sophie. His mom mentioned that the new name means the psychotic episode is significant. She tells us that he is a biological male and that this gender pronoun change is new and could be a part of his delusion.     Psychiatric Hospital course:  Tello Steen was admitted to [facility] committed with a Holman. All PTA medications were continued.     Medications:     Neuroleptics: This is his third hospitalization within 4 months, indicating that his symptoms are not adequately controlled with injectable paliperidone. We are considering a new neuroleptic medication for him, either risperidone or clozapine. As they have not had sustained remission of symptoms with trials of two different neuroleptics, clozapine would likely be the most efficacious next choice. While awaiting clozapine to be added to his existing Holman, Tello was initially continued on PTA olanzapine. Due to arm dystonia, Tello's olanzapine was switched to risperidone on 4/21. We also added additional cogentin and propranolol PRNs to help manage any possible extrapyramidal side effects. He tolerated risperidone titration well, although there were some concerns about akithisia due to patient's apparent restlessness and psychomotor agitation. On further exploration, this movement appeared volitional and more related to álvaro rather than akithisia or dystonia. Clozapine was added to the Holman and started on 4/27.     Lithium was started on 4/20 for álvaro, tolerating titration well thus far. Lithium increased from 600 to 900mg on 4/25/23 due to a 0.6 lithium level.     Today's changes:   - Continue clozapine titration and risperidone taper (detailed below)  ---    Clozapine titration:  4/27 - Clozapine  25mg HS, Risperidone 1mg AM & 1.5mg PM   4/28 - Cl: 50mg HS, Risperidone 1mg AM & 1mg PM   4/29 - Cl: 75mg HS, Risperidone 1mg AM & 0.5mg PM   4/30 - Cl: 100mg HS, Risperidone 0.5mg AM & 0.5mg PM   5/1 - Cl: 125mg HS, Risperidone 0.5mg AM  5/2 - Cl: 150mg HS, discontinued risperidone    1. Psychotropic Medications:  Scheduled:  Current Facility-Administered Medications   Medication Dose Route Frequency     cloZAPine  50 mg Oral At Bedtime    Followed by     [START ON 4/29/2023] cloZAPine  75 mg Oral At Bedtime    Followed by     [START ON 4/30/2023] cloZAPine  100 mg Oral At Bedtime    Followed by     [START ON 5/1/2023] cloZAPine  125 mg Oral At Bedtime    Followed by     [START ON 5/2/2023] cloZAPine  150 mg Oral At Bedtime     lithium ER  900 mg Oral At Bedtime     risperiDONE  1 mg Oral Daily    Or     OLANZapine  5 mg Intramuscular Daily     propranolol  10 mg Oral TID     [START ON 4/29/2023] risperiDONE  0.5 mg Oral At Bedtime     [START ON 4/30/2023] risperiDONE  0.5 mg Oral Daily     risperiDONE  1 mg Oral At Bedtime     Vitamin D3  25 mcg Oral Daily     PRN:  Current Facility-Administered Medications   Medication Dose Route Frequency     acetaminophen  650 mg Oral Q4H PRN     alum & mag hydroxide-simethicone  30 mL Oral Q4H PRN     benztropine  0.5 mg Oral BID PRN     melatonin  3 mg Oral At Bedtime PRN     OLANZapine  10 mg Intramuscular TID PRN     polyethylene glycol  17 g Oral Daily PRN     propranolol  10 mg Oral BID PRN     risperiDONE  0.5 mg Sublingual BID PRN     sennosides  8.6 mg Oral BID PRN       2. Pertinent Labs/Monitoring:   Lithium 4/25: 0.60, Cr without significant change  Repeat Lithium level and Cr scheduled for 4/30    EKG 4/26 - normal sinus rhythm, QTc 417  ANC 4/27 - 4.0    3. Additional Plans:  Patient will be treated in therapeutic milieu with appropriate individual and group therapies as described.    Medical Assessment and Plan     Medical diagnoses to be addressed this  admission:    None    Medical course: Patient was physically examined by the ED prior to being transferred to the unit and was found to be medically stable and appropriate for admission.     Consults: None    Checklist     Legal Status: Committed on PD. Holman: haldol, olanzapine, risperidone, paliperidone, and clozapine.    Safety Assessment:   Behavioral Orders   Procedures     Code 1 - Restrict to Unit     Code 2     OK to leave the floor for imaging procedures at the discretion of floor staff     Routine Programming     As clinically indicated     Status 15     Every 15 minutes.       Risk Assessment:  Risk for harm is moderate-high.  Risk factors: impulsive and past behaviors, multiple recent pending  Protective factors: family and engaged in treatment     SIO: No    Disposition: TBD, complicated by patient being from [place]. Pending stabilization, medication optimization, & development of a safe discharge plan.    Attestations     Jez Kelly MS3  [employer]    Resident Attestation:   I was present with the medical student who participated in the service and in the documentation of the note.  I have verified the history and personally performed the physical exam, mental status exam, and medical decision making. I agree with the assessment and plan of care as documented in the note.    Catherine Alvarado MD  PGY-2 Psychiatry Resident    Attending Attestation:

## 2023-04-29 PROCEDURE — 124N000002 HC R&B MH UMMC

## 2023-04-29 PROCEDURE — 250N000013 HC RX MED GY IP 250 OP 250 PS 637: Performed by: STUDENT IN AN ORGANIZED HEALTH CARE EDUCATION/TRAINING PROGRAM

## 2023-04-29 PROCEDURE — 250N000013 HC RX MED GY IP 250 OP 250 PS 637

## 2023-04-29 RX ADMIN — ACETAMINOPHEN 650 MG: 325 TABLET ORAL at 08:41

## 2023-04-29 RX ADMIN — CLOZAPINE 75 MG: 25 TABLET ORAL at 20:34

## 2023-04-29 RX ADMIN — LITHIUM CARBONATE 900 MG: 450 TABLET, EXTENDED RELEASE ORAL at 18:13

## 2023-04-29 RX ADMIN — PROPRANOLOL HYDROCHLORIDE 10 MG: 10 TABLET ORAL at 13:58

## 2023-04-29 RX ADMIN — Medication 25 MCG: at 08:36

## 2023-04-29 RX ADMIN — PROPRANOLOL HYDROCHLORIDE 10 MG: 10 TABLET ORAL at 19:14

## 2023-04-29 RX ADMIN — RISPERIDONE 0.5 MG: 0.5 TABLET ORAL at 20:35

## 2023-04-29 RX ADMIN — RISPERIDONE 1 MG: 1 TABLET ORAL at 08:36

## 2023-04-29 RX ADMIN — PROPRANOLOL HYDROCHLORIDE 10 MG: 10 TABLET ORAL at 08:36

## 2023-04-29 ASSESSMENT — ACTIVITIES OF DAILY LIVING (ADL)
ADLS_ACUITY_SCORE: 28
ADLS_ACUITY_SCORE: 28
LAUNDRY: WITH SUPERVISION
ADLS_ACUITY_SCORE: 28
DRESS: STREET CLOTHES
ADLS_ACUITY_SCORE: 28
HYGIENE/GROOMING: INDEPENDENT
ADLS_ACUITY_SCORE: 28
ADLS_ACUITY_SCORE: 28
ORAL_HYGIENE: INDEPENDENT
ADLS_ACUITY_SCORE: 28

## 2023-04-29 NOTE — PLAN OF CARE
At the beginning of the shift the pt visible in room and occasionally out on milieu pacing on the hallway. Upon approach the pt calm in mood, flat in affect, promptly denied pain and psych symptoms, no concern verbalized at the time and contracted for safety. The pt appeared reading in room and dinning room, and not social with peer/staff. The pt requested HS med to take early and given. The pt later visibly anxious c/o left palm swelling attributing to med side effect and requested for cold pack, upon assessment not apparent swelling visible, no other side effect reported/noted, redirected, and cold pack given. The pt appetite good and adequately hydrated.    /79 (BP Location: Left arm, Patient Position: Sitting, Cuff Size: Adult Regular)   Pulse 78   Temp 97.6  F (36.4  C) (Oral)   Resp 16   Wt 71.4 kg (157 lb 6.4 oz)   SpO2 97%   BMI 20.77 kg/m    Problem: Adult Behavioral Health Plan of Care  Goal: Plan of Care Review  Outcome: Progressing  Flowsheets (Taken 4/29/2023 1600)  Patient Agreement with Plan of Care: agrees     Problem: Anxiety  Goal: Anxiety Reduction or Resolution  Outcome: Progressing     Problem: Depression  Goal: Improved Mood  Outcome: Progressing   Goal Outcome Evaluation:    Plan of Care Reviewed With: patient

## 2023-04-29 NOTE — PLAN OF CARE
Problem: Adult Behavioral Health Plan of Care  Goal: Plan of Care Review  Outcome: Progressing  Flowsheets (Taken 4/29/2023 1419)  Plan of Care Reviewed With: patient  Overall Patient Progress: improving  Patient Agreement with Plan of Care: agrees  Goal: Adheres to Safety Considerations for Self and Others  Outcome: Progressing  Flowsheets (Taken 4/29/2023 1419)  Adheres to Safety Considerations for Self and Others: making progress toward outcome  Goal: Optimized Coping Skills in Response to Life Stressors  Outcome: Progressing  Flowsheets (Taken 4/29/2023 1419)  Optimized Coping Skills in Response to Life Stressors: making progress toward outcome   Goal Outcome Evaluation:      Plan of Care Reviewed With: patient    Overall Patient Progress: improving  Patient has been visible in the milieu.Patient pacing halls all shift long.Patient complained of lower back pain,rated @ 8/10.PRN Tylenol given with relief.patient requested to be seen by doctor,on call MD was paged and he came  & saw patient.  Patient was concerned about medication side effect.Patient is compliant with medication.Denies all MH symptoms.Eats and hydrates adequately.Patient is not sociable with peers.Patient gets to attend some of the groups.Temp: 98  F (36.7  C) Temp src: Oral BP: 111/71 Pulse: 108   Resp: 18 SpO2: 98 % O2 Device: None (Room air)

## 2023-04-29 NOTE — PLAN OF CARE
Problem: Sleep Disturbance  Goal: Adequate Sleep/Rest  Outcome: Progressing   Goal Outcome Evaluation:  Patient appears to have slept for 6.5 hours tonight. Awake an in the hallway briefly but went back to sleep. No complaints of pain or requests for prn's. Safety checks in place.

## 2023-04-29 NOTE — PROGRESS NOTES
"Psychiatry Cross Cover Note  S:  Informed by patient's nurse that patient is reporting lower back pain and would like to talk to the provider regarding side effects of clozapine.     Patient reports that he had back pain and some stiffness when he woke up this morning. Says back pain has subsided after he took Tylenol. He is wondering if clozapine, lithium or risperidone might be causing this and if this is dystonia. He denies fever, headache, dizziness, changes in vision, sialorrhea, nausea, vomiting, cold symptoms, chest pain, palpitations, constipation, diarrhea, urinary retention, restlessness, tremors, abnormal movements. He denies issues with ambulating. Discussed side effects of clozapine, risperidone and Lithium.     O:  Vital signs:  Temp: 98  F (36.7  C) Temp src: Oral BP: 131/81 Pulse: 100   Resp: 18 SpO2: 98 % O2 Device: None (Room air)     Weight: 71.4 kg (157 lb 6.4 oz)  Estimated body mass index is 20.77 kg/m  as calculated from the following:    Height as of 4/13/23: 1.854 m (6' 1\").    Weight as of this encounter: 71.4 kg (157 lb 6.4 oz).    Patient calm and cooperative. Ambulating without issues. No evidence of tremors, dystonia, tics or TD.     A/P:  28 yo male on day 14 of admission for Schizoaffective disorder, bipolar type, current álvaro and day 3 of clozapine titration seen for back pain and concerns of dystonia related to clozapine. Patient's presentation at this time is not consistent with dystonia. No evidence of clozapine side effects at this time. Suspect back pain is related to the bed. Per chart review, prior reported movements appeared volitional and more related to álvaro rather than akithisia or dystonia. Patient is currently on clozapine (EPS is typically rare or uncommon in adults treated with clozapine) and risperidone and has cogentin and propranolol for EPSE. Will monitor back pain. Patient has PRN tylenol for back pain.     Juan Benoit MD  PGY-2 Psychiatry Resident    "

## 2023-04-29 NOTE — PLAN OF CARE
Goal Outcome Evaluation:       Problem: Adult Behavioral Health Plan of Care  Goal: Plan of Care Review  Outcome: Progressing     Problem: Anxiety  Goal: Anxiety Reduction or Resolution  Outcome: Progressing     atient observed pacing the hallway at the beginning of the shift. Withdrawn to self, not social with peers. Denies depression, anxiety, SI/SIB/HI. Pt is eating and hydrating  appropriately. Pt requested to take his night meds early, after dinner pt requested for Lithium, at 1900 pt came back for the rest of his night medications. No PRN given this shift. Pt is complaint with medications. Will continue to monitor.

## 2023-04-30 LAB
CREAT SERPL-MCNC: 0.75 MG/DL (ref 0.67–1.17)
GFR SERPL CREATININE-BSD FRML MDRD: >90 ML/MIN/1.73M2
LITHIUM SERPL-SCNC: 0.6 MMOL/L (ref 0.6–1.2)

## 2023-04-30 PROCEDURE — 82565 ASSAY OF CREATININE: CPT | Performed by: STUDENT IN AN ORGANIZED HEALTH CARE EDUCATION/TRAINING PROGRAM

## 2023-04-30 PROCEDURE — 80178 ASSAY OF LITHIUM: CPT | Performed by: STUDENT IN AN ORGANIZED HEALTH CARE EDUCATION/TRAINING PROGRAM

## 2023-04-30 PROCEDURE — 124N000002 HC R&B MH UMMC

## 2023-04-30 PROCEDURE — 36415 COLL VENOUS BLD VENIPUNCTURE: CPT | Performed by: STUDENT IN AN ORGANIZED HEALTH CARE EDUCATION/TRAINING PROGRAM

## 2023-04-30 PROCEDURE — 250N000013 HC RX MED GY IP 250 OP 250 PS 637: Performed by: STUDENT IN AN ORGANIZED HEALTH CARE EDUCATION/TRAINING PROGRAM

## 2023-04-30 PROCEDURE — 250N000013 HC RX MED GY IP 250 OP 250 PS 637

## 2023-04-30 RX ADMIN — Medication 25 MCG: at 08:12

## 2023-04-30 RX ADMIN — CLOZAPINE 100 MG: 100 TABLET ORAL at 19:22

## 2023-04-30 RX ADMIN — RISPERIDONE 0.5 MG: 0.5 TABLET ORAL at 19:08

## 2023-04-30 RX ADMIN — RISPERIDONE 0.5 MG: 0.5 TABLET ORAL at 08:12

## 2023-04-30 RX ADMIN — PROPRANOLOL HYDROCHLORIDE 10 MG: 10 TABLET ORAL at 08:43

## 2023-04-30 RX ADMIN — LITHIUM CARBONATE 900 MG: 450 TABLET, EXTENDED RELEASE ORAL at 18:25

## 2023-04-30 RX ADMIN — PROPRANOLOL HYDROCHLORIDE 10 MG: 10 TABLET ORAL at 19:09

## 2023-04-30 RX ADMIN — PROPRANOLOL HYDROCHLORIDE 10 MG: 10 TABLET ORAL at 13:50

## 2023-04-30 ASSESSMENT — ACTIVITIES OF DAILY LIVING (ADL)
ADLS_ACUITY_SCORE: 28
ORAL_HYGIENE: INDEPENDENT
ADLS_ACUITY_SCORE: 28
DRESS: STREET CLOTHES
ADLS_ACUITY_SCORE: 28
LAUNDRY: WITH SUPERVISION
ADLS_ACUITY_SCORE: 28
HYGIENE/GROOMING: INDEPENDENT
ADLS_ACUITY_SCORE: 28

## 2023-04-30 NOTE — PLAN OF CARE
The pt appeared with similar presentation pacing on the milieu, reading books in dinning room and alternating resting in room. The pt well engaged in conversation, verbalized improvement, no concern at the time, denied psych symptoms and contracted for safety. The pt verbalized hopeful, goal oriented, advocated for sooner DC and redirected to care team. The pt requested for Lithium lab draw result and updated. The pt comply with med and care. The pt requested to take med early at bed time and given.    Blood pressure 125/78, pulse 92, temperature 98.2  F (36.8  C), temperature source Oral, resp. rate 16, weight 71.4 kg (157 lb 6.4 oz), SpO2 96 %.      Problem: Anxiety  Goal: Anxiety Reduction or Resolution  Outcome: Progressing  Intervention: Promote Anxiety Reduction  Recent Flowsheet Documentation  Taken 4/30/2023 1600 by Anu Benavides RN  Family/Support System Care: self-care encouraged     Problem: Depression  Goal: Improved Mood  Outcome: Progressing  Intervention: Monitor and Manage Depressive Symptoms  Recent Flowsheet Documentation  Taken 4/30/2023 1600 by Anu Benavides RN  Family/Support System Care: self-care encouraged  Problem: Psychotic Signs/Symptoms  Goal: Improved Mood Symptoms  Outcome: Progressing  Intervention: Optimize Emotion and Mood  Recent Flowsheet Documentation  Taken 4/30/2023 1600 by Anu Benavides RN  Diversional Activity:    music    reading   Goal Outcome Evaluation:    Plan of Care Reviewed With: patient

## 2023-04-30 NOTE — PLAN OF CARE
"  Problem: Adult Behavioral Health Plan of Care  Goal: Plan of Care Review  Outcome: Progressing  Flowsheets (Taken 4/30/2023 1035)  Plan of Care Reviewed With: patient  Overall Patient Progress: improving  Patient Agreement with Plan of Care: agrees  Goal: Patient-Specific Goal (Individualization)  Description: You can add care plan individualizations to a care plan. Examples of Individualization might be:  \"Parent requests to be called daily at 9am for status\", \"I have a hard time hearing out of my right ear\", or \"Do not touch me to wake me up as it startles me\".  Outcome: Progressing  Flowsheets (Taken 4/30/2023 1035)  Patient Personal Strengths:    family/social support    expressive of needs    good impulse control    medication/treatment adherence    intellectual cognitive skills    relationship stability     Problem: Anxiety  Goal: Anxiety Reduction or Resolution  Outcome: Progressing  Intervention: Promote Anxiety Reduction  Recent Flowsheet Documentation  Taken 4/30/2023 0822 by Boone Ragsdale RN  Family/Support System Care:    self-care encouraged    involvement promoted     Problem: Sleep Disturbance  Goal: Adequate Sleep/Rest  Outcome: Progressing   Goal Outcome Evaluation:      Plan of Care Reviewed With: patient    Overall Patient Progress: improving  Patient has been visible in the milieu.Pacing halls.Patient does not socialize with peers.Compliant with medication.NO PRN requested.Patient is cooperative with cares.Able to verbalize needs.Adequate intake.Appropriate outlook,clothes appear clean and had a shower  yesterday 4/29/23 per patient.Denies MH issues.  Temp: 97.9  F (36.6  C) Temp src: Oral BP: 130/79 Pulse: 96   Resp: 16 SpO2: 97 % O2 Device: None (Room air)             "

## 2023-04-30 NOTE — PLAN OF CARE
Problem: Sleep Disturbance  Goal: Adequate Sleep/Rest  Outcome: Progressing   Goal Outcome Evaluation:  Patient appears to have slept for 6.5 hours. No complaints of pain or acute concerns for the overnight. No requests for prn's. Safety checks in place.

## 2023-05-01 PROCEDURE — 250N000013 HC RX MED GY IP 250 OP 250 PS 637: Performed by: STUDENT IN AN ORGANIZED HEALTH CARE EDUCATION/TRAINING PROGRAM

## 2023-05-01 PROCEDURE — 99232 SBSQ HOSP IP/OBS MODERATE 35: CPT | Mod: GC | Performed by: PSYCHIATRY & NEUROLOGY

## 2023-05-01 PROCEDURE — 250N000013 HC RX MED GY IP 250 OP 250 PS 637

## 2023-05-01 PROCEDURE — 124N000002 HC R&B MH UMMC

## 2023-05-01 RX ADMIN — LITHIUM CARBONATE 1200 MG: 300 TABLET, EXTENDED RELEASE ORAL at 18:22

## 2023-05-01 RX ADMIN — CLOZAPINE 125 MG: 100 TABLET ORAL at 20:06

## 2023-05-01 RX ADMIN — PROPRANOLOL HYDROCHLORIDE 10 MG: 10 TABLET ORAL at 14:30

## 2023-05-01 RX ADMIN — Medication 25 MCG: at 08:55

## 2023-05-01 RX ADMIN — RISPERIDONE 0.5 MG: 0.5 TABLET ORAL at 08:55

## 2023-05-01 RX ADMIN — PROPRANOLOL HYDROCHLORIDE 10 MG: 10 TABLET ORAL at 19:03

## 2023-05-01 RX ADMIN — ACETAMINOPHEN 650 MG: 325 TABLET ORAL at 08:55

## 2023-05-01 RX ADMIN — PROPRANOLOL HYDROCHLORIDE 10 MG: 10 TABLET ORAL at 08:55

## 2023-05-01 ASSESSMENT — ACTIVITIES OF DAILY LIVING (ADL)
ADLS_ACUITY_SCORE: 28
LAUNDRY: WITH SUPERVISION
ADLS_ACUITY_SCORE: 28
HYGIENE/GROOMING: INDEPENDENT
ADLS_ACUITY_SCORE: 28
ORAL_HYGIENE: INDEPENDENT
ADLS_ACUITY_SCORE: 28
DRESS: STREET CLOTHES
ADLS_ACUITY_SCORE: 28
ADLS_ACUITY_SCORE: 28

## 2023-05-01 NOTE — PLAN OF CARE
The pt visible out on milieu most of the shift with similar presentation pacing on hallway, reading in dinning room and behaviorally controlled. The pt appeared calm in mood, flat in affect, appeared minimally interacting with selected peer and verbalized staying for 1 more week here. The pt denied psych symptoms and contracted for safety. The pt endorsed minimal discomfort pain on back and soft mattress provided. The pt comply with med cheek checking and care. The pt appetite good and adequately hydrated. The pt requested and took for med given early and went to bed.    /74 (BP Location: Left arm, Patient Position: Sitting, Cuff Size: Adult Regular)   Pulse 105   Temp 98.4  F (36.9  C) (Oral)   Resp 16   Wt 71.4 kg (157 lb 6.4 oz)   SpO2 96%   BMI 20.77 kg/m          New lab order: Lithium level on Sat 05/06/2023  Blood specimen must be collected 12h after last Lithium dose (~7 PM)      Problem: Adult Behavioral Health Plan of Care  Goal: Plan of Care Review  Outcome: Progressing  Flowsheets (Taken 5/1/2023 1630)  Overall Patient Progress: improving  Patient Agreement with Plan of Care: agrees     Problem: Anxiety  Goal: Anxiety Reduction or Resolution  Outcome: Progressing  Intervention: Promote Anxiety Reduction  Recent Flowsheet Documentation  Taken 5/1/2023 1630 by Anu Benavides, RN  Family/Support System Care: self-care encouraged     Problem: Depression  Goal: Improved Mood  Outcome: Progressing  Intervention: Monitor and Manage Depressive Symptoms  Recent Flowsheet Documentation  Taken 5/1/2023 1630 by Anu Benavides, RN  Family/Support System Care: self-care encouraged     Problem: Psychotic Signs/Symptoms  Goal: Improved Behavioral Control (Psychotic Signs/Symptoms)  Outcome: Progressing   Goal Outcome Evaluation:    Plan of Care Reviewed With: patient      Overall Patient Progress: improving

## 2023-05-01 NOTE — PROGRESS NOTES
----------------------------------------------------------------------------------------------------------  [facility], [place]   Psychiatric Progress Note  Hospital Day #16    Identifier: Tello Steen is a 27 year old male with previous psychiatric diagnoses of schizophrenia vs schizoaffective disorder, bipolar type who presents with psychosis and álvaro in the context of recent hospitalizations 3/26-4/11/23 ([facility]) and 2/22-3/7/23 ([facility]) for the same. Assessment is that the current presentation is consistent with schizoaffective disorder, bipolar type, current álvaro and psychosis.      Interim History:   The patient's care was discussed with the treatment team and chart notes were reviewed.     Vitals: VSS, intermittently tachy to 108, but seems to sit around baseline near upper limit of normal  Sleep: 6.75 hours (05/01/23 0600)  Scheduled medications: Took all scheduled medications as prescribed  PRN medications: None    Staff Report:   - denied any anxiety or depression, no SI/HI/SIB.   - denied delusions or paranoia  - reported back pain over the weekend, seen by on call provider, likely 2/2 to discomfort from bed  - pacing the milieu, reading  - Minimal interactions with peers.   - Pleasant on approach, cooperative and adherent to medications.      Subjective:     Patient Interview:  Tello was interviewed in the conference room. He denies any acute concerns outside of requesting additional computer time so he can order books from [employer] for continued education. He also requested to have access to the whiteboard in the group therapy room in order to do continuing education work on the [employer]. He has reported an interest in returning to school recently, and inquires about discharge during interview. Discussed with patient continued medication titration and additional 1-2 weeks inpatient with which he seemed frustrated and disappointed. Patient was also counseled  "that computer phone privileges for the purposes of education, and access to group room for whiteboard were not within realm of possibility and capacity for unit staff.      He denies any concerns regarding his medications, and asks about his Lithium level being unchanged from prior and team discussed with patient increasing Lithium further.     When asked about his back pain reported over the weekend, patient states \"there's not much to talk about\" as he associates it mostly with his current bed in the hospital. He is also assured that additional options are available including cogentin (should symptoms be related to EPS), heating pads, movement groups, and a foam mattress topper.     Towards the end of the interview, patient requested to give a speech, and spoke about a book titled \"immunity to change\", appeared excited regarding subject matter.     Medications:   Ki said the medications are doing \"well\". Denies any side effects including dystonia, akathisia, or constipation.      Review of systems:   Patient has no bothersome physical symptoms   Patient denies acute concerns    Objective:   /74 (BP Location: Left arm, Patient Position: Sitting, Cuff Size: Adult Regular)   Pulse 105   Temp 98.4  F (36.9  C) (Oral)   Resp 16   Wt 71.4 kg (157 lb 6.4 oz)   SpO2 96%   BMI 20.77 kg/m    Weight is 157 lbs 6.4 oz  Body mass index is 20.77 kg/m .    Mental Status Exam:  Oriented to:  Grossly Oriented  General:  Awake and Alert  Appearance:  appears stated age, hygiene appears improved from prior exams.   Behavior/Attitude:  calm, cooperative, engaged at certain points in conversation regarding his education goals  Eye Contact:  downcast and intermittent, but increasingly appropriate relative to prior exams   Psychomotor: physical restlessness improved from prior exam, no evidence of tics, dystonia, akithisia, or tardive dyskinesia no catatonia present.  Speech:  appropriate volume/tone and slightly odd rhythm, " "took random long pauses between a few sentences  Language: Fluent in English and correct vocabulary   Mood:  \"fine\"  Affect: Full affect, alternates between reserved and excited with regards to sharing \"speeches\"   Thought Process: Tangential. No evidence of delusional thoughts on interview today, improved from prior. Somewhat disorganized process during academic speech on interview.    Thought Content:  No expressed SI/SIB/HI. Displays grandiosity regarding his fund of academic knowledge  Insight: Does recognize diagnosis of schizophrenia, insight into past delusions, but does not seem to have insight into current psychiatric presentation.   Judgment:  limited - listens to \"chip in brain\", though not discussed today   Impulse control: partial - appropriate behaviors  Attention Span:  adequate for conversation  Concentration:  grossly intact  Recent and Remote Memory:  grossly intact  Fund of Knowledge: above average, but has medical knowledge beyond that of a regular patient  Muscle Strength and Tone: appears normal  Gait and Station: Normal      Allergies:   No Known Allergies     Labs and imaging:     Data this admission:  BMP normal  CBC normal  UDS negative  TSH 3/9/23 normal  Lipids 2/24/23 unremarkable  Hgb A1c 2/24/23 normal   UA - amorphous crystals, 10 protein albumin, elevated pH   HIV - negative   Ceruloplasmin normal   AILYN - normal   Lyme abs - normal   Treponema  - negative   Folate - normal   B12 - normal   ESR - normal Salicylate, alcohol, Tylenol negative  EKG 3/20: sinus rhythm, QTc 431  Brain MRI - normal     Lithium 4/25: 0.60 -> 4/30: 0.60  EKG 4/26 - normal     Psychiatric Assessment and Plan   Primary psychiatric diagnosis:   Schizoaffective disorder, bipolar type, current álvaro    Diagnostic Impression:   Tello FERMÍN Steen is a 27 year old male with a past psychiatric diagnosis of schizophrenia admitted from the ED on 04/15/2023 due to pscyhosis in the context of recent hospitalization " 3/26-4/11/23 for psychosis and álvaro. Significant symptoms on admission include delusions about a chip in their brain, delusions of grandeur, delusions of having a special mission with the [employer], and elevated mood. The MSE on admission was pertinent for elevated affect and delusions previously mentioned. Biological contributions to mental health presentation include previous psychiatric diagnosis of schizophrenia. On chart review, there are also mentions of Lithium trials, possibly indicating previous álvaro. Psychosocial contributions to mental health presentation include partial insight, unclear work, housing, and financial situation. Protective factors include medication adherence, lack of substance use, and supportive CCM, good frustration tolerance, and presence of perceived social supports.      In summary, the patient's reported symptoms of psychosis and álvaro in the context of historical schizophrenia diagnosis and recent hospitalization are consistent with schizoaffective disorder bipolar type.    Psychiatric Hospital course:  Tello Steen was admitted to [facility] committed with a Holman. All PTA medications were continued on admission.     Medications:     Neuroleptics: This is his third hospitalization within 4 months, indicating that his symptoms are not adequately controlled with Invega Sustenna.     Olanzapine: While awaiting clozapine to be added to his existing Holman, Tello was initially continued on PTA olanzapine.     Risperidone: Due to arm dystonia, Tello's olanzapine was switched to risperidone on 4/21. Added PRN cogentin and propranolol for any potential EPS. He tolerated risperidone titration well, although there were some concerns about akithisia due to patient's apparent restlessness and psychomotor agitation. On further exploration, this movement appeared volitional and more related to álvaro rather than akithisia or dystonia. Risperidone discontinued 5/2 with planned cross taper to  clozapine.    Clozapine: Started on 4/27. Continuing to titrate clozapine, tolerating well    Lithium: started on 4/20 for álvaro, tolerating titration well thus far.    Symptoms    Patient appears to be improving with affect and behavior increasingly appropriate and voiced delusions less pronounced though delusions of grandiosity remain, particularly regarding academics. Has been appropriate on the unit.     Today's changes:   - Continue clozapine titration and risperidone taper (detailed below)  - Increase Lithium to 1200 mg daily     Clozapine titration:  4/27 - Clozapine 25mg HS, Risperidone 1mg AM & 1.5mg PM   4/28 - Cl: 50mg HS, Risperidone 1mg AM & 1mg PM   4/29 - Cl: 75mg HS, Risperidone 1mg AM & 0.5mg PM   4/30 - Cl: 100mg HS, Risperidone 0.5mg AM & 0.5mg PM   5/1 - Cl: 125mg HS, Risperidone 0.5mg AM  5/2 - Cl: 150mg HS, discontinue risperidone    1. Psychotropic Medications:  Scheduled:  Current Facility-Administered Medications   Medication Dose Route Frequency     [START ON 5/2/2023] cloZAPine  150 mg Oral At Bedtime     lithium ER  1,200 mg Oral At Bedtime     propranolol  10 mg Oral TID     Vitamin D3  25 mcg Oral Daily     PRN:  Current Facility-Administered Medications   Medication Dose Route Frequency     acetaminophen  650 mg Oral Q4H PRN     alum & mag hydroxide-simethicone  30 mL Oral Q4H PRN     benztropine  0.5 mg Oral BID PRN     melatonin  3 mg Oral At Bedtime PRN     OLANZapine  10 mg Intramuscular TID PRN     polyethylene glycol  17 g Oral Daily PRN     propranolol  10 mg Oral BID PRN     risperiDONE  0.5 mg Sublingual BID PRN     sennosides  8.6 mg Oral BID PRN       2. Pertinent Labs/Monitoring:   -Lithium 4/25: 0.60 -> 4/30: 0.60  -EKG 4/26 - normal sinus rhythm, QTc 417  -ANC 4/27 - 4.0    3. Additional Plans:  Patient will be treated in therapeutic milieu with appropriate individual and group therapies as described.    Medical Assessment and Plan     Medical diagnoses to be addressed  this admission:    None    Medical course: Patient was physically examined by the ED prior to being transferred to the unit and was found to be medically stable and appropriate for admission.     Consults: None    Checklist     Legal Status: Committed on PD. Holman: haldol, olanzapine, risperidone, paliperidone, and clozapine.    Safety Assessment:   Behavioral Orders   Procedures     Code 1 - Restrict to Unit     Code 2     OK to leave the floor for imaging procedures at the discretion of floor staff     Routine Programming     As clinically indicated     Status 15     Every 15 minutes.       Risk Assessment:  Risk for harm is moderate-high.  Risk factors: impulsive and past behaviors, multiple recent pending  Protective factors: family and engaged in treatment     SIO: No    Disposition: TBD, complicated by patient being from [place]. Pending stabilization, medication optimization, & development of a safe discharge plan.    Attestations     Brandon Merrill  MS4, [employer] medical school     Resident Attestation:   I was present with the medical student who participated in the service and in the documentation of the note.  I have verified the history and personally performed the physical exam, mental status exam, and medical decision making. I agree with the assessment and plan of care as documented in the note.    Catherine Alvarado MD  PGY-2 Psychiatry Resident    Attending Attestation:

## 2023-05-01 NOTE — PLAN OF CARE
Problem: Adult Behavioral Health Plan of Care  Goal: Plan of Care Review  Outcome: Progressing  Flowsheets (Taken 5/1/2023 9265)  Plan of Care Reviewed With: patient  Overall Patient Progress: improving  Patient Agreement with Plan of Care: agrees     Problem: Psychotic Signs/Symptoms  Goal: Improved Behavioral Control (Psychotic Signs/Symptoms)  Outcome: Progressing   Goal Outcome Evaluation:      Plan of Care Reviewed With: patient    Overall Patient Progress: improving  Patient has been visible in the milieu.  Patient paced the halls and observed reading books in the DR & talking and laughed @ self.Patient is not sociable with peers.  Patient is medication compliant,PRN given x 1 for back pain with relief.  Patient denies all psych symptoms.Patient had a shower today.  Patient eats well and appears hydrated.  Temp: 97.7  F (36.5  C) Temp src: Temporal BP: 129/83 Pulse: 84   Resp: 16 SpO2: 97 % O2 Device: None (Room air)

## 2023-05-01 NOTE — PROGRESS NOTES
[facility] Occupational Therapy Group Intervention Note     05/01/23 1223   Group Therapy Session   Group Attendance attended group session   Total Time (minutes) 35 (no charge)   Group Type task skill;psychoeducation;life skill   Group Topic Covered coping skills/lifestyle management;relapse prevention;leisure exploration/use of leisure time;cognitive activities   Group Session Detail Clinic - coping skill exploration, creative expression within personally meaningful activities, and observation of social, cognitive, and kinesthetic performance skills   Patient Response/Contribution discussed personal experience with topic   Patient Participation Detail Somewhat restricted affect. Appeared less restless compared to previous sessions with this writer. Congruent with previous dates, opted to work on math problems on the katena. Although, spent the majority of time requesting songs from personal [employer] playlist. Appeared to have a difficult time with perspective taking and other forms of verbal and non-verbal social etiquette. Agreeable to parameters set by writer (shared number of song requests and boundaries around Clinic iPad).      Mary Andrade OT on 5/1/2023 at 12:32 PM

## 2023-05-01 NOTE — PLAN OF CARE
05/01/23 1022   Individualization/Patient Specific Goals   Patient Personal Strengths family/social support;expressive of needs;good impulse control;medication/treatment adherence;intellectual cognitive skills;relationship stability   Patient Vulnerabilities history of unsuccessful treatment;lacks insight into illness;poor impulse control   Anxieties, Fears or Concerns None   Individualized Care Needs None identified   Interprofessional Rounds   Summary 1. Stabilization of thought disorder symptoms 2.Medication mgmt per MD's 3. Safe with self 4. Coordination of care with family/outpatient providers 5. Placement addressed 6. Psych f/u care in place   Participants CTC;nursing;patient;psychiatrist   Behavioral Team Discussion   Participants , Dr. Alvarado, Sujey Lima,  OSCAR, Renay Kuhn MA.LP, Med students   Progress Patient remains psychotic with delusion that there is a chip implanted in his brain and he follows the directions that he is told per the chip. Court granted Holman amendment and patient was started on Clozaril 4/27.  Patient has been compliant with meds   Anticipated length of stay 7-10 days   Continued Stay Criteria/Rationale Acute Psychosis, on civil commitment/Holman   Medical/Physical None   Plan Patient will continue to be seen by  Psychiatry daily.  Meds continues to be reviewed/adjusted per MD as indicated.  Patient refusing IRTS placement.  Will continue contact family for coordination of care  CTC will continue to assess needs, ensure appropriate f/u care is in place   Rationale for change in precautions or plan No change in plan/precautions   Anticipated Discharge Disposition home with family

## 2023-05-01 NOTE — PLAN OF CARE
Problem: Sleep Disturbance  Goal: Adequate Sleep/Rest  Outcome: Progressing   Goal Outcome Evaluation:  Patient appears to have slept for 6.75 hours. No acute events during the night. No complaints of pain  or requests for prn's. Safety checks in place, no concerns for the overnight.

## 2023-05-02 PROCEDURE — 250N000013 HC RX MED GY IP 250 OP 250 PS 637

## 2023-05-02 PROCEDURE — 250N000013 HC RX MED GY IP 250 OP 250 PS 637: Performed by: STUDENT IN AN ORGANIZED HEALTH CARE EDUCATION/TRAINING PROGRAM

## 2023-05-02 PROCEDURE — 99207 PR NO CHARGE LOS: CPT | Performed by: PSYCHIATRY & NEUROLOGY

## 2023-05-02 PROCEDURE — 124N000002 HC R&B MH UMMC

## 2023-05-02 PROCEDURE — H2032 ACTIVITY THERAPY, PER 15 MIN: HCPCS

## 2023-05-02 RX ADMIN — Medication 25 MCG: at 08:42

## 2023-05-02 RX ADMIN — PROPRANOLOL HYDROCHLORIDE 10 MG: 10 TABLET ORAL at 08:43

## 2023-05-02 RX ADMIN — CLOZAPINE 125 MG: 100 TABLET ORAL at 19:04

## 2023-05-02 RX ADMIN — PROPRANOLOL HYDROCHLORIDE 10 MG: 10 TABLET ORAL at 14:01

## 2023-05-02 RX ADMIN — PROPRANOLOL HYDROCHLORIDE 10 MG: 10 TABLET ORAL at 19:04

## 2023-05-02 RX ADMIN — LITHIUM CARBONATE 1200 MG: 300 TABLET, EXTENDED RELEASE ORAL at 18:35

## 2023-05-02 ASSESSMENT — ACTIVITIES OF DAILY LIVING (ADL)
ADLS_ACUITY_SCORE: 28
HYGIENE/GROOMING: SHOWER;INDEPENDENT
ADLS_ACUITY_SCORE: 28
ADLS_ACUITY_SCORE: 28
ORAL_HYGIENE: INDEPENDENT
ADLS_ACUITY_SCORE: 28
DRESS: STREET CLOTHES;INDEPENDENT
ADLS_ACUITY_SCORE: 28

## 2023-05-02 NOTE — PLAN OF CARE
Assessment/Intervention/Current Symtoms and Care Coordination  The patient's care was discussed with the treatment team and chart notes were reviewed.   Patient met with team. Patient has been repeatedly asking to use computer to register for classes, buy books on [employer], go into OT room and use white board etc.  Patient has been attending some groups, reading in [employer].  Has been eating, sleeping well    Writer spoke to Thad BURKS to discuss dispo.  He will come to meet with myself/patient this week.     Discharge Plan or Goal  Unclear at this time whether patient will remain in [place].  Patient in need of close Psychiatric follow up care.     Barriers to Discharge   Severity of symptoms- need for stabilization.  Patient remains psychotic.  Patient is an [place] resident with unclear plan to return to parent's home in [place].     Referral Status  None today     Legal Status     Commitment and Holman order per [facility]- Provisional discharge revoked

## 2023-05-02 NOTE — PROGRESS NOTES
----------------------------------------------------------------------------------------------------------  [facility], [place]   Psychiatric Progress Note  Hospital Day #17    Identifier: Tello Steen is a 27 year old male with previous psychiatric diagnoses of schizophrenia vs schizoaffective disorder, bipolar type who presents with psychosis and álvaro in the context of recent hospitalizations 3/26-4/11/23 ([facility]) and 2/22-3/7/23 ([facility]) for the same. Assessment is that the current presentation is consistent with schizoaffective disorder, bipolar type, current álvaro and psychosis.      Interim History:   The patient's care was discussed with the treatment team and chart notes were reviewed.     Vitals: Patient orthostatic this morning, asymptomatic   Sleep: 7 hours (05/02/23 0600)  Scheduled medications: Took all scheduled medications as prescribed, regularly requests medications early  PRN medications: None    Staff Report:   - reports minimal back pain, soft mattress provided  - continues to discuss school and academics and requests computer access to register for classes  - pacing the milieu, reading mathematics texts, appears less restless  - behaviorally controlled  - Minimal interactions with selected peers      Subjective:     Patient Interview:  Tello was interviewed in the conference room. He denies any concerns and does not request to talk about any specific topics today. When he seemed to be thinking about a question vs. RTIS, he was asked if the implanted chip in his head that has prevented him from talking to staff before was similarly active today, though he declined that it was. When asked if there was anything he wanted to talk about, patient became very excited and animated and began discussing several topics related to academia, philosophy, and mathematics, even drawing equations on the whiteboard in the conference room. He also, in a separate  "encounter on this day, provided staff with two book titles that he would like staff to look into and possibly obtain for him. When asked about episode of orthostasis this morning, patient denies having experienced any symptoms.     Medications:   Denies any medication concerns or side effects.     Review of systems:   Patient has minimal back pain   Patient denies acute concerns    Objective:   /82 (BP Location: Right arm, Patient Position: Sitting, Cuff Size: Adult Regular)   Pulse 91   Temp 97.6  F (36.4  C) (Oral)   Resp 16   Wt 71.4 kg (157 lb 6.4 oz)   SpO2 96%   BMI 20.77 kg/m    Weight is 157 lbs 6.4 oz  Body mass index is 20.77 kg/m .    Mental Status Exam:  Oriented to:  Grossly Oriented  General:  Awake and Alert  Appearance:  appears stated age, hygiene appears improved from prior exams.   Behavior/Attitude:  calm, cooperative, animated at certain points in conversation regarding education  Eye Contact:  downcast and intermittent  Psychomotor: no physical restlessness, no evidence of tics, dystonia, akithisia, or tardive dyskinesia, no catatonia  Speech:  appropriate volume/tone and slightly odd rhythm, took long pauses between a few sentences or was unresponsive to several questions, speech tangential   Language: Fluent in English and correct vocabulary   Mood:  \"fine\"  Affect: Full affect, alternates between reserved and excited with regards to sharing \"speeches\"   Thought Process: Tangential. 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 Displays grandiosity regarding his fund of academic knowledge and appears to be RTIS today  Insight: Does recognize diagnosis of schizophrenia, insight into past delusions, but does not seem to have insight into current psychiatric presentation.   Judgment:  limited - listens to \"chip in brain\", though denies today that chip is responsible for his limited participation in interview " "today  Impulse control: partial - often appropriate, though today is not able to appropriately respond to conversational cues to redirect when patient giving \"speeches\".   Attention Span:  adequate for conversation  Concentration:  grossly intact  Recent and Remote Memory:  grossly intact  Fund of Knowledge: above average, but has medical knowledge beyond that of a regular patient  Muscle Strength and Tone: appears normal  Gait and Station: Normal      Allergies:   No Known Allergies     Labs and imaging:     Data this admission:  BMP normal  CBC normal  UDS negative  TSH 3/9/23 normal  Lipids 2/24/23 unremarkable  Hgb A1c 2/24/23 normal   UA - amorphous crystals, 10 protein albumin, elevated pH   HIV - negative   Ceruloplasmin normal   AILYN - normal   Lyme abs - normal   Treponema  - negative   Folate - normal   B12 - normal   ESR - normal Salicylate, alcohol, Tylenol negative  EKG 3/20: sinus rhythm, QTc 431  Brain MRI - normal     Lithium 4/25: 0.60 -> 4/30: 0.60  EKG 4/26 - normal     Psychiatric Assessment and Plan   Primary psychiatric diagnosis:   Schizoaffective disorder, bipolar type, current álvaro    Diagnostic Impression:   Tello tSeen is a 27 year old male with a past psychiatric diagnosis of schizophrenia admitted from the ED on 04/15/2023 due to pscyhosis in the context of recent hospitalization 3/26-4/11/23 for psychosis and álvaro. Significant symptoms on admission include delusions about a chip in their brain, delusions of grandeur, delusions of having a special mission with the [employer], and elevated mood. The MSE on admission was pertinent for elevated affect and delusions previously mentioned. Biological contributions to mental health presentation include previous psychiatric diagnosis of schizophrenia. On chart review, there are also mentions of Lithium trials, possibly indicating previous álvaro. Psychosocial contributions to mental health presentation include partial insight, unclear work, housing, " and financial situation. Protective factors include medication adherence, lack of substance use, and supportive CCM, good frustration tolerance, and presence of perceived social supports.      In summary, the patient's reported symptoms of psychosis and álvaro in the context of historical schizophrenia diagnosis and recent hospitalization are consistent with schizoaffective disorder bipolar type.    Psychiatric Hospital course:  Tello Steen was admitted to [facility] committed with a Holman. All PTA medications were continued on admission.     Medications:     Neuroleptics: This is his third hospitalization within 4 months, indicating that his symptoms are not adequately controlled with Invega Sustenna.     Olanzapine: While awaiting clozapine to be added to his existing Holman, Tello was initially continued on PTA olanzapine.     Risperidone: Due to arm dystonia, Tello's olanzapine was switched to risperidone on 4/21. Added PRN cogentin and propranolol for any potential EPS. He tolerated risperidone titration well, although there were some concerns about akithisia due to patient's apparent restlessness and psychomotor agitation. On further exploration, this movement appeared volitional and more related to álvaro rather than akithisia or dystonia. Risperidone discontinued 5/2 with planned cross taper to clozapine.    Clozapine: Started on 4/27. Continuing to titrate clozapine, tolerating well    Patient orthostatic on 5/2, will hold clozapine at 125 mg and pause titration. Can consider restarting titration based on clinical symptoms.     Lithium: started on 4/20 for álvaro, tolerating titration well thus far    Symptoms    Patient appears intermittently to be improving with affect and behavior generally increasingly appropriate and voiced delusions less pronounced though delusions of grandiosity remain, particularly regarding academics. Has been appropriate on the unit.     Today's changes:   - Pause clozapine titration  at 125 mg d/t concern for orthostasis   - Discontinue Risperdal as per taper     1. Psychotropic Medications:  Scheduled:  Current Facility-Administered Medications   Medication Dose Route Frequency     cloZAPine  125 mg Oral At Bedtime     lithium ER  1,200 mg Oral At Bedtime     propranolol  10 mg Oral TID     Vitamin D3  25 mcg Oral Daily     PRN:  Current Facility-Administered Medications   Medication Dose Route Frequency     acetaminophen  650 mg Oral Q4H PRN     alum & mag hydroxide-simethicone  30 mL Oral Q4H PRN     benztropine  0.5 mg Oral BID PRN     melatonin  3 mg Oral At Bedtime PRN     OLANZapine  10 mg Intramuscular TID PRN     polyethylene glycol  17 g Oral Daily PRN     propranolol  10 mg Oral BID PRN     risperiDONE  0.5 mg Sublingual BID PRN     sennosides  8.6 mg Oral BID PRN       2. Pertinent Labs/Monitoring:   -Lithium 4/25: 0.60 -> 4/30: 0.60  -EKG 4/26 - normal sinus rhythm, QTc 417  -ANC 4/27 - 4.0    3. Additional Plans:  Patient will be treated in therapeutic milieu with appropriate individual and group therapies as described.    Medical Assessment and Plan     Medical diagnoses to be addressed this admission:    None    Medical course: Patient was physically examined by the ED prior to being transferred to the unit and was found to be medically stable and appropriate for admission.     Consults: None    Checklist     Legal Status: Committed (PD revoked) with Holman. Holman: haldol, olanzapine, risperidone, paliperidone, and clozapine.    Safety Assessment:   Behavioral Orders   Procedures     Code 1 - Restrict to Unit     Code 2     OK to leave the floor for imaging procedures at the discretion of floor staff     Routine Programming     As clinically indicated     Status 15     Every 15 minutes.       Risk Assessment:  Risk for harm is moderate-high.  Risk factors: impulsive and past behaviors, multiple recent pending  Protective factors: family and engaged in treatment     SIO:  No    Disposition: TBD, complicated by patient being from [place]. Pending stabilization, medication optimization, & development of a safe discharge plan.    Attestations     Brandon Merrill  MS4, [employer] medical [employer]     Resident Attestation:   I was present with the medical student who participated in the service and in the documentation of the note.  I have verified the history and personally performed the physical exam, mental status exam, and medical decision making. I agree with the assessment and plan of care as documented in the note.    Catherine Alvarado MD  PGY-2 Psychiatry Resident    Attending Attestation:

## 2023-05-02 NOTE — PLAN OF CARE
Visible in the milieu, tends to keep to himself.  Is able to make needs known. Showered. Paces the hallway frequently, listening to headphone.  Seen at times talking and smiling to himself.  Dismissive  and denies all mental health symptoms including depression, anxiety , hallucination and suicidal thoughts.  When making requests from staff, if not immediately fulfilled or request denied, will approach multiple different staff with the same request.   Had orthostatic changes in blood pressure and pulse this morning,  sitting /83 P 95, standing BP 96/56 P 126.  Denies lightheadedness or dizziness.   Encouraged to drink water. Repeat standing VS 30 minutes later, /70 P 122.  Care team informed of orthostatic changes.     Problem: Anxiety  Goal: Anxiety Reduction or Resolution  Outcome: Progressing     Problem: Depression  Goal: Improved Mood  Outcome: Progressing     Problem: Suicidal Behavior  Goal: Suicidal Behavior is Absent or Managed  Outcome: Progressing     Problem: Adult Behavioral Health Plan of Care  Goal: Plan of Care Review  Recent Flowsheet Documentation  Taken 5/2/2023 1158 by Aixa Irving RN  Patient Agreement with Plan of Care: agrees  Goal: Develops/Participates in Therapeutic Jackson to Support Successful Transition  Intervention: Foster Therapeutic Jackson  Recent Flowsheet Documentation  Taken 5/2/2023 1158 by Aixa Irving RN  Trust Relationship/Rapport: care explained     Problem: Psychotic Signs/Symptoms  Goal: Optimal Cognitive Function (Psychotic Signs/Symptoms)  Intervention: Support and Promote Cognitive Ability  Recent Flowsheet Documentation  Taken 5/2/2023 1158 by Aixa Irving RN  Trust Relationship/Rapport: care explained  Goal: Enhanced Social, Occupational or Functional Skills (Psychotic Signs/Symptoms)  Intervention: Promote Social, Occupational and Functional Ability  Recent Flowsheet Documentation  Taken 5/2/2023 1158 by Aixa Irving RN  Trust  Relationship/Rapport: care explained   Goal Outcome Evaluation:    Plan of Care Reviewed With: patient

## 2023-05-03 PROCEDURE — 124N000002 HC R&B MH UMMC

## 2023-05-03 PROCEDURE — 99232 SBSQ HOSP IP/OBS MODERATE 35: CPT | Mod: GC | Performed by: PSYCHIATRY & NEUROLOGY

## 2023-05-03 PROCEDURE — G0177 OPPS/PHP; TRAIN & EDUC SERV: HCPCS

## 2023-05-03 PROCEDURE — 250N000013 HC RX MED GY IP 250 OP 250 PS 637

## 2023-05-03 PROCEDURE — 250N000013 HC RX MED GY IP 250 OP 250 PS 637: Performed by: STUDENT IN AN ORGANIZED HEALTH CARE EDUCATION/TRAINING PROGRAM

## 2023-05-03 RX ADMIN — CLOZAPINE 125 MG: 100 TABLET ORAL at 19:30

## 2023-05-03 RX ADMIN — PROPRANOLOL HYDROCHLORIDE 10 MG: 10 TABLET ORAL at 09:24

## 2023-05-03 RX ADMIN — LITHIUM CARBONATE 1200 MG: 300 TABLET, EXTENDED RELEASE ORAL at 18:30

## 2023-05-03 RX ADMIN — PROPRANOLOL HYDROCHLORIDE 10 MG: 10 TABLET ORAL at 14:41

## 2023-05-03 RX ADMIN — Medication 25 MCG: at 08:52

## 2023-05-03 RX ADMIN — PROPRANOLOL HYDROCHLORIDE 10 MG: 10 TABLET ORAL at 19:30

## 2023-05-03 ASSESSMENT — ACTIVITIES OF DAILY LIVING (ADL)
ADLS_ACUITY_SCORE: 28
ORAL_HYGIENE: INDEPENDENT
ADLS_ACUITY_SCORE: 28
HYGIENE/GROOMING: HANDWASHING;INDEPENDENT
ADLS_ACUITY_SCORE: 28

## 2023-05-03 NOTE — PLAN OF CARE
Problem: Sleep Disturbance  Goal: Adequate Sleep/Rest  Outcome: Progressing   Goal Outcome Evaluation:  Patient appears to have slept for 7 hours for tonight. No acute events for the overnight. No requests for prn's. No concerns noted during routine safety checks.

## 2023-05-03 NOTE — PROGRESS NOTES
"    ----------------------------------------------------------------------------------------------------------  [facility], [place]   Psychiatric Progress Note  Hospital Day #18    Identifier: Tello Steen is a 27 year old male with previous psychiatric diagnoses of schizophrenia vs schizoaffective disorder, bipolar type who presents with psychosis and álvaro in the context of recent hospitalizations 3/26-4/11/23 ([facility]) and 2/22-3/7/23 ([facility]) for the same. Assessment is that the current presentation is consistent with schizoaffective disorder, bipolar type, current álvaro and psychosis.      Interim History:   The patient's care was discussed with the treatment team and chart notes were reviewed.     Vitals: Patient orthostatic this morning, asymptomatic   Sleep: 7 hours (05/03/23 0600)  Scheduled medications: Took all scheduled medications as prescribed, regularly requests medications early  PRN medications: None    Staff Report:   - pacing the hallway  - blunted, calm  - briefly attended a group  - mildly orthostatic this morning       Subjective:     Patient Interview:  Tello was interviewed in his room. He reports new onset medication concerns, stating that the clozapine last night made him feel \"numb, like I could barely walk\". He denies wanting to return to his prior medication, Invega. When asked about boredom, he says that he keeps himself entertained with studying. He is excited when told that team will try to print out book chapter and requests that we print chapter 3 as that is where he left off. He denies further boredom when asked and points to his head, making comments suggesting auditory hallucinations that keep him entertained. He does not specify. He requests computer access and when told it is likely not possible, he replies \"please don't beat around the bush. If the answer is no, just say no\". Patient then asked if he would like to talk about anything else " "and began telling a story about a time when he \"became a  and was given a substance that changed my personality in an instant\". Patient then gives a brief speech on academic topic.       Review of systems:   Negative unless stated above  Patient denies acute concerns    Objective:   /84   Pulse 114   Temp 97.6  F (36.4  C) (Oral)   Resp 16   Wt 71.4 kg (157 lb 6.4 oz)   SpO2 96%   BMI 20.77 kg/m    Weight is 157 lbs 6.4 oz  Body mass index is 20.77 kg/m .    Mental Status Exam:  Oriented to:  Grossly Oriented  General:  Awake and Alert  Appearance:  appears stated age, hygiene appears improved from prior exams.   Behavior/Attitude:  calm, cooperative, animated at certain points in conversation regarding education  Eye Contact:  Intermittent  Psychomotor: no physical restlessness, no evidence of tics, dystonia, akithisia, or tardive dyskinesia, no catatonia  Speech:  appropriate volume/tone and slightly odd rhythm, took long pauses between a few sentences; speech tangential   Language: Fluent in English and correct vocabulary   Mood:  \"fine\"  Affect: Full affect, alternates between reserved and excited with regards to sharing \"speeches\"   Thought Process: Tangential. 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 Displays grandiosity regarding his fund of academic knowledge and appears to be RTIS today. Implies experiencing auditory hallucinations during interview today  Insight: Does recognize diagnosis of schizophrenia, insight into past delusions, but does not seem to have insight into current psychiatric presentation.   Judgment:  limited - listens to \"chip in brain\"  Impulse control: partial - often appropriate, though today is not able to appropriately respond to conversational cues to redirect when patient giving \"speeches\". Redirectable today when time constraints mentioned  Attention Span:  adequate for " conversation  Concentration:  grossly intact  Recent and Remote Memory:  grossly intact  Fund of Knowledge: above average, but has medical knowledge beyond that of a regular patient  Muscle Strength and Tone: appears normal  Gait and Station: Normal      Allergies:   No Known Allergies     Labs and imaging:   Data this admission:  BMP normal  CBC normal  UDS negative  TSH 3/9/23 normal  Lipids 2/24/23 unremarkable  Hgb A1c 2/24/23 normal   UA - amorphous crystals, 10 protein albumin, elevated pH   HIV - negative   Ceruloplasmin normal   AILYN - normal   Lyme abs - normal   Treponema  - negative   Folate - normal   B12 - normal   ESR - normal Salicylate, alcohol, Tylenol negative  EKG 3/20: sinus rhythm, QTc 431  Brain MRI - normal     Lithium 4/25: 0.60 -> 4/30: 0.60  EKG 4/26 - normal     Psychiatric Assessment and Plan   Primary psychiatric diagnosis:   Schizoaffective disorder, bipolar type, current álvaro    Diagnostic Impression:   Tello Steen is a 27 year old male with a past psychiatric diagnosis of schizophrenia admitted from the ED on 04/15/2023 due to pscyhosis in the context of recent hospitalization 3/26-4/11/23 for psychosis and álvaro. Significant symptoms on admission include delusions about a chip in their brain, delusions of grandeur, delusions of having a special mission with the [employer], and elevated mood. The MSE on admission was pertinent for elevated affect and delusions previously mentioned. Biological contributions to mental health presentation include previous psychiatric diagnosis of schizophrenia. On chart review, there are also mentions of Lithium trials, possibly indicating previous álvaro. Psychosocial contributions to mental health presentation include partial insight, unclear work, housing, and financial situation. Protective factors include medication adherence, lack of substance use, and supportive CCM, good frustration tolerance, and presence of perceived social supports.      In  summary, the patient's reported symptoms of psychosis and álvaro in the context of historical schizophrenia diagnosis and recent hospitalization are consistent with schizoaffective disorder bipolar type.    Psychiatric Hospital course:  Tello Steen was admitted to [facility] committed with a Holman. All PTA medications were continued on admission.     Medications:     Neuroleptics: This is his third hospitalization within 4 months, indicating that his symptoms are not adequately controlled with Invega Sustenna.     Olanzapine: While awaiting clozapine to be added to his existing Holman, Tello was initially continued on PTA olanzapine.     Risperidone: Due to arm dystonia, Tello's olanzapine was switched to risperidone on 4/21. Added PRN cogentin and propranolol for any potential EPS. He tolerated risperidone titration well, although there were some concerns about akithisia due to patient's apparent restlessness and psychomotor agitation. On further exploration, this movement appeared volitional and more related to álvaro rather than akithisia or dystonia. Risperidone discontinued 5/2 with planned cross taper to clozapine.    Clozapine: Started on 4/27. Continuing to titrate clozapine, tolerating well    Patient orthostatic on 5/2, will hold clozapine at 125 mg and pause titration. Can consider restarting titration based on clinical symptoms.     Lithium: started on 4/20 for álvaro, tolerating titration well thus far    Symptoms    Patient appears intermittently to be improving with affect and behavior generally increasingly appropriate and voiced delusions less pronounced though delusions of grandiosity remain, particularly regarding academics. Has been appropriate on the unit.     Today's changes:   - Continue clozapine at 125 mg without titration d/t concern for orthostasis       1. Psychotropic Medications:  Scheduled:  Current Facility-Administered Medications   Medication Dose Route Frequency     cloZAPine  125 mg  Oral At Bedtime     lithium ER  1,200 mg Oral At Bedtime     propranolol  10 mg Oral TID     Vitamin D3  25 mcg Oral Daily     PRN:  Current Facility-Administered Medications   Medication Dose Route Frequency     acetaminophen  650 mg Oral Q4H PRN     alum & mag hydroxide-simethicone  30 mL Oral Q4H PRN     benztropine  0.5 mg Oral BID PRN     melatonin  3 mg Oral At Bedtime PRN     OLANZapine  10 mg Intramuscular TID PRN     polyethylene glycol  17 g Oral Daily PRN     propranolol  10 mg Oral BID PRN     risperiDONE  0.5 mg Sublingual BID PRN     sennosides  8.6 mg Oral BID PRN       2. Pertinent Labs/Monitoring:   -Lithium 4/25: 0.60 -> 4/30: 0.60  -EKG 4/26 - normal sinus rhythm, QTc 417  -ANC 4/27 - 4.0    3. Additional Plans:  Patient will be treated in therapeutic milieu with appropriate individual and group therapies as described.    Medical Assessment and Plan     Medical diagnoses to be addressed this admission:    None    Medical course: Patient was physically examined by the ED prior to being transferred to the unit and was found to be medically stable and appropriate for admission.     Consults: None    Checklist     Legal Status: Committed (PD revoked) with Holman. Holman: haldol, olanzapine, risperidone, paliperidone, and clozapine.    Safety Assessment:   Behavioral Orders   Procedures     Code 1 - Restrict to Unit     Code 2     OK to leave the floor for imaging procedures at the discretion of floor staff     Routine Programming     As clinically indicated     Status 15     Every 15 minutes.       Risk Assessment:  Risk for harm is moderate-high.  Risk factors: impulsive and past behaviors, multiple recent pending  Protective factors: family and engaged in treatment     SIO: No    Disposition: TBD, complicated by patient being from [place]. Pending stabilization, medication optimization, & development of a safe discharge plan.    Attestations     Brandon Merrill  MS4, [employer]

## 2023-05-03 NOTE — PLAN OF CARE
Goal Outcome Evaluation:    Problem: Adult Behavioral Health Plan of Care  Goal: Plan of Care Review  Outcome: Progressing     Patient was visible on unit majority of the shift. Paced the hallway back and forth. Presented with flat and blunt affects with calm mood. Pt denies all psych symptoms. Pt kept to himself, not interacting with peers. Pt attended group briefly and came out.  Appetite is good, eating and drinking adequately. Pt requested his hs meds early. Pt is medication complaint. No PRN requested this shift.

## 2023-05-03 NOTE — PLAN OF CARE
Assessment/Intervention/Current Symtoms and Care Coordination  The patient's care was discussed with the treatment team and chart notes were reviewed.   Patient met with team  Patient has been attending some groups, reading in lounge.  Has been eating, sleeping well.  Compliant with meds     Writer spoke to Thad BURKS to discuss dispo.  He will come to meet with myself/patient this week.  Patient's mother called for update.  She stated that she is thinking about having patient's car (which is in [place]) towed here prior to his discharge.  Mom requested that Dr. Wallace call her for medication/medical update.  Dr. Wallace notified.     Discharge Plan or Goal  Unclear at this time whether patient will remain in [place].  Patient in need of close Psychiatric follow up care.     Barriers to Discharge   Severity of symptoms- need for stabilization.  Patient remains psychotic.  Patient is an [place] resident with unclear plan to return to parent's home in [place].     Referral Status  None today    Legal Status     Commitment and Holman order per [facility]- Provisional discharge revoked

## 2023-05-03 NOTE — PROGRESS NOTES
05/02/23 2000   Group Therapy Session   Time Session Began 2000   Time Session Ended 2045   Total Time (minutes) 10   Total # Attendees 4   Group Type expressive therapy   Group Topic Covered balanced lifestyle;emotions/expression;structured socialization;self-care activities;relaxation techniques   Group Session Detail Music Clinic   Patient Response/Contribution cooperative with task   Patient Participation Detail Pt entered session late, engaged independently with the guitar with minimal assistance for ~10 minutes, then left session.  Affect appeared flat tonight.

## 2023-05-03 NOTE — PLAN OF CARE
Patient has been visible in the milieu.Patient is not sociable with peers.Patient likes to read books in the DR.Patient requested for 2 note books for journal ing and was given.Patient is medication compliant,no prn given.Denies anxiety,depression,SI/SIB/AH/VH.Eats and drinks adequately.Continue with plan of care.   Problem: Adult Behavioral Health Plan of Care  Goal: Develops/Participates in Therapeutic [place] to Support Successful Transition  Intervention: Foster Therapeutic [place]  Recent Flowsheet Documentation  Taken 5/3/2023 1035 by Boone Ragsdale RN  Trust Relationship/Rapport:    care explained    choices provided    questions answered    thoughts/feelings acknowledged     Problem: Anxiety  Goal: Anxiety Reduction or Resolution  Intervention: Promote Anxiety Reduction  Recent Flowsheet Documentation  Taken 5/3/2023 1035 by Boone Ragsdale RN  Family/Support System Care: self-care encouraged     Problem: Depression  Goal: Improved Mood  Intervention: Monitor and Manage Depressive Symptoms  Recent Flowsheet Documentation  Taken 5/3/2023 1035 by Boone Ragsdale RN  Family/Support System Care: self-care encouraged     Problem: Psychotic Signs/Symptoms  Goal: Optimal Cognitive Function (Psychotic Signs/Symptoms)  Intervention: Support and Promote Cognitive Ability  Recent Flowsheet Documentation  Taken 5/3/2023 1035 by Boone Ragsdale RN  Trust Relationship/Rapport:    care explained    choices provided    questions answered    thoughts/feelings acknowledged  Goal: Enhanced Social, Occupational or Functional Skills (Psychotic Signs/Symptoms)  Intervention: Promote Social, Occupational and Functional Ability  Recent Flowsheet Documentation  Taken 5/3/2023 1035 by Boone Ragsdale RN  Trust Relationship/Rapport:    care explained    choices provided    questions answered    thoughts/feelings acknowledged   Goal Outcome Evaluation:

## 2023-05-03 NOTE — PROGRESS NOTES
"BEH Occupational Therapy Group Intervention Note     05/03/23 1506   Group Therapy Session   Group Attendance attended group session   Total Time (minutes) 90   Group Type task skill;psychoeducation   Group Topic Covered coping skills/lifestyle management;relapse prevention;leisure exploration/use of leisure time;emotions/expression   Group Session Detail Clinic - coping skill exploration, creative expression within personally meaningful activities, and observation of social, cognitive, and kinesthetic performance skills   Patient Response/Contribution cooperative with task;organized;discussed personal experience with topic;listened actively   Patient Participation Detail Congruent affect. Opted to work on math problems and listen to music for duration of group. Utilized the Clinic iPad to learn new equations; required 1 VC to stay within boundaries of use (ie attempted to adjust songs within Spotify page). Otherwise, expressed gratitude and utilized appropriately. Reported intention to create / conduct a seminar at the [facility] on [employer]lectual [employer] \"when I have time\". Was observed laughing to self, seemingly provoked by internal stimuli, 2x.      Mary Andrade, OT on 5/3/2023 at 3:10 PM    "

## 2023-05-04 LAB
BASOPHILS # BLD AUTO: 0.1 10E3/UL (ref 0–0.2)
BASOPHILS NFR BLD AUTO: 1 %
EOSINOPHIL # BLD AUTO: 0.3 10E3/UL (ref 0–0.7)
EOSINOPHIL NFR BLD AUTO: 4 %
ERYTHROCYTE [DISTWIDTH] IN BLOOD BY AUTOMATED COUNT: 12.6 % (ref 10–15)
HCT VFR BLD AUTO: 44.4 % (ref 40–53)
HGB BLD-MCNC: 14.9 G/DL (ref 13.3–17.7)
IMM GRANULOCYTES # BLD: 0 10E3/UL
IMM GRANULOCYTES NFR BLD: 0 %
LYMPHOCYTES # BLD AUTO: 1.4 10E3/UL (ref 0.8–5.3)
LYMPHOCYTES NFR BLD AUTO: 20 %
MCH RBC QN AUTO: 29.9 PG (ref 26.5–33)
MCHC RBC AUTO-ENTMCNC: 33.6 G/DL (ref 31.5–36.5)
MCV RBC AUTO: 89 FL (ref 78–100)
MONOCYTES # BLD AUTO: 0.9 10E3/UL (ref 0–1.3)
MONOCYTES NFR BLD AUTO: 12 %
NEUTROPHILS # BLD AUTO: 4.5 10E3/UL (ref 1.6–8.3)
NEUTROPHILS NFR BLD AUTO: 63 %
NRBC # BLD AUTO: 0 10E3/UL
NRBC BLD AUTO-RTO: 0 /100
PLATELET # BLD AUTO: 195 10E3/UL (ref 150–450)
RBC # BLD AUTO: 4.98 10E6/UL (ref 4.4–5.9)
WBC # BLD AUTO: 7.1 10E3/UL (ref 4–11)

## 2023-05-04 PROCEDURE — 99232 SBSQ HOSP IP/OBS MODERATE 35: CPT | Performed by: PSYCHIATRY & NEUROLOGY

## 2023-05-04 PROCEDURE — 124N000002 HC R&B MH UMMC

## 2023-05-04 PROCEDURE — 85025 COMPLETE CBC W/AUTO DIFF WBC: CPT | Performed by: STUDENT IN AN ORGANIZED HEALTH CARE EDUCATION/TRAINING PROGRAM

## 2023-05-04 PROCEDURE — 36415 COLL VENOUS BLD VENIPUNCTURE: CPT | Performed by: STUDENT IN AN ORGANIZED HEALTH CARE EDUCATION/TRAINING PROGRAM

## 2023-05-04 PROCEDURE — G0177 OPPS/PHP; TRAIN & EDUC SERV: HCPCS

## 2023-05-04 PROCEDURE — 250N000013 HC RX MED GY IP 250 OP 250 PS 637: Performed by: STUDENT IN AN ORGANIZED HEALTH CARE EDUCATION/TRAINING PROGRAM

## 2023-05-04 PROCEDURE — 250N000013 HC RX MED GY IP 250 OP 250 PS 637

## 2023-05-04 RX ADMIN — PROPRANOLOL HYDROCHLORIDE 10 MG: 10 TABLET ORAL at 09:14

## 2023-05-04 RX ADMIN — PROPRANOLOL HYDROCHLORIDE 10 MG: 10 TABLET ORAL at 14:11

## 2023-05-04 RX ADMIN — CLOZAPINE 125 MG: 100 TABLET ORAL at 19:13

## 2023-05-04 RX ADMIN — PROPRANOLOL HYDROCHLORIDE 10 MG: 10 TABLET ORAL at 19:13

## 2023-05-04 RX ADMIN — LITHIUM CARBONATE 1200 MG: 300 TABLET, EXTENDED RELEASE ORAL at 18:27

## 2023-05-04 RX ADMIN — Medication 25 MCG: at 09:14

## 2023-05-04 ASSESSMENT — ACTIVITIES OF DAILY LIVING (ADL)
ADLS_ACUITY_SCORE: 28
HYGIENE/GROOMING: HANDWASHING;INDEPENDENT
ADLS_ACUITY_SCORE: 28
ORAL_HYGIENE: INDEPENDENT
ADLS_ACUITY_SCORE: 28

## 2023-05-04 NOTE — PLAN OF CARE
Problem: Anxiety  Goal: Anxiety Reduction or Resolution  Outcome: Progressing     Problem: Depression  Goal: Improved Mood  Outcome: Progressing     Problem: Suicidal Behavior  Goal: Suicidal Behavior is Absent or Managed  Outcome: Progressing   Goal Outcome Evaluation:    Plan of Care Reviewed With: patient        Patient denied any pain or discomfort, ADLs WNL. Observed pacing most of this shift, appeared responding to internal stimuli at times. Flat, brightened upon approach, cooperative and compliant with medications. Able to communicate needs, no reported delusional or paranoid statements. Dismissive during MH assessment, denied depression or anxiety, no SI/HI. Adequate food an fluids intake, able to take a shower.

## 2023-05-04 NOTE — PLAN OF CARE
Occupational Therapy Group Note:     05/04/23 1430   Group Therapy Session   Group Attendance attended group session   Time Session Began 1325   Time Session Ended 1410   Total Time (minutes) 45   Total # Attendees 2-4   Group Type task skill   Group Topic Covered cognitive activities;coping skills/lifestyle management   Group Session Detail OT clinic   Patient Response/Contribution cooperative with task   Patient Participation Detail Pt actively participated in occupational therapy clinic to facilitate coping skill exploration, creative expression within personally meaningful activities, and clinical observation of social, cognitive, and kinesthetic performance skills. Pt response: Independent to initiate, gather materials, sequence, and adjust to workspace demands as needed. Demonstrated good attention to task for selected self-directed, cognitive task utilizing a textbook he brought with him to group. Able to ask for assistance as needed, and socialized with peers and staff. Shared several significant life events that happened specifically when he was 20 years old, including making music and learning the complex math he was working on during group. Polite but somewhat tense at times in interactions. Will continue to assess.

## 2023-05-04 NOTE — PROGRESS NOTES
----------------------------------------------------------------------------------------------------------  [facility], [place]   Psychiatric Progress Note  Hospital Day #19    Identifier: Tello Steen is a 27 year old male with previous psychiatric diagnoses of schizophrenia vs schizoaffective disorder, bipolar type who presents with psychosis and álvaro in the context of recent hospitalizations 3/26-4/11/23 ([facility]) and 2/22-3/7/23 ([facility]) for the same. Assessment is that the current presentation is consistent with schizoaffective disorder, bipolar type, current álvaro and psychosis.      Interim History:   The patient's care was discussed with the treatment team and chart notes were reviewed.     Vitals: Patient orthostatic this morning, asymptomatic   Sleep: 7 hours (05/04/23 0600)  Scheduled medications: Took all scheduled medications as prescribed  PRN medications: None    Staff Report:   - pacing the hallway  - appeared to be RTIS  - blunted, calm  - Does not endorse depression, anxiety, SI/HI.       Subjective:     Patient Interview:  Tello was interviewed in conference room. He asks to speak about an incident that upset him in which he was told by a member of unit staff that he could not use windows and whiteboard marker to draw math equations because it may be triggering to other patients. 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 Patient asks that team request nursing staff to judith him access to window/whiteboards and appears upset when told that it is not possible. He does request that we print whatever we are able of a mathematics title he requested. When asked about medications, he does report feeling numbness and clumsiness in his BUE yesterday and this morning, worse in the AM. He does also note that he feels particularly sedated these last couple of days and that all of these symptoms are  "new to him. When asked about his mother requesting team to call her, patient says that he has an adoptive and a biological family and that she is his biological mother but does not play a significant role in his life and requests that the team not share with her any information outside of discharge planning.     Phone call with mom:  Shared with mom that Tello has rescinded our ability to share details related to treatment planning with her, and could only discuss discharge planning. She states that he is welcome to come home with her in [place]. This writer asked that she consider a way for him to get home that does not require him to drive the 4 hours. She will consider this. Resources emailed to her regarding support for family members of those with SPMI.      Review of systems:   Negative unless stated above    Objective:   /75   Pulse 103   Temp 97.8  F (36.6  C)   Resp 18   Wt 71.5 kg (157 lb 11.2 oz)   SpO2 96%   BMI 20.81 kg/m    Weight is 157 lbs 11.2 oz  Body mass index is 20.81 kg/m .    Mental Status Exam:  Oriented to:  Grossly Oriented  General:  Awake and Alert  Appearance:  appears stated age, hygiene appears improved from prior exams.   Behavior/Attitude:  frustrated, but calm and cooperative  Eye Contact:  Intermittent  Psychomotor: no physical restlessness, no tremors, no evidence of tics, dystonia, akithisia, or tardive dyskinesia, no catatonia  Speech:  appropriate volume/tone  Language: Fluent in English and correct vocabulary   Mood:  \"fine\"  Affect: Blunted affect  Thought Process: Less tangential than previously, and appears more organized  Thought Content:  No expressed SI/SIB/HI. Displays grandiosity regarding his fund of academic knowledge. Does not appear to be RTIS during interview today. Still expresses delusions, today regarding an adoptive and biological family  Insight: Does recognize diagnosis of schizophrenia, insight into past delusions, but does not seem to have insight " "into current psychiatric presentation.   Judgment:  limited - listens to \"chip in brain\"  Impulse control: partial - often appropriate  Attention Span:  adequate for conversation  Concentration:  grossly intact  Recent and Remote Memory:  grossly intact  Fund of Knowledge: above average, but has medical knowledge beyond that of a regular patient  Muscle Strength and Tone: appears normal  Gait and Station: Normal      Allergies:   No Known Allergies     Labs and imaging:   Data this admission:  BMP normal  CBC normal  UDS negative  TSH 3/9/23 normal  Lipids 2/24/23 unremarkable  Hgb A1c 2/24/23 normal   UA - amorphous crystals, 10 protein albumin, elevated pH   HIV - negative   Ceruloplasmin normal   AILYN - normal   Lyme abs - normal   Treponema  - negative   Folate - normal   B12 - normal   ESR - normal Salicylate, alcohol, Tylenol negative  EKG 3/20: sinus rhythm, QTc 431  Brain MRI - normal     Lithium 4/25: 0.60 -> 4/30: 0.60  EKG 4/26 - normal     Psychiatric Assessment and Plan   Primary psychiatric diagnosis:   Schizoaffective disorder, bipolar type, current álvaro    Diagnostic Impression:   Tello Steen is a 27 year old male with a past psychiatric diagnosis of schizophrenia admitted from the ED on 04/15/2023 due to pscyhosis in the context of recent hospitalization 3/26-4/11/23 for psychosis and álvaro. Significant symptoms on admission include delusions about a chip in their brain, delusions of grandeur, delusions of having a special mission with the [employer], and elevated mood. The MSE on admission was pertinent for elevated affect and delusions previously mentioned. Biological contributions to mental health presentation include previous psychiatric diagnosis of schizophrenia. On chart review, there are also mentions of Lithium trials, possibly indicating previous álvaro. Psychosocial contributions to mental health presentation include partial insight, unclear work, housing, and financial situation. Protective " factors include medication adherence, lack of substance use, and supportive CCM, good frustration tolerance, and presence of perceived social supports.      In summary, the patient's reported symptoms of psychosis and álvaro in the context of historical schizophrenia diagnosis and recent hospitalization are consistent with schizoaffective disorder bipolar type.    Psychiatric Hospital course:  Tello Steen was admitted to [facility] committed with a Holman. All PTA medications were continued on admission.     Medications:     Neuroleptics: This is his third hospitalization within 4 months, indicating that his symptoms are not adequately controlled with Invega Sustenna.     Olanzapine: While awaiting clozapine to be added to his existing Holman, Tello was initially continued on PTA olanzapine.     Risperidone: Due to arm dystonia, Tello's olanzapine was switched to risperidone on 4/21. Added PRN cogentin and propranolol for any potential EPS. He tolerated risperidone titration well, although there were some concerns about akithisia due to patient's apparent restlessness and psychomotor agitation. On further exploration, this movement appeared volitional and more related to álvaro rather than akithisia or dystonia. Risperidone discontinued 5/2 with planned cross taper to clozapine.    Clozapine: Started on 4/27. Continuing to titrate clozapine, tolerating well    Patient orthostatic on 5/2, will hold clozapine at 125 mg and pause titration. Can consider restarting titration based on clinical symptoms.     Lithium: started on 4/20 for álvaro, tolerating titration well thus far    Symptoms    Patient appears intermittently to be improving with affect and behavior generally increasingly appropriate and voiced delusions less pronounced though delusions of grandiosity remain, particularly regarding academics. Has been appropriate on the unit.     Psychosocial    5/4/23 patient requests that team not discuss care/treatment with  his mother and limit information sharing to discharge planning. Will ask SW to revise SABRINA      Today's changes:   - Continue clozapine at 125 mg without titration d/t concern for orthostasis         1. Psychotropic Medications:  Scheduled:  Current Facility-Administered Medications   Medication Dose Route Frequency     cloZAPine  125 mg Oral At Bedtime     lithium ER  1,200 mg Oral At Bedtime     propranolol  10 mg Oral TID     Vitamin D3  25 mcg Oral Daily     PRN:  Current Facility-Administered Medications   Medication Dose Route Frequency     acetaminophen  650 mg Oral Q4H PRN     alum & mag hydroxide-simethicone  30 mL Oral Q4H PRN     benztropine  0.5 mg Oral BID PRN     melatonin  3 mg Oral At Bedtime PRN     OLANZapine  10 mg Intramuscular TID PRN     polyethylene glycol  17 g Oral Daily PRN     propranolol  10 mg Oral BID PRN     risperiDONE  0.5 mg Sublingual BID PRN     sennosides  8.6 mg Oral BID PRN       2. Pertinent Labs/Monitoring:   -Lithium 4/25: 0.60 -> 4/30: 0.60  -EKG 4/26 - normal sinus rhythm, QTc 417  -ANC 4/27 - 4.0    3. Additional Plans:  Patient will be treated in therapeutic milieu with appropriate individual and group therapies as described.    Medical Assessment and Plan     Medical diagnoses to be addressed this admission:    None    Medical course: Patient was physically examined by the ED prior to being transferred to the unit and was found to be medically stable and appropriate for admission.     Consults: None    Checklist     Legal Status: Committed (PD revoked) with Holman. Holman: haldol, olanzapine, risperidone, paliperidone, and clozapine.    Safety Assessment:   Behavioral Orders   Procedures     Code 1 - Restrict to Unit     Code 2     OK to leave the floor for imaging procedures at the discretion of floor staff     Routine Programming     As clinically indicated     Status 15     Every 15 minutes.       Risk Assessment:  Risk for harm is moderate-high.  Risk factors:  impulsive and past behaviors, multiple recent pending  Protective factors: family and engaged in treatment     SIO: No    Disposition: TBD, complicated by patient being from [place]. Pending stabilization, medication optimization, & development of a safe discharge plan.    Attestations     Brandon Merrill  MS4, [employer] medical school     I was present with the medical student who participated in the service and in the documentation of the note. I have verified the history and personally performed the physical exam and medical decision making. I agree with the assessment and plan of care as documented in the note .    Renate Wallace MD        ADDENDUM: to add discussion with patient's mom to the note.

## 2023-05-04 NOTE — PLAN OF CARE
Patient appears to have slept for 7 hours for tonight. No PRNs given or requested.  No concerns noted this shift. Remained in his room the entire shift. Will continue to monitor and offer support.     Problem: Sleep Disturbance  Goal: Adequate Sleep/Rest  Outcome: Progressing   Goal Outcome Evaluation:

## 2023-05-04 NOTE — PLAN OF CARE
Assessment/Intervention/Current Symtoms and Care Coordination  The patient's care was discussed with the treatment team and chart notes were reviewed.   Patient met with team.  C/O feeling sedated in AM's, arms feeling numb.  Patient has been attending some groups, reading in lounge.  Has been eating, sleeping well.  Compliant with meds.  Patient appearing somewhat more agitated today. Unfortunately all of his requests (computer, bic pen) are denied and this appears to cause his irritability.      Discharge Plan or Goal  Patient insists that he plans to return home to [place] Patient in need of close Psychiatric follow up care.     Barriers to Discharge   Severity of symptoms- need for stabilization.  Patient remains psychotic.  Meds continue to be monitored/adjusted per MD     Referral Status  None today     Legal Status     Commitment and Holman order per [facility]- Provisional discharge revoked

## 2023-05-04 NOTE — PLAN OF CARE
Problem: Adult Behavioral Health Plan of Care  Goal: Absence of New-Onset Illness or Injury  Outcome: Progressing  Intervention: Identify and Manage Fall Risk  Recent Flowsheet Documentation  Taken 5/4/2023 1631 by Juan Guevara RN  Safety Measures:    environmental rounds completed    safety rounds completed     Problem: Anxiety  Goal: Anxiety Reduction or Resolution  Outcome: Progressing     Problem: Suicidal Behavior  Goal: Suicidal Behavior is Absent or Managed  Outcome: Progressing  Flowsheets (Taken 5/4/2023 1847)  Mutually Determined Action Steps (Suicidal Behavior Absent/Managed): sets future-oriented goal   Goal Outcome Evaluation:    Plan of Care Reviewed With: patient        More engaged with staff members this shift, no interactions with peers observed. Decreased pacing in hallway, most time spent in dining area reading and working on electricity related print outs. Presented as blunted, brightened up on approach. Cooperative and compliant with medications. He denied any pain or discomfort. ADLs, WNL, PO intake adequate. Patient denied anxiety or depression, denied SI/HI, contracted for safety.

## 2023-05-04 NOTE — PLAN OF CARE
Problem: Adult Behavioral Health Plan of Care  Goal: Plan of Care Review  Outcome: Progressing  Flowsheets (Taken 5/4/2023 1430)  Plan of Care Reviewed With: patient  Patient Agreement with Plan of Care: agrees  Goal: Adheres to Safety Considerations for Self and Others  Outcome: Progressing  Flowsheets (Taken 5/4/2023 1430)  Adheres to Safety Considerations for Self and Others: making progress toward outcome  Goal: Develops/Participates in Therapeutic [place] to Support Successful Transition  Outcome: Progressing  Flowsheets (Taken 5/4/2023 1430)  Develops/Participates in Therapeutic [place] to Support Successful Transition: making progress toward outcome  Intervention: Foster Therapeutic [place]  Recent Flowsheet Documentation  Taken 5/4/2023 1251 by Boone Ragsdale RN  Trust Relationship/Rapport:   care explained   choices provided   questions answered   thoughts/feelings acknowledged   Goal Outcome Evaluation:      Plan of Care Reviewed With: patient  Patient has been visible on the milieu.Attended OT group.Medication compliant.NO PRN given.Paced the halls.Occasionally visible in the DR reading.Denies physical pain.Denies all MH issues.NO behavior escalation & no safety issues.Good appetite and hydration.  Temp: 97.8  F (36.6  C) Temp src: Oral BP: 133/84 Pulse: 87   Resp: 18 SpO2: 96 % O2 Device: None (Room air)

## 2023-05-05 PROCEDURE — H2032 ACTIVITY THERAPY, PER 15 MIN: HCPCS

## 2023-05-05 PROCEDURE — 99232 SBSQ HOSP IP/OBS MODERATE 35: CPT | Mod: GC | Performed by: PSYCHIATRY & NEUROLOGY

## 2023-05-05 PROCEDURE — 250N000013 HC RX MED GY IP 250 OP 250 PS 637: Performed by: STUDENT IN AN ORGANIZED HEALTH CARE EDUCATION/TRAINING PROGRAM

## 2023-05-05 PROCEDURE — 124N000002 HC R&B MH UMMC

## 2023-05-05 PROCEDURE — 250N000013 HC RX MED GY IP 250 OP 250 PS 637

## 2023-05-05 RX ADMIN — LITHIUM CARBONATE 1200 MG: 300 TABLET, EXTENDED RELEASE ORAL at 18:20

## 2023-05-05 RX ADMIN — PROPRANOLOL HYDROCHLORIDE 10 MG: 10 TABLET ORAL at 19:10

## 2023-05-05 RX ADMIN — PROPRANOLOL HYDROCHLORIDE 10 MG: 10 TABLET ORAL at 08:29

## 2023-05-05 RX ADMIN — CLOZAPINE 125 MG: 100 TABLET ORAL at 19:10

## 2023-05-05 RX ADMIN — PROPRANOLOL HYDROCHLORIDE 10 MG: 10 TABLET ORAL at 13:13

## 2023-05-05 RX ADMIN — Medication 25 MCG: at 08:29

## 2023-05-05 ASSESSMENT — ACTIVITIES OF DAILY LIVING (ADL)
ADLS_ACUITY_SCORE: 28
ORAL_HYGIENE: INDEPENDENT
ADLS_ACUITY_SCORE: 28
LAUNDRY: WITH SUPERVISION
ADLS_ACUITY_SCORE: 28
ADLS_ACUITY_SCORE: 28
HYGIENE/GROOMING: INDEPENDENT
ADLS_ACUITY_SCORE: 28
DRESS: STREET CLOTHES;INDEPENDENT
ADLS_ACUITY_SCORE: 28

## 2023-05-05 NOTE — PROGRESS NOTES
"    ----------------------------------------------------------------------------------------------------------  [facility], [place]   Psychiatric Progress Note  Hospital Day #20    Identifier: Tello Steen is a 27 year old male with previous psychiatric diagnoses of schizophrenia vs schizoaffective disorder, bipolar type who presents with psychosis and álvaro in the context of recent hospitalizations 3/26-4/11/23 ([facility]) and 2/22-3/7/23 ([facility]) for the same. Assessment is that the current presentation is consistent with schizoaffective disorder, bipolar type, current álvaro and psychosis.      Interim History:   The patient's care was discussed with the treatment team and chart notes were reviewed.     Vitals: VSS   Sleep: 7 hours (05/05/23 0600)   Scheduled medications: Took all scheduled medications as prescribed  PRN medications: None    Staff Report:   - pacing the hallway, though less so than previously  - more interactive than staff  - often reading  - Does not endorse depression, anxiety, SI/HI       Subjective:     Patient Interview:  Tello was interviewed in conference room. He denies any concerns and states that the upper extremity numbness and overall sedation compared to yesterday. Patient does not have any topics he would specifically like to address and becomes frustrated quickly. When asked, patient describes auditory hallucinations with a \"chip in his brain\" that is not a familiar voice and not his own, but is just a \"random voice\". He denies the auditory hallucinations to be bothersome and becomes irritable when asked about them further. Patient quickly becomes frustrated and asks about discharge and when told that team talked to mom exclusively about discharge planning, patient says that the team is becoming two involved with his life. He states that his plan is to return home to [place] to stay with his family and that he will be driving himself home and states " "that he is a \"great \". He denies having any specific issues or questions and when asked how team could be helpful, firmly states that team should stay out of his life. Patient then ended the interview and left the room.     Review of systems:   Negative unless stated above    Objective:   /79 (BP Location: Right arm, Patient Position: Sitting, Cuff Size: Adult Regular)   Pulse 87   Temp 97.3  F (36.3  C) (Oral)   Resp 16   Wt 71.5 kg (157 lb 11.2 oz)   SpO2 97%   BMI 20.81 kg/m    Weight is 157 lbs 11.2 oz  Body mass index is 20.81 kg/m .    Mental Status Exam:  Oriented to:  Grossly Oriented  General:  Awake and Alert  Appearance:  appears stated age, hygiene appears improved from prior exams.   Behavior/Attitude:  irritable, agitated   Eye Contact:  Little  Psychomotor: no tremors, no evidence of tics, dystonia, akithisia, or tardive dyskinesia, no catatonia; pronounced physical restlessness with bouncing arms and frequently pulling at hair.  Speech: latency of responses,   Language: Fluent in English and correct vocabulary   Mood:  \"fine I guess\"  Affect: Blunted affect  Thought Process: Less tangential than previously, and appears more organized  Thought Content:  No expressed SI/SIB/HI. Displays grandiosity regarding his fund of academic knowledge. Does appear to be RTIS and endorses AH during interview of chip in brain with a voice and monologue separate from his own.  Insight: Does recognize diagnosis of schizophrenia, insight into past delusions, but does not seem to have insight into current psychiatric presentation.   Judgment:  limited - listens to \"chip in brain\", does not understand concerns of staff regarding him driving home  Impulse control: partial - often appropriate  Attention Span:  adequate for conversation  Concentration:  grossly intact  Recent and Remote Memory:  grossly intact  Fund of Knowledge: above average, but has medical knowledge beyond that of a regular " patient  Muscle Strength and Tone: appears normal  Gait and Station: Normal      Allergies:   No Known Allergies     Labs and imaging:   Data this admission:  BMP normal  CBC normal  UDS negative  TSH 3/9/23 normal  Lipids 2/24/23 unremarkable  Hgb A1c 2/24/23 normal   UA - amorphous crystals, 10 protein albumin, elevated pH   HIV - negative   Ceruloplasmin normal   AILYN - normal   Lyme abs - normal   Treponema  - negative   Folate - normal   B12 - normal   ESR - normal Salicylate, alcohol, Tylenol negative  EKG 3/20: sinus rhythm, QTc 431  Brain MRI - normal     Lithium 4/25: 0.60 -> 4/30: 0.60  EKG 4/26 - normal     Psychiatric Assessment and Plan   Primary psychiatric diagnosis:   Schizoaffective disorder, bipolar type, current álvaro    Diagnostic Impression:   Tello Steen is a 27 year old male with a past psychiatric diagnosis of schizophrenia admitted from the ED on 04/15/2023 due to pscyhosis in the context of recent hospitalization 3/26-4/11/23 for psychosis and álvaro. Significant symptoms on admission include delusions about a chip in their brain, delusions of grandeur, delusions of having a special mission with the [employer], and elevated mood. The MSE on admission was pertinent for elevated affect and delusions previously mentioned. Biological contributions to mental health presentation include previous psychiatric diagnosis of schizophrenia. On chart review, there are also mentions of Lithium trials, possibly indicating previous álvaro. Psychosocial contributions to mental health presentation include partial insight, unclear work, housing, and financial situation. Protective factors include medication adherence, lack of substance use, and supportive CCM, good frustration tolerance, and presence of perceived social supports.      In summary, the patient's reported symptoms of psychosis and álvaro in the context of historical schizophrenia diagnosis and recent hospitalization are consistent with schizoaffective  disorder bipolar type.    Psychiatric Hospital course:  Tello Steen was admitted to [facility] 20 committed with a Holman. All PTA medications were continued on admission.     Medications:     Neuroleptics: This is his third hospitalization within 4 months, indicating that his symptoms are not adequately controlled with Invega Sustenna.     Olanzapine: While awaiting clozapine to be added to his existing Holman, Tello was initially continued on PTA olanzapine.     Risperidone: Due to arm dystonia, Tello's olanzapine was switched to risperidone on 4/21. Added PRN cogentin and propranolol for any potential EPS. He tolerated risperidone titration well, although there were some concerns about akithisia due to patient's apparent restlessness and psychomotor agitation. On further exploration, this movement appeared volitional and more related to álvaro rather than akithisia or dystonia. Risperidone discontinued 5/2 with planned cross taper to clozapine.    Clozapine: Started on 4/27. Continuing to titrate clozapine, tolerating well    Patient orthostatic on 5/2, will hold clozapine at 125 mg and pause titration. Can consider restarting titration based on clinical symptoms.     Lithium: started on 4/20 for álvaro, tolerating titration well thus far. Lithium level scheduled for 5/6    Symptoms    Patient appears to be improving with less RTIS and improved ability to focus. However, patient continues to endorse and express delusions and AH and judgement does not appear to be improving.    Psychosocial    5/4/23 patient requests that team not discuss care/treatment with his mother and limit information sharing to discharge planning. Will ask SW to revise SABRINA      Today's changes:   - Continue clozapine at 125 mg without titration  - follow Li level tomorrow, plan as follows; verify trough level     If <0.8, increase dose by 150 mg    If 0.8-1.1, maintain current dose    If 1.1-1.2, decrease dose by 150 mg     If >1.2, call staff            1. Psychotropic Medications:  Scheduled:  Current Facility-Administered Medications   Medication Dose Route Frequency     cloZAPine  125 mg Oral At Bedtime     lithium ER  1,200 mg Oral At Bedtime     propranolol  10 mg Oral TID     Vitamin D3  25 mcg Oral Daily     PRN:  Current Facility-Administered Medications   Medication Dose Route Frequency     acetaminophen  650 mg Oral Q4H PRN     alum & mag hydroxide-simethicone  30 mL Oral Q4H PRN     benztropine  0.5 mg Oral BID PRN     melatonin  3 mg Oral At Bedtime PRN     OLANZapine  10 mg Intramuscular TID PRN     polyethylene glycol  17 g Oral Daily PRN     propranolol  10 mg Oral BID PRN     risperiDONE  0.5 mg Sublingual BID PRN     sennosides  8.6 mg Oral BID PRN       2. Pertinent Labs/Monitoring:   -Lithium 4/25: 0.60 -> 4/30: 0.60  -EKG 4/26 - normal sinus rhythm, QTc 417  -ANC 4/27 - 4.0    3. Additional Plans:  Patient will be treated in therapeutic milieu with appropriate individual and group therapies as described.    Medical Assessment and Plan     Medical diagnoses to be addressed this admission:    None    Medical course: Patient was physically examined by the ED prior to being transferred to the unit and was found to be medically stable and appropriate for admission. Patient found to be orthostatic first on 5/2 but has since stabilized and remained asymptomatic.     Consults: None    Checklist     Legal Status: Committed (PD revoked) with Holman. Holman: haldol, olanzapine, risperidone, paliperidone, and clozapine.    Safety Assessment:   Behavioral Orders   Procedures     Code 1 - Restrict to Unit     Code 2     OK to leave the floor for imaging procedures at the discretion of floor staff     Routine Programming     As clinically indicated     Status 15     Every 15 minutes.       Risk Assessment:  Risk for harm is moderate-high.  Risk factors: impulsive and past behaviors, multiple recent pending  Protective factors: family and engaged in  treatment     SIO: No    Disposition: TBD, complicated by patient being from [place]. Pending stabilization, medication optimization, & development of a safe discharge plan.    Attestations     Brandon Merrill  MS4, [employer] medical school

## 2023-05-05 NOTE — PROGRESS NOTES
"05/05/23 1500    Group Therapy Session   Time Session Began 1315   Time Session Ended 1500   Total Time (minutes) 25   Total # Attendees 6   Group Type expressive therapy   Group Topic Covered balanced lifestyle;cognitive activities;relaxation techniques   Group Session Detail Instrument Clinic   Patient Response/Contribution cooperative with task   Patient Participation Detail Actively participated in ukulele, keyboard and guitar music-making options.  Was able to focus on intervention and work to build skills of self-esteem and mastery on the instruments through personal experimentation with the instruments. Engaged affect.      Also offered some light guitar coaching to peer who was learning, which was a positive, constructive interaction, as pt usually tends to be \"in their own world\", and not seeking out interactions with others.        "

## 2023-05-05 NOTE — PLAN OF CARE
Patient was visible in the milieu this shift. Patient was calm on approach, appeared to be withdrawn at times. Patient denied SI/SIB/HI/AVH. Patient appeared to be flat/guarded. Patient was compliant with medications, denied medication adverse effects. Patient denied anxiety/depression. Patient denied pain. Patient endorsed feeling safe on the unit, is eating and drinking adequately. Patient endorsed having no issues with sleep. No behavioral issues observed or reported. Patient was not seen engaging with peers. Patient's orthostatic BP earlier was 137/86-HR for sitting and 93/63b for standing. This writer went to recheck it again , patient only allowed writer to check standing which was 127/77- and left. Will continue to monitor and assess needs.    Problem: Adult Behavioral Health Plan of Care  Goal: Plan of Care Review  Outcome: Progressing    Plan of Care Reviewed With: Patient

## 2023-05-05 NOTE — PLAN OF CARE
Assessment/Intervention/Current Symtoms and Care Coordination  The patient's care was discussed with the treatment team and chart notes were reviewed.   Patient met with team.  Less pacing observed. Was able to use computer do work on math equations.  Used computer appropriately.  Patient has been attending some groups, reading in lounge.  Has been eating, sleeping well.  Compliant with meds.    Discharge Plan or Goal  Patient insists that he plans to return home to [place] Patient in need of close Psychiatric follow up care.     Barriers to Discharge   Severity of symptoms- need for stabilization.  Patient remains psychotic.  Meds continue to be monitored/adjusted per MD     Referral Status  None today     Legal Status     Commitment and Holman order per [facility]- Provisional discharge revoked

## 2023-05-05 NOTE — PLAN OF CARE
05/04/23 2316   Group Therapy Session   Group Attendance refused to attend group session   Time Session Began 1615   Time Session Ended 1715   Total Time (minutes) 0   Total # Attendees 3   Group Type psychoeducation   Group Topic Covered coping skills/lifestyle management   Group Session Detail CTC-Group members completed/discussed worksheet on creating goals starting with 1 week, 1 month, 5 months, and 5 years.

## 2023-05-05 NOTE — PLAN OF CARE
Pt appears to have slept for 7 hours. Denied SI/SIB or hallucinations; denied pain at this time. No concerns noted. Remain in her room the entire shift. Will continue to monitor and offer support.     Problem: Sleep Disturbance  Goal: Adequate Sleep/Rest  Outcome: Progressing   Goal Outcome Evaluation:

## 2023-05-06 LAB — LITHIUM SERPL-SCNC: 0.9 MMOL/L (ref 0.6–1.2)

## 2023-05-06 PROCEDURE — 124N000002 HC R&B MH UMMC

## 2023-05-06 PROCEDURE — 250N000013 HC RX MED GY IP 250 OP 250 PS 637: Performed by: STUDENT IN AN ORGANIZED HEALTH CARE EDUCATION/TRAINING PROGRAM

## 2023-05-06 PROCEDURE — 80178 ASSAY OF LITHIUM: CPT | Performed by: STUDENT IN AN ORGANIZED HEALTH CARE EDUCATION/TRAINING PROGRAM

## 2023-05-06 PROCEDURE — 250N000013 HC RX MED GY IP 250 OP 250 PS 637

## 2023-05-06 PROCEDURE — 36415 COLL VENOUS BLD VENIPUNCTURE: CPT | Performed by: STUDENT IN AN ORGANIZED HEALTH CARE EDUCATION/TRAINING PROGRAM

## 2023-05-06 RX ADMIN — LITHIUM CARBONATE 1200 MG: 300 TABLET, EXTENDED RELEASE ORAL at 18:16

## 2023-05-06 RX ADMIN — PROPRANOLOL HYDROCHLORIDE 10 MG: 10 TABLET ORAL at 19:24

## 2023-05-06 RX ADMIN — PROPRANOLOL HYDROCHLORIDE 10 MG: 10 TABLET ORAL at 09:12

## 2023-05-06 RX ADMIN — CLOZAPINE 125 MG: 100 TABLET ORAL at 19:24

## 2023-05-06 RX ADMIN — PROPRANOLOL HYDROCHLORIDE 10 MG: 10 TABLET ORAL at 14:16

## 2023-05-06 RX ADMIN — Medication 25 MCG: at 09:13

## 2023-05-06 ASSESSMENT — ACTIVITIES OF DAILY LIVING (ADL)
ADLS_ACUITY_SCORE: 28
HYGIENE/GROOMING: INDEPENDENT
ADLS_ACUITY_SCORE: 28
ORAL_HYGIENE: INDEPENDENT

## 2023-05-06 NOTE — PLAN OF CARE
Pt visible in the milieu, mood is calm and cooperative on approach. Pt isolative with no interaction with peers, denied pain, anxiety, SI, SIB, hallucination and all other psyche symptoms. Pt compliant with medication, ate and drunk adequately, safety was contracted, no behavioral issues noted, will continue with the plan of care.    Problem: Anxiety  Goal: Anxiety Reduction or Resolution  Outcome: Progressing     Problem: Sleep Disturbance  Goal: Adequate Sleep/Rest  Outcome: Progressing   Goal Outcome Evaluation:    Plan of Care Reviewed With: patient                    yes

## 2023-05-06 NOTE — PLAN OF CARE
"  Problem: Anxiety  Goal: Anxiety Reduction or Resolution  Outcome: Progressing     Problem: Depression  Goal: Improved Mood  Outcome: Progressing     Problem: Adult Behavioral Health Plan of Care  Goal: Absence of New-Onset Illness or Injury  Outcome: Progressing  Intervention: Identify and Manage Fall Risk  Recent Flowsheet Documentation  Taken 5/6/2023 1630 by Juan Guevara, RN  Safety Measures:    environmental rounds completed    safety rounds completed   Goal Outcome Evaluation:    Plan of Care Reviewed With: patient        Pleasant, bright, cooperative and compliant with medications. Verbalized feeling ready to discharge and looking forwards to working on discharge planning with provider and SW. Decreased pacing, able to read at dinning room table. Able to communicate needs, no interactions with peers. He denied pain or discomfort, ADLs WNL. Adequate PO intake. Denied anxiety or depression, denied SI/HI, contracted for safety. Acknowledged effectiveness of Clazaril \"I feel like Clozaril is working, I feel calmer inside, I'm even able to sit down and watch a movie\"             "

## 2023-05-06 NOTE — PLAN OF CARE
"  Pt is intermittently visible in the milieu, comes out for meals and medications. Pt is eating and hydrating well.  Paced the hallways a few times. He sits off and on by the [facility] area,  reading his book, then goes back to his room. During assessment, pt denied anxiety, depression, SI and hallucinations. When asked if he had goals for the day, he said \" Yes, but I dont feel like sharing\". Pt was dismissive with [facility] staff.  Pt was observed to be smiling to self while he was at the dining room. Pt was able to take shower during this shift.    Problem: Adult Behavioral Health Plan of Care  Goal: Patient-Specific Goal (Individualization)  Description: You can add care plan individualizations to a care plan. Examples of Individualization might be:  \"Parent requests to be called daily at 9am for status\", \"I have a hard time hearing out of my right ear\", or \"Do not touch me to wake me up as it startles me\".  Outcome: Progressing  Goal: Adheres to Safety Considerations for Self and Others  Outcome: Progressing  Intervention: Develop and Maintain Individualized Safety Plan  Recent Flowsheet Documentation  Taken 5/6/2023 1200 by Faith Jha RN  Safety Measures:    safety rounds completed    safety plan reviewed     Problem: Anxiety  Goal: Anxiety Reduction or Resolution  Outcome: Progressing     Problem: Depression  Goal: Improved Mood  Outcome: Progressing     Problem: Psychotic Signs/Symptoms  Goal: Improved Behavioral Control (Psychotic Signs/Symptoms)  Outcome: Progressing     Problem: Suicidal Behavior  Goal: Suicidal Behavior is Absent or Managed  Outcome: Progressing     Problem: Sleep Disturbance  Goal: Adequate Sleep/Rest  Outcome: Progressing   Goal Outcome Evaluation:    Plan of Care Reviewed With: patient                   "

## 2023-05-06 NOTE — PLAN OF CARE
Pt appears to have slept for 7 hours. Denied SI/SIB or hallucinations; denied pain at this time. No concerns noted. Will continue to monitor and offer support.     Problem: Sleep Disturbance  Goal: Adequate Sleep/Rest  Outcome: Progressing   Goal Outcome Evaluation:

## 2023-05-07 PROCEDURE — 250N000013 HC RX MED GY IP 250 OP 250 PS 637

## 2023-05-07 PROCEDURE — 124N000002 HC R&B MH UMMC

## 2023-05-07 PROCEDURE — 250N000013 HC RX MED GY IP 250 OP 250 PS 637: Performed by: STUDENT IN AN ORGANIZED HEALTH CARE EDUCATION/TRAINING PROGRAM

## 2023-05-07 RX ADMIN — PROPRANOLOL HYDROCHLORIDE 10 MG: 10 TABLET ORAL at 09:12

## 2023-05-07 RX ADMIN — PROPRANOLOL HYDROCHLORIDE 10 MG: 10 TABLET ORAL at 19:12

## 2023-05-07 RX ADMIN — LITHIUM CARBONATE 1200 MG: 300 TABLET, EXTENDED RELEASE ORAL at 18:21

## 2023-05-07 RX ADMIN — PROPRANOLOL HYDROCHLORIDE 10 MG: 10 TABLET ORAL at 14:17

## 2023-05-07 RX ADMIN — CLOZAPINE 125 MG: 100 TABLET ORAL at 19:09

## 2023-05-07 RX ADMIN — Medication 25 MCG: at 09:12

## 2023-05-07 NOTE — PLAN OF CARE
Pt appears to have slept for  7 hours. Denied SI/SIB or hallucinations; denied pain. No concerns noted. Will continue to monitor and offer support.     Problem: Sleep Disturbance  Goal: Adequate Sleep/Rest  Outcome: Progressing   Goal Outcome Evaluation:

## 2023-05-07 NOTE — PLAN OF CARE
"Pt is visible in the milieu. He was out for meals and medication. Pt is medication compliant. He ate 100% of his breakfast and about 25 % of  his lunch. Pt spent time sitting by the dining area reading his book. He also paced a little  and was observed smiling to himself.  Pt appeared to be responding to internal stimuli. Pt listened to music via head phones while pacing the halls. He was minimally interactive with peers. Dismissive with staff. He denied anxiety, depression, SI and hallucinations.      Problem: Adult Behavioral Health Plan of Care  Goal: Patient-Specific Goal (Individualization)  Description: You can add care plan individualizations to a care plan. Examples of Individualization might be:  \"Parent requests to be called daily at 9am for status\", \"I have a hard time hearing out of my right ear\", or \"Do not touch me to wake me up as it startles me\".  Outcome: Progressing     Problem: Anxiety  Goal: Anxiety Reduction or Resolution  Outcome: Progressing     Problem: Depression  Goal: Improved Mood  Outcome: Progressing     Problem: Psychotic Signs/Symptoms  Goal: Improved Behavioral Control (Psychotic Signs/Symptoms)  Outcome: Progressing     Problem: Suicidal Behavior  Goal: Suicidal Behavior is Absent or Managed  Outcome: Progressing     Problem: Sleep Disturbance  Goal: Adequate Sleep/Rest  Outcome: Progressing   Goal Outcome Evaluation:    Plan of Care Reviewed With: patient                   "

## 2023-05-07 NOTE — PLAN OF CARE
Problem: Depression  Goal: Improved Mood  Outcome: Progressing     Problem: Anxiety  Goal: Anxiety Reduction or Resolution  Outcome: Progressing     Problem: Adult Behavioral Health Plan of Care  Goal: Absence of New-Onset Illness or Injury  Outcome: Progressing  Intervention: Identify and Manage Fall Risk  Recent Flowsheet Documentation  Taken 5/7/2023 1700 by Juan Guevara RN  Safety Measures:    safety rounds completed    environmental rounds completed   Goal Outcome Evaluation:    Plan of Care Reviewed With: patient        Alert and oriented x3, able to communicate needs. ADLs WNL. Adequate PO intake. Visible in milieu, pacing the hallway, smiling to self at times. Occasionally reading in the dinning area. No noted interactions with peers. He denied any pain or discomfort. Patient denied anxiety or depression, denied SI/HI/SIB, contracted for safety. Pleasant, cooperative and compliant with medications.

## 2023-05-08 PROCEDURE — 124N000002 HC R&B MH UMMC

## 2023-05-08 PROCEDURE — 250N000013 HC RX MED GY IP 250 OP 250 PS 637

## 2023-05-08 PROCEDURE — G0177 OPPS/PHP; TRAIN & EDUC SERV: HCPCS

## 2023-05-08 PROCEDURE — 250N000013 HC RX MED GY IP 250 OP 250 PS 637: Performed by: STUDENT IN AN ORGANIZED HEALTH CARE EDUCATION/TRAINING PROGRAM

## 2023-05-08 PROCEDURE — 99232 SBSQ HOSP IP/OBS MODERATE 35: CPT | Performed by: STUDENT IN AN ORGANIZED HEALTH CARE EDUCATION/TRAINING PROGRAM

## 2023-05-08 RX ADMIN — CLOZAPINE 150 MG: 100 TABLET ORAL at 19:25

## 2023-05-08 RX ADMIN — Medication 25 MCG: at 09:04

## 2023-05-08 RX ADMIN — PROPRANOLOL HYDROCHLORIDE 10 MG: 10 TABLET ORAL at 09:04

## 2023-05-08 RX ADMIN — LITHIUM CARBONATE 1200 MG: 300 TABLET, EXTENDED RELEASE ORAL at 18:35

## 2023-05-08 RX ADMIN — ACETAMINOPHEN 650 MG: 325 TABLET ORAL at 10:36

## 2023-05-08 ASSESSMENT — ACTIVITIES OF DAILY LIVING (ADL)
ADLS_ACUITY_SCORE: 28

## 2023-05-08 NOTE — PROGRESS NOTES
"    ----------------------------------------------------------------------------------------------------------  [facility], [place]   Psychiatric Progress Note  Hospital Day #23    Identifier: Tello Steen is a 27 year old male with previous psychiatric diagnoses of schizophrenia vs schizoaffective disorder, bipolar type who presents with psychosis and álvaro in the context of recent hospitalizations 3/26-4/11/23 ([facility]) and 2/22-3/7/23 ([facility]) for the same. Assessment is that the current presentation is consistent with schizoaffective disorder, bipolar type, current álvaro and psychosis.      Interim History:   The patient's care was discussed with the treatment team and chart notes were reviewed.     Vitals: VSS   Sleep: 7 hours (05/08/23 0600)   Scheduled medications: Took all scheduled medications as prescribed  PRN medications: None    Staff Report:   - pacing the hallway, though less so than previously  - more interactive than staff  - often reading  - Does not endorse depression, anxiety, SI/HI       Subjective:     Patient Interview:  Tello was interviewed in his room. He denies any concerns and states that the upper extremity numbness and overall sedation from clozapine have stopped. He is agreeable to discontinuing the propranolol. Unprompted, patient says that he feels the clozapine has been helpful as he feels calmer and like his mind has quieted and his attention improved. However, he cannot elucidate whether he continues to experience hallucinations. When asked about his mother and discussing only discharge planning with her vs. Treatment course, patient is ok with us talking to mother regarding treatment saying \"it doesn't matter\". When asked to clarify what he means by \"it doesn't matter\", he says \"I think you're reading too much into it. I'm quoting the chip directly\" indicating ongoing auditory hallucinations. Patient does not endorse experiencing any " "symptoms of orthostasis today. He also states that he feels prepared to go home and does not want any further medication changes as he feels improved and there is no need to \"over-\".     Review of systems:   Negative unless stated above    Objective:   /81 (BP Location: Left arm, Patient Position: Sitting, Cuff Size: Adult Regular)   Pulse 86   Temp 97.5  F (36.4  C) (Oral)   Resp 16   Wt 72.4 kg (159 lb 11.2 oz)   SpO2 96%   BMI 21.07 kg/m    Weight is 159 lbs 11.2 oz  Body mass index is 21.07 kg/m .    Mental Status Exam:  Oriented to:  Grossly Oriented  General:  Awake and Alert  Appearance:  appears stated age, hygiene appears worsened from most recent interviews   Behavior/Attitude: Overall calm, intermittently frustrated and irritable  Eye Contact:  Little  Psychomotor: no tremors, no evidence of tics, dystonia, akithisia, or tardive dyskinesia, no catatonia; pronounced physical restlessness as frustration progresses.  Speech: latency of responses,   Language: Fluent in English and correct vocabulary   Mood:  \"fine\"  Affect: Blunted affect, irritable  Thought Process: Less tangential than previously, and appears more organized  Thought Content:  No expressed SI/SIB/HI. Displays grandiosity regarding his fund of academic knowledge. Does appear to be RTIS and endorses AH during interview of chip in brain with a voice  Insight: Does recognize diagnosis of schizophrenia, insight into past delusions, but does not seem to have insight into current psychiatric presentation and ongoing symptoms or need for medication management  Judgment:  limited - listens to \"chip in brain\", does not seem to be able to assess utility of additional medication increases  Attention Span:  adequate for conversation  Concentration:  grossly intact  Recent and Remote Memory:  grossly intact  Fund of Knowledge: above average, but has medical knowledge beyond that of a regular patient  Muscle Strength and Tone: appears " normal  Gait and Station: Normal      Allergies:   No Known Allergies     Labs and imaging:   Data this admission:  BMP normal  CBC normal  UDS negative  TSH 3/9/23 normal  Lipids 2/24/23 unremarkable  Hgb A1c 2/24/23 normal   UA - amorphous crystals, 10 protein albumin, elevated pH   HIV - negative   Ceruloplasmin normal   AILYN - normal   Lyme abs - normal   Treponema  - negative   Folate - normal   B12 - normal   ESR - normal Salicylate, alcohol, Tylenol negative  EKG 3/20: sinus rhythm, QTc 431  Brain MRI - normal     Lithium 4/25: 0.60                4/30: 0.60               5/08: 0.90   EKG 4/26 - normal     Psychiatric Assessment and Plan   Primary psychiatric diagnosis:   Schizoaffective disorder, bipolar type, current álvaro    Diagnostic Impression:   Tello Steen is a 27 year old male with a past psychiatric diagnosis of schizophrenia admitted from the ED on 04/15/2023 due to pscyhosis in the context of recent hospitalization 3/26-4/11/23 for psychosis and álvaro. Significant symptoms on admission include delusions about a chip in their brain, delusions of grandeur, delusions of having a special mission with the [employer], and elevated mood. The MSE on admission was pertinent for elevated affect and delusions previously mentioned. Biological contributions to mental health presentation include previous psychiatric diagnosis of schizophrenia. On chart review, there are also mentions of Lithium trials, possibly indicating previous álvaro. Psychosocial contributions to mental health presentation include partial insight, unclear work, housing, and financial situation. Protective factors include medication adherence, lack of substance use, and supportive CCM, good frustration tolerance, and presence of perceived social supports.      In summary, the patient's reported symptoms of psychosis and álvaro in the context of historical schizophrenia diagnosis and recent hospitalization are consistent with schizoaffective  disorder bipolar type.    Psychiatric Hospital course:  Tello Steen was admitted to [facility] committed with a Holman. All PTA medications were continued on admission.   Medications:   Neuroleptics: This is his third hospitalization within 4 months, indicating that his symptoms are not adequately controlled with Invega Sustenna.   Olanzapine: While awaiting clozapine to be added to his existing Holman, Tello was initially continued on PTA olanzapine.   Risperidone: Due to arm dystonia, Tello's olanzapine was switched to risperidone on 4/21. Added PRN cogentin and propranolol for any potential EPS. He tolerated risperidone titration well, although there were some concerns about akithisia due to patient's apparent restlessness and psychomotor agitation. On further exploration, this movement appeared volitional and more related to álvaro rather than akithisia or dystonia. Risperidone discontinued 5/2 with planned cross taper to clozapine.  Clozapine: Started on 4/27. Continuing to titrate clozapine, has had some orthostasis  Clozapine held at 125 mg d/t orthostasis, but given ongoing symptoms, increased on 5/8 to 150 mg.   Propranolol discontinued d/t orthostasis with clozapine    Lithium: started on 4/20 for álvaro, tolerating titration well thus far. Lithium level on 5/6 returned at 0.9. Therapeutic level indicating no need for continued titration at this time  Symptoms  Patient appears to be improving with less RTIS and improved ability to focus. However, patient continues to endorse and express delusions and AH and judgement does not appear to be improving.  Psychosocial  5/4/23 patient requests that team not discuss care/treatment with his mother and limit information sharing to discharge planning. Will ask SW to revise SABRINA      Today's changes:   Increase clozapine to 150 mg from 125 mg  Discontinue propranolol d/t orthostasis      Psychotropic Medications:  Scheduled:  Current Facility-Administered Medications    Medication Dose Route Frequency    cloZAPine  150 mg Oral At Bedtime    lithium ER  1,200 mg Oral At Bedtime    Vitamin D3  25 mcg Oral Daily     PRN:  Current Facility-Administered Medications   Medication Dose Route Frequency    acetaminophen  650 mg Oral Q4H PRN    alum & mag hydroxide-simethicone  30 mL Oral Q4H PRN    benztropine  0.5 mg Oral BID PRN    melatonin  3 mg Oral At Bedtime PRN    OLANZapine  10 mg Intramuscular TID PRN    polyethylene glycol  17 g Oral Daily PRN    propranolol  10 mg Oral BID PRN    risperiDONE  0.5 mg Sublingual BID PRN    sennosides  8.6 mg Oral BID PRN       2. Pertinent Labs/Monitoring:   -Lithium 4/25: 0.60 -> 4/30: 0.60  -EKG 4/26 - normal sinus rhythm, QTc 417  -ANC 4/27 - 4.0  - Lithium 5/6: 0.9    3. Additional Plans:  Patient will be treated in therapeutic milieu with appropriate individual and group therapies as described.    Medical Assessment and Plan     Medical diagnoses to be addressed this admission:    None    Medical course: Patient was physically examined by the ED prior to being transferred to the unit and was found to be medically stable and appropriate for admission. Patient found to be orthostatic first on 5/2 but has since stabilized and remained asymptomatic.     Consults: None    Checklist     Legal Status: Committed (PD revoked) with Holman. Holman: haldol, olanzapine, risperidone, paliperidone, and clozapine.    Safety Assessment:   Behavioral Orders   Procedures    Code 1 - Restrict to Unit    Code 2     OK to leave the floor for imaging procedures at the discretion of floor staff    Routine Programming     As clinically indicated    Status 15     Every 15 minutes.       Risk Assessment:  Risk for harm is moderate-high.  Risk factors: impulsive and past behaviors, multiple recent pending  Protective factors: family and engaged in treatment     SIO: No    Disposition: TBD, complicated by patient being from [place]. Pending stabilization, medication  optimization, & development of a safe discharge plan.    Attestations     Brandon Merrill  MS4, [employer] medical school       This patient has been seen and evaluated by me, Johanna Altman DO.  I have discussed this patient with the team including the resident and medical student(s) and I agree with the findings and plan in this note.  Dr. Johanna Altman DO, CARIN

## 2023-05-08 NOTE — PLAN OF CARE
"  Pt is visible int the milieu this shift. He was observed sitting at the dining room early on the shift , reading his book.  Pt is eating and hydrating well. Drinks his nutritional shake religiously . Minimally interacts with peers. Pt denies anxiety, depression, SI and hallucinations. Pt stated that Clozaril makes him more relaxed and makes him enjoy things more, like being able to sit and watch a movie.  Pt is more pleasant today, compared to previous day that he is dismissive.  Pt denies anxiety, depression, SI and  hallucinations.  His goal today is to read further on his book. He is able to join groups. He is observed to be writing/solving formulas on the white board during OT clinic.   BP in the morning were (sitting)     126/85 P 98                                          (Standing) 104/67 P 115  Pt complained of knee pain rating it at 4/10 PRN Tylenol given.     Problem: Adult Behavioral Health Plan of Care  Goal: Patient-Specific Goal (Individualization)  Description: You can add care plan individualizations to a care plan. Examples of Individualization might be:  \"Parent requests to be called daily at 9am for status\", \"I have a hard time hearing out of my right ear\", or \"Do not touch me to wake me up as it startles me\".  Outcome: Progressing     Problem: Anxiety  Goal: Anxiety Reduction or Resolution  Outcome: Progressing     Problem: Depression  Goal: Improved Mood  Outcome: Progressing     Problem: Psychotic Signs/Symptoms  Goal: Improved Behavioral Control (Psychotic Signs/Symptoms)  Outcome: Progressing  Goal: Optimal Cognitive Function (Psychotic Signs/Symptoms)  Outcome: Progressing  Intervention: Support and Promote Cognitive Ability  Recent Flowsheet Documentation  Taken 5/8/2023 1058 by Faith Jha RN  Trust Relationship/Rapport:    care explained    choices provided    emotional support provided    empathic listening provided    questions answered    questions encouraged    " reassurance provided    thoughts/feelings acknowledged     Problem: Suicidal Behavior  Goal: Suicidal Behavior is Absent or Managed  Outcome: Progressing     Problem: Sleep Disturbance  Goal: Adequate Sleep/Rest  Outcome: Progressing   Goal Outcome Evaluation:    Plan of Care Reviewed With: patient

## 2023-05-08 NOTE — PLAN OF CARE
[facility] Occupational Therapy Group Intervention Note     05/08/23 1324   Group Therapy Session   Group Attendance attended group session   Total Time (minutes) 45   Group Type task skill;psychoeducation   Group Topic Covered coping skills/lifestyle management;relapse prevention;leisure exploration/use of leisure time   Group Session Detail Clinic - coping skill exploration, creative expression within personally meaningful activities, and observation of social, cognitive, and kinesthetic performance skills   Patient Response/Contribution cooperative with task;organized;listened actively   Patient Participation Detail Somewhat restricted affect. Opted to use white board for math problems throughout session. Continued to express disinterest in any other form of therapeutic programming or clinic activity.      Mary Andrade OT on 5/8/2023 at 1:24 PM

## 2023-05-08 NOTE — PLAN OF CARE
05/08/23 0933   Individualization/Patient Specific Goals   Patient Personal Strengths family/social support;expressive of needs;good impulse control;medication/treatment adherence;intellectual cognitive skills;relationship stability   Patient Vulnerabilities history of unsuccessful treatment;lacks insight into illness;poor impulse control   Anxieties, Fears or Concerns None   Individualized Care Needs None identified   Interprofessional Rounds   Summary 1. Stabilization of thought disorder symptoms 2.Medication mgmt per MD's 3. Safe with self 4. Coordination of care with family/outpatient providers 5. Placement addressed 6. Psych f/u care in place   Participants CTC;nursing;patient;psychiatrist   Behavioral Team Discussion   Participants , Dr. Ryan, Sujey Lima,  OSCAR, Renay Kuhn MA.LP, Med students   Progress Patient showing improvement in delusions- at least less overtly psychotic. Patient has been observed smiling to self. Meds continue to be adjusted per MD's   Anticipated length of stay 7-10 days   Continued Stay Criteria/Rationale Acute Psychosis, on civil commitment/Holman   Medical/Physical None   Plan Patient will continue to be seen by  Psychiatry daily.  Meds continues to be reviewed/adjusted per MD as indicated.  Patient refusing IRTS placement.  Will continue to coordinate with family re: discharge plans.   CTC will continue to assess needs, ensure appropriate f/u care is in place   Rationale for change in precautions or plan No change in plan/precautions   Anticipated Discharge Disposition home with family

## 2023-05-09 PROCEDURE — 124N000002 HC R&B MH UMMC

## 2023-05-09 PROCEDURE — 250N000013 HC RX MED GY IP 250 OP 250 PS 637: Performed by: STUDENT IN AN ORGANIZED HEALTH CARE EDUCATION/TRAINING PROGRAM

## 2023-05-09 PROCEDURE — 250N000013 HC RX MED GY IP 250 OP 250 PS 637

## 2023-05-09 PROCEDURE — 99232 SBSQ HOSP IP/OBS MODERATE 35: CPT | Performed by: STUDENT IN AN ORGANIZED HEALTH CARE EDUCATION/TRAINING PROGRAM

## 2023-05-09 RX ADMIN — Medication 25 MCG: at 12:42

## 2023-05-09 RX ADMIN — CLOZAPINE 175 MG: 100 TABLET ORAL at 19:01

## 2023-05-09 RX ADMIN — LITHIUM CARBONATE 1200 MG: 300 TABLET, EXTENDED RELEASE ORAL at 18:53

## 2023-05-09 RX ADMIN — ACETAMINOPHEN 650 MG: 325 TABLET ORAL at 13:58

## 2023-05-09 ASSESSMENT — ACTIVITIES OF DAILY LIVING (ADL)
ADLS_ACUITY_SCORE: 28
ADLS_ACUITY_SCORE: 28
ORAL_HYGIENE: INDEPENDENT
ADLS_ACUITY_SCORE: 28
HYGIENE/GROOMING: INDEPENDENT;HANDWASHING
ADLS_ACUITY_SCORE: 28

## 2023-05-09 NOTE — PLAN OF CARE
"  Problem: Adult Behavioral Health Plan of Care  Goal: Patient-Specific Goal (Individualization)  Description: You can add care plan individualizations to a care plan. Examples of Individualization might be:  \"Parent requests to be called daily at 9am for status\", \"I have a hard time hearing out of my right ear\", or \"Do not touch me to wake me up as it startles me\".  Outcome: Not Progressing     Pt presents with a flat affect. He is observed reading his book at the dining area or just sitting by the lounge watching TV, still holding his book. Pt denied SI or hallucinations, denies anxiety and depression. Pt is dismissive with his answers on assessment questions.  Pt is eating and hydrating well. BP readings retaken around noon time were for sitting /82, P 110, standing /83 P 123. Pt stated that he is feeling fine , though he is  little concerned with his high pulses.     Problem: Psychotic Signs/Symptoms  Goal: Increased Participation and Engagement (Psychotic Signs/Symptoms)  Outcome: Not Progressing  Intervention: Facilitate Participation and Engagement  Recent Flowsheet Documentation  Taken 5/9/2023 1200 by Faith Jha RN  Diversional Activity:   reading   music   Goal Outcome Evaluation:    Plan of Care Reviewed With: patient                   "

## 2023-05-09 NOTE — PLAN OF CARE
Pt visible in the milieu intermittently. Mood is calm and guarded to himself. Pt isolative, denied anxiety, depression, Hallucination and all mental symptoms. Isolative with no interaction with peers, ate and hydrated well, pt well groomed this evening.  Compliant with medication, didn't attend groups this evening. Safety was contracted.    Problem: Psychotic Signs/Symptoms  Goal: Improved Behavioral Control (Psychotic Signs/Symptoms)  Outcome: Progressing   Goal Outcome Evaluation:    Plan of Care Reviewed With: patient

## 2023-05-09 NOTE — PLAN OF CARE
Pt appears to have slept for 6.75  hours. Denied SI/SIB or hallucinations; denied pain. No concerns noted. Will continue to monitor and offer support.     Problem: Sleep Disturbance  Goal: Adequate Sleep/Rest  Outcome: Progressing   Goal Outcome Evaluation:

## 2023-05-09 NOTE — PLAN OF CARE
Assessment/Intervention/Current Symtoms and Care Coordination  The patient's care was discussed with the treatment team and chart notes were reviewed.   Patient met with team.  No changes in last 24 hours.  Per Nursing he has appeared irritable but patient reports feeling meds are helping- has no concerns.   Patient has been attending some groups, reading in lounge.  Has been eating, sleeping well. Compliant with meds.     Discharge Plan or Goal  Patient insists that he plans to return home to [place] Patient in need of close Psychiatric follow up care.     Barriers to Discharge   Severity of symptoms- ongoing need for stabilization.    Meds continue to be monitored/adjusted per MD     Referral Status  None today     Legal Status     Commitment and Holman order per [facility]- Provisional discharge revoked

## 2023-05-09 NOTE — PROGRESS NOTES
"    ----------------------------------------------------------------------------------------------------------  [facility], [place]   Psychiatric Progress Note  Hospital Day #24    Identifier: Tello Steen is a 27 year old male with previous psychiatric diagnoses of schizophrenia vs schizoaffective disorder, bipolar type who presents with psychosis and álvaro in the context of recent hospitalizations 3/26-4/11/23 ([facility]) and 2/22-3/7/23 ([facility]) for the same. Assessment is that the current presentation is consistent with schizoaffective disorder, bipolar type, current álvaro and psychosis.      Interim History:   The patient's care was discussed with the treatment team and chart notes were reviewed.     Vitals: VSS   Sleep: 6.75 hours (05/09/23 0600)   Scheduled medications: Took all scheduled medications as prescribed  PRN medications: None    Staff Report:   - Attends group to use whiteboard for math problems  - Appears RTIS vs. Laughing at peers  - appears annoyed  - seems less restless in groups       Subjective:     Patient Interview:  Tello was interviewed today in room, though requested to be interviewed in conference room. Patient says that he feels well without acute concerns. He continues to report that the clozaril has been helpful and denies any rigidity, numbness, and excessive sedation. He talks about wanting to leave the hospital as he has been \"activated\" by the [facility]. He also states that his plan on discharge is to graduate from [employer] and do research that will be used by the [facility]. When asked about the previously discussed chip in his head, patient states that it is not bothersome and that it is always speaking to him (is never silent). He describes it as artificial intelligence in his head. When asked further questions, he smiles and asks if team member would like a chip. 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 Patient notices that dose of clozaril was increased yesterday; he inquires about what a therapeutic dose would be and is amenable to incrementally increasing dose to therapeutic range. Asked about orthostasis and reports no symptoms. Later asks if clozaril can be titrated to therapeutic dose more quickly or even immediately.    Review of systems:   Negative unless stated above    Objective:   /76 (BP Location: Left arm)   Pulse 92   Temp 98  F (36.7  C) (Oral)   Resp 16   Wt 72.6 kg (160 lb 1.6 oz)   SpO2 97%   BMI 21.12 kg/m    Weight is 160 lbs 1.6 oz  Body mass index is 21.12 kg/m .    Mental Status Exam:  Oriented to:  Grossly Oriented  General:  Awake and Alert  Appearance:  appears stated age, hygiene appears worsened from most recent interviews   Behavior/Attitude: Overall calm, cooperative, engaged  Eye Contact:  Little  Psychomotor: no tremors, no evidence of tics, dystonia, akithisia, or tardive dyskinesia, no catatonia; pronounced physical restlessness and rocking back and forth, described as \"comforting\".  Speech: latency of responses  Language: Fluent in English and correct vocabulary   Mood:  \"better\"  Affect: Blunted affect, irritable  Thought Process: Less tangential than previously, and appears more organized  Thought Content:  No expressed SI/SIB/HI. Displays grandiosity regarding his fund of academic knowledge. Does appear to be RTIS and endorses AH during interview of chip in brain with a voice. Continues to describe delusions re: JAZZY, NSA.   Insight: Does recognize diagnosis of schizophrenia, insight into past delusions, but does not seem to have insight into current psychiatric presentation and ongoing symptoms or need for medication management.   Judgment:  limited - listens to \"chip in brain\". Is amenable to further medication changes but does not seem to address purpose of medication changes at this time.   Attention Span:  adequate for conversation  Concentration:  grossly " intact  Recent and Remote Memory:  grossly intact  Fund of Knowledge: above average, and has medical knowledge beyond that of a regular patient  Muscle Strength and Tone: appears normal  Gait and Station: Normal      Allergies:   No Known Allergies     Labs and imaging:   Data this admission:  BMP normal  CBC normal  UDS negative  TSH 3/9/23 normal  Lipids 2/24/23 unremarkable  Hgb A1c 2/24/23 normal   UA - amorphous crystals, 10 protein albumin, elevated pH   HIV - negative   Ceruloplasmin normal   AILYN - normal   Lyme abs - normal   Treponema  - negative   Folate - normal   B12 - normal   ESR - normal Salicylate, alcohol, Tylenol negative  EKG 3/20: sinus rhythm, QTc 431  Brain MRI - normal     Lithium 4/25: 0.60                4/30: 0.60               5/08: 0.90   EKG 4/26 - normal     Psychiatric Assessment and Plan   Primary psychiatric diagnosis:   Schizoaffective disorder, bipolar type, current álvaro    Diagnostic Impression:   Tello Steen is a 27 year old male with a past psychiatric diagnosis of schizophrenia admitted from the ED on 04/15/2023 due to pscyhosis in the context of recent hospitalization 3/26-4/11/23 for psychosis and álvaro. Significant symptoms on admission include delusions about a chip in their brain, delusions of grandeur, delusions of having a special mission with the NSA, and elevated mood. The MSE on admission was pertinent for elevated affect and delusions previously mentioned. Biological contributions to mental health presentation include previous psychiatric diagnosis of schizophrenia. On chart review, there are also mentions of Lithium trials, possibly indicating previous álvaro. Psychosocial contributions to mental health presentation include partial insight, unclear work, housing, and financial situation. Protective factors include medication adherence, lack of substance use, and supportive CCM, good frustration tolerance, and presence of perceived social supports.      In  summary, the patient's reported symptoms of psychosis and álvaro in the context of historical schizophrenia diagnosis and recent hospitalization are consistent with schizoaffective disorder bipolar type.    Psychiatric Hospital course:  Tello Steen was admitted to [facility] committed with a Holman. All PTA medications were continued on admission.     Medications:     Neuroleptics: This is his third hospitalization within 4 months, indicating that his symptoms are not adequately controlled with Invega Sustenna.     Olanzapine: While awaiting clozapine to be added to his existing Holman, Tello was initially continued on PTA olanzapine.     Risperidone: Due to arm dystonia, Tello's olanzapine was switched to risperidone on 4/21. Added PRN cogentin and propranolol for any potential EPS. He tolerated risperidone titration well, although there were some concerns about akithisia due to patient's apparent restlessness and psychomotor agitation. On further exploration, this movement appeared volitional and more related to álvaro rather than akithisia or dystonia. Risperidone discontinued 5/2 with planned cross taper to clozapine.    Clozapine: Started on 4/27. Continuing to titrate clozapine, has had some orthostasis    Clozapine titrated to 150 mg on 5/8 and increased on 5/9 to 175 mg d/t symptom persistence.     Propranolol discontinued d/t orthostasis with clozapine      Patient with postural tachycardia, encouraged fluids    Lithium: started on 4/20 for álvaro, tolerating titration well thus far. Lithium level on 5/6 returned at 0.9. Therapeutic level indicating no need for continued titration at this time    Symptoms    Patient appears to be improving with less RTIS and improved ability to focus. However, patient continues to endorse and express delusions and AH with limited insight into current psychiatric symptoms.    Psychosocial    5/4/23 patient requests that team not discuss care/treatment with his mother and limit  information sharing to discharge planning. Later stated that he is amenable to team discussing any element of treatment plan with mom.       Today's changes:     Increase clozapine to 175 mg from 150 mg    Encourage fluids d/t postural tachycardia, which resolved after fluid intake      1. Psychotropic Medications:  Scheduled:  Current Facility-Administered Medications   Medication Dose Route Frequency     cloZAPine  150 mg Oral At Bedtime     lithium ER  1,200 mg Oral At Bedtime     Vitamin D3  25 mcg Oral Daily     PRN:  Current Facility-Administered Medications   Medication Dose Route Frequency     acetaminophen  650 mg Oral Q4H PRN     alum & mag hydroxide-simethicone  30 mL Oral Q4H PRN     benztropine  0.5 mg Oral BID PRN     melatonin  3 mg Oral At Bedtime PRN     OLANZapine  10 mg Intramuscular TID PRN     polyethylene glycol  17 g Oral Daily PRN     propranolol  10 mg Oral BID PRN     risperiDONE  0.5 mg Sublingual BID PRN     sennosides  8.6 mg Oral BID PRN       2. Pertinent Labs/Monitoring:   -Lithium 4/25: 0.60 -> 4/30: 0.60  -EKG 4/26 - normal sinus rhythm, QTc 417  -ANC 4/27 - 4.0  - Lithium 5/6: 0.9    3. Additional Plans:  Patient will be treated in therapeutic milieu with appropriate individual and group therapies as described.    Medical Assessment and Plan     Medical diagnoses to be addressed this admission:    None    Medical course: Patient was physically examined by the ED prior to being transferred to the unit and was found to be medically stable and appropriate for admission. Patient found to be orthostatic first on 5/2 but has since stabilized and remained asymptomatic. Postural tachycardia present on 5/9, likely d/t clozapine. Encouraging fluids.     Consults: None    Checklist     Legal Status: Committed (PD revoked) with Holman. Holman: haldol, olanzapine, risperidone, paliperidone, and clozapine.    Safety Assessment:   Behavioral Orders   Procedures     Code 1 - Restrict to Unit      Code 2     OK to leave the floor for imaging procedures at the discretion of floor staff     Routine Programming     As clinically indicated     Status 15     Every 15 minutes.       Risk Assessment:  Risk for harm is moderate-high.  Risk factors: impulsive and past behaviors, multiple recent pending  Protective factors: family and engaged in treatment     SIO: No    Disposition: TBD, complicated by patient being from [place]. Pending stabilization, medication optimization, & development of a safe discharge plan.    Attestations     Brandon Merrill  MS4, [employer] medical school     This patient has been seen and evaluated by me, Johanna Altman DO.  I have discussed this patient with the team including the resident and medical student(s) and I agree with the findings and plan in this note.  Dr. Johanna Altman DO, CARIN

## 2023-05-10 PROCEDURE — 124N000002 HC R&B MH UMMC

## 2023-05-10 PROCEDURE — 250N000013 HC RX MED GY IP 250 OP 250 PS 637

## 2023-05-10 PROCEDURE — 250N000013 HC RX MED GY IP 250 OP 250 PS 637: Performed by: STUDENT IN AN ORGANIZED HEALTH CARE EDUCATION/TRAINING PROGRAM

## 2023-05-10 PROCEDURE — 99232 SBSQ HOSP IP/OBS MODERATE 35: CPT | Mod: GC | Performed by: PSYCHIATRY & NEUROLOGY

## 2023-05-10 RX ORDER — CLOZAPINE 200 MG/1
200 TABLET ORAL AT BEDTIME
Status: DISCONTINUED | OUTPATIENT
Start: 2023-05-11 | End: 2023-05-24

## 2023-05-10 RX ORDER — CLOZAPINE 100 MG/1
200 TABLET ORAL AT BEDTIME
Status: CANCELLED | OUTPATIENT
Start: 2023-05-10

## 2023-05-10 RX ADMIN — LITHIUM CARBONATE 1200 MG: 300 TABLET, EXTENDED RELEASE ORAL at 18:09

## 2023-05-10 RX ADMIN — Medication 25 MCG: at 08:36

## 2023-05-10 RX ADMIN — CLOZAPINE 175 MG: 100 TABLET ORAL at 19:05

## 2023-05-10 ASSESSMENT — ACTIVITIES OF DAILY LIVING (ADL)
ADLS_ACUITY_SCORE: 28
ORAL_HYGIENE: INDEPENDENT
ADLS_ACUITY_SCORE: 28
ADLS_ACUITY_SCORE: 28
ORAL_HYGIENE: INDEPENDENT
ADLS_ACUITY_SCORE: 28
HYGIENE/GROOMING: HANDWASHING;INDEPENDENT
HYGIENE/GROOMING: INDEPENDENT
ADLS_ACUITY_SCORE: 28

## 2023-05-10 NOTE — PLAN OF CARE
Assessment/Intervention/Current Symtoms and Care Coordination  The patient's care was discussed with the treatment team and chart notes were reviewed.   Patient met with team.  No changes in last 24 hours.  Patient continues to endorse AH though states they are not bothersome, always there. Agreeable to increasing Clozaril dose - wants dose increased more quickly.  Patient has been attending some groups, reading in [employer].  Has been eating, sleeping well. Compliant with meds.  Writer left  for patient's CM with update- request to meet.     Discharge Plan or Goal  Patient insists that he plans to return home to [place]. Patient in need of close Psychiatric follow up care.     Barriers to Discharge   Severity of symptoms- ongoing need for stabilization.    Meds continue to be monitored/adjusted per MD     Referral Status  None today     Legal Status     Commitment and Holman order per [facility]- Provisional discharge revoked

## 2023-05-10 NOTE — PLAN OF CARE
[facility] Occupational Therapy Group Intervention Note     05/10/23 1229   Group Therapy Session   Group Attendance attended group session   Total Time (minutes) 35 (no charge)   Group Type task skill;life skill   Group Topic Covered coping skills/lifestyle management;relapse prevention;leisure exploration/use of leisure time;cognitive activities   Group Session Detail Clinic - coping skill exploration, creative expression within personally meaningful activities, and observation of social, cognitive, and kinesthetic performance skills   Patient Response/Contribution cooperative with task   Patient Participation Detail Demonstrated congruent performance skills and min-no social interaction as seen on previous dates; no significant change noted. Attempted to gather phone number for patient relations; was later observed expressing grievances on the phone.      Mary Andrade OT on 5/10/2023 at 12:31 PM

## 2023-05-10 NOTE — PROGRESS NOTES
----------------------------------------------------------------------------------------------------------  [facility], [place]   Psychiatric Progress Note  Hospital Day #25    Identifier: Tello Steen is a 27 year old male with previous psychiatric diagnoses of schizophrenia vs schizoaffective disorder, bipolar type who presents with psychosis and álvaro in the context of recent hospitalizations 3/26-4/11/23 ([facility]) and 2/22-3/7/23 ([facility]) for the same. Assessment is that the current presentation is consistent with schizoaffective disorder, bipolar type, current álvaro and psychosis.      Interim History:   The patient's care was discussed with the treatment team and chart notes were reviewed.     Vitals: VSS   Sleep: 7 hours (05/10/23 0616)   Scheduled medications: Took all scheduled medications as prescribed  PRN medications: None    Staff Report:   - Attends group to use [employer] for math problems  - Appears RTIS vs. Laughing at peers  - appears annoyed  - seems less restless in groups       Subjective:     Patient Interview:  Tello was interviewed today in room. Patient says that he felt sedated and had some dizziness, which he associated with the sedation. He also stated he had toe pain which has since resolved and attributes to not having drank enough water. He states that he has been able to improve his water intake since yesterday. Patient requests to talk about our target dose of the clozapine and is told that we cannot give him a firm target dose as it depends on his clinical presentation. Patient quickly becomes agitated as he says that he is well and does not know what is still left to be addressed with medication changes. When told that he needs to be well on discharge and still recover after discharge and he becomes upset at the idea that there is anything from which to recover. He is reassured that we do not want to keep him longer than necessary but he  "is worried about missing his deadline for registration. When told that team has concerns about him being able to drive home after discharge and that we don't have firm information on discharge planning, patient makes gesture of shooting gun at his own head.     Review of systems:   Negative unless stated above    Objective:   /85 (BP Location: Left arm, Patient Position: Sitting, Cuff Size: Adult Regular)   Pulse 102   Temp 97.8  F (36.6  C) (Oral)   Resp 16   Wt 72.6 kg (160 lb 1.6 oz)   SpO2 98%   BMI 21.12 kg/m    Weight is 160 lbs 1.6 oz  Body mass index is 21.12 kg/m .    Mental Status Exam:  Oriented to:  Grossly Oriented  General:  Awake and Alert  Appearance:  appears stated age  Behavior/Attitude: Irritable, agitated, frustrated  Eye Contact:  intermittent, increased when agitated  Psychomotor: no tremors, no evidence of tics, dystonia, akithisia, or tardive dyskinesia, no catatonia  Speech: latency of response initially, volume escalates to loud when frustration increases  Language: Fluent in English and correct vocabulary   Mood:  \"fine\"  Affect: Blunted affect, irritable, angry  Thought Process: Linear but rigid and has difficulty accessing flexibility of thought and planning regarding discharge.   Thought Content:  No expressed HI, patient gestures shooting gun at his own head during interview. Displays grandiosity regarding his fund of academic knowledge. Does not express delusions today during interview.   Insight: Does recognize diagnosis of schizophrenia, insight into past delusions, but lacks insight into ongoing symptoms of psychosis, need for additional medication changes, or need for caution regarding discharge. Also not able to respond appropriately to context clues during interview.   Judgment:  limited - listens to \"chip in brain\". Is amenable to medication changes but judgement regarding driving home on discharge is limited.   Attention Span:  adequate for " conversation  Concentration:  grossly intact  Recent and Remote Memory:  grossly intact  Fund of Knowledge: above average, and has medical knowledge beyond that of a regular patient  Muscle Strength and Tone: appears normal  Gait and Station: Normal      Allergies:   No Known Allergies     Labs and imaging:   Data this admission:  BMP normal  CBC normal  UDS negative  TSH 3/9/23 normal  Lipids 2/24/23 unremarkable  Hgb A1c 2/24/23 normal   UA - amorphous crystals, 10 protein albumin, elevated pH   HIV - negative   Ceruloplasmin normal   AILYN - normal   Lyme abs - normal   Treponema  - negative   Folate - normal   B12 - normal   ESR - normal Salicylate, alcohol, Tylenol negative  EKG 3/20: sinus rhythm, QTc 431  Brain MRI - normal     Lithium 4/25: 0.60                4/30: 0.60               5/08: 0.90   EKG 4/26 - normal     Psychiatric Assessment and Plan   Primary psychiatric diagnosis:   Schizoaffective disorder, bipolar type, current álvaro    Diagnostic Impression:   Tello Steen is a 27 year old male with a past psychiatric diagnosis of schizophrenia admitted from the ED on 04/15/2023 due to pscyhosis in the context of recent hospitalization 3/26-4/11/23 for psychosis and álvaro. Significant symptoms on admission include delusions about a chip in their brain, delusions of grandeur, delusions of having a special mission with the NSA, and elevated mood. The MSE on admission was pertinent for elevated affect and delusions previously mentioned. Biological contributions to mental health presentation include previous psychiatric diagnosis of schizophrenia. On chart review, there are also mentions of Lithium trials, possibly indicating previous álvaro. Psychosocial contributions to mental health presentation include partial insight, unclear work, housing, and financial situation. Protective factors include medication adherence, lack of substance use, and supportive CCM, good frustration tolerance, and presence of  perceived social supports.      In summary, the patient's reported symptoms of psychosis and álvaro in the context of historical schizophrenia diagnosis and recent hospitalization are consistent with schizoaffective disorder bipolar type.    Psychiatric Hospital course:  Tello Steen was admitted to [facility] committed with a Holman. All PTA medications were continued on admission.     Medications:     Neuroleptics: This is his third hospitalization within 4 months, indicating that his symptoms are not adequately controlled with Invega Sustenna.     Olanzapine: While awaiting clozapine to be added to his existing Holman, Tello was initially continued on PTA olanzapine.     Risperidone: Due to arm dystonia, Tello's olanzapine was switched to risperidone on 4/21. Added PRN cogentin and propranolol for any potential EPS. He tolerated risperidone titration well, although there were some concerns about akithisia due to patient's apparent restlessness and psychomotor agitation. On further exploration, this movement appeared volitional and more related to álvaro rather than akithisia or dystonia. Risperidone discontinued 5/2 with planned cross taper to clozapine.    Clozapine: Started on 4/27. Continuing to titrate clozapine, has had some orthostasis    Clozapine titrated to 150 mg on 5/8 and increased on 5/9 to 175 mg d/t symptom persistence.     Propranolol discontinued d/t orthostasis with clozapine      Patient with postural tachycardia 5/9, encouraged fluids    Titrate clozapine 25 mg q48h with next dose increase on 05/11 to 200mg     Lithium: started on 4/20 for álvaro, tolerating titration well thus far. Lithium level on 5/6 returned at 0.9. Therapeutic level indicating no need for continued titration at this time    Symptoms    Patient appears to be improving with less RTIS and improved ability to focus. However, patient continues to endorse and express delusions and AH with limited insight into current psychiatric  symptoms and limited judgement.    Psychosocial    5/4/23 patient requests that team not discuss care/treatment with his mother and limit information sharing to discharge planning. Later stated that he is amenable to team discussing any element of treatment plan with mom.       Today's changes:     Continue encouraging fluids     Continue titrating clozapine by 25 mg q48 h    Plan to increase to 200 mg tomorrow 5/11      1. Psychotropic Medications:  Scheduled:  Current Facility-Administered Medications   Medication Dose Route Frequency     cloZAPine  175 mg Oral At Bedtime     lithium ER  1,200 mg Oral At Bedtime     Vitamin D3  25 mcg Oral Daily     PRN:  Current Facility-Administered Medications   Medication Dose Route Frequency     acetaminophen  650 mg Oral Q4H PRN     alum & mag hydroxide-simethicone  30 mL Oral Q4H PRN     benztropine  0.5 mg Oral BID PRN     melatonin  3 mg Oral At Bedtime PRN     OLANZapine  10 mg Intramuscular TID PRN     polyethylene glycol  17 g Oral Daily PRN     propranolol  10 mg Oral BID PRN     risperiDONE  0.5 mg Sublingual BID PRN     sennosides  8.6 mg Oral BID PRN       2. Pertinent Labs/Monitoring:   -Lithium 4/25: 0.60 -> 4/30: 0.60  -EKG 4/26 - normal sinus rhythm, QTc 417  -ANC 4/27 - 4.0  - Lithium 5/6: 0.9    3. Additional Plans:  Patient will be treated in therapeutic milieu with appropriate individual and group therapies as described.    Medical Assessment and Plan     Medical diagnoses to be addressed this admission:    None    Medical course: Patient was physically examined by the ED prior to being transferred to the unit and was found to be medically stable and appropriate for admission. Patient found to be orthostatic first on 5/2 but has since stabilized and remained asymptomatic. Postural tachycardia present on 5/9, likely d/t clozapine. Encouraging fluids.     Consults: None    Checklist     Legal Status: Committed (PD revoked) with Holman. River: sima  olanzapine, risperidone, paliperidone, and clozapine.    Safety Assessment:   Behavioral Orders   Procedures     Code 1 - Restrict to Unit     Code 2     OK to leave the floor for imaging procedures at the discretion of floor staff     Routine Programming     As clinically indicated     Status 15     Every 15 minutes.       Risk Assessment:  Risk for harm is moderate-high.  Risk factors: impulsive and past behaviors, multiple recent pending  Protective factors: family and engaged in treatment     SIO: No    Disposition: TBD, complicated by patient being from [place]. Pending stabilization, medication optimization, & development of a safe discharge plan.    Attestations     Brandon Merrill  MS4, [employer] medical school

## 2023-05-10 NOTE — PLAN OF CARE
Goal Outcome Evaluation:    Problem: Adult Behavioral Health Plan of Care  Goal: Plan of Care Review  Outcome: Progressing  Pt was up on the unit, with a flat and a blunted affect. He was brief and dismissive of any psych concerns, with one word no no and no. Pt denied having any pain or discomfort, denied SI, HI and he contracted to no self harm or harm toward others. He spent the day reading out in the launch or in his room. He was medication compliant and had adequate intake of both food and fluids. Pt initiated and took a shower. He is not interactive with other peers. He offered no other complaints. Will continue to encourage pt to verbalize feelings and thoughts.

## 2023-05-10 NOTE — PLAN OF CARE
Problem: Psychotic Signs/Symptoms  Goal: Improved Psychomotor Symptoms (Psychotic Signs/Symptoms)  Intervention: Manage Psychomotor Movement  Recent Flowsheet Documentation  Taken 5/9/2023 1630 by Juan Guevara, RN  Diversional Activity: reading  Activity (Behavioral Health): up ad nuria     Problem: Depression  Goal: Improved Mood  Outcome: Progressing     Problem: Anxiety  Goal: Anxiety Reduction or Resolution  5/9/2023 2137 by Juan Guevara, RN  Outcome: Progressing  5/9/2023 2113 by Juan Guevara, RN  Outcome: Progressing   Goal Outcome Evaluation:    Plan of Care Reviewed With: patient        Alert and oriented, able to communicate needs. Pleasant, cooperative and compliant with medications. Patient was visible in milieu, reading in dinning area, also observed pacing the campo way, he had no interactions with peers ADLS WNL, Adequate PO intake. He denied anxiety or depression, no SI/HI, contracted for safety. Endorsed intermittent sharp pain in the left second toe, patient attribute pain to luck of fluids intake (fluids intake increased).

## 2023-05-10 NOTE — PLAN OF CARE
Problem: Depression  Goal: Improved Mood  Outcome: Progressing     Problem: Anxiety  Goal: Anxiety Reduction or Resolution  Outcome: Progressing   Goal Outcome Evaluation:    Plan of Care Reviewed With: patient        Alert and oriented, able to communicate needs. Visible in milieu, most time spent in his room. No interactions with peers when in milieu. Observed reading in his room and in dinning area. Pleasant on approach, cooperative and compliant with medications. Continues to be brief and dismissing with  assessment questions. He denied anxiety or depression, no SI/HI, contracted for safety.

## 2023-05-11 LAB
BASOPHILS # BLD AUTO: 0.1 10E3/UL (ref 0–0.2)
BASOPHILS NFR BLD AUTO: 1 %
EOSINOPHIL # BLD AUTO: 0.5 10E3/UL (ref 0–0.7)
EOSINOPHIL NFR BLD AUTO: 6 %
ERYTHROCYTE [DISTWIDTH] IN BLOOD BY AUTOMATED COUNT: 12.8 % (ref 10–15)
HCT VFR BLD AUTO: 47.1 % (ref 40–53)
HGB BLD-MCNC: 15.8 G/DL (ref 13.3–17.7)
IMM GRANULOCYTES # BLD: 0.1 10E3/UL
IMM GRANULOCYTES NFR BLD: 1 %
LYMPHOCYTES # BLD AUTO: 1.8 10E3/UL (ref 0.8–5.3)
LYMPHOCYTES NFR BLD AUTO: 21 %
MCH RBC QN AUTO: 30.5 PG (ref 26.5–33)
MCHC RBC AUTO-ENTMCNC: 33.5 G/DL (ref 31.5–36.5)
MCV RBC AUTO: 91 FL (ref 78–100)
MONOCYTES # BLD AUTO: 1 10E3/UL (ref 0–1.3)
MONOCYTES NFR BLD AUTO: 11 %
NEUTROPHILS # BLD AUTO: 5.3 10E3/UL (ref 1.6–8.3)
NEUTROPHILS NFR BLD AUTO: 60 %
NRBC # BLD AUTO: 0 10E3/UL
NRBC BLD AUTO-RTO: 0 /100
PLATELET # BLD AUTO: 241 10E3/UL (ref 150–450)
RBC # BLD AUTO: 5.18 10E6/UL (ref 4.4–5.9)
URATE SERPL-MCNC: 6.4 MG/DL (ref 3.4–7)
WBC # BLD AUTO: 8.8 10E3/UL (ref 4–11)

## 2023-05-11 PROCEDURE — 250N000013 HC RX MED GY IP 250 OP 250 PS 637: Performed by: STUDENT IN AN ORGANIZED HEALTH CARE EDUCATION/TRAINING PROGRAM

## 2023-05-11 PROCEDURE — 250N000013 HC RX MED GY IP 250 OP 250 PS 637

## 2023-05-11 PROCEDURE — 36415 COLL VENOUS BLD VENIPUNCTURE: CPT | Performed by: STUDENT IN AN ORGANIZED HEALTH CARE EDUCATION/TRAINING PROGRAM

## 2023-05-11 PROCEDURE — 99232 SBSQ HOSP IP/OBS MODERATE 35: CPT | Performed by: STUDENT IN AN ORGANIZED HEALTH CARE EDUCATION/TRAINING PROGRAM

## 2023-05-11 PROCEDURE — 85025 COMPLETE CBC W/AUTO DIFF WBC: CPT | Performed by: STUDENT IN AN ORGANIZED HEALTH CARE EDUCATION/TRAINING PROGRAM

## 2023-05-11 PROCEDURE — 124N000002 HC R&B MH UMMC

## 2023-05-11 PROCEDURE — 84550 ASSAY OF BLOOD/URIC ACID: CPT | Performed by: STUDENT IN AN ORGANIZED HEALTH CARE EDUCATION/TRAINING PROGRAM

## 2023-05-11 RX ADMIN — Medication 25 MCG: at 09:16

## 2023-05-11 RX ADMIN — ACETAMINOPHEN 650 MG: 325 TABLET ORAL at 16:08

## 2023-05-11 RX ADMIN — CLOZAPINE 200 MG: 200 TABLET ORAL at 19:15

## 2023-05-11 RX ADMIN — LITHIUM CARBONATE 1200 MG: 300 TABLET, EXTENDED RELEASE ORAL at 18:16

## 2023-05-11 ASSESSMENT — ACTIVITIES OF DAILY LIVING (ADL)
ADLS_ACUITY_SCORE: 28
ORAL_HYGIENE: INDEPENDENT
ADLS_ACUITY_SCORE: 28
ORAL_HYGIENE: INDEPENDENT
ADLS_ACUITY_SCORE: 28
ADLS_ACUITY_SCORE: 28
HYGIENE/GROOMING: HANDWASHING;INDEPENDENT
ADLS_ACUITY_SCORE: 28
DRESS: STREET CLOTHES;INDEPENDENT
ADLS_ACUITY_SCORE: 28
HYGIENE/GROOMING: INDEPENDENT
ADLS_ACUITY_SCORE: 28
ADLS_ACUITY_SCORE: 28
DRESS: STREET CLOTHES;INDEPENDENT
ADLS_ACUITY_SCORE: 28

## 2023-05-11 NOTE — PLAN OF CARE
Assessment/Intervention/Current Symtoms and Care Coordination  The patient's care was discussed with the treatment team and chart notes were reviewed.   Patient met with team.  No changes in last 24 hours.  Patient irritable, continues to endorse AH though states they are not bothersome, always there. Agreeable to increasing Clozaril dose - wants dose increased more quickly.  Patient has been attending some groups, reading in lounge.  Has been eating, sleeping well. Compliant with meds.  Writer left  for patient's CM with update- request to meet. Have not heard back as yet.     Discharge Plan or Goal  Patient insists that he plans to return home to [place]. Patient in need of close Psychiatric follow up care.     Barriers to Discharge   Severity of symptoms- ongoing need for stabilization.    Meds continue to be monitored/adjusted per MD     Referral Status  None today     Legal Status     Commitment and Holman order per [facility]- Provisional discharge revoked

## 2023-05-11 NOTE — PROGRESS NOTES
"    ----------------------------------------------------------------------------------------------------------  [facility], [place]   Psychiatric Progress Note  Hospital Day #26    Identifier: Tello Steen is a 27 year old male with previous psychiatric diagnoses of schizophrenia vs schizoaffective disorder, bipolar type who presents with psychosis and álvaro in the context of recent hospitalizations 3/26-4/11/23 ([facility]) and 2/22-3/7/23 ([facility]) for the same. Assessment is that the current presentation is consistent with schizoaffective disorder, bipolar type, current álvaro and psychosis.      Interim History:   The patient's care was discussed with the treatment team and chart notes were reviewed.     Vitals: VSS   Sleep: 7 hours (05/10/23 0616)   Scheduled medications: Took all scheduled medications as prescribed  PRN medications: None    Staff Report:   - Increasingly irritated and closed off  - appears unimproved from two weeks ago   - filed report with patient relations yesterday       Subjective:     Patient Interview:  Patient interviewed today in room, says he is feeling well and inquires how team is doing. Raises concerns about clozapine side effects reporting tremor, pain in right 2nd toe, and sedation (sedation improved from previously). Asks team to stop increasing clozapine once at 200 mg. Is amenable to plan to check a level at 200 mg to assess therapeutic effect. Inquires about registering for classes and is told that will discuss with team tomorrow. In the hallway patient stops team and says \"remind me tomorrow to talk about my support system. I'm not alone in the world\".       Review of systems:   As stated above    Objective:   /85 (BP Location: Left arm, Patient Position: Sitting, Cuff Size: Adult Regular)   Pulse 102   Temp 98.4  F (36.9  C) (Oral)   Resp 14   Wt 72.6 kg (160 lb 1.6 oz)   SpO2 95%   BMI 21.12 kg/m    Weight is 160 lbs 1.6 oz  " "Body mass index is 21.12 kg/m .    Mental Status Exam:  Oriented to:  Grossly Oriented  General:  Awake and Alert  Appearance:  appears stated age  Behavior/Attitude: Calm, cooperative  Eye Contact:  intermittent, more appropriate than yesterday  Psychomotor: No evidence of tics, dystonia, akithisia, or tardive dyskinesia, no catatonia; UE tremor observed, RUE>LUE  Speech: Appropriate volume and tone, intermittent latency of response  Language: Fluent in English and correct vocabulary   Mood:  \"good\"  Affect: Blunted affect  Thought Process: Linear but rigid and has difficulty accessing flexibility of thought and planning regarding discharge.   Thought Content:  No expressed HI. Displays grandiosity regarding his fund of academic knowledge. AH referenced during interview today with chip in brain   Insight: Does recognize diagnosis of schizophrenia, insight into past delusions, but lacks insight into ongoing symptoms of psychosis or need for caution regarding discharge and educational planning. Also not able to respond appropriately to context clues during interview.   Judgment:  limited - listens to \"chip in brain\". Is amenable to medication changes but judgement regarding discharge planning.   Attention Span:  adequate for conversation  Concentration:  grossly intact  Recent and Remote Memory:  grossly intact  Fund of Knowledge: above average, and has medical knowledge beyond that of a regular patient  Muscle Strength and Tone: appears normal  Gait and Station: Normal      Allergies:   No Known Allergies     Labs and imaging:   Data this admission:  BMP normal  CBC normal  UDS negative  TSH 3/9/23 normal  Lipids 2/24/23 unremarkable  Hgb A1c 2/24/23 normal   UA - amorphous crystals, 10 protein albumin, elevated pH   HIV - negative   Ceruloplasmin normal   AILYN - normal   Lyme abs - normal   Treponema  - negative   Folate - normal   B12 - normal   ESR - normal Salicylate, alcohol, Tylenol negative  EKG 3/20: sinus " rhythm, QTc 431  Brain MRI - normal     Lithium 4/25: 0.60                4/30: 0.60               5/08: 0.90     EKG 4/26 - normal     Psychiatric Assessment and Plan   Primary psychiatric diagnosis:   Schizoaffective disorder, bipolar type, current álvaro    Diagnostic Impression:   Tello Steen is a 27 year old male with a past psychiatric diagnosis of schizophrenia admitted from the ED on 04/15/2023 due to pscyhosis in the context of recent hospitalization 3/26-4/11/23 for psychosis and álvaro. Significant symptoms on admission include delusions about a chip in their brain, delusions of grandeur, delusions of having a special mission with the [employer], and elevated mood. The MSE on admission was pertinent for elevated affect and delusions previously mentioned. Biological contributions to mental health presentation include previous psychiatric diagnosis of schizophrenia. On chart review, there are also mentions of Lithium trials, possibly indicating previous álvaro. Psychosocial contributions to mental health presentation include partial insight, unclear work, housing, and financial situation. Protective factors include medication adherence, lack of substance use, and supportive CCM, good frustration tolerance, and presence of perceived social supports.      In summary, the patient's reported symptoms of psychosis and álvaro in the context of historical schizophrenia diagnosis and recent hospitalization are consistent with schizoaffective disorder bipolar type.    Psychiatric Hospital course:  Tello Steen was admitted to [facility] committed with a Holman. All PTA medications were continued on admission.     Medications:     Neuroleptics: This is his third hospitalization within 4 months, indicating that his symptoms are not adequately controlled with Invega Sustenna.     Olanzapine: While awaiting clozapine to be added to his existing Holman, Tello was initially continued on PTA olanzapine.     Risperidone: Due to arm  dystonia, Ki's olanzapine was switched to risperidone on 4/21. Added PRN cogentin and propranolol for any potential EPS. He tolerated risperidone titration well, although there were some concerns about akithisia due to patient's apparent restlessness and psychomotor agitation. On further exploration, this movement appeared volitional and more related to álvaro rather than akithisia or dystonia. Risperidone discontinued 5/2 with planned cross taper to clozapine.    Clozapine: Started on 4/27. Continuing to titrate clozapine, has had some orthostasis    Clozapine titrated to 150 mg on 5/8 and increased on 5/9 to 175 mg d/t symptom persistence.     Propranolol discontinued d/t orthostasis with clozapine      Patient with postural tachycardia 5/9, encouraged fluids    Continue to titrate clozapine 25 mg q48h with next dose increase on 05/11 to 200mg     Lithium: started on 4/20 for álvaro, tolerating titration well thus far. Lithium level on 5/6 returned at 0.9. Therapeutic level indicating no need for continued titration at this time    Symptoms    Patient appears to be improving with less RTIS and improved ability to focus. However, patient continues to express delusions and AH with limited insight into current psychiatric symptoms and limited judgement. Appears increasingly irritable and agitated.    Psychosocial    5/4/23 patient requests that team not discuss care/treatment with his mother and limit information sharing to discharge planning. Later stated that he is amenable to team discussing any element of treatment plan with mom.       Today's changes:     Continue encouraging fluids     Increase clozapine to 200 mg tonight      1. Psychotropic Medications:  Scheduled:  Current Facility-Administered Medications   Medication Dose Route Frequency     cloZAPine  200 mg Oral At Bedtime     lithium ER  1,200 mg Oral At Bedtime     Vitamin D3  25 mcg Oral Daily     PRN:  Current Facility-Administered Medications    Medication Dose Route Frequency     acetaminophen  650 mg Oral Q4H PRN     alum & mag hydroxide-simethicone  30 mL Oral Q4H PRN     benztropine  0.5 mg Oral BID PRN     melatonin  3 mg Oral At Bedtime PRN     OLANZapine  10 mg Intramuscular TID PRN     polyethylene glycol  17 g Oral Daily PRN     propranolol  10 mg Oral BID PRN     risperiDONE  0.5 mg Sublingual BID PRN     sennosides  8.6 mg Oral BID PRN       2. Pertinent Labs/Monitoring:   - Lithium 4/25: 0.60 -> 4/30: 0.60  - EKG 4/26 - normal sinus rhythm, QTc 417  - ANC 4/27 - 4.0  - Lithium 5/6: 0.9  - ANC 5/4: 4.5  - ANC 5/11: 5.3      3. Additional Plans:  Patient will be treated in therapeutic milieu with appropriate individual and group therapies as described.    Medical Assessment and Plan     Medical diagnoses to be addressed this admission:    None    Medical course: Patient was physically examined by the ED prior to being transferred to the unit and was found to be medically stable and appropriate for admission. Patient found to be orthostatic first on 5/2 but has since stabilized and remained asymptomatic. Postural tachycardia present on 5/9, likely d/t clozapine. Encouraging fluids.     Consults: None    Checklist     Legal Status: Committed (PD revoked) with Holman. Holman: haldol, olanzapine, risperidone, paliperidone, and clozapine.    Safety Assessment:   Behavioral Orders   Procedures     Code 1 - Restrict to Unit     Code 2     OK to leave the floor for imaging procedures at the discretion of floor staff     Routine Programming     As clinically indicated     Status 15     Every 15 minutes.       Risk Assessment:  Risk for harm is moderate-high.  Risk factors: impulsive and past behaviors, multiple recent pending  Protective factors: family and engaged in treatment     SIO: No    Disposition: TBD, complicated by patient being from [place]. Pending stabilization, medication optimization, & development of a safe discharge plan.    Attestations      Brandon Merrill  MS4, [employer] medical school     This patient has been seen and evaluated by me, Johanna Altman DO.  I have discussed this patient with the team including the resident and medical student(s) and I agree with the findings and plan in this note.  Dr. Johanna Altman DO, CARIN

## 2023-05-11 NOTE — PLAN OF CARE
"Pacing the hallway intermittently or isolative to room.   Dismissive with check in stating \"It's no to everything.\" denies depression, anxiwty, suicidal thoughts and hallucinations.   Attempted to engage patient in casual conversation, patient declined to be engaged , staring straight ahead and not answering any questions.  Is able to make needs known.  Does not socialize with peers.     Problem: Anxiety  Goal: Anxiety Reduction or Resolution  Outcome: Progressing     Problem: Depression  Goal: Improved Mood  Outcome: Progressing     Problem: Suicidal Behavior  Goal: Suicidal Behavior is Absent or Managed  Outcome: Progressing     Problem: Sleep Disturbance  Goal: Adequate Sleep/Rest  Outcome: Progressing     Problem: Adult Behavioral Health Plan of Care  Goal: Plan of Care Review  Recent Flowsheet Documentation  Taken 5/11/2023 1128 by Aixa Irving RN  Patient Agreement with Plan of Care: agrees  Goal: Develops/Participates in Therapeutic [place] to Support Successful Transition  Intervention: Foster Therapeutic [place]  Recent Flowsheet Documentation  Taken 5/11/2023 1128 by Aixa Irving RN  Trust Relationship/Rapport: care explained     Problem: Psychotic Signs/Symptoms  Goal: Optimal Cognitive Function (Psychotic Signs/Symptoms)  Intervention: Support and Promote Cognitive Ability  Recent Flowsheet Documentation  Taken 5/11/2023 1128 by Aixa Irving RN  Trust Relationship/Rapport: care explained  Goal: Enhanced Social, Occupational or Functional Skills (Psychotic Signs/Symptoms)  Intervention: Promote Social, Occupational and Functional Ability  Recent Flowsheet Documentation  Taken 5/11/2023 1128 by Aixa Irving RN  Trust Relationship/Rapport: care explained   Goal Outcome Evaluation:    Plan of Care Reviewed With: patient                   "

## 2023-05-11 NOTE — PLAN OF CARE
Pt appears to have slept for 7 hours.  Pt denied SI/SIB or any mental symptoms; denied pain.  No concerns noted this shift. Will continue to monitor and offer support.      Problem: Sleep Disturbance  Goal: Adequate Sleep/Rest  Outcome: Progressing   Goal Outcome Evaluation:

## 2023-05-12 PROCEDURE — 250N000013 HC RX MED GY IP 250 OP 250 PS 637: Performed by: STUDENT IN AN ORGANIZED HEALTH CARE EDUCATION/TRAINING PROGRAM

## 2023-05-12 PROCEDURE — H2032 ACTIVITY THERAPY, PER 15 MIN: HCPCS

## 2023-05-12 PROCEDURE — 250N000013 HC RX MED GY IP 250 OP 250 PS 637

## 2023-05-12 PROCEDURE — 93005 ELECTROCARDIOGRAM TRACING: CPT

## 2023-05-12 PROCEDURE — 93010 ELECTROCARDIOGRAM REPORT: CPT | Performed by: INTERNAL MEDICINE

## 2023-05-12 PROCEDURE — 99233 SBSQ HOSP IP/OBS HIGH 50: CPT | Performed by: STUDENT IN AN ORGANIZED HEALTH CARE EDUCATION/TRAINING PROGRAM

## 2023-05-12 PROCEDURE — 124N000002 HC R&B MH UMMC

## 2023-05-12 RX ORDER — IPRATROPIUM BROMIDE 21 UG/1
2 SPRAY, METERED NASAL
Status: DISCONTINUED | OUTPATIENT
Start: 2023-05-12 | End: 2023-06-01 | Stop reason: HOSPADM

## 2023-05-12 RX ADMIN — CLOZAPINE 200 MG: 200 TABLET ORAL at 19:27

## 2023-05-12 RX ADMIN — LITHIUM CARBONATE 1200 MG: 300 TABLET, EXTENDED RELEASE ORAL at 18:33

## 2023-05-12 RX ADMIN — Medication 25 MCG: at 09:03

## 2023-05-12 ASSESSMENT — ACTIVITIES OF DAILY LIVING (ADL)
ORAL_HYGIENE: INDEPENDENT
ADLS_ACUITY_SCORE: 28
DRESS: STREET CLOTHES;INDEPENDENT
ADLS_ACUITY_SCORE: 28
HYGIENE/GROOMING: INDEPENDENT
ADLS_ACUITY_SCORE: 28

## 2023-05-12 NOTE — PROGRESS NOTES
05/12/23 1500   Group Therapy Session   Time Session Began 1315   Time Session Ended 1500   Total Time (minutes) 30   Total # Attendees 9   Group Type expressive therapy   Group Topic Covered cognitive activities;problem-solving;self-care activities   Group Session Detail Instrument Clinic   Patient Response/Contribution cooperative with task   Patient Participation Detail Participated in Music Therapy Instrument Clinic today. By engaging in active music-making with a variety of instruments (ukulele, guitar, keyboard) patients worked on building mastery, socialization, and self-expression.      Pt presented with urgency to want to play guitar.  After checking in as a group, MT invited pt to play at that time.  Played for ~30 minutes with sometimes rough strumming and down strokes on the instrument, but remained within acceptable bounds of safety for self and the instrument.    Does not seek out social interactions with MT or peers.  Keeps mostly to self, unless if to make a request.   Flat affect, but expressive playing, musically. Sings at times with his playing, appearing to improvise lyrics and melodies about what he is feeling.

## 2023-05-12 NOTE — PROGRESS NOTES
"    ----------------------------------------------------------------------------------------------------------  [facility], [place]   Psychiatric Progress Note  Hospital Day #27    Identifier: Tello Steen is a 27 year old male with previous psychiatric diagnoses of schizophrenia vs schizoaffective disorder, bipolar type who presents with psychosis and álvaro in the context of recent hospitalizations 3/26-4/11/23 ([facility]) and 2/22-3/7/23 ([facility]) for the same. Assessment is that the current presentation is consistent with schizoaffective disorder, bipolar type, current álvaro and psychosis.      Interim History:   The patient's care was discussed with the treatment team and chart notes were reviewed.     Vitals: Tachy to 140s with standing, no orthostatic hypotension  Sleep: 7 hours (05/12/23 0600)   Scheduled medications: Took all scheduled medications as prescribed  PRN medications: Tylenol for toe pain    Staff Report:   - Increasingly irritated and closed off  - endorses sharp toe pain in right 2nd toe  - NAEO         Subjective:     Patient Interview:  Patient interviewed today in conference room. Says that he is doing well. When asked if he has any specific things he would like to discuss, patient pulls out a list of names and says that these are the names of people in his support system. 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 Patient also talks about research plans and preparing for classes. He also states that he is hospitalized only because of his work with the [facility] [employer].     When asked about side effects of medication, patient continues to endorse nocturnal symptoms of toe pain, sedation, and " "dizziness. He is amenable to trying ice and heat for pain if needed.    Patient is talked to about his schooling and given the team's recommendation that he not return to school in the fall and instead waits to recover from current episode. Patient becomes agitated, stating that there is nothing to recover from, that he has been in the hospital many times because of his work with the [facility], and that the team has no right to tell him what to do outside of the hospital. He ultimately tells interviewer \"get over yourself\" and ends the interview by walking out of the room.     Review of systems:   As stated above    Objective:   /80   Pulse 112   Temp 98  F (36.7  C) (Oral)   Resp 18   Wt 72.9 kg (160 lb 11.2 oz)   SpO2 98%   BMI 21.20 kg/m    Weight is 160 lbs 11.2 oz  Body mass index is 21.2 kg/m .    Mental Status Exam:  Oriented to:  Grossly Oriented  General:  Awake and Alert  Appearance:  appears stated age  Behavior/Attitude: Initially calm, becomes agitated quickly  Eye Contact:  intermittent, more appropriate than yesterday  Psychomotor: No evidence of tics, dystonia, akithisia, or tardive dyskinesia, no catatonia; UE tremor observed, RUE>LUE  Speech: Appropriate volume and tone, intermittent latency of response  Language: Fluent in English and correct vocabulary   Mood:  \"good\"  Affect: Blunted affect  Thought Process: Linear but rigid and has difficulty accessing flexibility of thought and planning regarding discharge.   Thought Content:  No expressed HI. Displays grandiosity regarding his fund of academic knowledge. AH referenced during interview today with chip in brain   Insight: Does recognize diagnosis of schizophrenia, insight into past delusions, but lacks insight into ongoing symptoms of psychosis or need for caution regarding discharge and educational planning. Also not able to respond appropriately to context clues during interview.   Judgment:  limited - listens to \"chip in brain\". Is " amenable to medication changes but judgement regarding discharge planning.   Attention Span:  adequate for conversation  Concentration:  grossly intact  Recent and Remote Memory:  grossly intact  Fund of Knowledge: above average, and has medical knowledge beyond that of a regular patient  Muscle Strength and Tone: appears normal  Gait and Station: Normal      Allergies:   No Known Allergies     Labs and imaging:   Data this admission:  BMP normal  CBC normal  UDS negative  TSH 3/9/23 normal  Lipids 2/24/23 unremarkable  Hgb A1c 2/24/23 normal   UA - amorphous crystals, 10 protein albumin, elevated pH   HIV - negative   Ceruloplasmin normal   AILYN - normal   Lyme abs - normal   Treponema  - negative   Folate - normal   B12 - normal   ESR - normal Salicylate, alcohol, Tylenol negative  EKG 3/20: sinus rhythm, QTc 431  Brain MRI - normal     Lithium 4/25: 0.60                4/30: 0.60               5/08: 0.90     EKG 4/26 - normal     Psychiatric Assessment and Plan   Primary psychiatric diagnosis:   Schizoaffective disorder, bipolar type, current álvaro    Diagnostic Impression:   Tello Steen is a 27 year old male with a past psychiatric diagnosis of schizophrenia admitted from the ED on 04/15/2023 due to pscyhosis in the context of recent hospitalization 3/26-4/11/23 for psychosis and álvaro. Significant symptoms on admission include delusions about a chip in their brain, delusions of grandeur, delusions of having a special mission with the [employer], and elevated mood. The MSE on admission was pertinent for elevated affect and delusions previously mentioned. Biological contributions to mental health presentation include previous psychiatric diagnosis of schizophrenia. On chart review, there are also mentions of Lithium trials, possibly indicating previous álvaro. Psychosocial contributions to mental health presentation include partial insight, unclear work, housing, and financial situation. Protective factors include  medication adherence, lack of substance use, and supportive CCM, good frustration tolerance, and presence of perceived social supports.      In summary, the patient's reported symptoms of psychosis and álvaro in the context of historical schizophrenia diagnosis and recent hospitalization are consistent with schizoaffective disorder bipolar type.    Psychiatric Hospital course:  Tello Steen was admitted to [facility] committed with a Holman. All PTA medications were continued on admission.     Medications:     Neuroleptics: This is his third hospitalization within 4 months, indicating that his symptoms are not adequately controlled with Invega Sustenna.     Olanzapine: While awaiting clozapine to be added to his existing Holman, Tello was initially continued on PTA olanzapine.     Risperidone: Due to arm dystonia, Tello's olanzapine was switched to risperidone on 4/21. Added PRN cogentin and propranolol for any potential EPS. He tolerated risperidone titration well, although there were some concerns about akithisia due to patient's apparent restlessness and psychomotor agitation. On further exploration, this movement appeared volitional and more related to álvaro rather than akithisia or dystonia. Risperidone discontinued 5/2 with planned cross taper to clozapine.    Clozapine: Started on 4/27. Continuing to titrate clozapine, has had some orthostasis    Clozapine titrated to 150 mg on 5/8 and increased on 5/9 to 175 mg d/t symptom persistence.     Propranolol discontinued d/t orthostasis with clozapine      Patient with postural tachycardia 5/9, encouraged fluids    Continue to titrate clozapine 25 mg q48h with next dose increase on 05/11 to 200mg     Lithium: started on 4/20 for álvaro, tolerating titration well thus far. Lithium level on 5/6 returned at 0.9. Therapeutic level indicating no need for continued titration at this time    Symptoms    Patient appears to be improving with less RTIS and improved ability to  focus. However, patient continues to express delusions and AH with limited insight into current psychiatric symptoms and limited judgement. Appears increasingly irritable and agitated.    Psychosocial    5/4/23 patient requests that team not discuss care/treatment with his mother and limit information sharing to discharge planning. Later stated that he is amenable to team discussing any element of treatment plan with mom.       Today's changes:     Continue encouraging fluids     Increase clozapine to 200 mg tonight     Continue titrating at 25 mg intervals q48h      1. Psychotropic Medications:  Scheduled:  Current Facility-Administered Medications   Medication Dose Route Frequency     cloZAPine  200 mg Oral At Bedtime     lithium ER  1,200 mg Oral At Bedtime     Vitamin D3  25 mcg Oral Daily     PRN:  Current Facility-Administered Medications   Medication Dose Route Frequency     acetaminophen  650 mg Oral Q4H PRN     alum & mag hydroxide-simethicone  30 mL Oral Q4H PRN     benztropine  0.5 mg Oral BID PRN     melatonin  3 mg Oral At Bedtime PRN     OLANZapine  10 mg Intramuscular TID PRN     polyethylene glycol  17 g Oral Daily PRN     propranolol  10 mg Oral BID PRN     risperiDONE  0.5 mg Sublingual BID PRN     sennosides  8.6 mg Oral BID PRN       2. Pertinent Labs/Monitoring:   - Lithium 4/25: 0.60 -> 4/30: 0.60  - EKG 4/26 - normal sinus rhythm, QTc 417  - ANC 4/27 - 4.0  - Lithium 5/6: 0.9  - ANC 5/4: 4.5  - ANC 5/11: 5.3      3. Additional Plans:  Patient will be treated in therapeutic milieu with appropriate individual and group therapies as described.    Medical Assessment and Plan     Medical diagnoses to be addressed this admission:    None    Medical course: Patient was physically examined by the ED prior to being transferred to the unit and was found to be medically stable and appropriate for admission. Patient found to be orthostatic first on 5/2 but has since stabilized and remained asymptomatic.  Postural tachycardia present on 5/9, likely d/t clozapine. Encouraging fluids.     Consults: None    Checklist     Legal Status: Committed (PD revoked) with Holman. Holman: haldol, olanzapine, risperidone, paliperidone, and clozapine.    Safety Assessment:   Behavioral Orders   Procedures     Code 1 - Restrict to Unit     Code 2     OK to leave the floor for imaging procedures at the discretion of floor staff     Routine Programming     As clinically indicated     Status 15     Every 15 minutes.       Risk Assessment:  Risk for harm is moderate-high.  Risk factors: impulsive and past behaviors, multiple recent pending  Protective factors: family and engaged in treatment     SIO: No    Disposition: TBD, complicated by patient being from [place]. Pending stabilization, medication optimization, & development of a safe discharge plan.    Attestations     Brandon Merrill  MS4, [employer] medical school     This patient has been seen and evaluated by me, Johanna Altman DO.  I have discussed this patient with the team including the resident and medical student(s) and I agree with the findings and plan in this note.  Dr. Johanna Altman DO, CARIN

## 2023-05-12 NOTE — PLAN OF CARE
Problem: Sleep Disturbance  Goal: Adequate Sleep/Rest  Outcome: Progressing   Goal Outcome Evaluation:  Patient appears to have slept for 7 hours. No acute/new events for the overnight. No complaints of pain or requests for prn's. Safety checks in place, no concerns noted this shift.

## 2023-05-12 NOTE — PLAN OF CARE
Problem: Depression  Goal: Improved Mood  Outcome: Progressing     Problem: Anxiety  Goal: Anxiety Reduction or Resolution  Outcome: Progressing   Goal Outcome Evaluation:    Plan of Care Reviewed With: patient         Patient received PRN Tylenol for left second toe sharp pain rated at 4.5/10. Medication effective. ADLs WNL. Cooperative and compliant with medications. Visible in milieu, no interactions with peers, able to communicate needs. Dismissive during assessment. Patient was blunted pleasant on approach. Denied anxiety and depression, no SI/HI, contracted for safety.

## 2023-05-12 NOTE — PLAN OF CARE
Isolative to room, intermittently pacing the hallway. Tends to keep to himself. Denies pain, stating great toe pain has improved.  Dismissive with all questions.  Denies all mental health issues.   Was tachycardic this morning pulse =128 sitting, increasing to 143 standing.  Care team informed , EKG ordered and completed.  No c/o lightheadedness or dizziness with tachycardia.  Attended afternoon OT group, seen playing the guitar in the OT room.     Problem: Psychotic Signs/Symptoms  Goal: Improved Mood Symptoms  Outcome: Not Progressing  Goal: Enhanced Social, Occupational or Functional Skills (Psychotic Signs/Symptoms)  Outcome: Not Progressing     Problem: Psychotic Signs/Symptoms  Goal: Increased Participation and Engagement (Psychotic Signs/Symptoms)  Outcome: Progressing  Goal: Improved Psychomotor Symptoms (Psychotic Signs/Symptoms)  Outcome: Progressing  Goal: Decreased Sensory Symptoms (Psychotic Signs/Symptoms)  Outcome: Progressing     Problem: Adult Behavioral Health Plan of Care  Goal: Plan of Care Review  Recent Flowsheet Documentation  Taken 5/12/2023 1133 by Aixa Irving RN  Patient Agreement with Plan of Care: agrees   Goal Outcome Evaluation:    Plan of Care Reviewed With: patient

## 2023-05-13 PROCEDURE — 250N000013 HC RX MED GY IP 250 OP 250 PS 637: Performed by: STUDENT IN AN ORGANIZED HEALTH CARE EDUCATION/TRAINING PROGRAM

## 2023-05-13 PROCEDURE — 124N000002 HC R&B MH UMMC

## 2023-05-13 PROCEDURE — 250N000013 HC RX MED GY IP 250 OP 250 PS 637

## 2023-05-13 RX ADMIN — LITHIUM CARBONATE 1200 MG: 300 TABLET, EXTENDED RELEASE ORAL at 18:25

## 2023-05-13 RX ADMIN — Medication 25 MCG: at 10:03

## 2023-05-13 RX ADMIN — CLOZAPINE 200 MG: 200 TABLET ORAL at 19:47

## 2023-05-13 ASSESSMENT — ACTIVITIES OF DAILY LIVING (ADL)
HYGIENE/GROOMING: SHOWER;INDEPENDENT
ADLS_ACUITY_SCORE: 28
ORAL_HYGIENE: INDEPENDENT
ADLS_ACUITY_SCORE: 28
ADLS_ACUITY_SCORE: 28
ORAL_HYGIENE: INDEPENDENT
ADLS_ACUITY_SCORE: 28
DRESS: STREET CLOTHES;INDEPENDENT
DRESS: STREET CLOTHES;INDEPENDENT
HYGIENE/GROOMING: INDEPENDENT
ADLS_ACUITY_SCORE: 28

## 2023-05-13 NOTE — PLAN OF CARE
"  Problem: Psychotic Signs/Symptoms  Goal: Optimal Cognitive Function (Psychotic Signs/Symptoms)  Intervention: Support and Promote Cognitive Ability  Recent Flowsheet Documentation  Taken 5/12/2023 1857 by Lorenza Ruano RN  Trust Relationship/Rapport:    care explained    questions encouraged    reassurance provided    thoughts/feelings acknowledged     Problem: Psychotic Signs/Symptoms  Goal: Improved Behavioral Control (Psychotic Signs/Symptoms)  Outcome: Progressing   Goal Outcome Evaluation:  Patient up and visible in Milieu pacing unit and avoids social interactions with staffs and peers. Upon approach, patient dismissed all mental health assessments questions and replied to writer stating \" if you want to know what I did today, here are the list, it's not like anything other than the obvious anyway\". Patient eye contact was hesitant and he appeared to be responding to internal stimuli. Patient spent some time in dining area reading, he had adequate intake of food and fluids, appeared clean and dress appropriate. Patient requested for HS medications at 18:30 and after 1900, he went to room for the night, no behavior concern noted this shift.  "

## 2023-05-13 NOTE — PLAN OF CARE
Slept until mid morning. Appetite good at breakfast and lunch.  Denies depression, anxiety, suicidal thoughts and hallucinations. Has been pacing back and forth in his room and occasionally in the hallway.  Avoids social interactions.  Orthostatic VS this morning: sitting  /78 P 117, standing /62 P 132 (similar to yesterday's VS).  Denies dizziness, lightheadedness and chest discomfort.  Declined to have VS rechecked this afternoon.  C/o mild  great toe pain, reporting that it has improved from previous days,  declined intervention.    Problem: Anxiety  Goal: Anxiety Reduction or Resolution  Outcome: Progressing     Problem: Depression  Goal: Improved Mood  Outcome: Progressing     Problem: Adult Behavioral Health Plan of Care  Goal: Plan of Care Review  Recent Flowsheet Documentation  Taken 5/13/2023 1321 by Aixa Irving RN  Patient Agreement with Plan of Care: (declines review of treatment plan) other (see comments)   Goal Outcome Evaluation:    Plan of Care Reviewed With: patient

## 2023-05-13 NOTE — PLAN OF CARE
Pt appears to have slept for 6.25  hours.  No PRNs given or requested this shift.  Pt remained in his room the entire shift.  No concerns noted this shift. Will continue to monitor and offer support.     Problem: Sleep Disturbance  Goal: Adequate Sleep/Rest  Outcome: Progressing   Goal Outcome Evaluation:

## 2023-05-14 LAB
ATRIAL RATE - MUSE: 117 BPM
DIASTOLIC BLOOD PRESSURE - MUSE: NORMAL MMHG
INTERPRETATION ECG - MUSE: NORMAL
P AXIS - MUSE: 70 DEGREES
PR INTERVAL - MUSE: 144 MS
QRS DURATION - MUSE: 84 MS
QT - MUSE: 332 MS
QTC - MUSE: 463 MS
R AXIS - MUSE: 96 DEGREES
SYSTOLIC BLOOD PRESSURE - MUSE: NORMAL MMHG
T AXIS - MUSE: 60 DEGREES
VENTRICULAR RATE- MUSE: 117 BPM

## 2023-05-14 PROCEDURE — 250N000013 HC RX MED GY IP 250 OP 250 PS 637: Performed by: STUDENT IN AN ORGANIZED HEALTH CARE EDUCATION/TRAINING PROGRAM

## 2023-05-14 PROCEDURE — 124N000002 HC R&B MH UMMC

## 2023-05-14 PROCEDURE — 250N000013 HC RX MED GY IP 250 OP 250 PS 637

## 2023-05-14 RX ADMIN — CLOZAPINE 200 MG: 200 TABLET ORAL at 19:17

## 2023-05-14 RX ADMIN — Medication 25 MCG: at 08:49

## 2023-05-14 RX ADMIN — LITHIUM CARBONATE 1200 MG: 300 TABLET, EXTENDED RELEASE ORAL at 17:47

## 2023-05-14 ASSESSMENT — ACTIVITIES OF DAILY LIVING (ADL)
ADLS_ACUITY_SCORE: 28
ORAL_HYGIENE: INDEPENDENT
ADLS_ACUITY_SCORE: 28
HYGIENE/GROOMING: INDEPENDENT
DRESS: STREET CLOTHES;INDEPENDENT
ADLS_ACUITY_SCORE: 28
HYGIENE/GROOMING: INDEPENDENT
ADLS_ACUITY_SCORE: 28
ORAL_HYGIENE: INDEPENDENT
DRESS: STREET CLOTHES;INDEPENDENT
ADLS_ACUITY_SCORE: 28
ADLS_ACUITY_SCORE: 28

## 2023-05-14 NOTE — PLAN OF CARE
"Has been isolative to room, either pacing in his room or sitting on his bed reading.  Denies all mental health symptoms.  Reports feeling fine. Affect is flat , blunted and guarded.  Orthostatic vital signs similar to previous two days,  sitting /86, P 108; standing BP 99/61, P 132.  Denies dizziness and lightheadedness, stating \"everything is fine\".   Reports some toe pain,  that he says is the the same as before. Declined need for Tylenol.  Ambulated in the hallway for a short period of time this afternoon. Noted to be wearing ear plugs when ambulating outside of his room and avoided any eye contact with writer and peers.     Problem: Psychotic Signs/Symptoms  Goal: Improved Behavioral Control (Psychotic Signs/Symptoms)  Outcome: Not Progressing  Goal: Optimal Cognitive Function (Psychotic Signs/Symptoms)  Outcome: Not Progressing  Goal: Increased Participation and Engagement (Psychotic Signs/Symptoms)  Outcome: Not Progressing  Goal: Improved Mood Symptoms  Outcome: Not Progressing     Problem: Adult Behavioral Health Plan of Care  Goal: Plan of Care Review  Recent Flowsheet Documentation  Taken 5/14/2023 1220 by Aixa Irving RN  Patient Agreement with Plan of Care: agrees   Goal Outcome Evaluation:    Plan of Care Reviewed With: patient                   "

## 2023-05-14 NOTE — PLAN OF CARE
Pt appears to have slept for 6.75 hours.  No PRNs given or requested this shift.  No concerns noted this shift. Will continue to monitor and offer support.      Problem: Sleep Disturbance  Goal: Adequate Sleep/Rest  Outcome: Progressing   Goal Outcome Evaluation:

## 2023-05-14 NOTE — PLAN OF CARE
Isolative to room most of the evening.  Seen pacing back and forth in room, ambulated in the hallway occasionally.  Reports mild toe pain, declined tylenol.  Is medication compliant. Showered. Affect is flat and blunted.  Avoids social interactions except to make occasional requests from staff.     Problem: Anxiety  Goal: Anxiety Reduction or Resolution  5/13/2023 2059 by Aixa Irving RN  Outcome: Progressing  5/13/2023 1326 by Aixa Irving RN  Outcome: Progressing     Problem: Depression  Goal: Improved Mood  Outcome: Progressing     Problem: Sleep Disturbance  Goal: Adequate Sleep/Rest  Outcome: Progressing     Problem: Adult Behavioral Health Plan of Care  Goal: Plan of Care Review  Recent Flowsheet Documentation  Taken 5/13/2023 2054 by Aixa Irving RN  Patient Agreement with Plan of Care: refuses to participate  Taken 5/13/2023 1321 by Aixa Irving RN  Patient Agreement with Plan of Care: (declines review of treatment plan) other (see comments)   Goal Outcome Evaluation:    Plan of Care Reviewed With: patient

## 2023-05-15 PROCEDURE — 124N000002 HC R&B MH UMMC

## 2023-05-15 PROCEDURE — 250N000013 HC RX MED GY IP 250 OP 250 PS 637: Performed by: STUDENT IN AN ORGANIZED HEALTH CARE EDUCATION/TRAINING PROGRAM

## 2023-05-15 PROCEDURE — 250N000013 HC RX MED GY IP 250 OP 250 PS 637

## 2023-05-15 RX ADMIN — ACETAMINOPHEN 650 MG: 325 TABLET ORAL at 11:39

## 2023-05-15 RX ADMIN — LITHIUM CARBONATE 1200 MG: 300 TABLET, EXTENDED RELEASE ORAL at 18:03

## 2023-05-15 RX ADMIN — CLOZAPINE 200 MG: 200 TABLET ORAL at 20:40

## 2023-05-15 RX ADMIN — Medication 25 MCG: at 09:09

## 2023-05-15 ASSESSMENT — ACTIVITIES OF DAILY LIVING (ADL)
ADLS_ACUITY_SCORE: 28
HYGIENE/GROOMING: INDEPENDENT
ADLS_ACUITY_SCORE: 28
ADLS_ACUITY_SCORE: 28
ORAL_HYGIENE: INDEPENDENT
ADLS_ACUITY_SCORE: 28

## 2023-05-15 NOTE — PROGRESS NOTES
"    ----------------------------------------------------------------------------------------------------------  [facility], [place]   Psychiatric Progress Note  Hospital Day #30    Identifier: Tello Steen is a 27 year old male with previous psychiatric diagnoses of schizophrenia vs schizoaffective disorder, bipolar type who presents with psychosis and álvaro in the context of recent hospitalizations 3/26-4/11/23 ([facility]) and 2/22-3/7/23 ([facility]) for the same. Assessment is that the current presentation is consistent with schizoaffective disorder, bipolar type, current álvaro and psychosis.      Interim History:   The patient's care was discussed with the treatment team and chart notes were reviewed.     Vitals: Tachy to 140s with standing, no orthostatic hypotension  Sleep: 6.75 hours (05/15/23 0600)   Scheduled medications: Took all scheduled medications as prescribed  PRN medications: None    Staff Report:   - NAEO  - continues to be guarded and dismissive of questions         Subjective:     Patient Interview:  Patient interviewed today in conference room. He is very guarded today and says he is doing well without any concerns. He does not respond to many questions and turns to other team members aside from interviewer and prompts them to ask him questions instead, saying \"does anyone else have questions?\" appearing reluctant to speak with lead interviewer likely given their role in leading conversation about delaying schooling last week.     Patient ultimately agreeable to answering some questions about side effects, stating that his toe pain is intermittent but  Not worsening and that his sedation from the clozapine is manageable and not worse from prior. Given patient's guardedness, patient is asked if discomfort today is due to difficult interview Friday regarding conversation about delaying return to school. He does not respond to this question and ultimately leaves " "the room on his own.     Review of systems:   As stated above    Objective:   /86 (BP Location: Right arm)   Pulse 114   Temp 97.8  F (36.6  C) (Oral)   Resp 20   Wt 73.2 kg (161 lb 6.4 oz)   SpO2 95%   BMI 21.29 kg/m    Weight is 161 lbs 6.4 oz  Body mass index is 21.29 kg/m .    Mental Status Exam:  Oriented to:  Grossly Oriented  General:  Awake and Alert  Appearance:  appears stated age  Behavior/Attitude: Guarded, frustrated, irritable. Attitude towards lead interviewer significantly more guarded than with rest of team  Eye Contact:  Intermittent, avoids eye contact with lead interviewer   Psychomotor: No evidence of tics, dystonia, akithisia, or tardive dyskinesia, no catatonia; UE tremor observed, RUE>LUE  Speech: Appropriate volume and tone, intermittent latency of response  Language: Fluent in English and correct vocabulary   Mood:  \"okay\"  Affect: Blunted affect  Thought Process: Linear but rigid and has difficulty accessing flexibility of thought and planning regarding discharge.   Thought Content:  No expressed HI. Appears to be RTIS during interview today.   Insight: Does recognize diagnosis of schizophrenia, insight into past delusions, but lacks insight into ongoing symptoms of psychosis or need for cautious discharge planning.  Judgment:  limited - listens to \"chip in brain\". Is amenable to medication changes but judgement regarding discharge planning is limited.  Attention Span:  adequate for conversation  Concentration:  grossly intact  Recent and Remote Memory:  grossly intact  Fund of Knowledge: above average, and has medical knowledge beyond that of a regular patient  Muscle Strength and Tone: appears normal  Gait and Station: Normal      Allergies:   No Known Allergies     Labs and imaging:   Data this admission:  BMP normal  CBC normal  UDS negative  TSH 3/9/23 normal  Lipids 2/24/23 unremarkable  Hgb A1c 2/24/23 normal   UA - amorphous crystals, 10 protein albumin, elevated pH "   HIV - negative   Ceruloplasmin normal   AILYN - normal   Lyme abs - normal   Treponema  - negative   Folate - normal   B12 - normal   ESR - normal Salicylate, alcohol, Tylenol negative  EKG 3/20: sinus rhythm, QTc 431  Brain MRI - normal     Lithium 4/25: 0.60                4/30: 0.60               5/08: 0.90     EKG 4/26 - normal     Psychiatric Assessment and Plan   Primary psychiatric diagnosis:   Schizoaffective disorder, bipolar type, current álvaro    Diagnostic Impression:   Tello Steen is a 27 year old male with a past psychiatric diagnosis of schizophrenia admitted from the ED on 04/15/2023 due to pscyhosis in the context of recent hospitalization 3/26-4/11/23 for psychosis and álvaro. Significant symptoms on admission include delusions about a chip in their brain, delusions of grandeur, delusions of having a special mission with the [employer], and elevated mood. The MSE on admission was pertinent for elevated affect and delusions previously mentioned. Biological contributions to mental health presentation include previous psychiatric diagnosis of schizophrenia. On chart review, there are also mentions of Lithium trials, possibly indicating previous álvaro. Psychosocial contributions to mental health presentation include partial insight, unclear work, housing, and financial situation. Protective factors include medication adherence, lack of substance use, and supportive CCM, good frustration tolerance, and presence of perceived social supports.      In summary, the patient's reported symptoms of psychosis and álvaro in the context of historical schizophrenia diagnosis and recent hospitalization are consistent with schizoaffective disorder bipolar type.    Psychiatric Hospital course:  Tello Steen was admitted to [facility] committed with a Holman. All PTA medications were continued on admission.     Medications:     Neuroleptics: This is his third hospitalization within 4 months, indicating that his symptoms  are not adequately controlled with Invega Sustenna.     Olanzapine: While awaiting clozapine to be added to his existing Holman, Tello was initially continued on PTA olanzapine.     Risperidone: Due to arm dystonia, Tello's olanzapine was switched to risperidone on 4/21. Added PRN cogentin and propranolol for any potential EPS. He tolerated risperidone titration well, although there were some concerns about akithisia due to patient's apparent restlessness and psychomotor agitation. On further exploration, this movement appeared volitional and more related to álvaro rather than akithisia or dystonia. Risperidone discontinued 5/2 with planned cross taper to clozapine.    Clozapine: Started on 4/27. Continuing to titrate clozapine, has had some orthostasis    Clozapine titrated to 150 mg on 5/8 and increased on 5/9 to 175 mg d/t symptom persistence.     Propranolol discontinued d/t orthostasis with clozapine      Patient with postural tachycardia 5/9, encouraged fluids    Continued to titrate clozapine 25 mg q48h with dose increase on 05/11 to 200mg     Maintaining clozapine at 200 mg pending clozapine level Wednesday 5/17    Patient has had intermittent orthostasis and tachycardia on clozapine, but has remained asymptomatic. Continuing to monitor. EKG showing possible sinus tachycardia    Lithium: started on 4/20 for álvaro, tolerating titration well thus far. Lithium level on 5/6 returned at 0.9. Therapeutic level indicating no need for continued titration at this time    Symptoms    Patient appears to be improving with less RTIS and improved ability to focus. However, patient continues to express delusions and AH with limited insight into current psychiatric symptoms and limited judgement. Appears increasingly irritable and agitated regarding discharge planning.    Psychosocial    5/4/23 patient requests that team not discuss care/treatment with his mother and limit information sharing to discharge planning. Later stated  that he is amenable to team discussing any element of treatment plan with mom.       Today's changes:     Continue encouraging fluids    Hold clozapine at 200 mg pending level on 5/17      1. Psychotropic Medications:  Scheduled:  Current Facility-Administered Medications   Medication Dose Route Frequency     cloZAPine  200 mg Oral At Bedtime     lithium ER  1,200 mg Oral At Bedtime     Vitamin D3  25 mcg Oral Daily     PRN:  Current Facility-Administered Medications   Medication Dose Route Frequency     acetaminophen  650 mg Oral Q4H PRN     alum & mag hydroxide-simethicone  30 mL Oral Q4H PRN     benztropine  0.5 mg Oral BID PRN     ipratropium  2 spray Sublingual At Bedtime PRN     melatonin  3 mg Oral At Bedtime PRN     OLANZapine  10 mg Intramuscular TID PRN     polyethylene glycol  17 g Oral Daily PRN     propranolol  10 mg Oral BID PRN     risperiDONE  0.5 mg Sublingual BID PRN     sennosides  8.6 mg Oral BID PRN       2. Pertinent Labs/Monitoring:   - Lithium 4/25: 0.60 -> 4/30: 0.60  - EKG 4/26 - normal sinus rhythm, QTc 417  - ANC 4/27 - 4.0  - Lithium 5/6: 0.9  - ANC 5/4: 4.5  - ANC 5/11: 5.3      3. Additional Plans:  Patient will be treated in therapeutic milieu with appropriate individual and group therapies as described.    Medical Assessment and Plan     Medical diagnoses to be addressed this admission:    None    Medical course: Patient was physically examined by the ED prior to being transferred to the unit and was found to be medically stable and appropriate for admission. Patient has been intermittently found to be orthostatic and with postural tachycardia since 5/2 but has remained asymptomatic. Encouraging fluids.     Consults: None    Checklist     Legal Status: Committed (PD revoked) with Holman. Holman: haldol, olanzapine, risperidone, paliperidone, and clozapine.    Safety Assessment:   Behavioral Orders   Procedures     Code 1 - Restrict to Unit     Code 2     OK to leave the floor for  imaging procedures at the discretion of floor staff     Routine Programming     As clinically indicated     Status 15     Every 15 minutes.       Risk Assessment:  Risk for harm is moderate-high.  Risk factors: impulsive and past behaviors, multiple recent pending  Protective factors: family and engaged in treatment     SIO: No    Disposition: TBD, complicated by patient being from [place]. Pending stabilization, medication optimization, & development of a safe discharge plan.    Attestations     Branodn Aliyahcaridad  MS4, [employer] medical school         This patient has been seen and evaluated by me, Johanna Altman DO.  I have discussed this patient with the team including the resident and medical student(s) and I agree with the findings and plan in this note.  Dr. Johanna Altman DO, CARIN

## 2023-05-15 NOTE — PLAN OF CARE
Pt isolative in room reading books and pacing this shift, came out for meals and medication. Mood is calm with no eye contact, denies anxiety, hallucination, SI, SIB and other mental problems, denies dizziness and headache. Pt answers yes or no response during assessment. Ate 100% with adequate fluid intake. C/O mild pain on the toe but needed no intervention stated it's tolerable. Pt is medication compliant safety was contracted, will continue with the plan of care.     /86   Pulse 114   Temp 97.8  F (36.6  C) (Oral)   Resp 20 SpO2 95%     Problem: Anxiety  Goal: Anxiety Reduction or Resolution  Outcome: Progressing     Problem: Depression  Goal: Improved Mood  Outcome: Progressing     Problem: Sleep Disturbance  Goal: Adequate Sleep/Rest  Outcome: Progressing   Goal Outcome Evaluation:    Plan of Care Reviewed With: patient

## 2023-05-15 NOTE — PLAN OF CARE
Patient was in his room reading, was blunted with affect, declined to check in the first attempt, was brief the second attempt, unable to assess his emotional state, responded most questionnaires with yes and no's,, took his medications without prompts, intake was adequate, patient was intermittently visible, socially withdrawn from others.   Problem: Adult Behavioral Health Plan of Care  Goal: Adheres to Safety Considerations for Self and Others  Outcome: Progressing  Flowsheets (Taken 5/15/2023 1743)  Adheres to Safety Considerations for Self and Others: making progress toward outcome     Problem: Anxiety  Goal: Anxiety Reduction or Resolution  Intervention: Promote Anxiety Reduction  Recent Flowsheet Documentation  Taken 5/15/2023 1741 by Lorie Bell, RN  Family/Support System Care: self-care encouraged   Goal Outcome Evaluation:    Plan of Care Reviewed With: patient

## 2023-05-15 NOTE — PLAN OF CARE
Pt appeared to sleep for a total of 6.75 hours overnight. No PRN medications were administered, and no concerns were noted.     Problem: Sleep Disturbance  Goal: Adequate Sleep/Rest  Outcome: Progressing

## 2023-05-15 NOTE — PLAN OF CARE
Problem: Psychotic Signs/Symptoms  Goal: Optimal Cognitive Function (Psychotic Signs/Symptoms)  Outcome: Progressing   Goal Outcome Evaluation:    Plan of Care Reviewed With: patient       Patient alert and oriented x 4 with stable vitals.  Mood was calmed, affect flat and  blunted. Patient denied feeling depressed or having any anxiety, denied SI,SIB.    Patient was given Tylenol 650 mg for complained of right foot pain  due to  sleeping with his shoes on. Patient was visible on the unit occasionally, he was observed walking up and down the campo. He ate less than 50% of breakfast and 50% of lunch. Took AM medication with no problem. Patient was pleasant and cooperative, no behavior issues or any safety concern noted

## 2023-05-15 NOTE — PLAN OF CARE
05/15/23 0836   Individualization/Patient Specific Goals   Patient Personal Strengths family/social support;expressive of needs;good impulse control;medication/treatment adherence;intellectual cognitive skills;relationship stability   Patient Vulnerabilities history of unsuccessful treatment;lacks insight into illness;poor impulse control   Anxieties, Fears or Concerns None   Individualized Care Needs None   Interprofessional Rounds   Summary 1. Stabilization of thought disorder symptoms 2.Medication mgmt per MD's 3. Safe with self 4. Coordination of care with family/outpatient providers 5. Placement addressed 6. Psych f/u care in place   Participants CTC;nursing;patient;psychiatrist   Behavioral Team Discussion   Participants , Dr. Ryan, Berenice KOVACS RN, Renay Kuhn MA.LP, Med students   Progress Patient remains psychotic- titration had to be slowwed down due to orthostasis. Patient  has continued to be compliant with meds, tx recommendations. .   Anticipated length of stay 7-10 days   Continued Stay Criteria/Rationale Psychosis, on civil commitment/Holman   Medical/Physical Tachycardia, orthostasis   Plan Patient will continue to be seen by  Psychiatry daily.  Meds continues to be reviewed/adjusted per MD as indicated.  Patient refusing IRTS placement.  Will continue to coordinate with family re: discharge plans.   CTC will continue to assess needs, ensure appropriate f/u care is in place   Rationale for change in precautions or plan No change in plan/precautions   Anticipated Discharge Disposition home with family

## 2023-05-16 PROCEDURE — H2032 ACTIVITY THERAPY, PER 15 MIN: HCPCS

## 2023-05-16 PROCEDURE — 250N000013 HC RX MED GY IP 250 OP 250 PS 637

## 2023-05-16 PROCEDURE — 250N000013 HC RX MED GY IP 250 OP 250 PS 637: Performed by: STUDENT IN AN ORGANIZED HEALTH CARE EDUCATION/TRAINING PROGRAM

## 2023-05-16 PROCEDURE — 124N000002 HC R&B MH UMMC

## 2023-05-16 PROCEDURE — 99231 SBSQ HOSP IP/OBS SF/LOW 25: CPT | Mod: GC | Performed by: PSYCHIATRY & NEUROLOGY

## 2023-05-16 RX ADMIN — LITHIUM CARBONATE 1200 MG: 300 TABLET, EXTENDED RELEASE ORAL at 18:37

## 2023-05-16 RX ADMIN — Medication 25 MCG: at 09:45

## 2023-05-16 RX ADMIN — CLOZAPINE 200 MG: 200 TABLET ORAL at 19:04

## 2023-05-16 ASSESSMENT — ACTIVITIES OF DAILY LIVING (ADL)
LAUNDRY: WITH SUPERVISION
ADLS_ACUITY_SCORE: 28
HYGIENE/GROOMING: INDEPENDENT
ADLS_ACUITY_SCORE: 28
DRESS: INDEPENDENT
ADLS_ACUITY_SCORE: 28
ADLS_ACUITY_SCORE: 28
ORAL_HYGIENE: INDEPENDENT

## 2023-05-16 NOTE — PLAN OF CARE
Assessment/Intervention/Current Symtoms and Care Coordination  The patient's care was discussed with the treatment team and chart notes were reviewed.   Patient met with team.  Patient declined meeting with the team today..  Has been eating, sleeping well. Compliant with meds. ADL's fluctuate.  MD's spoke to mom today and she reports he sounds better on the phone however he appears to  to continue to not be at baseline with thoughts that he is adopted, talking to self.  Writer has left  for patient's CM with update- request to meet. Have not heard back as yet.     Discharge Plan or Goal  Patient insists that he plans to return home to [place]. Patient in need of close Psychiatric follow up care.     Barriers to Discharge   Severity of symptoms- ongoing need for stabilization.    Meds continue to be monitored/adjusted per MD     Referral Status  None today     Legal Status     Commitment and Holman order per [facility]- Provisional discharge revoked

## 2023-05-16 NOTE — PROGRESS NOTES
"    ----------------------------------------------------------------------------------------------------------  [facility], [place]   Psychiatric Progress Note  Hospital Day #31    Identifier: Tello Steen is a 27 year old male with previous psychiatric diagnoses of schizophrenia vs schizoaffective disorder, bipolar type who presents with psychosis and álvaro in the context of recent hospitalizations 3/26-4/11/23 ([facility]) and 2/22-3/7/23 ([facility] for the same. Assessment is that the current presentation is consistent with schizoaffective disorder, bipolar type, current álvaro and psychosis.      Interim History:   The patient's care was discussed with the treatment team and chart notes were reviewed.     Vitals: Tachy to 140s with standing, no orthostatic hypotension  Sleep: 7 hours (05/16/23 0614)   Scheduled medications: Took all scheduled medications as prescribed  PRN medications: Tylenol for toe pain    Staff Report:   - NAEO  - continues to be guarded and dismissive of questions  - continues to endorse intermittent right second toe pain      Subjective:     Patient Interview:  Patient declined to speak to team today, saying \"no, thank you\" when team asked to speak with him. Also declined incoming call from mother and did not want to accept a message.     Family interview with mom:  Patient's mom interviewed today. Despite patient observed to decline an incoming call from mom, she reports that she did speak to him twice this morning, potentially separate from his observed declination.     Mom is concerned about team increasing his medications d/t his tachycardia and feeling like based on his weight, he might be susceptible to overmedication. Mom assured that team will obtain clozapine level and increase any medications slowly and while monitoring for side effects.     Also inquired to mom what patient looks like at his baseline vs when decompensated. She states that when " "he is well, he sleeps well, does math/physics, and maintains good hygiene. When he decompensates, he talks about being adopted, uses the name \"Ki\", \"walks around too much\", laughs or speaks to himself, eats poorly, displays worsened hygiene, and dos not sleep well. Based on this information, team and mom both agree that patient is not yet at baseline, though she does report that he sounds \"much better\".     She asks about patient's ability to drive home and seems to understand team's concern about patient needing to clinically improve and have time to recover after hospitalization in order to be safe driving home. She does inquire about whether or not there is a way to have his car towed to the hospital without a fee of keeping the car on site. Mom also agrees with team that patient needs more recovery time before being able to return to school and that a return to classes in the fall is likely not a wise decision.      Review of systems:   As stated above    Objective:   /76 (BP Location: Left arm, Patient Position: Sitting)   Pulse 99   Temp 99.2  F (37.3  C) (Oral)   Resp 18   Wt 73.2 kg (161 lb 6.4 oz)   SpO2 97%   BMI 21.29 kg/m    Weight is 161 lbs 6.4 oz  Body mass index is 21.29 kg/m .    Mental Status Exam:  Oriented to:  Grossly Oriented  General:  Awake and Alert, pacing bedroom  Appearance:  appears stated age  Behavior/Attitude: Guarded, irritable.   Eye Contact:  Makes initial eye contact, but avoids after declining interview  Psychomotor: No evidence of tics, dystonia, akithisia, or tardive dyskinesia, no catatonia  Speech: Appropriate volume and tone  Language: Fluent in English and correct vocabulary   Mood:  \"no thank you [regarding interview]\"  Affect: Blunted affect  Thought Process: Logical and linear in declining interview and phone call from mother.   Thought Content:  No observable AH today but observation limited d/t declines interview  Insight: Does recognize diagnosis of " "schizophrenia, insight into past delusions, but lacks insight into ongoing symptoms of psychosis or need for cautious discharge planning.  Judgment:  limited - listens to \"chip in brain\". Recent judgment regarding discharge planning has been limited  Attention Span:  adequate historically, but unnable to assess today as patient declines interview  Concentration:  grossly intact  Recent and Remote Memory:  grossly intact  Fund of Knowledge: above average, and has medical knowledge beyond that of a regular patient  Muscle Strength and Tone: appears normal  Gait and Station: Normal, observed pacing without any gait abnormalities today      Allergies:   No Known Allergies     Labs and imaging:   Data this admission:  BMP normal  CBC normal  UDS negative  TSH 3/9/23 normal  Lipids 2/24/23 unremarkable  Hgb A1c 2/24/23 normal   UA - amorphous crystals, 10 protein albumin, elevated pH   HIV - negative   Ceruloplasmin normal   AILYN - normal   Lyme abs - normal   Treponema  - negative   Folate - normal   B12 - normal   ESR - normal Salicylate, alcohol, Tylenol negative  EKG 3/20: sinus rhythm, QTc 431  Brain MRI - normal     Lithium 4/25: 0.60                4/30: 0.60               5/08: 0.90     EKG 4/26 - normal     Psychiatric Assessment and Plan   Primary psychiatric diagnosis:   Schizoaffective disorder, bipolar type, current álvaro    Diagnostic Impression:   Tello Steen is a 27 year old male with a past psychiatric diagnosis of schizophrenia admitted from the ED on 04/15/2023 due to pscyhosis in the context of recent hospitalization 3/26-4/11/23 for psychosis and álvaro. Significant symptoms on admission include delusions about a chip in their brain, delusions of grandeur, delusions of having a special mission with the [employer], and elevated mood. The MSE on admission was pertinent for elevated affect and delusions previously mentioned. Biological contributions to mental health presentation include previous psychiatric " diagnosis of schizophrenia. On chart review, there are also mentions of Lithium trials, possibly indicating previous álvaro. Psychosocial contributions to mental health presentation include partial insight, unclear work, housing, and financial situation. Protective factors include medication adherence, lack of substance use, and supportive CCM, good frustration tolerance, and presence of perceived social supports.      In summary, the patient's reported symptoms of psychosis and álvaro in the context of historical schizophrenia diagnosis and recent hospitalization are consistent with schizoaffective disorder bipolar type.    Psychiatric Hospital course:  Tello Steen was admitted to [facility] committed with a Holman. All PTA medications were continued on admission.     Medications:     Neuroleptics: This is his third hospitalization within 4 months, indicating that his symptoms are not adequately controlled with Invega Sustenna.     Olanzapine: While awaiting clozapine to be added to his existing Holman, Tello was initially continued on PTA olanzapine.     Risperidone: Due to arm dystonia, Tello's olanzapine was switched to risperidone on 4/21. Added PRN cogentin and propranolol for any potential EPS. He tolerated risperidone titration well, although there were some concerns about akithisia due to patient's apparent restlessness and psychomotor agitation. On further exploration, this movement appeared volitional and more related to álvaro rather than akithisia or dystonia. Risperidone discontinued 5/2 with planned cross taper to clozapine.    Clozapine: Started on 4/27. Continuing to titrate clozapine, has had some orthostasis    Clozapine titrated to 150 mg on 5/8 and increased on 5/9 to 175 mg d/t symptom persistence.     Propranolol discontinued d/t orthostasis with clozapine      Patient with postural tachycardia 5/9, encouraged fluids    Continued to titrate clozapine 25 mg q48h with dose increase on 05/11 to 200mg      Maintaining clozapine at 200 mg pending clozapine level Wednesday 5/17    Patient has had intermittent orthostasis and tachycardia on clozapine, but has remained asymptomatic. Continuing to monitor. EKG showing possible sinus tachycardia    Lithium: started on 4/20 for álvaro, tolerating titration well thus far. Lithium level on 5/6 returned at 0.9. Therapeutic level indicating no need for continued titration at this time    Symptoms    Patient appears to be improving with less RTIS and improved ability to focus. However, patient continues to express delusions and AH with limited insight into current psychiatric symptoms and limited judgement. Appears increasingly irritable and agitated regarding discharge planning.    Psychosocial    5/4/23 patient requests that team not discuss care/treatment with his mother and limit information sharing to discharge planning. Later stated that he is amenable to team discussing any element of treatment plan with mom.       Today's changes:     Family interview       1. Psychotropic Medications:  Scheduled:  Current Facility-Administered Medications   Medication Dose Route Frequency     cloZAPine  200 mg Oral At Bedtime     lithium ER  1,200 mg Oral At Bedtime     Vitamin D3  25 mcg Oral Daily     PRN:  Current Facility-Administered Medications   Medication Dose Route Frequency     acetaminophen  650 mg Oral Q4H PRN     alum & mag hydroxide-simethicone  30 mL Oral Q4H PRN     benztropine  0.5 mg Oral BID PRN     ipratropium  2 spray Sublingual At Bedtime PRN     melatonin  3 mg Oral At Bedtime PRN     OLANZapine  10 mg Intramuscular TID PRN     polyethylene glycol  17 g Oral Daily PRN     propranolol  10 mg Oral BID PRN     risperiDONE  0.5 mg Sublingual BID PRN     sennosides  8.6 mg Oral BID PRN       2. Pertinent Labs/Monitoring:   - Lithium 4/25: 0.60 -> 4/30: 0.60  - EKG 4/26 - normal sinus rhythm, QTc 417  - ANC 4/27 - 4.0  - Lithium 5/6: 0.9  - ANC 5/4: 4.5  - ANC 5/11:  5.3      3. Additional Plans:  Patient will be treated in therapeutic milieu with appropriate individual and group therapies as described.    Medical Assessment and Plan     Medical diagnoses to be addressed this admission:    None    Medical course: Patient was physically examined by the ED prior to being transferred to the unit and was found to be medically stable and appropriate for admission. Patient has been intermittently found to be orthostatic and with postural tachycardia since 5/2 but has remained asymptomatic. Encouraging fluids.     Consults: None    Checklist     Legal Status: Committed (PD revoked) with Holman. Holman: haldol, olanzapine, risperidone, paliperidone, and clozapine.    Safety Assessment:   Behavioral Orders   Procedures     Code 1 - Restrict to Unit     Code 2     OK to leave the floor for imaging procedures at the discretion of floor staff     Routine Programming     As clinically indicated     Status 15     Every 15 minutes.       Risk Assessment:  Risk for harm is moderate-high.  Risk factors: impulsive and past behaviors, multiple recent pending  Protective factors: family and engaged in treatment     SIO: No    Disposition: TBD, complicated by patient being from [place]. Pending stabilization, medication optimization, & development of a safe discharge plan.    Attestations     Brandon Merrill  MS4, [employer]

## 2023-05-16 NOTE — PLAN OF CARE
Goal Outcome Evaluation:    Plan of Care Reviewed With: patient      Pt guarded upon approach although was willing to answer assessment questions. Pt present with flat affect and appears to have poverty of speech. The patient denied pain, anxiety and depression, said no SI/HA. Ate adequately at meal. Orthostatic b/p checked sitting 127/70, P127. Standing 109/58 P145. Pt denied feeling dizzy. Was encouraged to drink fluids. Dr. Wallace notified of b/p's and pulses.  No roommate due to paranoia and psychosis

## 2023-05-16 NOTE — PLAN OF CARE
05/15/23 2112   Group Therapy Session   Group Attendance refused to attend group session   Time Session Began 2000   Time Session Ended 2100   Total Time (minutes) 0   Total # Attendees 4   Group Type psychotherapeutic   Group Topic Covered coping skills/lifestyle management;emotions/expression   Group Session Detail Group members were given 2 worksheets to complete/discuss questions on identifying one thing they did not have control over including coping skills

## 2023-05-17 PROCEDURE — 124N000002 HC R&B MH UMMC

## 2023-05-17 PROCEDURE — 80159 DRUG ASSAY CLOZAPINE: CPT | Performed by: PSYCHIATRY & NEUROLOGY

## 2023-05-17 PROCEDURE — 250N000013 HC RX MED GY IP 250 OP 250 PS 637: Performed by: STUDENT IN AN ORGANIZED HEALTH CARE EDUCATION/TRAINING PROGRAM

## 2023-05-17 PROCEDURE — 99231 SBSQ HOSP IP/OBS SF/LOW 25: CPT | Performed by: PSYCHIATRY & NEUROLOGY

## 2023-05-17 PROCEDURE — 36415 COLL VENOUS BLD VENIPUNCTURE: CPT | Performed by: PSYCHIATRY & NEUROLOGY

## 2023-05-17 PROCEDURE — 250N000013 HC RX MED GY IP 250 OP 250 PS 637

## 2023-05-17 RX ADMIN — MELATONIN TAB 3 MG 3 MG: 3 TAB at 19:53

## 2023-05-17 RX ADMIN — Medication 25 MCG: at 09:17

## 2023-05-17 RX ADMIN — CLOZAPINE 200 MG: 200 TABLET ORAL at 19:04

## 2023-05-17 RX ADMIN — LITHIUM CARBONATE 1200 MG: 300 TABLET, EXTENDED RELEASE ORAL at 17:51

## 2023-05-17 ASSESSMENT — ACTIVITIES OF DAILY LIVING (ADL)
ADLS_ACUITY_SCORE: 28
HYGIENE/GROOMING: HANDWASHING;INDEPENDENT
ADLS_ACUITY_SCORE: 28
ADLS_ACUITY_SCORE: 28
DRESS: SCRUBS (BEHAVIORAL HEALTH);INDEPENDENT
ADLS_ACUITY_SCORE: 28
ADLS_ACUITY_SCORE: 28
ORAL_HYGIENE: INDEPENDENT
ADLS_ACUITY_SCORE: 28
LAUNDRY: WITH SUPERVISION

## 2023-05-17 NOTE — PLAN OF CARE
Patient had 7 hours of sleep this shift. Patient no complaints of pain and discomfort. No behavioral issues or any safety concern this shift. Patient continues on q 15 minutes safety checks. Will continue to monitor the patient and provide therapeutic intervention as needed. Will continue with current plan of care. Notify MD with any concerns.   Problem: Sleep Disturbance  Goal: Adequate Sleep/Rest  Outcome: Progressing

## 2023-05-17 NOTE — PROGRESS NOTES
05/16/23 2000   Group Therapy Session   Time Session Began 2000   Time Session Ended 2040   Total Time (minutes) 25   Total # Attendees 1-4   Group Type expressive therapy   Group Topic Covered cognitive activities;emotions/expression   Group Session Detail Instrument Clinic   Patient Response/Contribution cooperative with task   Patient Participation Detail Participated in Music Therapy Instrument Clinic today.  By engaging in active music-making with a variety of instruments patient worked on building mastery, socialization, and self-expression.  Engaged with the acoustic guitar, strumming at a loud volume and singing along with his playing.  Did not seek out interactions with peers.

## 2023-05-17 NOTE — PLAN OF CARE
Goal Outcome Evaluation:    Problem: Adult Behavioral Health Plan of Care  Goal: Plan of Care Review  Outcome: Progressing     Problem: Anxiety  Goal: Anxiety Reduction or Resolution  Outcome: Progressing     Patient was intermittently visible on the unit. Paced the hallway couple of times. Denies all MH symptoms. Pt ate dinner in his room. Pt was mostly isolative and withdrawn, not social with peers. Attended group this shift. Denies pain when asked. Medication complaint, took all scheduled med without prompting.

## 2023-05-17 NOTE — PROGRESS NOTES
----------------------------------------------------------------------------------------------------------  [facility], [place]   Psychiatric Progress Note  Hospital Day #32    Identifier: Tello Steen is a 27 year old male with previous psychiatric diagnoses of schizophrenia vs schizoaffective disorder, bipolar type who presents with psychosis and álvaro in the context of recent hospitalizations 3/26-4/11/23 ([facility]) and 2/22-3/7/23 ([facility]) for the same. Assessment is that the current presentation is consistent with schizoaffective disorder, bipolar type, current álvaro and psychosis.      Interim History:   The patient's care was discussed with the treatment team and chart notes were reviewed.     Vitals: orthostatic pressure without tachycardia this AM  Sleep: 7 hours (05/17/23 0600)   Scheduled medications: Took all scheduled medications as prescribed  PRN medications: None    Staff Report:   - NAEO  - continues to be guarded and dismissive of questions  - poor hygiene recently       Subjective:     Patient Interview:  Patient agrees to interview today, interviewed in room.  Does not have any questions or concerns for the team, amenable to plan of maintaining current clozapine dose in light of intermittent tachycardia and pending clozapine level. Endorses intermittent palpitations without chest pain, dizziness, lightheadedness. States that toe pain is intermittent and not bothersome. Agreeable to team continuing to talk to and update mom, states he does talk to her while being inpatient.       Review of systems:   As stated above    Objective:   /78 (BP Location: Left arm)   Pulse 107   Temp 98.4  F (36.9  C) (Oral)   Resp 16   Wt 72.8 kg (160 lb 8 oz)   SpO2 96%   BMI 21.18 kg/m    Weight is 160 lbs 8 oz  Body mass index is 21.18 kg/m .    Mental Status Exam:  Oriented to:  Grossly Oriented  General:  Awake and Alert, in bedroom on bed moved to  "floor  Appearance:  appears stated age, hygiene observably poor today  Behavior/Attitude: Guarded, dismissive, seems irritated or derisive, when asked certain questions.   Eye Contact:  Poor, seems preoccupied during interview  Psychomotor: No evidence of tics, dystonia, akithisia, or tardive dyskinesia, no catatonia  Speech: Appropriate volume and tone, latency of response, gives one-word answers  Language: Fluent in English and correct vocabulary   Mood:  \"okay\"  Affect: Blunted affect  Thought Process: Logical and linear   Thought Content:  Appears to be RTIS during interview today   Insight: Does recognize diagnosis of schizophrenia, insight into past delusions, but lacks insight into ongoing symptoms of psychosis or need for cautious discharge planning.  Judgment:  limited - listens to \"chip in brain\". Recent judgment regarding discharge planning has been limited  Attention Span:  adequate historically, adequate today for conversation but seems eager to discontinue discussion  Concentration:  grossly intact  Recent and Remote Memory:  grossly intact  Fund of Knowledge: above average, and has medical knowledge beyond that of a regular patient  Muscle Strength and Tone: appears normal  Gait and Station: Normal, observed pacing without any gait abnormalities today      Allergies:   No Known Allergies     Labs and imaging:   Data this admission:  BMP normal  CBC normal  UDS negative  TSH 3/9/23 normal  Lipids 2/24/23 unremarkable  Hgb A1c 2/24/23 normal   UA - amorphous crystals, 10 protein albumin, elevated pH   HIV - negative   Ceruloplasmin normal   AILYN - normal   Lyme abs - normal   Treponema  - negative   Folate - normal   B12 - normal   ESR - normal Salicylate, alcohol, Tylenol negative  EKG 3/20: sinus rhythm, QTc 431  Brain MRI - normal     Lithium 4/25: 0.60                4/30: 0.60               5/08: 0.90     EKG 4/26 - normal     Psychiatric Assessment and Plan   Primary psychiatric diagnosis: "   Schizoaffective disorder, bipolar type, current álvaro    Diagnostic Impression:   Tello Steen is a 27 year old male with a past psychiatric diagnosis of schizophrenia admitted from the ED on 04/15/2023 due to pscyhosis in the context of recent hospitalization 3/26-4/11/23 for psychosis and álvaro. Significant symptoms on admission include delusions about a chip in their brain, delusions of grandeur, delusions of having a special mission with the [employer], and elevated mood. The MSE on admission was pertinent for elevated affect and delusions previously mentioned. Biological contributions to mental health presentation include previous psychiatric diagnosis of schizophrenia. On chart review, there are also mentions of Lithium trials, possibly indicating previous álvaro. Psychosocial contributions to mental health presentation include partial insight, unclear work, housing, and financial situation. Protective factors include medication adherence, lack of substance use, and supportive CCM, good frustration tolerance, and presence of perceived social supports.      In summary, the patient's reported symptoms of psychosis and álvaro in the context of historical schizophrenia diagnosis and recent hospitalization are consistent with schizoaffective disorder bipolar type.    Psychiatric Hospital course:  Tello Steen was admitted to [facility] committed with a Holman. All PTA medications were continued on admission.     Medications:     Neuroleptics: This is his third hospitalization within 4 months, indicating that his symptoms are not adequately controlled with Invega Sustenna.     Olanzapine: While awaiting clozapine to be added to his existing Holman, Tello was initially continued on PTA olanzapine.     Risperidone: Due to arm dystonia, Tello's olanzapine was switched to risperidone on 4/21. Added PRN cogentin and propranolol for any potential EPS. He tolerated risperidone titration well, although there were some concerns about  akithisia due to patient's apparent restlessness and psychomotor agitation. On further exploration, this movement appeared volitional and more related to álvaro rather than akithisia or dystonia. Risperidone discontinued 5/2 with planned cross taper to clozapine.    Clozapine: Started on 4/27. Continuing to titrate clozapine, has had some orthostasis    Clozapine titrated to 150 mg on 5/8 and increased on 5/9 to 175 mg d/t symptom persistence.     Propranolol discontinued d/t orthostasis with clozapine      Patient with postural tachycardia 5/9, encouraged fluids    Continued to titrate clozapine 25 mg q48h with dose increase on 05/11 to 200mg     Maintaining clozapine at 200 mg pending results from clozapine level collected 5/17    Patient has had intermittent orthostasis and tachycardia on clozapine, but has remained asymptomatic. Continuing to monitor. EKG showing possible sinus tachycardia    Lithium: started on 4/20 for álvaro, tolerating titration well thus far. Lithium level on 5/6 returned at 0.9. Therapeutic level indicating no need for continued titration at this time    Symptoms    Patient appears to be improving with less RTIS and improved ability to focus. However, patient continues to express delusions and AH with limited insight into current psychiatric symptoms and limited judgement. Appears increasingly irritable and agitated regarding discharge planning.    Psychosocial    5/4/23 patient requests that team not discuss care/treatment with his mother and limit information sharing to discharge planning. Later stated that he is amenable to team discussing any element of treatment plan with mom. Mom requests update when clozapine level returns      Today's changes:     None; maintain current dose of clozapine      1. Psychotropic Medications:  Scheduled:  Current Facility-Administered Medications   Medication Dose Route Frequency     cloZAPine  200 mg Oral At Bedtime     lithium ER  1,200 mg Oral At  Bedtime     Vitamin D3  25 mcg Oral Daily     PRN:  Current Facility-Administered Medications   Medication Dose Route Frequency     acetaminophen  650 mg Oral Q4H PRN     alum & mag hydroxide-simethicone  30 mL Oral Q4H PRN     benztropine  0.5 mg Oral BID PRN     ipratropium  2 spray Sublingual At Bedtime PRN     melatonin  3 mg Oral At Bedtime PRN     OLANZapine  10 mg Intramuscular TID PRN     polyethylene glycol  17 g Oral Daily PRN     propranolol  10 mg Oral BID PRN     risperiDONE  0.5 mg Sublingual BID PRN     sennosides  8.6 mg Oral BID PRN       2. Pertinent Labs/Monitoring:   - Lithium 4/25: 0.60 -> 4/30: 0.60  - EKG 4/26 - normal sinus rhythm, QTc 417  - ANC 4/27 - 4.0  - Lithium 5/6: 0.9  - ANC 5/4: 4.5  - ANC 5/11: 5.3      3. Additional Plans:  Patient will be treated in therapeutic milieu with appropriate individual and group therapies as described.    Medical Assessment and Plan     Medical diagnoses to be addressed this admission:    None    Medical course: Patient was physically examined by the ED prior to being transferred to the unit and was found to be medically stable and appropriate for admission. Patient has been intermittently found to be orthostatic and with postural tachycardia since 5/2 but has remained asymptomatic. Encouraging fluids.     Consults: None    Checklist     Legal Status: Committed (PD revoked) with Holman. Holman: haldol, olanzapine, risperidone, paliperidone, and clozapine.    Safety Assessment:   Behavioral Orders   Procedures     Code 1 - Restrict to Unit     Code 2     OK to leave the floor for imaging procedures at the discretion of floor staff     Routine Programming     As clinically indicated     Status 15     Every 15 minutes.       Risk Assessment:  Risk for harm is moderate-high.  Risk factors: impulsive and past behaviors, multiple recent pending  Protective factors: family and engaged in treatment     SIO: No    Disposition: TBD, complicated by patient being  from [place]. Pending stabilization, medication optimization, & development of a safe discharge plan.    Attestations     Brandon Merrill  MS4, [employer] medical school       Attending attestation:  I was present with the medical student who participated in the service and in the documentation of the note. I have verified the history and personally performed the physical exam and medical decision making. I agree with the assessment and plan of care as documented in the note .    Renate Wallace MD

## 2023-05-17 NOTE — PLAN OF CARE
"Goal Outcome Evaluation:    Pt's affect is bland/flat, responses are delayed and minimal. He states, \"no to all\" MH sx. Pt makes facial expression as if to say he doesn't know, when asked re mood and any sx or concerns. He asked if he was getting lab work \"to test for acid.\" Pt also asked that his bkfst be brought into his rm, but did come out when prompted. His hygiene is poor, and his rm is unkempt. Lab was here for ordered blood work.    1327) Pt has been in the DR some, also walking in the campo. He did not answer, when asked how he was doing. Pt did allow vs, and wanted them to be rechecked, after taking.                            "

## 2023-05-18 LAB
BASOPHILS # BLD AUTO: 0.1 10E3/UL (ref 0–0.2)
BASOPHILS NFR BLD AUTO: 1 %
EOSINOPHIL # BLD AUTO: 0.4 10E3/UL (ref 0–0.7)
EOSINOPHIL NFR BLD AUTO: 6 %
ERYTHROCYTE [DISTWIDTH] IN BLOOD BY AUTOMATED COUNT: 12.7 % (ref 10–15)
HCT VFR BLD AUTO: 42.3 % (ref 40–53)
HGB BLD-MCNC: 14.2 G/DL (ref 13.3–17.7)
IMM GRANULOCYTES # BLD: 0.1 10E3/UL
IMM GRANULOCYTES NFR BLD: 1 %
LYMPHOCYTES # BLD AUTO: 1.4 10E3/UL (ref 0.8–5.3)
LYMPHOCYTES NFR BLD AUTO: 20 %
MCH RBC QN AUTO: 30.3 PG (ref 26.5–33)
MCHC RBC AUTO-ENTMCNC: 33.6 G/DL (ref 31.5–36.5)
MCV RBC AUTO: 90 FL (ref 78–100)
MONOCYTES # BLD AUTO: 0.8 10E3/UL (ref 0–1.3)
MONOCYTES NFR BLD AUTO: 12 %
NEUTROPHILS # BLD AUTO: 4.1 10E3/UL (ref 1.6–8.3)
NEUTROPHILS NFR BLD AUTO: 60 %
NRBC # BLD AUTO: 0 10E3/UL
NRBC BLD AUTO-RTO: 0 /100
PLATELET # BLD AUTO: 229 10E3/UL (ref 150–450)
RBC # BLD AUTO: 4.68 10E6/UL (ref 4.4–5.9)
WBC # BLD AUTO: 6.8 10E3/UL (ref 4–11)

## 2023-05-18 PROCEDURE — 85004 AUTOMATED DIFF WBC COUNT: CPT | Performed by: STUDENT IN AN ORGANIZED HEALTH CARE EDUCATION/TRAINING PROGRAM

## 2023-05-18 PROCEDURE — 250N000013 HC RX MED GY IP 250 OP 250 PS 637

## 2023-05-18 PROCEDURE — 250N000013 HC RX MED GY IP 250 OP 250 PS 637: Performed by: STUDENT IN AN ORGANIZED HEALTH CARE EDUCATION/TRAINING PROGRAM

## 2023-05-18 PROCEDURE — 99231 SBSQ HOSP IP/OBS SF/LOW 25: CPT | Performed by: PSYCHIATRY & NEUROLOGY

## 2023-05-18 PROCEDURE — 124N000002 HC R&B MH UMMC

## 2023-05-18 PROCEDURE — 36415 COLL VENOUS BLD VENIPUNCTURE: CPT | Performed by: STUDENT IN AN ORGANIZED HEALTH CARE EDUCATION/TRAINING PROGRAM

## 2023-05-18 RX ADMIN — LITHIUM CARBONATE 1200 MG: 300 TABLET, EXTENDED RELEASE ORAL at 17:53

## 2023-05-18 RX ADMIN — Medication 25 MCG: at 07:54

## 2023-05-18 RX ADMIN — CLOZAPINE 200 MG: 200 TABLET ORAL at 19:03

## 2023-05-18 ASSESSMENT — ACTIVITIES OF DAILY LIVING (ADL)
ADLS_ACUITY_SCORE: 28
DRESS: INDEPENDENT
ORAL_HYGIENE: INDEPENDENT
ADLS_ACUITY_SCORE: 28
HYGIENE/GROOMING: SHOWER;INDEPENDENT
ADLS_ACUITY_SCORE: 28
HYGIENE/GROOMING: INDEPENDENT
ADLS_ACUITY_SCORE: 28
ORAL_HYGIENE: INDEPENDENT
ADLS_ACUITY_SCORE: 28
HYGIENE/GROOMING: HANDWASHING;INDEPENDENT
ADLS_ACUITY_SCORE: 28

## 2023-05-18 NOTE — PROGRESS NOTES
----------------------------------------------------------------------------------------------------------  [facility], [place]   Psychiatric Progress Note  Hospital Day #33    Identifier: Tello Steen is a 27 year old male with previous psychiatric diagnoses of schizophrenia vs schizoaffective disorder, bipolar type who presents with psychosis and álvaro in the context of recent hospitalizations 3/26-4/11/23 ([facility]) and 2/22-3/7/23 ([facility]) for the same. Assessment is that the current presentation is consistent with schizoaffective disorder, bipolar type, current álvaro and psychosis.      Interim History:   The patient's care was discussed with the treatment team and chart notes were reviewed.     Vitals: No vitals collected this AM, patient not orthostatic yesterday  Sleep: 6.75 hours (05/18/23 0600)   Scheduled medications: Took all scheduled medications as prescribed  PRN medications: Requested PRN melatonin    Staff Report:   - NAEO  - Isolative to room  - denies mental health symptoms including ongoing psychosis       Subjective:     Patient Interview:  Patient declines interview today    Review of systems:   As stated above    Objective:   /83 (BP Location: Left arm, Patient Position: Sitting, Cuff Size: Adult Regular)   Pulse 102   Temp 98.2  F (36.8  C) (Oral)   Resp 18   Wt 72.8 kg (160 lb 8 oz)   SpO2 98%   BMI 21.18 kg/m    Weight is 160 lbs 8 oz  Body mass index is 21.18 kg/m .    Mental Status Exam:  Oriented to:  Grossly Oriented  General:  Awake and Alert, in bedroom on bed moved to floor  Appearance:  appears stated age, room is malodorous   Behavior/Attitude: Guarded, dismissive, seems irritated or derisive, when asked certain questions.   Eye Contact:  Poor d/t declines interview and is facing away from team  Psychomotor: No evidence of tics, dystonia, akithisia, or tardive dyskinesia, no catatonia  Speech: Appropriate volume and tone,  "latency of response, gives one-word answers  Language: Fluent in English and correct vocabulary   Mood:  declines interview, says \"no, thank you\" when asked  Affect: Blunted affect  Thought Process: Logical and linear   Thought Content:  Not assessed as patient declines interview  Insight: Not assessed as patient declines interview  Judgment:  limited - listens to \"chip in brain\". Not formally assessed today as patient declines interview, but recent judgment regarding discharge planning has been limited  Attention Span:  adequate historically but declines interview today  Concentration:  grossly intact   Recent and Remote Memory:  grossly intact  Fund of Knowledge: above average, and has medical knowledge beyond that of a regular patient  Muscle Strength and Tone: appears normal  Gait and Station: Not observed, patient sitting on mattress on floor in room      Allergies:   No Known Allergies     Labs and imaging:   Data this admission:  BMP normal  CBC normal  UDS negative  TSH 3/9/23 normal  Lipids 2/24/23 unremarkable  Hgb A1c 2/24/23 normal   UA - amorphous crystals, 10 protein albumin, elevated pH   HIV - negative   Ceruloplasmin normal   AILYN - normal   Lyme abs - normal   Treponema  - negative   Folate - normal   B12 - normal   ESR - normal Salicylate, alcohol, Tylenol negative  EKG 3/20: sinus rhythm, QTc 431  Brain MRI - normal     Lithium 4/25: 0.60                4/30: 0.60               5/08: 0.90     EKG 4/26 - normal  Trending ANC, has been stable to date     Psychiatric Assessment and Plan   Primary psychiatric diagnosis:   Schizoaffective disorder, bipolar type, current álvaro    Diagnostic Impression:   Tello Steen is a 27 year old male with a past psychiatric diagnosis of schizophrenia admitted from the ED on 04/15/2023 due to pscyhosis in the context of recent hospitalization 3/26-4/11/23 for psychosis and álvaro. Significant symptoms on admission include delusions about a chip in their brain, " delusions of grandeur, delusions of having a special mission with the [employer], and elevated mood. The MSE on admission was pertinent for elevated affect and delusions previously mentioned. Biological contributions to mental health presentation include previous psychiatric diagnosis of schizophrenia. On chart review, there are also mentions of Lithium trials, possibly indicating previous álvaro. Psychosocial contributions to mental health presentation include partial insight, unclear work, housing, and financial situation. Protective factors include medication adherence, lack of substance use, and supportive CCM, good frustration tolerance, and presence of perceived social supports.      In summary, the patient's reported symptoms of psychosis and álvaro in the context of historical schizophrenia diagnosis and recent hospitalization are consistent with schizoaffective disorder bipolar type.    Psychiatric Hospital course:  Tello Steen was admitted to [facility] committed with a Holman. All PTA medications were continued on admission.     Medications:     Neuroleptics: This is his third hospitalization within 4 months, indicating that his symptoms are not adequately controlled with Invega Sustenna.     Olanzapine: While awaiting clozapine to be added to his existing Holman, Tello was initially continued on PTA olanzapine.     Risperidone: Due to arm dystonia, Tello's olanzapine was switched to risperidone on 4/21. Added PRN cogentin and propranolol for any potential EPS. He tolerated risperidone titration well, although there were some concerns about akithisia due to patient's apparent restlessness and psychomotor agitation. On further exploration, this movement appeared volitional and more related to álvaro rather than akithisia or dystonia. Risperidone discontinued 5/2 with planned cross taper to clozapine.    Clozapine: Started on 4/27. Continuing to titrate clozapine, has had some orthostasis    Clozapine titrated to 150 mg  on 5/8 and increased on 5/9 to 175 mg d/t symptom persistence.     Propranolol discontinued d/t orthostasis with clozapine      Patient with postural tachycardia 5/9, encouraged fluids    Continued to titrate clozapine 25 mg q48h with dose increase on 05/11 to 200mg     Maintaining clozapine at 200 mg pending results from clozapine level collected 5/17    Patient has had intermittent orthostasis and tachycardia on clozapine, but has remained asymptomatic. Continuing to monitor. EKG showing possible sinus tachycardia    Lithium: started on 4/20 for álvaro, tolerating titration well thus far. Lithium level on 5/6 returned at 0.9. Therapeutic level indicating no need for continued titration at this time    Symptoms    Patient appears to be improving with less RTIS and improved ability to focus. However, patient continues to express delusions and AH with limited insight into current psychiatric symptoms and limited judgement. Appears increasingly irritable and agitated regarding discharge planning.    Psychosocial    5/4/23 patient requests that team not discuss care/treatment with his mother and limit information sharing to discharge planning. Later stated that he is amenable to team discussing any element of treatment plan with mom. Mom requests update when clozapine level returns      Today's changes:     None; maintain current dose of clozapine      1. Psychotropic Medications:  Scheduled:  Current Facility-Administered Medications   Medication Dose Route Frequency     cloZAPine  200 mg Oral At Bedtime     lithium ER  1,200 mg Oral At Bedtime     Vitamin D3  25 mcg Oral Daily     PRN:  Current Facility-Administered Medications   Medication Dose Route Frequency     acetaminophen  650 mg Oral Q4H PRN     alum & mag hydroxide-simethicone  30 mL Oral Q4H PRN     benztropine  0.5 mg Oral BID PRN     ipratropium  2 spray Sublingual At Bedtime PRN     melatonin  3 mg Oral At Bedtime PRN     OLANZapine  10 mg Intramuscular  TID PRN     polyethylene glycol  17 g Oral Daily PRN     propranolol  10 mg Oral BID PRN     risperiDONE  0.5 mg Sublingual BID PRN     sennosides  8.6 mg Oral BID PRN       2. Pertinent Labs/Monitoring:   - Lithium 4/25: 0.60 -> 4/30: 0.60  - EKG 4/26 - normal sinus rhythm, QTc 417  - ANC 4/27 - 4.0  - Lithium 5/6: 0.9  - ANC 5/4: 4.5  - ANC 5/11: 5.3  - ANC 5/18: 4.1      3. Additional Plans:  Patient will be treated in therapeutic milieu with appropriate individual and group therapies as described.    Medical Assessment and Plan     Medical diagnoses to be addressed this admission:    None    Medical course: Patient was physically examined by the ED prior to being transferred to the unit and was found to be medically stable and appropriate for admission. Patient has been intermittently found to be orthostatic and with postural tachycardia since 5/2 but has remained asymptomatic. Encouraging fluids.     Consults: None    Checklist     Legal Status: Committed (PD revoked) with Holman. Holman: haldol, olanzapine, risperidone, paliperidone, and clozapine.    Safety Assessment:   Behavioral Orders   Procedures     Code 1 - Restrict to Unit     Code 2     OK to leave the floor for imaging procedures at the discretion of floor staff     Routine Programming     As clinically indicated     Status 15     Every 15 minutes.       Risk Assessment:  Risk for harm is moderate-high.  Risk factors: impulsive and past behaviors, multiple recent pending  Protective factors: family and engaged in treatment     SIO: No    Disposition: TBD, complicated by patient being from [place]. Pending stabilization, medication optimization, & development of a safe discharge plan.    Attestations     Brandon Merrill  MS4, [employer] medical school     Attending Attestation:  I was present with the medical student who participated in the service and in the documentation of the note. I have verified the history and personally performed the physical exam and  medical decision making. I agree with the assessment and plan of care as documented in the note .    Renate Wallace MD

## 2023-05-18 NOTE — PLAN OF CARE
Problem: Anxiety  Goal: Anxiety Reduction or Resolution  Outcome: Progressing     Problem: Depression  Goal: Improved Mood  Outcome: Progressing     Problem: Psychotic Signs/Symptoms  Goal: Optimal Cognitive Function (Psychotic Signs/Symptoms)  Intervention: Support and Promote Cognitive Ability  Recent Flowsheet Documentation  Taken 5/18/2023 1800 by Juan Guevara RN  Trust Relationship/Rapport:    care explained    choices provided    emotional support provided    empathic listening provided    questions answered    questions encouraged    reassurance provided    thoughts/feelings acknowledged   Goal Outcome Evaluation:    Plan of Care Reviewed With: patient        Alert and oriented, able to communicate needs and verbalize feelings. Blunted, restricted, dismissive of MH assessment. Denied any anxiety or depression, no SI/SIB/HI, contracted for safety. Patient denied any pain or discomfort. Adequate PO intake, ADLs WNL. Cooperative and compliant with medications. Visible in milieu but spent most of his time in his room. Had no interactions with peers.

## 2023-05-18 NOTE — PLAN OF CARE
Goal Outcome Evaluation:  Problem: Adult Behavioral Health Plan of Care  Goal: Plan of Care Review  Outcome: Progressing     Patient was isolative and withdrawn to his room most of the shift. Denies all MH symptoms, his affect was flat and blunted, polite on approached. Pt ate dinner in his room. Hygiene continues to be poor, appetite is good. Pt is medication complaint, requested and received PRN Melatonin 3 Mg for sleep. Pt remains on no roommate order.

## 2023-05-18 NOTE — PLAN OF CARE
He is isolative to his room in the shift.  He is brief, guarded, and delayed in speech, cognition in early conversations.  However, he did just wake up to get his blood drawn.  With some irritability, he states that he has never had anxiety or depression.  He denies suicidal and homicidal ideations when asked.  He denies pain/discomfort and cold, flu like symptoms when asked.  He states that he did experience hallucinations, but not currently.  Writer tried to engage in more meaningful conversations, but he would not respond.  Writer allowed to him to rest in his room and informed if any concerns, to notify staff.  He did come out and requested a new name band, which was placed on his right wrist.  He did shower and eat in the lounge area when most patients left the area.

## 2023-05-18 NOTE — PLAN OF CARE
Patient had 6.75 hours of sleep this shift. No complaints of pain and discomfort. No behavioral issues or any safety concerns at this time. Will continue to monitor the patient and provide therapeutic intervention as needed. Notify MD with any concerns. Will continue with current plan of care.  Problem: Sleep Disturbance  Goal: Adequate Sleep/Rest  Outcome: Progressing

## 2023-05-19 PROCEDURE — 250N000013 HC RX MED GY IP 250 OP 250 PS 637

## 2023-05-19 PROCEDURE — H2032 ACTIVITY THERAPY, PER 15 MIN: HCPCS

## 2023-05-19 PROCEDURE — 250N000013 HC RX MED GY IP 250 OP 250 PS 637: Performed by: STUDENT IN AN ORGANIZED HEALTH CARE EDUCATION/TRAINING PROGRAM

## 2023-05-19 PROCEDURE — 124N000002 HC R&B MH UMMC

## 2023-05-19 PROCEDURE — 99232 SBSQ HOSP IP/OBS MODERATE 35: CPT | Performed by: PSYCHIATRY & NEUROLOGY

## 2023-05-19 RX ORDER — CLOTRIMAZOLE 1 %
CREAM (GRAM) TOPICAL 2 TIMES DAILY
Status: DISCONTINUED | OUTPATIENT
Start: 2023-05-19 | End: 2023-06-01 | Stop reason: HOSPADM

## 2023-05-19 RX ADMIN — CLOTRIMAZOLE 1% ANTIFUNGAL CREAM: 1 CREAM TOPICAL at 19:05

## 2023-05-19 RX ADMIN — LITHIUM CARBONATE 1200 MG: 300 TABLET, EXTENDED RELEASE ORAL at 18:02

## 2023-05-19 RX ADMIN — MELATONIN TAB 3 MG 3 MG: 3 TAB at 19:53

## 2023-05-19 RX ADMIN — CLOZAPINE 200 MG: 200 TABLET ORAL at 19:05

## 2023-05-19 RX ADMIN — Medication 25 MCG: at 10:24

## 2023-05-19 ASSESSMENT — ACTIVITIES OF DAILY LIVING (ADL)
ADLS_ACUITY_SCORE: 28
HYGIENE/GROOMING: HANDWASHING;INDEPENDENT
DRESS: STREET CLOTHES;INDEPENDENT
ADLS_ACUITY_SCORE: 28
ORAL_HYGIENE: INDEPENDENT
ADLS_ACUITY_SCORE: 28
HYGIENE/GROOMING: SHOWER
ADLS_ACUITY_SCORE: 28

## 2023-05-19 NOTE — PLAN OF CARE
Problem: Suicidal Behavior  Goal: Suicidal Behavior is Absent or Managed  Outcome: Progressing     Problem: Depression  Goal: Improved Mood  Outcome: Progressing     Problem: Psychotic Signs/Symptoms  Goal: Improved Mood Symptoms  Intervention: Optimize Emotion and Mood  Recent Flowsheet Documentation  Taken 5/19/2023 1700 by Juan Guevara RN  Diversional Activity: reading   Goal Outcome Evaluation:    Plan of Care Reviewed With: patient        Patient continues to be dismissive of MH assessment. Stated that everything is find and that the medication is working, denied anxiety and depression, no SI/HI, contracted for safety. Mostly in his room, out in milieu for snacks, coffee, and supper, PO intake adequate. Able to communicate needs, no noted interactions with peers when in milieu. Blunted, restricted but cooperative and compliant with medications. He denied any pain or discomfort, ADLs WNL.

## 2023-05-19 NOTE — PLAN OF CARE
Assessment/Intervention/Current Symtoms and Care Coordination  The patient's care was discussed with the treatment team and chart notes were reviewed.   Patient met with team.  Patient met with team.  Stated he thinks the meds are working.  .  Has been eating, sleeping well. Compliant with meds. ADL's fluctuate.    Discharge Plan or Goal  Patient insists that he plans to return home to [place]. Patient in need of close Psychiatric follow up care.     Barriers to Discharge   Severity of symptoms- ongoing need for stabilization.    Meds continue to be monitored/adjusted per MD     Referral Status  None today     Legal Status     Commitment and Holman order per [facility]- Provisional discharge revoked

## 2023-05-19 NOTE — PROGRESS NOTES
" 05/19/23 1500   Group Therapy Session   Time Session Began 1315   Time Session Ended 1500   Total Time (minutes) 25   Total # Attendees 9   Group Type expressive therapy   Group Topic Covered balanced lifestyle;problem-solving   Group Session Detail Instrument Clinic/Relaxation   Patient Response/Contribution cooperative with task   Patient Participation Detail Participated in Music Therapy Instrument Clinic today. By engaging in active music-making with a variety of instruments (ukulele, guitar, keyboard) patients worked on building mastery, socialization, and self-expression. Pt played on the guitar, playing loudly and improvisiong chord progressions, with some light singing improvisations.  Kept to self except for \"necessary\" conversations.  Engaged in 1 out of 2 afternoon groups offered today.  Was respectful and calm.  Appears to express self much more freely with music than with verbal communication.        "

## 2023-05-19 NOTE — PLAN OF CARE
Pt slept about 7 hours this night. No prns given this shift. Pt remained in room throughout the night. No concerns.    Problem: Sleep Disturbance  Goal: Adequate Sleep/Rest  Outcome: Progressing   Goal Outcome Evaluation:

## 2023-05-19 NOTE — PROGRESS NOTES
"    ----------------------------------------------------------------------------------------------------------  [facility], [place]   Psychiatric Progress Note  Hospital Day #34    Identifier: Tello Steen is a 27 year old male with previous psychiatric diagnoses of schizophrenia vs schizoaffective disorder, bipolar type who presents with psychosis and álvaro in the context of recent hospitalizations 3/26-4/11/23 ([facility]) and 2/22-3/7/23 ([facility]) for the same. Assessment is that the current presentation is consistent with schizoaffective disorder, bipolar type, current álvaro and psychosis.      Interim History:   The patient's care was discussed with the treatment team and chart notes were reviewed.     Vitals: Patient remains tachy into 120s, but no orthostasis  Sleep: 7 hours (05/19/23 0600)   Scheduled medications: Took all scheduled medications as prescribed  PRN medications: Nobe    Staff Report:   - NAEO  - Isolative to room  - denies mental health symptoms including ongoing psychosis       Subjective:     Patient Interview:  Patient amenable to interview today. States \"I think the medication is working\". When asked to elaborate, patient states it feels like it has been helpful in the same way it was the last time he found it helpful, which is in the sense that it seems to make him calmer throughout the day now as opposed to just at night like it used to. He states he sometimes feels like his heart is racing, but denies any other medication side effects. He requests to have his lithium level checked again and is assured we will check it before he discharges. After team leaves, nurse reports that he requested to speak to team again but declined to inform nurse about subject. When team re-entered his room, he notes concern for jock itch and asks for medication for that. He is amenable to plan to shower, dry completely, and apply topical jock itch medication which team " "will order.     Review of systems:   As stated above    Objective:   /73 (BP Location: Left arm)   Pulse 120   Temp 97.6  F (36.4  C) (Oral)   Resp 20   Wt 72.8 kg (160 lb 8 oz)   SpO2 98%   BMI 21.18 kg/m    Weight is 160 lbs 8 oz  Body mass index is 21.18 kg/m .    Mental Status Exam:  Oriented to:  Grossly Oriented  General:  Awake and Alert, in bedroom, bedding moved back to bed from floor  Appearance:  appears stated age, room is malodorous, patient appears disheveled    Behavior/Attitude: Guarded, but engagement increased from yesterday.   Eye Contact:  intermittent, improved  Psychomotor: No evidence of tics, dystonia, akithisia, or tardive dyskinesia, no catatonia; patient shifts from foot to foot and appears physically restless   Speech: Appropriate volume and tone, latency of response improved Language: Fluent in English and correct vocabulary   Mood:  \"good\"  Affect: Restricted affect but affect broadened from previous days  Thought Process: Logical and linear   Thought Content:  No delusions or hallucinations noted or observed during interview today  Insight: Patient appears to have insight into utility of clozapine but does not seem to have insight into ongoing psychotic symptoms with which clozapine is meant to assist  Judgment:  limited - listens to \"chip in brain\". Patient remains amenable to treatment plan, but judgement affected by limited insight into ongoing symptoms   Attention Span:  adequate for conversation  Concentration:  grossly intact   Recent and Remote Memory:  grossly intact  Fund of Knowledge: above average, and has medical knowledge beyond that of a regular patient  Muscle Strength and Tone: appears normal  Gait and Station: Normal gait     Allergies:   No Known Allergies     Labs and imaging:   Data this admission:  BMP normal  CBC normal  UDS negative  TSH 3/9/23 normal  Lipids 2/24/23 unremarkable  Hgb A1c 2/24/23 normal   UA - amorphous crystals, 10 protein albumin, " elevated pH   HIV - negative   Ceruloplasmin normal   AILYN - normal   Lyme abs - normal   Treponema  - negative   Folate - normal   B12 - normal   ESR - normal Salicylate, alcohol, Tylenol negative  EKG 3/20: sinus rhythm, QTc 431  Brain MRI - normal     Lithium 4/25: 0.60                4/30: 0.60               5/08: 0.90     EKG 4/26 - normal  Trending ANC, has been stable to date     Psychiatric Assessment and Plan   Primary psychiatric diagnosis:   Schizoaffective disorder, bipolar type, current álvaro    Diagnostic Impression:   Tello Steen is a 27 year old male with a past psychiatric diagnosis of schizophrenia admitted from the ED on 04/15/2023 due to pscyhosis in the context of recent hospitalization 3/26-4/11/23 for psychosis and álvaro. Significant symptoms on admission include delusions about a chip in their brain, delusions of grandeur, delusions of having a special mission with the [employer], and elevated mood. The MSE on admission was pertinent for elevated affect and delusions previously mentioned. Biological contributions to mental health presentation include previous psychiatric diagnosis of schizophrenia. On chart review, there are also mentions of Lithium trials, possibly indicating previous álvaro. Psychosocial contributions to mental health presentation include partial insight, unclear work, housing, and financial situation. Protective factors include medication adherence, lack of substance use, and supportive CCM, good frustration tolerance, and presence of perceived social supports.      In summary, the patient's reported symptoms of psychosis and álvaro in the context of historical schizophrenia diagnosis and recent hospitalization are consistent with schizoaffective disorder bipolar type.    Psychiatric Hospital course:  Tello Steen was admitted to [facility] committed with a Holman. All PTA medications were continued on admission.     Medications:     Neuroleptics: This is his third  hospitalization within 4 months, indicating that his symptoms are not adequately controlled with Invega Sustenna.     Olanzapine: While awaiting clozapine to be added to his existing Holman, Tello was initially continued on PTA olanzapine.     Risperidone: Due to arm dystonia, Tello's olanzapine was switched to risperidone on 4/21. Added PRN cogentin and propranolol for any potential EPS. He tolerated risperidone titration well, although there were some concerns about akithisia due to patient's apparent restlessness and psychomotor agitation. On further exploration, this movement appeared volitional and more related to álvaro rather than akithisia or dystonia. Risperidone discontinued 5/2 with planned cross taper to clozapine.    Clozapine: Started on 4/27. Continuing to titrate clozapine, has had some orthostasis    Clozapine titrated to 150 mg on 5/8 and increased on 5/9 to 175 mg d/t symptom persistence.     Propranolol discontinued d/t orthostasis with clozapine      Patient with postural tachycardia 5/9, encouraged fluids    Continued to titrate clozapine 25 mg q48h with dose increase on 05/11 to 200mg     Maintaining clozapine at 200 mg pending results from clozapine level collected 5/17    Patient has had intermittent orthostasis and tachycardia on clozapine, but has remained asymptomatic. Continuing to monitor. EKG showing possible sinus tachycardia    Based on vitals can consider adding propranolol given no further orthostasis     Lithium: started on 4/20 for álvaro, tolerating titration well thus far. Lithium level on 5/6 returned at 0.9. Therapeutic level indicating no need for continued titration at this time    Symptoms    Patient appears to be improving with less RTIS and improved ability to focus. However, patient continues to express delusions and AH with limited insight into current psychiatric symptoms and limited judgement.     Psychosocial    5/4/23 patient requests that team not discuss care/treatment  with his mother and limit information sharing to discharge planning. Later stated that he is amenable to team discussing any element of treatment plan with mom. Mom requests update when clozapine level returns      Today's changes:     None; maintain current dose of clozapine    Ordered topical clotrimazole for patient reported jock itch      1. Psychotropic Medications:  Scheduled:  Current Facility-Administered Medications   Medication Dose Route Frequency     cloZAPine  200 mg Oral At Bedtime     lithium ER  1,200 mg Oral At Bedtime     Vitamin D3  25 mcg Oral Daily     PRN:  Current Facility-Administered Medications   Medication Dose Route Frequency     acetaminophen  650 mg Oral Q4H PRN     alum & mag hydroxide-simethicone  30 mL Oral Q4H PRN     benztropine  0.5 mg Oral BID PRN     ipratropium  2 spray Sublingual At Bedtime PRN     melatonin  3 mg Oral At Bedtime PRN     OLANZapine  10 mg Intramuscular TID PRN     polyethylene glycol  17 g Oral Daily PRN     propranolol  10 mg Oral BID PRN     risperiDONE  0.5 mg Sublingual BID PRN     sennosides  8.6 mg Oral BID PRN       2. Pertinent Labs/Monitoring:   - Lithium 4/25: 0.60 -> 4/30: 0.60  - EKG 4/26 - normal sinus rhythm, QTc 417  - ANC 4/27 - 4.0  - Lithium 5/6: 0.9  - ANC 5/4: 4.5  - ANC 5/11: 5.3  - ANC 5/18: 4.1      3. Additional Plans:  Patient will be treated in therapeutic milieu with appropriate individual and group therapies as described.    Medical Assessment and Plan     Medical diagnoses to be addressed this admission:    None    Medical course: Patient was physically examined by the ED prior to being transferred to the unit and was found to be medically stable and appropriate for admission. Patient has been intermittently found to be orthostatic and with postural tachycardia since 5/2 but has remained asymptomatic. Encouraging fluids.     #Jock itch  5/19 Patient concerned regarding jock itch. Education on hygeine provided and topical  clotrimazole ordered.     Consults: None    Checklist     Legal Status: Committed (PD revoked) with Holman. Holman: haldol, olanzapine, risperidone, paliperidone, and clozapine.    Safety Assessment:   Behavioral Orders   Procedures     Code 1 - Restrict to Unit     Code 2     OK to leave the floor for imaging procedures at the discretion of floor staff     Routine Programming     As clinically indicated     Status 15     Every 15 minutes.       Risk Assessment:  Risk for harm is moderate-high.  Risk factors: impulsive and past behaviors, multiple recent pending  Protective factors: family and engaged in treatment     SIO: No    Disposition: TBD, complicated by patient being from [place]. Pending stabilization, medication optimization, & development of a safe discharge plan.    Attestations     Brandon Merrill  MS4, [employer] medical [employer]       Attending Attestation:  I was present with the medical student who participated in the service and in the documentation of the note. I have verified the history and personally performed the physical exam and medical decision making. I agree with the assessment and plan of care as documented in the note .    Renate Wallace MD

## 2023-05-19 NOTE — PLAN OF CARE
"  Pt woke up late this shift. He was able to eat breakfast and he took his scheduled medication. Pt took a long shower afterward, but appear to not have washed his hair, looking unkempt and with odor.  Pt denied having SI, hallucinations, anxiety and depression.  Pt presented with a flat affect, very guarded and dismissive.  He asked to speak with the team, would not say what it was about .   Pt is not social with peers, mostly stayed in his room.      Problem: Adult Behavioral Health Plan of Care  Goal: Patient-Specific Goal (Individualization)  Description: You can add care plan individualizations to a care plan. Examples of Individualization might be:  \"Parent requests to be called daily at 9am for status\", \"I have a hard time hearing out of my right ear\", or \"Do not touch me to wake me up as it startles me\".  Outcome: Not Progressing     Problem: Psychotic Signs/Symptoms  Goal: Increased Participation and Engagement (Psychotic Signs/Symptoms)  Outcome: Not Progressing  Intervention: Facilitate Participation and Engagement  Recent Flowsheet Documentation  Taken 5/19/2023 1100 by Faith Jha RN  Diversional Activity: reading     Problem: Anxiety  Goal: Anxiety Reduction or Resolution  Outcome: Progressing     Problem: Depression  Goal: Improved Mood  Outcome: Progressing     Problem: Sleep Disturbance  Goal: Adequate Sleep/Rest  Outcome: Progressing   Goal Outcome Evaluation:    Plan of Care Reviewed With: patient                   "

## 2023-05-19 NOTE — PROGRESS NOTES
05/19/23 1200   Group Therapy Session   Time Session Began 1120   Time Session Ended 1205   Total Time (minutes) 15   Total # Attendees 5-10   Group Type expressive therapy   Group Topic Covered balanced lifestyle;relaxation techniques   Group Session Detail Relaxation   Patient Response/Contribution cooperative with task   Patient Participation Detail Cooperatively engaged in Morning Music Relaxation group to decrease anxiety and promote personal and interpersonal connectedness.      Was in and out of session, popping in to make requests of songs to incorporate into session, but not having the attention span to stay longer.  Is very particular about musical tastes, preferring own original music, or spiritual recordings today.  Also exhibits a somewhat low frustration tolerance, but is pleasant in other moments, but overall does not appear connected with others in group.

## 2023-05-20 LAB
CLOZAPINE SERPL-MCNC: 122 NG/ML
CLOZAPINE+NOR SERPL-MCNC: 122 NG/ML
CNO SERPL-MCNC: <100 NG/ML
NORCLOZAPINE SERPL-MCNC: <100 NG/ML

## 2023-05-20 PROCEDURE — 250N000013 HC RX MED GY IP 250 OP 250 PS 637

## 2023-05-20 PROCEDURE — 250N000013 HC RX MED GY IP 250 OP 250 PS 637: Performed by: STUDENT IN AN ORGANIZED HEALTH CARE EDUCATION/TRAINING PROGRAM

## 2023-05-20 PROCEDURE — 124N000002 HC R&B MH UMMC

## 2023-05-20 RX ADMIN — Medication 25 MCG: at 08:29

## 2023-05-20 RX ADMIN — LITHIUM CARBONATE 1200 MG: 300 TABLET, EXTENDED RELEASE ORAL at 18:01

## 2023-05-20 RX ADMIN — CLOTRIMAZOLE 1% ANTIFUNGAL CREAM: 1 CREAM TOPICAL at 19:03

## 2023-05-20 RX ADMIN — CLOZAPINE 200 MG: 200 TABLET ORAL at 19:03

## 2023-05-20 ASSESSMENT — ACTIVITIES OF DAILY LIVING (ADL)
ADLS_ACUITY_SCORE: 28
ORAL_HYGIENE: INDEPENDENT
ADLS_ACUITY_SCORE: 28
HYGIENE/GROOMING: HANDWASHING;INDEPENDENT
DRESS: INDEPENDENT;STREET CLOTHES
ADLS_ACUITY_SCORE: 28
ADLS_ACUITY_SCORE: 28

## 2023-05-20 NOTE — PLAN OF CARE
"  Pt intermittently visible in the milieu. He is minimally social. Comes out for meals and medications. Appetite is good. Pt slept thru morning this shift. He paced the hallways when out in the milieu. Pt denies anxiety, depression, SI and hallucinations. Pt appeared to be responding to internal stimuli. He was observed smiling while pacing inside his room. When asked about medication side effect, he said that his heart rate is fast and he has a little bit of tremors. Offered to take his vitals, but he refused. Very dismissive, irritable  and gave  no verbal response . Will continue to monitor.         Problem: Adult Behavioral Health Plan of Care  Goal: Patient-Specific Goal (Individualization)  Description: You can add care plan individualizations to a care plan. Examples of Individualization might be:  \"Parent requests to be called daily at 9am for status\", \"I have a hard time hearing out of my right ear\", or \"Do not touch me to wake me up as it startles me\".  Outcome: Not Progressing     Problem: Psychotic Signs/Symptoms  Goal: Improved Behavioral Control (Psychotic Signs/Symptoms)  Outcome: Not Progressing     Problem: Anxiety  Goal: Anxiety Reduction or Resolution  Outcome: Progressing     Problem: Depression  Goal: Improved Mood  Outcome: Progressing     Problem: Sleep Disturbance  Goal: Adequate Sleep/Rest  Outcome: Progressing   Goal Outcome Evaluation:    Plan of Care Reviewed With: patient                   "

## 2023-05-20 NOTE — PLAN OF CARE
Problem: Sleep Disturbance  Goal: Adequate Sleep/Rest  Outcome: Progressing    Pt appear to have slept for 7 hours. Pt did not complain of pain or discomfort, and no PRN medications were given. 15 minutes safety checks were in place. Staff will continue to offer support to pt.

## 2023-05-20 NOTE — PLAN OF CARE
Problem: Psychotic Signs/Symptoms  Goal: Improved Mood Symptoms  Intervention: Optimize Emotion and Mood  Recent Flowsheet Documentation  Taken 5/20/2023 1723 by Juan Guevara, RN  Diversional Activity: reading     Problem: Depression  Goal: Improved Mood  Outcome: Progressing     Problem: Anxiety  Goal: Anxiety Reduction or Resolution  Outcome: Progressing     Problem: Adult Behavioral Health Plan of Care  Goal: Absence of New-Onset Illness or Injury  Outcome: Progressing  Intervention: Identify and Manage Fall Risk  Recent Flowsheet Documentation  Taken 5/20/2023 1723 by Juan Guevara, RN  Safety Measures:    environmental rounds completed    safety rounds completed   Goal Outcome Evaluation:    Plan of Care Reviewed With: patient        Patient alert and oriented, able to communicate needs, spent most of the shift in his room, pacing while in room, out in milieu for coffee, snacks and dinner, had no interactions with peers. Appeared flat, blunted and restricted but was cooperative and compliant with medications. Dismissive of MH assessment, denied anxiety or depression, denied SI/HI. He denied any pain or discomfort, ADLs WNL, adequate PO intake.

## 2023-05-21 PROCEDURE — 124N000002 HC R&B MH UMMC

## 2023-05-21 PROCEDURE — 250N000013 HC RX MED GY IP 250 OP 250 PS 637: Performed by: STUDENT IN AN ORGANIZED HEALTH CARE EDUCATION/TRAINING PROGRAM

## 2023-05-21 PROCEDURE — 250N000013 HC RX MED GY IP 250 OP 250 PS 637

## 2023-05-21 RX ADMIN — Medication 25 MCG: at 09:36

## 2023-05-21 RX ADMIN — CLOZAPINE 200 MG: 200 TABLET ORAL at 19:02

## 2023-05-21 RX ADMIN — LITHIUM CARBONATE 1200 MG: 300 TABLET, EXTENDED RELEASE ORAL at 18:11

## 2023-05-21 ASSESSMENT — ACTIVITIES OF DAILY LIVING (ADL)
ADLS_ACUITY_SCORE: 28
DRESS: INDEPENDENT;STREET CLOTHES
ADLS_ACUITY_SCORE: 28
HYGIENE/GROOMING: HANDWASHING;INDEPENDENT
ORAL_HYGIENE: INDEPENDENT
ADLS_ACUITY_SCORE: 28

## 2023-05-21 NOTE — PLAN OF CARE
"  Pt woke up late in the morning. He ate most of his meals. Intermittently visible in the milieu. Mostly kept to himself inside his room . Pt  would pace the halls a few times, then he would go back to his room. Pt once in a while is observed to be smiling to self. He denies hallucinations, anxiety, depression and SI.  Noted that Pt had orthostatic BP this shift. Unable to do education because of his irritability and divisiveness.      Problem: Adult Behavioral Health Plan of Care  Goal: Patient-Specific Goal (Individualization)  Description: You can add care plan individualizations to a care plan. Examples of Individualization might be:  \"Parent requests to be called daily at 9am for status\", \"I have a hard time hearing out of my right ear\", or \"Do not touch me to wake me up as it startles me\".  Outcome: Progressing     Problem: Anxiety  Goal: Anxiety Reduction or Resolution  Outcome: Progressing     Problem: Depression  Goal: Improved Mood  Outcome: Progressing     Problem: Psychotic Signs/Symptoms  Goal: Improved Mood Symptoms  Outcome: Progressing  Intervention: Optimize Emotion and Mood  Recent Flowsheet Documentation  Taken 5/21/2023 1300 by Faith Jha RN  Diversional Activity: reading     Problem: Sleep Disturbance  Goal: Adequate Sleep/Rest  Outcome: Progressing   Goal Outcome Evaluation:    Plan of Care Reviewed With: patient                   "

## 2023-05-21 NOTE — PLAN OF CARE
Problem: Suicidal Behavior  Goal: Suicidal Behavior is Absent or Managed  Outcome: Progressing     Problem: Psychotic Signs/Symptoms  Goal: Improved Psychomotor Symptoms (Psychotic Signs/Symptoms)  Intervention: Manage Psychomotor Movement  Recent Flowsheet Documentation  Taken 5/21/2023 1647 by Juan Guevara RN  Diversional Activity: reading  Activity (Behavioral Health): up ad nuria     Problem: Depression  Goal: Improved Mood  Outcome: Progressing     Problem: Anxiety  Goal: Anxiety Reduction or Resolution  Outcome: Progressing   Goal Outcome Evaluation:    Plan of Care Reviewed With: patient        Patient was mostly in his room, occasionally in milieu for pacing, snacks, dinned, and requests. Able to communicate needs. Flat, blunted, not engaged with peers, but cooperative and compliant with medications. PO intake was adequate, ADLs WNLs. He denied pain or discomfort. Denied anxiety or depression, no SI/HI, contracted for safety.

## 2023-05-21 NOTE — PLAN OF CARE
Problem: Sleep Disturbance  Goal: Adequate Sleep/Rest  Outcome: Progressing    Pt appear to have slept for 6.75 hours. Pt did not complain of pain or discomfort, and no PRN medications were given. 15 minutes safety checks were in place. Staff will continue to offer support to pt.

## 2023-05-22 LAB
ANION GAP SERPL CALCULATED.3IONS-SCNC: 11 MMOL/L (ref 7–15)
BUN SERPL-MCNC: 15.8 MG/DL (ref 6–20)
CALCIUM SERPL-MCNC: 10.2 MG/DL (ref 8.6–10)
CHLORIDE SERPL-SCNC: 101 MMOL/L (ref 98–107)
CREAT SERPL-MCNC: 0.83 MG/DL (ref 0.67–1.17)
DEPRECATED HCO3 PLAS-SCNC: 28 MMOL/L (ref 22–29)
ERYTHROCYTE [DISTWIDTH] IN BLOOD BY AUTOMATED COUNT: 12.5 % (ref 10–15)
GFR SERPL CREATININE-BSD FRML MDRD: >90 ML/MIN/1.73M2
GLUCOSE SERPL-MCNC: 116 MG/DL (ref 70–99)
HCT VFR BLD AUTO: 43.4 % (ref 40–53)
HGB BLD-MCNC: 14.6 G/DL (ref 13.3–17.7)
MAGNESIUM SERPL-MCNC: 2.1 MG/DL (ref 1.7–2.3)
MCH RBC QN AUTO: 30.4 PG (ref 26.5–33)
MCHC RBC AUTO-ENTMCNC: 33.6 G/DL (ref 31.5–36.5)
MCV RBC AUTO: 90 FL (ref 78–100)
PLATELET # BLD AUTO: 227 10E3/UL (ref 150–450)
POTASSIUM SERPL-SCNC: 4.1 MMOL/L (ref 3.4–5.3)
RBC # BLD AUTO: 4.8 10E6/UL (ref 4.4–5.9)
SODIUM SERPL-SCNC: 140 MMOL/L (ref 136–145)
WBC # BLD AUTO: 8.5 10E3/UL (ref 4–11)

## 2023-05-22 PROCEDURE — 250N000013 HC RX MED GY IP 250 OP 250 PS 637: Performed by: STUDENT IN AN ORGANIZED HEALTH CARE EDUCATION/TRAINING PROGRAM

## 2023-05-22 PROCEDURE — 93010 ELECTROCARDIOGRAM REPORT: CPT | Performed by: INTERNAL MEDICINE

## 2023-05-22 PROCEDURE — 85027 COMPLETE CBC AUTOMATED: CPT | Performed by: PHYSICIAN ASSISTANT

## 2023-05-22 PROCEDURE — 124N000002 HC R&B MH UMMC

## 2023-05-22 PROCEDURE — 99222 1ST HOSP IP/OBS MODERATE 55: CPT | Performed by: PHYSICIAN ASSISTANT

## 2023-05-22 PROCEDURE — 250N000013 HC RX MED GY IP 250 OP 250 PS 637

## 2023-05-22 PROCEDURE — 36415 COLL VENOUS BLD VENIPUNCTURE: CPT | Performed by: PHYSICIAN ASSISTANT

## 2023-05-22 PROCEDURE — 99232 SBSQ HOSP IP/OBS MODERATE 35: CPT | Performed by: STUDENT IN AN ORGANIZED HEALTH CARE EDUCATION/TRAINING PROGRAM

## 2023-05-22 PROCEDURE — 93005 ELECTROCARDIOGRAM TRACING: CPT

## 2023-05-22 PROCEDURE — 82310 ASSAY OF CALCIUM: CPT | Performed by: PHYSICIAN ASSISTANT

## 2023-05-22 PROCEDURE — 83735 ASSAY OF MAGNESIUM: CPT | Performed by: PHYSICIAN ASSISTANT

## 2023-05-22 RX ADMIN — Medication 25 MCG: at 09:38

## 2023-05-22 RX ADMIN — LITHIUM CARBONATE 1200 MG: 300 TABLET, EXTENDED RELEASE ORAL at 17:34

## 2023-05-22 RX ADMIN — CLOTRIMAZOLE 1% ANTIFUNGAL CREAM: 1 CREAM TOPICAL at 09:38

## 2023-05-22 RX ADMIN — CLOZAPINE 200 MG: 200 TABLET ORAL at 19:04

## 2023-05-22 ASSESSMENT — ACTIVITIES OF DAILY LIVING (ADL)
ADLS_ACUITY_SCORE: 38
ADLS_ACUITY_SCORE: 28
LAUNDRY: WITH SUPERVISION
ADLS_ACUITY_SCORE: 28
HYGIENE/GROOMING: HANDWASHING;INDEPENDENT
ORAL_HYGIENE: INDEPENDENT
HYGIENE/GROOMING: SHOWER;PROMPTS
ADLS_ACUITY_SCORE: 28
LAUNDRY: WITH SUPERVISION
ADLS_ACUITY_SCORE: 28
ADLS_ACUITY_SCORE: 28
DRESS: STREET CLOTHES;INDEPENDENT
ADLS_ACUITY_SCORE: 38
ORAL_HYGIENE: INDEPENDENT
ADLS_ACUITY_SCORE: 28
DRESS: STREET CLOTHES;INDEPENDENT

## 2023-05-22 NOTE — PROGRESS NOTES
----------------------------------------------------------------------------------------------------------  [facility], [place]   Psychiatric Progress Note  Hospital Day #37    Identifier: Tello Steen is a 27 year old male with previous psychiatric diagnoses of schizophrenia vs schizoaffective disorder, bipolar type who presents with psychosis and álvaro in the context of recent hospitalizations 3/26-4/11/23 ([facility]) and 2/22-3/7/23 ([facility]) for the same. Assessment is that the current presentation is consistent with schizoaffective disorder, bipolar type, current álvaro and psychosis.      Interim History:   The patient's care was discussed with the treatment team and chart notes were reviewed.     Vitals: Patient remains tachy into 120s, intermittently orthostatic   Sleep: 7 hours (05/22/23 0600)   Scheduled medications: Took all scheduled medications as prescribed  PRN medications: None    Staff Report:   - UZMA over the weekend  - Isolative to room, still appears RTIS  - denies mental health symptoms   - intermittently declines vital signs          Subjective:     Patient Interview:  Patient interviewed today in room. States he still feels that the medication is working and does not endorse side effects aside from tachycardia. Encouraged to continue drinking fluids. Advised patient that medicine will be consulted to assess tachycardia in the face of clozapine. Patient also informed that clozapine level resulted and is significantly low, and that additional titration will likely be needed. Patient insists that the level is likely higher now as he did not feel the medication was working when his level was initially drawn, but does feel it working now, indicating a belief that the level must have increased. Patient seems reluctantly agreeable to clozapine continuing to be increased. Patient has been declining vitals and in separate conversation today with patient,  "encouraged vitals as necessary part of medication changes to monitor for orthostasis and tachycardia. Patient agreeable to having vitals checked.      Contacted mom today, she is very concerned about increasing his clozapine in light of his tachycardia. She is informed that clozapine level returned sub therapeutic and current dose is also sub therapeutic. She asks if there are natural options to help manage his tachycardia as opposed to medications and is informed that those options aren't well-explored or managed in the hospital. She is very hesitant to increase clozapine as she believes his heart cannot handle it if he has been tachycardic, and she is assured that we will talk to the medicine team and assess next steps before discussing a plan for increasing clozapine, starting any agents for his tachycardia, etc.     Review of systems:   As stated above    Objective:   /48 (BP Location: Right arm, Patient Position: Sitting, Cuff Size: Adult Regular)   Pulse 104   Temp 97.8  F (36.6  C) (Oral)   Resp 18   Wt 76.3 kg (168 lb 3.2 oz)   SpO2 98%   BMI 22.19 kg/m    Weight is 168 lbs 3.2 oz  Body mass index is 22.19 kg/m .    Mental Status Exam:  Oriented to:  Grossly Oriented  General:  Awake and Alert, in bedroom, bedding moved back to bed from floor  Appearance:  appears stated age, patient appears disheveled, room disorganized   Behavior/Attitude: Guarded, largely gives brief answers  Eye Contact:  Intermittent, improved  Psychomotor: No evidence of tics, dystonia, akithisia, or tardive dyskinesia, no catatonia  Speech: Appropriate volume and tone, latency of response improved Language: Fluent in English and correct vocabulary   Mood:  \"fine\"  Affect: Restricted affect  Thought Process: Logical and linear, though rigid given insistence that clozapine level must be higher than when drawn   Thought Content:  No delusions or hallucinations noted or observed during interview today  Insight: Patient appears " "to have insight into utility of clozapine but does not seem to have insight into ongoing psychotic symptoms with which clozapine is meant to assist  Judgment:  limited - listens to \"chip in brain\". Patient remains amenable to treatment plan, but judgement affected by limited insight into ongoing symptoms   Attention Span:  adequate for conversation  Concentration:  grossly intact   Recent and Remote Memory:  grossly intact  Fund of Knowledge: above average, and has medical knowledge beyond that of a regular patient  Muscle Strength and Tone: appears normal  Gait and Station: Normal gait     Allergies:   No Known Allergies     Labs and imaging:   Data this admission:  BMP normal  CBC normal  UDS negative  TSH 3/9/23 normal  Lipids 2/24/23 unremarkable  Hgb A1c 2/24/23 normal   UA - amorphous crystals, 10 protein albumin, elevated pH   HIV - negative   Ceruloplasmin normal   AILYN - normal   Lyme abs - normal   Treponema  - negative   Folate - normal   B12 - normal   ESR - normal Salicylate, alcohol, Tylenol negative  EKG 3/20: sinus rhythm, QTc 431  Brain MRI - normal     Lithium 4/25: 0.60                4/30: 0.60               5/08: 0.90     EKG 4/26 - normal  Trending ANC, has been stable to date    Clozapine level drawn 5/17 returned at 122, sub therapeutic      Psychiatric Assessment and Plan   Primary psychiatric diagnosis:   Schizoaffective disorder, bipolar type, current álvaro    Diagnostic Impression:   Tello Steen is a 27 year old male with a past psychiatric diagnosis of schizophrenia admitted from the ED on 04/15/2023 due to pscyhosis in the context of recent hospitalization 3/26-4/11/23 for psychosis and álvaro. Significant symptoms on admission include delusions about a chip in their brain, delusions of grandeur, delusions of having a special mission with the [employer], and elevated mood. The MSE on admission was pertinent for elevated affect and delusions previously mentioned. Biological contributions to " mental health presentation include previous psychiatric diagnosis of schizophrenia. On chart review, there are also mentions of Lithium trials, possibly indicating previous álvaro. Psychosocial contributions to mental health presentation include partial insight, unclear work, housing, and financial situation. Protective factors include medication adherence, lack of substance use, and supportive CCM, good frustration tolerance, and presence of perceived social supports.      In summary, the patient's reported symptoms of psychosis and álvaro in the context of historical schizophrenia diagnosis and recent hospitalization are consistent with schizoaffective disorder bipolar type.    Psychiatric Hospital course:  Tello Steen was admitted to [facility] committed with a Holman. All PTA medications were continued on admission.     Medications:     Neuroleptics: This is his third hospitalization within 4 months, indicating that his symptoms are not adequately controlled with Invega Sustenna.     Olanzapine: While awaiting clozapine to be added to his existing Holman, Tello was initially continued on PTA olanzapine.     Risperidone: Due to arm dystonia, Tello's olanzapine was switched to risperidone on 4/21. Added PRN cogentin and propranolol for any potential EPS. He tolerated risperidone titration well, although there were some concerns about akithisia due to patient's apparent restlessness and psychomotor agitation. On further exploration, this movement appeared volitional and more related to álvaro rather than akithisia or dystonia. Risperidone discontinued 5/2 with planned cross taper to clozapine.    Clozapine: Started on 4/27. Continuing to titrate clozapine, has had some orthostasis    Clozapine titrated to 150 mg on 5/8 and increased on 5/9 to 175 mg d/t symptom persistence.     Propranolol discontinued d/t orthostasis with clozapine      Patient with postural tachycardia 5/9, encouraged fluids    Continued to titrate  clozapine 25 mg q48h with dose increase on 05/11 to 200mg     Clozapine maintained at 200 while level pending; level returned on 5//20 at 122, sub therapeutic. Further titration limited by tachycardia into 120s-130s. Medicine consulted for assistance with tachycardia to continue clozapine titration as patient remains symptomatic.     Patient has had intermittent orthostasis and tachycardia on clozapine, but has remained asymptomatic. Continuing to monitor. EKG showing possible sinus tachycardia    Based on vitals can consider adding propranolol given no further orthostasis     Lithium: started on 4/20 for álvaro, tolerating titration well thus far. Lithium level on 5/6 returned at 0.9. Therapeutic level indicating no need for continued titration at this time    Symptoms    Patient appears to be improving with less RTIS and improved ability to focus. However, patient continues to express delusions and AH with limited insight into current psychiatric symptoms and limited judgement.     Psychosocial    5/4/23 patient requests that team not discuss care/treatment with his mother and limit information sharing to discharge planning. Later stated that he is amenable to team discussing any element of treatment plan with mom. Mom requests update when clozapine level returns      Today's changes:     Medicine consulted for tachycardia     Maintain clozapine at 200 mg pending recommendations from medicine for tachycardia; based on recommendations, can consider continuing titration and increasing to 225 mg.       1. Psychotropic Medications:  Scheduled:  Current Facility-Administered Medications   Medication Dose Route Frequency     clotrimazole   Topical BID     cloZAPine  200 mg Oral At Bedtime     lithium ER  1,200 mg Oral At Bedtime     Vitamin D3  25 mcg Oral Daily     PRN:  Current Facility-Administered Medications   Medication Dose Route Frequency     acetaminophen  650 mg Oral Q4H PRN     alum & mag hydroxide-simethicone   30 mL Oral Q4H PRN     benztropine  0.5 mg Oral BID PRN     ipratropium  2 spray Sublingual At Bedtime PRN     melatonin  3 mg Oral At Bedtime PRN     OLANZapine  10 mg Intramuscular TID PRN     polyethylene glycol  17 g Oral Daily PRN     propranolol  10 mg Oral BID PRN     risperiDONE  0.5 mg Sublingual BID PRN     sennosides  8.6 mg Oral BID PRN       2. Pertinent Labs/Monitoring:   - Lithium 4/25: 0.60 -> 4/30: 0.60  - EKG 4/26 - normal sinus rhythm, QTc 417  - ANC 4/27 - 4.0  - Lithium 5/6: 0.9  - ANC 5/4: 4.5  - ANC 5/11: 5.3  - ANC 5/18: 4.1  - Clozapine level 5/17 122      3. Additional Plans:  Patient will be treated in therapeutic milieu with appropriate individual and group therapies as described.    Medical Assessment and Plan     Medical diagnoses to be addressed this admission:    None    Medical course: Patient was physically examined by the ED prior to being transferred to the unit and was found to be medically stable and appropriate for admission. Patient has been intermittently found to be orthostatic and with postural tachycardia since 5/2 but has remained asymptomatic. Encouraging fluids.     #Jock itch  5/19 Patient concerned regarding jock itch. Education on hygeine provided and topical clotrimazole ordered.     Consults: None    Checklist     Legal Status: Committed (PD revoked) with Holman. Holman: haldol, olanzapine, risperidone, paliperidone, and clozapine.    Safety Assessment:   Behavioral Orders   Procedures     Code 1 - Restrict to Unit     Code 2     OK to leave the floor for imaging procedures at the discretion of floor staff     Routine Programming     As clinically indicated     Status 15     Every 15 minutes.       Risk Assessment:  Risk for harm is moderate-high.  Risk factors: impulsive and past behaviors, multiple recent pending  Protective factors: family and engaged in treatment     SIO: No    Disposition: TBD, complicated by patient being from [place]. Pending stabilization,  medication optimization, & development of a safe discharge plan.    Attestations     Brandon Merrill  MS4, [employer] medical school     This patient has been seen and evaluated by me, Johanna Altman DO.  I have discussed this patient with the team including the resident and medical student(s) and I agree with the findings and plan in this note.  Dr. Johanna Altman DO, CARIN

## 2023-05-22 NOTE — CONSULTS
"[facility]  Consult Note - Hospitalist Service  Date of Admission:  4/15/2023  Consult Requested by: psychiatry   Reason for Consult: postural tachycardia     Assessment & Plan   Sophie \"Ki\" FERMÍN Steen is a 27 year old male w/ PMH álvaro, schizophrenia and multiple hospitalizations to psychiatric units admitted on 4/15/2023 with psychoses, delusions and álvaro    Schizophrenia  Álvaro  Delusions  recent hospitalizations 3/26-4/11/23 ([facility]) and 2/22-3/7/23 ([place] [facility]) for the same.  Previously on Invega and symptoms appear to be refractory to this outpatient treatment.   -Cares per primary psychiatry team  -Given increased postural tachycardia in the setting of clozapine would hold off on uptitrating at this time    Tachycardia  Heart rate generally in the 110s to 120s and patient more recently was noted per his psychiatry team to be symptomatic.  Patient denies any symptomology on assessment today.  -Continue to encourage oral fluids  -Okay to continue current dose of clozapine but would hold off on up titration for now and continue to monitor may be able to uptitrate slowly in the coming days if patient continues to be stable and asymptomatic    Medicine will follow-up peripherally on vitals, pending labs, pending EKG, interval psychiatry and nursing notes, please page with any questions or concerns    Clinically Significant Risk Factors                                  Clara Trivedi PA-C  Hospitalist Service  Securely message with [email or web address] (more info)  Text page via [facility] Paging/Directory   ______________________________________________________________________    Chief Complaint   Delusions, álvaro, psychoses in the setting of known schizophrenia    History is obtained from the patient, also discussed with bedside nurse    History of Present Illness   Sophie \"Ki\" FERMÍN Steen is a 27 year old male w/ PMH álvaro, schizophrenia and multiple hospitalizations to " psychiatric units admitted on 4/15/2023 with psychoses, delusions and álvaro    Discussed patient with ordering provider noted that the patient was symptomatic of tachycardia with standing and had expressed noting palpitations.    When discussing with patient he does not endorse having symptoms associated with tachycardia including any chest pain, abnormal chest sensation, palpitations, shortness of breath, dizziness, lightheadedness.  He does not endorse any signs for infection including any fevers, chills, rashes, sore throat.    Patient notes that what he experiences is essentially for approximately 1 and half hours after taking his Clozaril in the evening a sensation of significant sedation and a feeling as though he could not swallow his tongue.  He denies that he feels his tongue is swollen or larger and has not had any difficulties with his airway.    Patient notes that he is otherwise generally healthy.  He notes that Invega injections outpatient were working for him and that he would like to revisit this therapy.    He denies having any urinary or bowel complaints or other general complaints.      Past Medical History    Past Medical History:   Diagnosis Date     Schizophrenia (H)        Past Surgical History   No past surgical history on file.   When asked about prior surgeries patient states he would not like to comment    Medications   I have reviewed this patient's current medications       Review of Systems    The 10 point Review of Systems is negative other than noted in the HPI or here.      Physical Exam   Vital Signs: Temp: 97.8  F (36.6  C) Temp src: Oral BP: 116/81 Pulse: (!) 122     SpO2: 96 % O2 Device: None (Room air)    Weight: 168 lbs 3.2 oz  GENERAL: Alert and oriented. NAD.  Able to sit upright independently.  Cooperative.   HEENT: Anicteric sclera. Mucous membranes moist. NC. AT.  Pupils equal and round  CV: Tachycardic, RRR. S1, S2. No murmurs appreciated.   RESPIRATORY: Effort normal on  RA. Lungs CTAB with no wheezing, rales, rhonchi.   GI: Abdomen soft, NT, NABS  NEUROLOGICAL: No focal deficits. Moves all extremities.    EXTREMITIES: No peripheral edema. Intact bilateral pedal pulses.   SKIN: No jaundice. No rashes on exposed skin.  BACK: no CVA tenderness, no spinous tenderness    Medical Decision Making       50 MINUTES SPENT BY ME on the date of service doing chart review, history, exam, documentation & further activities per the note.      Data

## 2023-05-22 NOTE — CARE PLAN
05/22/23 1022   Individualization/Patient Specific Goals   Patient Personal Strengths family/social support;expressive of needs;good impulse control;medication/treatment adherence;intellectual cognitive skills;relationship stability   Patient Vulnerabilities history of unsuccessful treatment;lacks insight into illness;poor impulse control   Anxieties, Fears or Concerns None   Individualized Care Needs None   Interprofessional Rounds   Summary 1. Stabilization of thought disorder symptoms 2.Medication mgmt per MD's 3. Safe with self 4. Coordination of care with family/outpatient providers 5. Placement addressed 6. Psych f/u care in place   Participants CTC;nursing;patient;psychiatrist   Behavioral Team Discussion   Participants , Jo Mejias  RN, Renay Kuhn MA.LP, Med students   Progress Patient has been more irritable, dismissive- titration on Clozaril  had to be slowwed down due to orthostasis. Patient  has continued to be compliant with meds, tx recommendations. .   Anticipated length of stay 7-10 days   Continued Stay Criteria/Rationale Psychosis, on civil commitment/Holman   Medical/Physical Tachycardia, orthostasis   Plan Patient will continue to be seen by  Psychiatry daily.  Meds continues to be reviewed/adjusted per MD as indicated.  Patient will return to [place] when stable/safe.   Will continue to coordinate with family re: discharge plans.   CTC will continue to assess needs, ensure appropriate f/u care is in place   Rationale for change in precautions or plan No change in plan/precautions

## 2023-05-23 LAB
ATRIAL RATE - MUSE: 100 BPM
DIASTOLIC BLOOD PRESSURE - MUSE: NORMAL MMHG
INTERPRETATION ECG - MUSE: NORMAL
P AXIS - MUSE: 65 DEGREES
PR INTERVAL - MUSE: 148 MS
QRS DURATION - MUSE: 80 MS
QT - MUSE: 336 MS
QTC - MUSE: 433 MS
R AXIS - MUSE: 96 DEGREES
SYSTOLIC BLOOD PRESSURE - MUSE: NORMAL MMHG
T AXIS - MUSE: 56 DEGREES
VENTRICULAR RATE- MUSE: 100 BPM

## 2023-05-23 PROCEDURE — 124N000002 HC R&B MH UMMC

## 2023-05-23 PROCEDURE — 250N000013 HC RX MED GY IP 250 OP 250 PS 637: Performed by: STUDENT IN AN ORGANIZED HEALTH CARE EDUCATION/TRAINING PROGRAM

## 2023-05-23 PROCEDURE — 99232 SBSQ HOSP IP/OBS MODERATE 35: CPT | Performed by: STUDENT IN AN ORGANIZED HEALTH CARE EDUCATION/TRAINING PROGRAM

## 2023-05-23 RX ORDER — ATENOLOL 25 MG/1
12.5 TABLET ORAL DAILY
Status: DISCONTINUED | OUTPATIENT
Start: 2023-05-23 | End: 2023-05-24

## 2023-05-23 RX ADMIN — CLOZAPINE 200 MG: 200 TABLET ORAL at 19:04

## 2023-05-23 RX ADMIN — LITHIUM CARBONATE 1200 MG: 300 TABLET, EXTENDED RELEASE ORAL at 18:28

## 2023-05-23 RX ADMIN — Medication 12.5 MG: at 16:43

## 2023-05-23 RX ADMIN — CLOTRIMAZOLE 1% ANTIFUNGAL CREAM: 1 CREAM TOPICAL at 19:05

## 2023-05-23 ASSESSMENT — ACTIVITIES OF DAILY LIVING (ADL)
ADLS_ACUITY_SCORE: 28
HYGIENE/GROOMING: INDEPENDENT;SHOWER
ADLS_ACUITY_SCORE: 28
ORAL_HYGIENE: INDEPENDENT
HYGIENE/GROOMING: INDEPENDENT;HANDWASHING
ADLS_ACUITY_SCORE: 28
DRESS: STREET CLOTHES;INDEPENDENT
ADLS_ACUITY_SCORE: 28
ADLS_ACUITY_SCORE: 28
ORAL_HYGIENE: INDEPENDENT
ADLS_ACUITY_SCORE: 28
ADLS_ACUITY_SCORE: 28
LAUNDRY: WITH SUPERVISION
ADLS_ACUITY_SCORE: 28
DRESS: STREET CLOTHES;INDEPENDENT
ADLS_ACUITY_SCORE: 28

## 2023-05-23 NOTE — PROGRESS NOTES
Brief medicine follow-up note    Follow-up regarding question of ongoing tachycardia in the setting of clozapine up titration for schizophrenia with álvaro and delusions    /85 (BP Location: Left arm, Patient Position: Sitting, Cuff Size: Adult Regular)   Pulse 103   Temp 98.1  F (36.7  C) (Oral)   Resp 16   Wt 76.3 kg (168 lb 3.2 oz)   SpO2 97%   BMI 22.19 kg/m      A/p:    Tachycardia  Based on recent vitals trend appears patient continues to have some postural element to tachycardia  -Continue to encourage oral fluids  -Continue to encourage regular activity and monitor for symptoms  -Courage patient to rise slowly from laying to sitting to standing  -Recommend slow progression with clozapine taper as patient tolerates    Medicine will follow vitals peripherally, please page with questions or concerns    Clara Trivedi PA-C  [facility]  Contact information available via [facility] Paging/Directory

## 2023-05-23 NOTE — PLAN OF CARE
Assessment/Intervention/Current Symtoms and Care Coordination  The patient's care was discussed with the treatment team and chart notes were reviewed.   Patient met with team.  No changes in the past 24 hours.  Patient remains guarded- irritable at times.  Has been eating, sleeping well. Compliant with meds. ADL's fluctuate.    CTC spoke to patient's CM Kiko Dempsey and provided update.  He plans to come to the unit tomorrow to meet with patient.     Discharge Plan or Goal  Patient plans to return home to [place]. Patient in need of close Psychiatric follow up care.     Barriers to Discharge   Ongoing symptoms- need for further stabilization.    Meds continue to be monitored/adjusted per MD     Referral Status  None today     Legal Status     Commitment and Holman order per [facility]- Provisional discharge revoked

## 2023-05-23 NOTE — PLAN OF CARE
Pt intermittently visible in the milieu, isolative and guarded, short on answering questions with no eye contact. Pt uncooperative uopn assessment keeping quiet and turning on the side. Pt occasionally came outside and was pacing around. Pt well groomed this shift, ate and drunk adequately in the room. Medication compliant, safety contracted, will continue with the plan of care.    /85     Pulse 103   Temp 98.1  F (36.7  C) (Oral)   Resp 16   SpO2 97%        Problem: Psychotic Signs/Symptoms  Goal: Improved Behavioral Control (Psychotic Signs/Symptoms)  Outcome: Progressing   Goal Outcome Evaluation:    Plan of Care Reviewed With: patient

## 2023-05-23 NOTE — PROGRESS NOTES
"    ----------------------------------------------------------------------------------------------------------  [facility], [place]   Psychiatric Progress Note  Hospital Day #38    Identifier: Tello Steen is a 27 year old male with previous psychiatric diagnoses of schizophrenia vs schizoaffective disorder, bipolar type who presents with psychosis and álvaro in the context of recent hospitalizations 3/26-4/11/23 ([facility]) and 2/22-3/7/23 ([facility]) for the same. Assessment is that the current presentation is consistent with schizoaffective disorder, bipolar type, current álvaro and psychosis.      Interim History:   The patient's care was discussed with the treatment team and chart notes were reviewed.     Vitals: Patient remains tachy into 120s, but no orthostasis  Sleep: 7 hours (05/23/23 0600)   Scheduled medications: Took all scheduled medications as prescribed  PRN medications: None    Interim events:   - Patient was isolative and guarded, unwilling to answer nursing assessment questions. Requires prompts to clean his room and keep up with hygiene      Subjective:     Patient Interview:  The patient reports that he would like to be on the Invega again rather than the Clozapine. He states that he was on Invega for years and was fine on it. When it was pointed out that he was still being admitted to the hospital while taking Invega he states that he knows \"it seems that way\". He states that he believes his limiting factor was not having a provider outpatient and he was just getting the injections through his PCP rather than another provider. He said that he did not have hallucinations, HI or suicidal ideation and states \"how many schizophrenics are compliant with medications? And I am\". When asked about the events leading up to his admission he states that he only told the police he was having a psychotic episode because needed to stay in the hospital. When asked why he " "needed to stay in the hospital he states it's because the \"intelligence communication\" needed him to. He affirms that he is still experiencing intelligence communication.   Patient was asked about his fast heart beat. He states he can feel it on occasion but denies any dizziness, shortness of breath or chest pain.     Review of systems:   As stated above    Objective:   /85 (BP Location: Left arm, Patient Position: Sitting, Cuff Size: Adult Regular)   Pulse 103   Temp 98.1  F (36.7  C) (Oral)   Resp 16   Wt 76.3 kg (168 lb 3.2 oz)   SpO2 97%   BMI 22.19 kg/m    Weight is 168 lbs 3.2 oz  Body mass index is 22.19 kg/m .    Mental Status Exam:  Oriented to:  Grossly Oriented  General:  Awake and Alert, in bedroom, bedding moved back to bed from floor  Appearance:  appears stated age, room is malodorous, patient appears disheveled    Behavior/Attitude: Guarded, but engagement increased from yesterday.   Eye Contact:  intermittent, improved  Psychomotor: No evidence of tics, dystonia, akithisia, or tardive dyskinesia, no catatonia; patient shifts from foot to foot and appears physically restless   Speech: Appropriate volume and tone, latency of response improved Language: Fluent in English and correct vocabulary   Mood:  \"good\"  Affect: Restricted affect but affect broadened from previous days  Thought Process: Logical and linear   Thought Content:  No delusions or hallucinations noted or observed during interview today  Insight: Limited - Patient does not have insight into why previous medications were not working for him and why Clozapine is necessary.   Judgment:  limited - listens to \"chip in brain\". Patient remains amenable to treatment plan, but judgement affected by limited insight into ongoing symptoms   Attention Span:  adequate for conversation  Concentration:  grossly intact   Recent and Remote Memory:  grossly intact  Fund of Knowledge: above average, and has medical knowledge beyond that of a " regular patient  Muscle Strength and Tone: appears normal  Gait and Station: Normal gait     Allergies:   No Known Allergies     Labs and imaging:   Data this admission:  BMP normal  CBC normal  UDS negative  TSH 3/9/23 normal  Lipids 2/24/23 unremarkable  Hgb A1c 2/24/23 normal   UA - amorphous crystals, 10 protein albumin, elevated pH   HIV - negative   Ceruloplasmin normal   AILYN - normal   Lyme abs - normal   Treponema  - negative   Folate - normal   B12 - normal   ESR - normal Salicylate, alcohol, Tylenol negative  EKG 3/20: sinus rhythm, QTc 431  Brain MRI - normal     Lithium 4/25: 0.60                4/30: 0.60               5/08: 0.90     EKG 4/26 - normal  Trending ANC, has been stable to date     Psychiatric Assessment and Plan   Primary psychiatric diagnosis:   Schizoaffective disorder, bipolar type, current álvaro    Diagnostic Impression:   Tello Steen is a 27 year old male with a past psychiatric diagnosis of schizophrenia admitted from the ED on 04/15/2023 due to pscyhosis in the context of recent hospitalization 3/26-4/11/23 for psychosis and álvaro. Significant symptoms on admission include delusions about a chip in their brain, delusions of grandeur, delusions of having a special mission with the [employer], and elevated mood. The MSE on admission was pertinent for elevated affect and delusions previously mentioned. Biological contributions to mental health presentation include previous psychiatric diagnosis of schizophrenia. On chart review, there are also mentions of Lithium trials, possibly indicating previous álvaro. Psychosocial contributions to mental health presentation include partial insight, unclear work, housing, and financial situation. Protective factors include medication adherence, lack of substance use, and supportive CCM, good frustration tolerance, and presence of perceived social supports.      In summary, the patient's reported symptoms of psychosis and álvaro in the context of  historical schizophrenia diagnosis and recent hospitalization are consistent with schizoaffective disorder bipolar type.    Psychiatric Hospital course:  Tello Steen was admitted to [facility] committed with a Holman. All PTA medications were continued on admission.     Medications:     Neuroleptics: This is his third hospitalization within 4 months, indicating that his symptoms are not adequately controlled with Invega Sustenna.     Olanzapine: While awaiting clozapine to be added to his existing Holman, Tello was initially continued on PTA olanzapine.     Risperidone: Due to arm dystonia, Tello's olanzapine was switched to risperidone on 4/21. Added PRN cogentin and propranolol for any potential EPS. He tolerated risperidone titration well, although there were some concerns about akithisia due to patient's apparent restlessness and psychomotor agitation. On further exploration, this movement appeared volitional and more related to álvaro rather than akithisia or dystonia. Risperidone discontinued 5/2 with planned cross taper to clozapine.    Clozapine: Started on 4/27. Continuing to titrate clozapine, has had some orthostasis    Clozapine titrated to 150 mg on 5/8 and increased on 5/9 to 175 mg d/t symptom persistence.     Propranolol discontinued d/t orthostasis with clozapine      Patient with postural tachycardia 5/9, encouraged fluids    Continued to titrate clozapine 25 mg q48h with dose increase on 05/11 to 200mg     Maintaining clozapine at 200 mg pending results from clozapine level collected 5/17    Patient has had intermittent orthostasis and tachycardia on clozapine, but has remained asymptomatic. Continuing to monitor. EKG showing possible sinus tachycardia    Based on vitals can consider adding propranolol given no further orthostasis     Medicine consulted on 5/22-23 and saw patient on 5/22. Agreed that tachycardia was likely a Clozapine side effect. They agreed with adding atenolol 12.5 mg     Lithium:  started on 4/20 for álvaro, tolerating titration well thus far. Lithium level on 5/6 returned at 0.9. Therapeutic level indicating no need for continued titration at this time    Symptoms    Patient appears to be improving with less RTIS and improved ability to focus. However, patient continues to express delusions and AH with limited insight into current psychiatric symptoms and limited judgement.     Psychosocial    5/4/23 patient requests that team not discuss care/treatment with his mother and limit information sharing to discharge planning. Later stated that he is amenable to team discussing any element of treatment plan with mom. Mom requests update when clozapine level returns      Today's changes:     None; maintain current dose of clozapine    Ordered topical clotrimazole for patient reported jock itch      1. Psychotropic Medications:  Scheduled:  Current Facility-Administered Medications   Medication Dose Route Frequency     clotrimazole   Topical BID     cloZAPine  200 mg Oral At Bedtime     lithium ER  1,200 mg Oral At Bedtime     Vitamin D3  25 mcg Oral Daily     PRN:  Current Facility-Administered Medications   Medication Dose Route Frequency     acetaminophen  650 mg Oral Q4H PRN     alum & mag hydroxide-simethicone  30 mL Oral Q4H PRN     benztropine  0.5 mg Oral BID PRN     ipratropium  2 spray Sublingual At Bedtime PRN     melatonin  3 mg Oral At Bedtime PRN     OLANZapine  10 mg Intramuscular TID PRN     polyethylene glycol  17 g Oral Daily PRN     propranolol  10 mg Oral BID PRN     risperiDONE  0.5 mg Sublingual BID PRN     sennosides  8.6 mg Oral BID PRN       2. Pertinent Labs/Monitoring:   - Lithium 4/25: 0.60 -> 4/30: 0.60  - EKG 4/26 - normal sinus rhythm, QTc 417  - ANC 4/27 - 4.0  - Lithium 5/6: 0.9  - ANC 5/4: 4.5  - ANC 5/11: 5.3  - ANC 5/18: 4.1      3. Additional Plans:  Patient will be treated in therapeutic milieu with appropriate individual and group therapies as  described.    Medical Assessment and Plan     Medical diagnoses to be addressed this admission:    None    Medical course: Patient was physically examined by the ED prior to being transferred to the unit and was found to be medically stable and appropriate for admission. Patient has been intermittently found to be orthostatic and with postural tachycardia since 5/2 but has remained asymptomatic. Encouraging fluids.     #Jock itch  5/19 Patient concerned regarding jock itch. Education on hygeine provided and topical clotrimazole ordered.     Consults:   - Medicine for symptomatic postural tachycardia to the 120s: Recommended holding off on uptitrating clozapine dosing until resolved. Labs including Mag, CBC and BMP wnl. Agreed with adding atenolol 12.5 mg.     Checklist     Legal Status: Committed (PD revoked) with Holman. Holman: haldol, olanzapine, risperidone, paliperidone, and clozapine.    Safety Assessment:   Behavioral Orders   Procedures     Code 1 - Restrict to Unit     Code 2     OK to leave the floor for imaging procedures at the discretion of floor staff     Routine Programming     As clinically indicated     Status 15     Every 15 minutes.       Risk Assessment:  Risk for harm is moderate-high.  Risk factors: impulsive and past behaviors, multiple recent pending  Protective factors: family and engaged in treatment     SIO: No    Disposition: TBD, complicated by patient being from [place]. Pending stabilization, medication optimization, & development of a safe discharge plan.    Attestations     Iliana Kelly, MS3  [employer]         Attending Attestation:  I was present with the medical student who participated in the service and in the documentation of the note. I have verified the history and personally performed the physical exam and medical decision making. I agree with the assessment and plan of care as documented in the note .    Johanna Altman DO, MBA

## 2023-05-23 NOTE — PLAN OF CARE
Pt has been mostly withdrawn today with minimal to no social interactions with peers. He appears irritable when interacting writer and other staff; he presents as dismissive, tense and brief in his responses. Usually his responses are 1-2 word answers. Highly focused on speaking with his CTC this afternoon; made numerous requests to speak with her without telling staff what he specifically needed. Pt showered twice today but still appears disheveled with unclean clothing. Appears guarded but no other evidence of paranoia, delusions or disorganized thought/speech or behavior. Atenolol was added to his scheduled morning medications to address his tachycardia r/t Clozapine titration.     Problem: Adult Behavioral Health Plan of Care  Goal: Plan of Care Review  Recent Flowsheet Documentation  Taken 5/23/2023 1108 by Jo Mejias, RN  Patient Agreement with Plan of Care: agrees

## 2023-05-24 PROCEDURE — 250N000013 HC RX MED GY IP 250 OP 250 PS 637: Performed by: STUDENT IN AN ORGANIZED HEALTH CARE EDUCATION/TRAINING PROGRAM

## 2023-05-24 PROCEDURE — 250N000013 HC RX MED GY IP 250 OP 250 PS 637

## 2023-05-24 PROCEDURE — 99232 SBSQ HOSP IP/OBS MODERATE 35: CPT | Performed by: STUDENT IN AN ORGANIZED HEALTH CARE EDUCATION/TRAINING PROGRAM

## 2023-05-24 PROCEDURE — 124N000002 HC R&B MH UMMC

## 2023-05-24 RX ORDER — ATENOLOL 25 MG/1
12.5 TABLET ORAL DAILY
Status: COMPLETED | OUTPATIENT
Start: 2023-05-24 | End: 2023-05-24

## 2023-05-24 RX ORDER — ATENOLOL 25 MG/1
25 TABLET ORAL DAILY
Status: DISCONTINUED | OUTPATIENT
Start: 2023-05-25 | End: 2023-06-01 | Stop reason: HOSPADM

## 2023-05-24 RX ADMIN — CLOZAPINE 225 MG: 200 TABLET ORAL at 19:08

## 2023-05-24 RX ADMIN — LITHIUM CARBONATE 1200 MG: 300 TABLET, EXTENDED RELEASE ORAL at 18:22

## 2023-05-24 RX ADMIN — Medication 25 MCG: at 09:20

## 2023-05-24 RX ADMIN — Medication 12.5 MG: at 09:21

## 2023-05-24 RX ADMIN — Medication 12.5 MG: at 12:16

## 2023-05-24 RX ADMIN — CLOTRIMAZOLE 1% ANTIFUNGAL CREAM: 1 CREAM TOPICAL at 09:21

## 2023-05-24 RX ADMIN — IPRATROPIUM BROMIDE 2 SPRAY: 21 SPRAY, METERED NASAL at 20:20

## 2023-05-24 ASSESSMENT — ACTIVITIES OF DAILY LIVING (ADL)
ADLS_ACUITY_SCORE: 28
HYGIENE/GROOMING: INDEPENDENT;HANDWASHING
ADLS_ACUITY_SCORE: 28
ORAL_HYGIENE: INDEPENDENT
ADLS_ACUITY_SCORE: 28

## 2023-05-24 NOTE — PLAN OF CARE
Pt appear to have slept for 7  hours. No  PRN medications given or requested. 15 minutes safety checks were in place. No concerns noted. Will continue to monitor and offer support. Will continue to monitor .     Problem: Sleep Disturbance  Goal: Adequate Sleep/Rest  Outcome: Progressing   Goal Outcome Evaluation:

## 2023-05-24 NOTE — PLAN OF CARE
Problem: Psychotic Signs/Symptoms  Goal: Optimal Cognitive Function (Psychotic Signs/Symptoms)  Intervention: Support and Promote Cognitive Ability  Recent Flowsheet Documentation  Taken 5/24/2023 1100 by Lorenza Ruano RN  Trust Relationship/Rapport:    care explained    choices provided    reassurance provided    thoughts/feelings acknowledged  Goal: Improved Mood Symptoms  Outcome: Progressing  Goal: Enhanced Social, Occupational or Functional Skills (Psychotic Signs/Symptoms)  Intervention: Promote Social, Occupational and Functional Ability  Recent Flowsheet Documentation  Taken 5/24/2023 1100 by Lorenza Ruano RN  Trust Relationship/Rapport:    care explained    choices provided    reassurance provided    thoughts/feelings acknowledged   Goal Outcome Evaluation:  Patient alert and able to make needs known, reported onset of tremors upon waking up  and appeared frustrated. Patient dismissive of all mental health symptoms and give writer a thumb up when assessing mental health. Patient vitals at the start of shift was 135/87, , temp 97.1, RR 18 and O2 96% on RA, patient denies pain and took all schedule medications. Provider updated on patient reported concern of tremors and elevated HR, increase dose for Atenolol order and patient took 12.5 mg after noon, vitals at /76   Pulse 87   Temp 97.1  F (36.2  C) (Temporal)   Resp 16   Wt 76.3 kg (168 lb 3.2 oz)   SpO2 96%   BMI 22.19 kg/m . Patient continue to be isolative to room, eye contact is hesitant and he did not part take in any group activity this shift. Patient intake was fear at breakfast, he is holding on to lunch try to eat later, is drinking adequate fluids and appeared clean and well groom. Continue current plan of care.

## 2023-05-24 NOTE — PROGRESS NOTES
Brief medicine follow-up note    Reviewed patient's recent vitals and continue to remain stable    Vitals trend as follows:  Vitals:    05/23/23 1103 05/23/23 1643 05/23/23 1702 05/24/23 0921   BP:  129/84  135/87   BP Location:  Left arm     Patient Position:  Sitting     Cuff Size:  Adult Regular     Pulse:  97 97 105   Resp: 16 16     Temp: 98  F (36.7  C) 98.5  F (36.9  C) 98.1  F (36.7  C)    TempSrc: Oral Oral Oral    SpO2: 96% 96%     Weight:         Tachycardia  -Assuming patient remains asymptomatic is reasonable to continue to titrate patient's clozapine per psychiatry  -Continue to monitor patient's vitals and for new cardiopulmonary symptoms    Medicine will continue to follow vitals peripherally, please call or page with questions or concerns    Clara Trivedi PA-C  [facility]  Contact information available via [facility] Paging/Directory

## 2023-05-24 NOTE — PLAN OF CARE
Assessment/Intervention/Current Symtoms and Care Coordination  The patient's care was discussed with the treatment team and chart notes were reviewed.   Patient met with team.  Reported hand tremors and feeling like he's choking at night.  MD's addressed  No changes in the past 24 hours.  Patient remains guarded- irritable at times.  Has been eating, sleeping well. Compliant with meds. ADL's fluctuate.     CTC received VM from  patient's CM Kiko Dempsey stating that he would not be coming today as he needs to speak to patient's Atty first- I assume re: commitment status if patient returns to [place].  Will await nataly back.   Patient informed    Discharge Plan or Goal  Patient plans to return home to [place]. Patient in need of close Psychiatric follow up care.     Barriers to Discharge   Ongoing symptoms- need for further stabilization.    Meds continue to be monitored/adjusted per MD     Referral Status  None today     Legal Status     Commitment and Holman order per [facility]- Provisional discharge revoked

## 2023-05-24 NOTE — PLAN OF CARE
Problem: Depression  Goal: Improved Mood  Outcome: Progressing     Problem: Anxiety  Goal: Anxiety Reduction or Resolution  Outcome: Progressing     Problem: Adult Behavioral Health Plan of Care  Goal: Absence of New-Onset Illness or Injury  Outcome: Progressing  Intervention: Identify and Manage Fall Risk  Recent Flowsheet Documentation  Taken 5/23/2023 1700 by Juan Guevara, RN  Safety Measures:    environmental rounds completed    safety rounds completed   Goal Outcome Evaluation:    Plan of Care Reviewed With: patient        Presented as withdrawn and isolative, out in milieu on occasions. He had no interactions with peers, able to communicate needs. Access to cell phone with supervision for cell-phone and credit card payment. Cooperative and compliant with medications. Denied any pain or discomfort, ADLs WNL, adequate PO intake. He denied anxiety or depression, no SI/HI, contracted for safety.

## 2023-05-24 NOTE — PROGRESS NOTES
----------------------------------------------------------------------------------------------------------  [facility], [place]   Psychiatric Progress Note  Hospital Day #39    Identifier: Tello Steen is a 27 year old male with previous psychiatric diagnoses of schizophrenia vs schizoaffective disorder, bipolar type who presents with psychosis and álvaro in the context of recent hospitalizations 3/26-4/11/23 ([facility]) and 2/22-3/7/23 ([facility]) for the same. Assessment is that the current presentation is consistent with schizoaffective disorder, bipolar type, current álvaro and psychosis.      Interim History:   The patient's care was discussed with the treatment team and chart notes were reviewed.     Vitals: Patient remains tachy into 110s, will check vitals q4h  Sleep: 7 hours (05/24/23 0630)   Scheduled medications: Took all scheduled medications as prescribed  PRN medications: None    Interim events:   - Patient isolative and irritable  - reports tremor      Subjective:     Patient Interview:  Patient interviewed today in room. Reports feeling tremulous, with weakness in hands, and having a sensation of choking on his tongue at night that jolts him awake, which he attributes to feeling sedated. He is assured that the atenolol can help with some of those symptoms and that dose will be increased. He is also advised to try using mouth spray at night to help with oral symptoms, including increased saliva, that may be contributing to his sensation of choking on tongue.     Review of systems:   As stated above    Objective:   /87 (BP Location: Left arm, Patient Position: Sitting, Cuff Size: Adult Regular)   Pulse 105   Temp 97.1  F (36.2  C) (Temporal)   Resp 16   Wt 76.3 kg (168 lb 3.2 oz)   SpO2 96%   BMI 22.19 kg/m    Weight is 168 lbs 3.2 oz  Body mass index is 22.19 kg/m .     Mental Status Exam:  Oriented to:  Grossly Oriented  General:  Awake and Alert, in  "bedroom, bedding moved back to bed from floor  Appearance:  appears stated age, room is malodorous, patient appears disheveled Behavior/Attitude: Guarded, but engagement increased from yesterday.   Eye Contact: poor, patient largely stares down at floor or at hands  Psychomotor: Patient has slight tremor in LUE   Speech: Appropriate volume and tone; pronounced latency of speech, sometimes unresponsive  Language: Fluent in English and correct vocabulary   Mood:  \"fine\"  Affect: Blunted affect  Thought Process: Logical and linear   Thought Content:  No delusions stated in interview today, but patient intermittently appears to be RTIS.   Insight: Limited - Patient does not have insight into why previous medications were not working for him and why Clozapine is necessary.   Judgment:  limited - listens to \"chip in brain\". Patient remains amenable to treatment plan, but judgement affected by limited insight into ongoing symptoms   Attention Span:  adequate for conversation  Concentration:  grossly intact   Recent and Remote Memory:  grossly intact  Fund of Knowledge: above average, and has medical knowledge beyond that of a regular patient  Muscle Strength and Tone: appears normal  Gait and Station: Normal gait     Allergies:   No Known Allergies     Labs and imaging:   Data this admission:  BMP normal  CBC normal  UDS negative  TSH 3/9/23 normal  Lipids 2/24/23 unremarkable  Hgb A1c 2/24/23 normal   UA - amorphous crystals, 10 protein albumin, elevated pH   HIV - negative   Ceruloplasmin normal   AILYN - normal   Lyme abs - normal   Treponema  - negative   Folate - normal   B12 - normal   ESR - normal Salicylate, alcohol, Tylenol negative  EKG 3/20: sinus rhythm, QTc 431  Brain MRI - normal     Lithium 4/25: 0.60                4/30: 0.60               5/08: 0.90     EKG 4/26 - normal  Trending ANC, has been stable to date     Psychiatric Assessment and Plan   Primary psychiatric diagnosis:   Schizoaffective disorder, " bipolar type, current álvaro    Diagnostic Impression:   Tello Steen is a 27 year old male with a past psychiatric diagnosis of schizophrenia admitted from the ED on 04/15/2023 due to pscyhosis in the context of recent hospitalization 3/26-4/11/23 for psychosis and álvaro. Significant symptoms on admission include delusions about a chip in their brain, delusions of grandeur, delusions of having a special mission with the [employer], and elevated mood. The MSE on admission was pertinent for elevated affect and delusions previously mentioned. Biological contributions to mental health presentation include previous psychiatric diagnosis of schizophrenia. On chart review, there are also mentions of Lithium trials, possibly indicating previous álvaro. Psychosocial contributions to mental health presentation include partial insight, unclear work, housing, and financial situation. Protective factors include medication adherence, lack of substance use, and supportive CCM, good frustration tolerance, and presence of perceived social supports.      In summary, the patient's reported symptoms of psychosis and álvaro in the context of historical schizophrenia diagnosis and recent hospitalization are consistent with schizoaffective disorder bipolar type.    Psychiatric Hospital course:  Tello Steen was admitted to [facility] committed with a Holman. All PTA medications were continued on admission.     Medications:     Neuroleptics: This is his third hospitalization within 4 months, indicating that his symptoms are not adequately controlled with Invega Sustenna.     Olanzapine: While awaiting clozapine to be added to his existing Holman, Tello was initially continued on PTA olanzapine.     Risperidone: Due to arm dystonia, Tello's olanzapine was switched to risperidone on 4/21. Added PRN cogentin and propranolol for any potential EPS. He tolerated risperidone titration well, although there were some concerns about akithisia due to patient's  apparent restlessness and psychomotor agitation. On further exploration, this movement appeared volitional and more related to álvaro rather than akithisia or dystonia. Risperidone discontinued 5/2 with planned cross taper to clozapine.    Clozapine: Started on 4/27. Continuing to titrate clozapine, has had some orthostasis    Clozapine titrated to 150 mg on 5/8 and increased on 5/9 to 175 mg d/t symptom persistence.     Propranolol discontinued d/t orthostasis with clozapine      Patient with postural tachycardia 5/9, encouraged fluids    Continued to titrate clozapine 25 mg q48h with dose increase on 05/11 to 200mg     Maintaining clozapine at 200 mg d/t tachycardia despite clozapine level collected 5/17 subtherapeutic at 122.     Patient has had intermittent orthostasis and tachycardia on clozapine, but has remained asymptomatic. Continuing to monitor. EKG showing possible sinus tachycardia    Based on vitals can consider adding propranolol given no further orthostasis     Medicine consulted on 5/22-23 and saw patient on 5/22. Agreed that tachycardia was likely a Clozapine side effect. They agreed with adding atenolol 12.5 mg     5/24 increase atenolol to 25 mg daily d/t EPS    Lithium: started on 4/20 for álvaro, tolerating titration well thus far. Lithium level on 5/6 returned at 0.9. Therapeutic level indicating no need for continued titration at this time    Symptoms    Patient appears to be improving with less RTIS and improved ability to focus. However, patient continues to express delusions and AH with limited insight into current psychiatric symptoms and limited judgement.     Psychosocial    5/4/23 patient requests that team not discuss care/treatment with his mother and limit information sharing to discharge planning. Later stated that he is amenable to team discussing any element of treatment plan with mom.       Today's changes:     Q4h vitals; if HR<100 and not orthostatic, increase clozapine to 225 mg      Increase atenolol to 25 mg from 12.5 mg; ordered additional 12.5 mg dose forr today for total dose 25 mg today      1. Psychotropic Medications:  Scheduled:  Current Facility-Administered Medications   Medication Dose Route Frequency     atenolol  12.5 mg Oral Daily     clotrimazole   Topical BID     cloZAPine  200 mg Oral At Bedtime     lithium ER  1,200 mg Oral At Bedtime     Vitamin D3  25 mcg Oral Daily     PRN:  Current Facility-Administered Medications   Medication Dose Route Frequency     acetaminophen  650 mg Oral Q4H PRN     alum & mag hydroxide-simethicone  30 mL Oral Q4H PRN     ipratropium  2 spray Sublingual At Bedtime PRN     melatonin  3 mg Oral At Bedtime PRN     OLANZapine  10 mg Intramuscular TID PRN     polyethylene glycol  17 g Oral Daily PRN     sennosides  8.6 mg Oral BID PRN       2. Pertinent Labs/Monitoring:   - Lithium 4/25: 0.60 -> 4/30: 0.60  - EKG 4/26 - normal sinus rhythm, QTc 417  - ANC 4/27 - 4.0  - Lithium 5/6: 0.9  - ANC 5/4: 4.5  - ANC 5/11: 5.3  - ANC 5/18: 4.1  - Clozapine level 5/17: 122      3. Additional Plans:  Patient will be treated in therapeutic milieu with appropriate individual and group therapies as described.    Medical Assessment and Plan     Medical diagnoses to be addressed this admission:    None    Medical course: Patient was physically examined by the ED prior to being transferred to the unit and was found to be medically stable and appropriate for admission. Patient has been intermittently found to be orthostatic and with postural tachycardia since 5/2 but has remained asymptomatic. Encouraging fluids.     #Jock itch  5/19 Patient concerned regarding jock itch. Education on hygeine provided and topical clotrimazole ordered.     Consults:   - Medicine for symptomatic postural tachycardia to the 120s: Recommended holding off on uptitrating clozapine dosing until resolved. Labs including Mag, CBC and BMP wnl. Agreed with adding atenolol 12.5 mg.     Checklist      Legal Status: Committed (PD revoked) with Holman. Holman: haldol, olanzapine, risperidone, paliperidone, and clozapine.    Safety Assessment:   Behavioral Orders   Procedures     Code 1 - Restrict to Unit     Code 2     OK to leave the floor for imaging procedures at the discretion of floor staff     Routine Programming     As clinically indicated     Status 15     Every 15 minutes.       Risk Assessment:  Risk for harm is moderate-high.  Risk factors: impulsive and past behaviors, multiple recent pending  Protective factors: family and engaged in treatment     SIO: No    Disposition: TBD, complicated by patient being from [place]. Pending stabilization, medication optimization, & development of a safe discharge plan.    Attestations     Brandon Merrill, MS4  [employer] medical school     This patient has been seen and evaluated by me, Johanna Altman DO.  I have discussed this patient with the team including the resident and medical student(s) and I agree with the findings and plan in this note.  Dr. Johanna Altman DO, CARIN

## 2023-05-25 LAB
BASOPHILS # BLD AUTO: 0.1 10E3/UL (ref 0–0.2)
BASOPHILS NFR BLD AUTO: 1 %
EOSINOPHIL # BLD AUTO: 0.4 10E3/UL (ref 0–0.7)
EOSINOPHIL NFR BLD AUTO: 6 %
ERYTHROCYTE [DISTWIDTH] IN BLOOD BY AUTOMATED COUNT: 12.7 % (ref 10–15)
HCT VFR BLD AUTO: 42.2 % (ref 40–53)
HGB BLD-MCNC: 13.7 G/DL (ref 13.3–17.7)
HOLD SPECIMEN: NORMAL
IMM GRANULOCYTES # BLD: 0.1 10E3/UL
IMM GRANULOCYTES NFR BLD: 1 %
LYMPHOCYTES # BLD AUTO: 1.6 10E3/UL (ref 0.8–5.3)
LYMPHOCYTES NFR BLD AUTO: 23 %
MCH RBC QN AUTO: 30 PG (ref 26.5–33)
MCHC RBC AUTO-ENTMCNC: 32.5 G/DL (ref 31.5–36.5)
MCV RBC AUTO: 92 FL (ref 78–100)
MONOCYTES # BLD AUTO: 0.9 10E3/UL (ref 0–1.3)
MONOCYTES NFR BLD AUTO: 12 %
NEUTROPHILS # BLD AUTO: 4.1 10E3/UL (ref 1.6–8.3)
NEUTROPHILS NFR BLD AUTO: 57 %
NRBC # BLD AUTO: 0 10E3/UL
NRBC BLD AUTO-RTO: 0 /100
PLATELET # BLD AUTO: 204 10E3/UL (ref 150–450)
RBC # BLD AUTO: 4.57 10E6/UL (ref 4.4–5.9)
WBC # BLD AUTO: 7.2 10E3/UL (ref 4–11)

## 2023-05-25 PROCEDURE — 250N000013 HC RX MED GY IP 250 OP 250 PS 637: Performed by: STUDENT IN AN ORGANIZED HEALTH CARE EDUCATION/TRAINING PROGRAM

## 2023-05-25 PROCEDURE — 99232 SBSQ HOSP IP/OBS MODERATE 35: CPT | Performed by: STUDENT IN AN ORGANIZED HEALTH CARE EDUCATION/TRAINING PROGRAM

## 2023-05-25 PROCEDURE — 250N000013 HC RX MED GY IP 250 OP 250 PS 637

## 2023-05-25 PROCEDURE — 36415 COLL VENOUS BLD VENIPUNCTURE: CPT | Performed by: STUDENT IN AN ORGANIZED HEALTH CARE EDUCATION/TRAINING PROGRAM

## 2023-05-25 PROCEDURE — 124N000002 HC R&B MH UMMC

## 2023-05-25 PROCEDURE — 85025 COMPLETE CBC W/AUTO DIFF WBC: CPT | Performed by: STUDENT IN AN ORGANIZED HEALTH CARE EDUCATION/TRAINING PROGRAM

## 2023-05-25 RX ADMIN — Medication 25 MCG: at 08:41

## 2023-05-25 RX ADMIN — Medication 25 MG: at 08:41

## 2023-05-25 RX ADMIN — CLOTRIMAZOLE 1% ANTIFUNGAL CREAM: 1 CREAM TOPICAL at 08:41

## 2023-05-25 RX ADMIN — CLOTRIMAZOLE 1% ANTIFUNGAL CREAM: 1 CREAM TOPICAL at 19:04

## 2023-05-25 RX ADMIN — IPRATROPIUM BROMIDE 2 SPRAY: 21 SPRAY, METERED NASAL at 19:04

## 2023-05-25 RX ADMIN — CLOZAPINE 250 MG: 200 TABLET ORAL at 19:03

## 2023-05-25 RX ADMIN — ACETAMINOPHEN 650 MG: 325 TABLET ORAL at 17:43

## 2023-05-25 RX ADMIN — LITHIUM CARBONATE 1200 MG: 300 TABLET, EXTENDED RELEASE ORAL at 18:04

## 2023-05-25 ASSESSMENT — ACTIVITIES OF DAILY LIVING (ADL)
ADLS_ACUITY_SCORE: 28

## 2023-05-25 NOTE — PROGRESS NOTES
"    ----------------------------------------------------------------------------------------------------------  [facility], [place]   Psychiatric Progress Note  Hospital Day #40    Identifier: Tello Steen is a 27 year old male with previous psychiatric diagnoses of schizophrenia vs schizoaffective disorder, bipolar type who presents with psychosis and álvaro in the context of recent hospitalizations 3/26-4/11/23 ([facility]) and 2/22-3/7/23 ([facility]) for the same. Assessment is that the current presentation is consistent with schizoaffective disorder, bipolar type, current álvaro and psychosis.      Interim History:   The patient's care was discussed with the treatment team and chart notes were reviewed.     Vitals: HR has decreased appropriately with atenolol, has not been significantly tachycardic   Sleep: 7 hours (05/25/23 0549)   Scheduled medications: Took all scheduled medications as prescribed  PRN medications: None    Interim events:   - Patient isolative and dismissive        Subjective:     Patient Interview:  Patient interviewed today in conference room. Reports no concerns and states that tremor improved with atenolol and tongue choking sensation improved with mouth spray. Patient was asked about discharge planning and reports that his car is still in [place] and that his mother can have it towed here so that he can wait for it in the ED on discharge. When asked about his relationship with his father, who he reports also lives at home, he says \"please don't ask\" and states \"I don't talk to my dad\".     Review of systems:   As stated above    Objective:   /81 (BP Location: Right arm, Patient Position: Sitting, Cuff Size: Adult Regular)   Pulse 80   Temp 98.7  F (37.1  C) (Oral)   Resp 18   Wt 76.3 kg (168 lb 3.2 oz)   SpO2 96%   BMI 22.19 kg/m    Weight is 168 lbs 3.2 oz  Body mass index is 22.19 kg/m .     Mental Status Exam:  Oriented to:  Grossly " "Oriented  General:  Awake and Alert  Appearance:  appears stated age, room is malodorous Behavior/Attitude: Guarded, but engagement increased from yesterday.   Eye Contact: poor, patient largely stares at hands and avoids eye contact  Psychomotor: Patient has slight tremor in LUE, improved from yesterday  Speech: Appropriate volume and tone; pronounced latency of speech, sometimes unresponsive  Language: Fluent in English and correct vocabulary   Mood:  \"fine\"  Affect: Blunted affect  Thought Process: Logical and linear   Thought Content:  No delusions stated in interview today, but patient intermittently appears to be RTIS.   Insight: Limited - Patient does not have insight into why previous medications were not working for him and why Clozapine is necessary.   Judgment:  limited - listens to \"chip in brain\". Patient remains amenable to treatment plan, but judgement affected by limited insight into ongoing symptoms   Attention Span:  adequate for conversation  Concentration:  grossly intact   Recent and Remote Memory:  grossly intact  Fund of Knowledge: above average, and has medical knowledge beyond that of a regular patient  Muscle Strength and Tone: appears normal  Gait and Station: Normal gait     Allergies:   No Known Allergies     Labs and imaging:   Data this admission:  BMP normal  CBC normal  UDS negative  TSH 3/9/23 normal  Lipids 2/24/23 unremarkable  Hgb A1c 2/24/23 normal   UA - amorphous crystals, 10 protein albumin, elevated pH   HIV - negative   Ceruloplasmin normal   AILYN - normal   Lyme abs - normal   Treponema  - negative   Folate - normal   B12 - normal   ESR - normal Salicylate, alcohol, Tylenol negative  EKG 3/20: sinus rhythm, QTc 431  Brain MRI - normal     Lithium 4/25: 0.60                4/30: 0.60               5/08: 0.90     EKG 4/26 - normal  Trending ANC, has been stable to date     Psychiatric Assessment and Plan   Primary psychiatric diagnosis:   Schizoaffective disorder, bipolar " type, current álvaro    Diagnostic Impression:   Tello Steen is a 27 year old male with a past psychiatric diagnosis of schizophrenia admitted from the ED on 04/15/2023 due to pscyhosis in the context of recent hospitalization 3/26-4/11/23 for psychosis and álvaro. Significant symptoms on admission include delusions about a chip in their brain, delusions of grandeur, delusions of having a special mission with the [employer], and elevated mood. The MSE on admission was pertinent for elevated affect and delusions previously mentioned. Biological contributions to mental health presentation include previous psychiatric diagnosis of schizophrenia. On chart review, there are also mentions of Lithium trials, possibly indicating previous álvaro. Psychosocial contributions to mental health presentation include partial insight, unclear work, housing, and financial situation. Protective factors include medication adherence, lack of substance use, and supportive CCM, good frustration tolerance, and presence of perceived social supports.      In summary, the patient's reported symptoms of psychosis and álvaro in the context of historical schizophrenia diagnosis and recent hospitalization are consistent with schizoaffective disorder bipolar type.    Psychiatric Hospital course:  Tello Steen was admitted to [facility] committed with a Holman. All PTA medications were continued on admission.     Medications:     Neuroleptics: This is his third hospitalization within 4 months, indicating that his symptoms are not adequately controlled with Invega Sustenna.     Olanzapine: While awaiting clozapine to be added to his existing Holman, Tello was initially continued on PTA olanzapine.     Risperidone: Due to arm dystonia, Tello's olanzapine was switched to risperidone on 4/21. Added PRN cogentin and propranolol for any potential EPS. He tolerated risperidone titration well, although there were some concerns about akithisia due to patient's apparent  restlessness and psychomotor agitation. On further exploration, this movement appeared volitional and more related to álvaro rather than akithisia or dystonia. Risperidone discontinued 5/2 with planned cross taper to clozapine.    Clozapine: Started on 4/27. Continuing to titrate clozapine, has had some orthostasis    Clozapine titrated to 150 mg on 5/8 and increased on 5/9 to 175 mg d/t symptom persistence.     Propranolol discontinued d/t orthostasis with clozapine      Patient with postural tachycardia 5/9, encouraged fluids    Continued to titrate clozapine 25 mg q48h with dose increase on 05/11 to 200mg     Maintaining clozapine at 200 mg d/t tachycardia despite clozapine level collected 5/17 subtherapeutic at 122.     Patient has had intermittent orthostasis and tachycardia on clozapine, but has remained asymptomatic. Continuing to monitor. EKG showing possible sinus tachycardia    Based on vitals can consider adding propranolol given no further orthostasis     Medicine consulted on 5/22-23 and saw patient on 5/22. Agreed that tachycardia was likely a Clozapine side effect. They agreed with adding atenolol 12.5 mg     5/24 increase atenolol to 25 mg daily d/t EPS    Lithium: started on 4/20 for álvaro, tolerating titration well thus far. Lithium level on 5/6 returned at 0.9. Therapeutic level indicating no need for continued titration at this time    Symptoms    Patient appears to be improving with less RTIS and improved ability to focus. However, patient continues to express delusions and AH with limited insight into current psychiatric symptoms and limited judgement.     Psychosocial    5/4/23 patient requests that team not discuss care/treatment with his mother and limit information sharing to discharge planning. Later stated that he is amenable to team discussing any element of treatment plan with mom.       Today's changes:     Q4h vitals; if HR<100 and not orthostatic, increase clozapine to 225 mg      Increase atenolol to 25 mg from 12.5 mg; ordered additional 12.5 mg dose forr today for total dose 25 mg today      1. Psychotropic Medications:  Scheduled:  Current Facility-Administered Medications   Medication Dose Route Frequency     atenolol  25 mg Oral Daily     clotrimazole   Topical BID     cloZAPine  225 mg Oral At Bedtime     lithium ER  1,200 mg Oral At Bedtime     Vitamin D3  25 mcg Oral Daily     PRN:  Current Facility-Administered Medications   Medication Dose Route Frequency     acetaminophen  650 mg Oral Q4H PRN     alum & mag hydroxide-simethicone  30 mL Oral Q4H PRN     ipratropium  2 spray Sublingual At Bedtime PRN     melatonin  3 mg Oral At Bedtime PRN     OLANZapine  10 mg Intramuscular TID PRN     polyethylene glycol  17 g Oral Daily PRN     sennosides  8.6 mg Oral BID PRN       2. Pertinent Labs/Monitoring:   - Lithium 4/25: 0.60 -> 4/30: 0.60  - EKG 4/26 - normal sinus rhythm, QTc 417  - ANC 4/27 - 4.0  - Lithium 5/6: 0.9  - ANC 5/4: 4.5  - ANC 5/11: 5.3  - ANC 5/18: 4.1  - Clozapine level 5/17: 122  - ANC 5/25: stable at 4.1    3. Additional Plans:  Patient will be treated in therapeutic milieu with appropriate individual and group therapies as described.    Medical Assessment and Plan     Medical diagnoses to be addressed this admission:    None    Medical course: Patient was physically examined by the ED prior to being transferred to the unit and was found to be medically stable and appropriate for admission.    #Clozapine induced asymptomatic tachycardia  - Atenolol 25mg daily and encouraging fluids     #Jock itch  5/19 Patient concerned regarding jock itch. Education on hygeine provided and topical clotrimazole ordered.     Consults:   - Medicine for symptomatic postural tachycardia to the 120s: Recommended holding off on uptitrating clozapine dosing until resolved. Labs including Mag, CBC and BMP wnl. Agreed with adding atenolol 12.5 mg.     Checklist     Legal Status: Committed  (PD revoked) with Holman. Holman: haldol, olanzapine, risperidone, paliperidone, and clozapine.    Safety Assessment:   Behavioral Orders   Procedures     Cheeking Precautions (behavioral units)     Patient Observed swallowing PO medications; Patient asked to drink water after swallowing medication; Patient in Staff line of sight for 15 minutes after medication given; Mouth checks after PO administration (patient asked to open mouth and stick out their tongue).     Code 1 - Restrict to Unit     Code 2     OK to leave the floor for imaging procedures at the discretion of floor staff     Routine Programming     As clinically indicated     Status 15     Every 15 minutes.       Risk Assessment:  Risk for harm is moderate-high.  Risk factors: impulsive and past behaviors, multiple recent pending  Protective factors: family and engaged in treatment     SIO: No    Disposition: TBD, complicated by patient being from [place]. Pending stabilization, medication optimization, & development of a safe discharge plan.    Attestations     Brandon Merrill, MS4  [employer] medical school     This patient has been seen and evaluated by me, Johanna Altman DO.  I have discussed this patient with the team including the resident and medical student(s) and I agree with the findings and plan in this note.  Dr. Johanna Altman DO, CARIN

## 2023-05-25 NOTE — PLAN OF CARE
Problem: Sleep Disturbance  Goal: Adequate Sleep/Rest  Outcome: Progressing    Pt appears to have slept for 7  hours. Pt did not complain of pain or discomfort, and no PRN medications were given. 15 minutes safety checks were in place. Staff will continue to offer support to pt.

## 2023-05-25 NOTE — PLAN OF CARE
Assessment/Intervention/Current Symtoms and Care Coordination  The patient's care was discussed with the treatment team and chart notes were reviewed.   Patient met with team.  Reported hand tremors and feeling like he's choking at night.  MD's addressed  No changes in the past 24 hours.  Has little interaction with peers/staff.  Patient has not expressed any delusional thinking.  Has been eating, sleeping well. Compliant with meds. ADL's fluctuate.     CTC will attempt to contact Mom for dispo planning.  CTC will arrange psychiatry f/u care in [place]       Discharge Plan or Goal  Patient plans to return home to [place]. Patient in need of close Psychiatric follow up care.     Barriers to Discharge   Ongoing symptoms- need for further stabilization.    Meds continue to be monitored/adjusted per MD     Referral Status  Referral made to [facility] in [place]     Legal Status     Commitment and Holman order per [facility]- Provisional discharge revoked

## 2023-05-25 NOTE — PLAN OF CARE
Problem: Anxiety  Goal: Anxiety Reduction or Resolution  Outcome: Progressing  Intervention: Promote Anxiety Reduction  Recent Flowsheet Documentation  Taken 5/25/2023 1000 by Lorenza Ruano RN  Family/Support System Care: self-care encouraged   Goal Outcome Evaluation:    Patient alert and able to make needs known, he is dismissive with all mental health questions and avoids further conversations or assessments with staff. Patient isolative to room, comes out for meal trays and eats in room. Patient did not participate in any group activity this shift, spent most of the shift preoccupied reading and pacing back and forth in room. No further behavior concern

## 2023-05-25 NOTE — PLAN OF CARE
Problem: Depression  Goal: Improved Mood  Outcome: Progressing     Problem: Adult Behavioral Health Plan of Care  Goal: Absence of New-Onset Illness or Injury  Outcome: Progressing  Intervention: Identify and Manage Fall Risk  Recent Flowsheet Documentation  Taken 5/24/2023 1700 by Juan Guevara RN  Safety Measures:    safety rounds completed    environmental rounds completed     Problem: Adult Behavioral Health Plan of Care  Goal: Adheres to Safety Considerations for Self and Others  Outcome: Progressing  Flowsheets (Taken 5/24/2023 1947)  Adheres to Safety Considerations for Self and Others: making progress toward outcome  Intervention: Develop and Maintain Individualized Safety Plan  Recent Flowsheet Documentation  Taken 5/24/2023 1700 by Juan Guevara, RN  Safety Measures:    safety rounds completed    environmental rounds completed   Goal Outcome Evaluation:    Plan of Care Reviewed With: patient        Patient remains blunted and restricted but cooperative and compliant with medication. Patient was withdrawn, isolative, no interactions with peers, able to communicate needs when needed. Occasional visible in milieu. He denied pain or discomfort. He denied any SI/HI, denied depression or anxiety.

## 2023-05-26 PROCEDURE — 250N000013 HC RX MED GY IP 250 OP 250 PS 637: Performed by: STUDENT IN AN ORGANIZED HEALTH CARE EDUCATION/TRAINING PROGRAM

## 2023-05-26 PROCEDURE — 250N000013 HC RX MED GY IP 250 OP 250 PS 637

## 2023-05-26 PROCEDURE — 99232 SBSQ HOSP IP/OBS MODERATE 35: CPT | Performed by: STUDENT IN AN ORGANIZED HEALTH CARE EDUCATION/TRAINING PROGRAM

## 2023-05-26 PROCEDURE — 124N000002 HC R&B MH UMMC

## 2023-05-26 RX ADMIN — LITHIUM CARBONATE 1200 MG: 300 TABLET, EXTENDED RELEASE ORAL at 17:53

## 2023-05-26 RX ADMIN — CLOZAPINE 275 MG: 200 TABLET ORAL at 19:13

## 2023-05-26 RX ADMIN — Medication 25 MCG: at 09:16

## 2023-05-26 RX ADMIN — Medication 25 MG: at 09:16

## 2023-05-26 ASSESSMENT — ACTIVITIES OF DAILY LIVING (ADL)
ADLS_ACUITY_SCORE: 28

## 2023-05-26 NOTE — PLAN OF CARE
Problem: Depression  Goal: Improved Mood  5/25/2023 1929 by Lorenza Ruano, RN  Outcome: Progressing  5/25/2023 1128 by Lorenza Ruano, RN  Outcome: Progressing  Intervention: Monitor and Manage Depressive Symptoms  Recent Flowsheet Documentation  Taken 5/25/2023 1000 by Lorenza Ruano, RN  Family/Support System Care: self-care encouraged   Goal Outcome Evaluation:    Patient continue to be isolative to room has no social interactions with peers, he was out briefly in milieu to watch TV but did not stay for more than half an hour. Patient dismissive to mental health assessment questions, reported pain in left hip and PRN tylenol given, patient reported medication effective, food and fluids intake was adequate. Patient reported BM , vitals /75 (BP Location: Right arm, Patient Position: Standing, Cuff Size: Adult Regular)   Pulse 89   Temp 98.3  F (36.8  C) (Oral)   Resp 20   Wt 76.3 kg (168 lb 3.2 oz)   SpO2 96%, compliant with medication regimen, reported BM yesterday and today, took increase dose of Clozaril 250 mg at HS.continue current plan of care

## 2023-05-26 NOTE — PLAN OF CARE
Problem: Sleep Disturbance  Goal: Adequate Sleep/Rest  Outcome: Progressing   Goal Outcome Evaluation:  Patient appears to have slept for 7 hours. No acute events for the overnight. No requests for prn's. Routine safety checks in place.

## 2023-05-26 NOTE — PROGRESS NOTES
"    ----------------------------------------------------------------------------------------------------------  [facility], [place]   Psychiatric Progress Note  Hospital Day #41    Identifier: Tello Steen is a 27 year old male with previous psychiatric diagnoses of schizophrenia vs schizoaffective disorder, bipolar type who presents with psychosis and álvaro in the context of recent hospitalizations 3/26-4/11/23 ([facility]) and 2/22-3/7/23 ([facility]) for the same. Assessment is that the current presentation is consistent with schizoaffective disorder, bipolar type, current álvaro and psychosis.      Interim History:   The patient's care was discussed with the treatment team and chart notes were reviewed.     Vitals: HR has decreased appropriately with atenolol, has not been significantly tachycardic   Sleep: 7 hours (05/26/23 0600)   Scheduled medications: Took all scheduled medications as prescribed  PRN medications: None    Interim events:   - Patient isolative and dismissive   - did come out to milieu for short period of time  - received PRN tylenol for left hip pain       Subjective:     Patient Interview:  Patient interviewed today in conference room. Reports no concerns and states that tremor and tongue choking symptoms remain improved, and that sedation is tolerable. Discussed discharge planning with patient, reported plan to drive to [place] and live with family on discharge. Reported that he will go home with plans to: cook for himself, hang out with friends (names Moncho Salmon from a [employer] club he used to attend), and do lots of research in the area of STEM. States that the chip in his head today does not want him to share lots of information with the team. Reports that the chip does not tell him to do anything that is dangerous to himself or others \"anymore\" and that if it did, he would feel able to say no to the request. When asked about ability to follow up with " "psychiatrist on discharge, states that he will follow up with his prescriber, Moncho, at [facility] in [place] via walk-in appointment.       Per conversation with social work and clinic mentioned by patient, Moncho no longer practices at the clinic and patient cannot have walk in appointment and same day labs and medication management.    Review of systems:   As stated above    Objective:   /75 (BP Location: Right arm, Patient Position: Standing, Cuff Size: Adult Regular)   Pulse 89   Temp 98.3  F (36.8  C) (Oral)   Resp 20   Wt 76.3 kg (168 lb 3.2 oz)   SpO2 96%   BMI 22.19 kg/m    Weight is 168 lbs 3.2 oz  Body mass index is 22.19 kg/m .     Mental Status Exam:  Oriented to:  Grossly Oriented  General:  Awake and Alert  Appearance:  appears stated age, room is malodorous Behavior/Attitude: Guarded, but engagement increased from yesterday. Occasionally appears happy when discussing discharge planning.  Eye Contact: poor, patient largely stares at hands and avoids eye contact; eye contact improves when talking about discharge  Psychomotor: Patient has slight tremor in LUE, improved from yesterday  Speech: Appropriate volume and tone; pronounced latency of speech, sometimes unresponsive  Language: Fluent in English and correct vocabulary   Mood:  \"fine\"  Affect: Blunted affect  Thought Process: Logical and linear   Thought Content:  No delusions stated in interview today, but patient intermittently appears to be RTIS and laughs inappropriately to self during interview  Insight: Fair- Patient does not have insight into why previous medications were not working for him and why Clozapine is necessary but insight into history of mental illness and discharge plan is good.    Judgment:  Fair, improved - listens to \"chip in brain\". Patient remains amenable to treatment plan and need for safe discharge plan, improved from prior interviews.   Attention Span:  adequate for conversation  Concentration:  grossly intact "   Recent and Remote Memory:  grossly intact  Fund of Knowledge: above average, and has medical knowledge beyond that of a regular patient  Muscle Strength and Tone: appears normal  Gait and Station: Normal gait     Allergies:   No Known Allergies     Labs and imaging:   Data this admission:  BMP normal  CBC normal  UDS negative  TSH 3/9/23 normal  Lipids 2/24/23 unremarkable  Hgb A1c 2/24/23 normal   UA - amorphous crystals, 10 protein albumin, elevated pH   HIV - negative   Ceruloplasmin normal   AILYN - normal   Lyme abs - normal   Treponema  - negative   Folate - normal   B12 - normal   ESR - normal Salicylate, alcohol, Tylenol negative  EKG 3/20: sinus rhythm, QTc 431  Brain MRI - normal     Lithium 4/25: 0.60                4/30: 0.60               5/08: 0.90     EKG 4/26 - normal  Trending ANC, has been stable to date     Psychiatric Assessment and Plan   Primary psychiatric diagnosis:   Schizoaffective disorder, bipolar type, current álvaro    Diagnostic Impression:   Tello Steen is a 27 year old male with a past psychiatric diagnosis of schizophrenia admitted from the ED on 04/15/2023 due to pscyhosis in the context of recent hospitalization 3/26-4/11/23 for psychosis and álvaro. Significant symptoms on admission include delusions about a chip in their brain, delusions of grandeur, delusions of having a special mission with the [employer], and elevated mood. The MSE on admission was pertinent for elevated affect and delusions previously mentioned. Biological contributions to mental health presentation include previous psychiatric diagnosis of schizophrenia. On chart review, there are also mentions of Lithium trials, possibly indicating previous álvaro. Psychosocial contributions to mental health presentation include partial insight, unclear work, housing, and financial situation. Protective factors include medication adherence, lack of substance use, and supportive CCM, good frustration tolerance, and presence of  perceived social supports.      In summary, the patient's reported symptoms of psychosis and álvaro in the context of historical schizophrenia diagnosis and recent hospitalization are consistent with schizoaffective disorder bipolar type.    Psychiatric Hospital course:  Tello Steen was admitted to [facility] committed with a Holman. All PTA medications were continued on admission.     Medications:     Neuroleptics: This is his third hospitalization within 4 months, indicating that his symptoms are not adequately controlled with Invega Sustenna.     Olanzapine: While awaiting clozapine to be added to his existing Holman, Tello was initially continued on PTA olanzapine.     Risperidone: Due to arm dystonia, Tello's olanzapine was switched to risperidone on 4/21. Added PRN cogentin and propranolol for any potential EPS. He tolerated risperidone titration well, although there were some concerns about akithisia due to patient's apparent restlessness and psychomotor agitation. On further exploration, this movement appeared volitional and more related to álvaro rather than akithisia or dystonia. Risperidone discontinued 5/2 with planned cross taper to clozapine.    Clozapine: Started on 4/27. Continuing to titrate clozapine, has had some orthostasis    Clozapine titrated to 150 mg on 5/8 and increased on 5/9 to 175 mg d/t symptom persistence.     Propranolol discontinued d/t orthostasis with clozapine      Patient with postural tachycardia 5/9, encouraged fluids    Continued to titrate clozapine 25 mg q48h with dose increase on 05/11 to 200mg     Maintained clozapine at 200 d/t tachycardia    Increased to 225 on 5/24    Increased to 250 on 5/25    Increased to 275 on 5/26    Will increase to 300 on 5/28      Patient has had intermittent orthostasis and tachycardia on clozapine, but has remained asymptomatic. Continuing to monitor. EKG showing possible sinus tachycardia    Based on vitals can consider adding propranolol given  no further orthostasis     Medicine consulted on 5/22-23 and saw patient on 5/22. Agreed that tachycardia was likely a Clozapine side effect. They agreed with adding atenolol 12.5 mg     5/24 increase atenolol to 25 mg daily d/t EPS    Lithium: started on 4/20 for álvaro, tolerating titration well thus far. Lithium level on 5/6 returned at 0.9. Therapeutic level indicating no need for continued titration at this time    Symptoms    Patient appears to be improving with less RTIS and improved ability to focus. However, patient continues to express delusions and AH with limited insight into current psychiatric symptoms and limited judgement.     Psychosocial    5/4/23 patient requests that team not discuss care/treatment with his mother and limit information sharing to discharge planning. Later stated that he is amenable to team discussing any element of treatment plan with mom.     Complicated discharge planning d/t patient living in [place]. Current plan is for patient's car to be towed to hospital from [place], and patient will drive himself home. Follow up will be through psych urgent care walk-in at [facility] with current inpatient provider Dr. Altman to monitor clozapine labs until patient establishes care in [place].      Today's changes:     Q4h vitals; if HR<100 and not orthostatic, increase clozapine to 275 mg     Will increase to 300 mg on Sunday 1. Psychotropic Medications:  Scheduled:  Current Facility-Administered Medications   Medication Dose Route Frequency     atenolol  25 mg Oral Daily     clotrimazole   Topical BID     cloZAPine  250 mg Oral At Bedtime     lithium ER  1,200 mg Oral At Bedtime     Vitamin D3  25 mcg Oral Daily     PRN:  Current Facility-Administered Medications   Medication Dose Route Frequency     acetaminophen  650 mg Oral Q4H PRN     alum & mag hydroxide-simethicone  30 mL Oral Q4H PRN     ipratropium  2 spray Sublingual At Bedtime PRN     melatonin  3 mg Oral At  Bedtime PRN     OLANZapine  10 mg Intramuscular TID PRN     polyethylene glycol  17 g Oral Daily PRN     sennosides  8.6 mg Oral BID PRN       2. Pertinent Labs/Monitoring:   - Lithium 4/25: 0.60 -> 4/30: 0.60  - EKG 4/26 - normal sinus rhythm, QTc 417  - ANC 4/27 - 4.0  - Lithium 5/6: 0.9  - ANC 5/4: 4.5  - ANC 5/11: 5.3  - ANC 5/18: 4.1  - Clozapine level 5/17: 122  - ANC 5/25: stable at 4.1    3. Additional Plans:  Patient will be treated in therapeutic milieu with appropriate individual and group therapies as described.    Medical Assessment and Plan     Medical diagnoses to be addressed this admission:    None    Medical course: Patient was physically examined by the ED prior to being transferred to the unit and was found to be medically stable and appropriate for admission. Patient has been intermittently found to be orthostatic and with postural tachycardia since 5/2 but has remained asymptomatic. Encouraging fluids.     #Jock itch  5/19 Patient concerned regarding jock itch. Education on hygeine provided and topical clotrimazole ordered.     Consults:   - Medicine for symptomatic postural tachycardia to the 120s: Recommended holding off on uptitrating clozapine dosing until resolved. Labs including Mag, CBC and BMP wnl. Agreed with adding atenolol 12.5 mg.     Checklist     Legal Status: Committed (PD revoked) with Holman. Holman: haldol, olanzapine, risperidone, paliperidone, and clozapine.    Safety Assessment:   Behavioral Orders   Procedures     Cheeking Precautions (behavioral units)     Patient Observed swallowing PO medications; Patient asked to drink water after swallowing medication; Patient in Staff line of sight for 15 minutes after medication given; Mouth checks after PO administration (patient asked to open mouth and stick out their tongue).     Code 1 - Restrict to Unit     Code 2     OK to leave the floor for imaging procedures at the discretion of floor staff     Routine Programming     As  clinically indicated     Status 15     Every 15 minutes.       Risk Assessment:  Risk for harm is moderate-high.  Risk factors: impulsive and past behaviors, multiple recent pending  Protective factors: family and engaged in treatment     SIO: No    Disposition: Likely next week. On optimization of clozapine dose and safe discharge plan. Plan is for patient's car to be towed to hospital from [place], and patient will drive himself home. Follow up will be through psych urgent care walk-in at [facility] with current inpatient provider Dr. Altman to monitor clozapine labs until patient establishes care in [place].     Attestations     Brandon Merrill, MS4  [employer] medical school     This patient has been seen and evaluated by me, Johanna Altman DO.  I have discussed this patient with the team including the resident and medical student(s) and I agree with the findings and plan in this note.  Dr. Johanna Altman DO, CARIN

## 2023-05-26 NOTE — PLAN OF CARE
Problem: Adult Behavioral Health Plan of Care  Goal: Adheres to Safety Considerations for Self and Others  Outcome: Progressing  Flowsheets (Taken 5/26/2023 1420)  Adheres to Safety Considerations for Self and Others: making progress toward outcome  Goal: Optimized Coping Skills in Response to Life Stressors  Outcome: Progressing  Flowsheets (Taken 5/26/2023 1420)  Optimized Coping Skills in Response to Life Stressors: making progress toward outcome     Problem: Psychotic Signs/Symptoms  Goal: Improved Behavioral Control (Psychotic Signs/Symptoms)  Outcome: Progressing   Goal Outcome Evaluation:  Patient is isolative to self.Patient is quiet.Patient comes out for meals and medication.patient avoids social contact,gives one word answers.Mood is calm.Affect is flat.No cheeking observed,patient is medication compliant.No PRN this shift.Denies suicidal thoughts,no delusional statements.Denies anxiety and depression.Denies harm to self and others.Patient contracted for safety.  Temp: 97.6  F (36.4  C) Temp src: Tympanic BP: 122/86 Pulse: 92   Resp: 18 SpO2: 97 % O2 Device: None (Room air)

## 2023-05-26 NOTE — PLAN OF CARE
Assessment/Intervention/Current Symtoms and Care Coordination  The patient's care was discussed with the treatment team and chart notes were reviewed.   Patient met with team.  Patient continues to report mild hand tremors though doesn't express concerns  Patient remains isolative to self.  Answers questions with mostly 1 word answers.  Patient has not expressed any delusional thinking.  Has been eating, sleeping well. Compliant with meds. ADL's continue to fluctuate.     PLan is for patient's Mom to have patient's car towed to the hospital.        Discharge Plan or Goal  Patient plans to return home to [place]. Patient in need of close Psychiatric follow up care/Clozaril mgmt     Barriers to Discharge   Ongoing symptoms- need for further stabilization.    Meds continue to be monitored/adjusted per MD     Referral Status  Referral being made to [facility] in [place].  MD will call patient's prior provider     Legal Status     Commitment and Holman order per [facility]- Provisional discharge revoked

## 2023-05-27 PROCEDURE — 124N000002 HC R&B MH UMMC

## 2023-05-27 PROCEDURE — 250N000013 HC RX MED GY IP 250 OP 250 PS 637: Performed by: STUDENT IN AN ORGANIZED HEALTH CARE EDUCATION/TRAINING PROGRAM

## 2023-05-27 PROCEDURE — 250N000013 HC RX MED GY IP 250 OP 250 PS 637

## 2023-05-27 RX ADMIN — LITHIUM CARBONATE 1200 MG: 300 TABLET, EXTENDED RELEASE ORAL at 18:05

## 2023-05-27 RX ADMIN — CLOTRIMAZOLE 1% ANTIFUNGAL CREAM: 1 CREAM TOPICAL at 10:18

## 2023-05-27 RX ADMIN — CLOZAPINE 275 MG: 200 TABLET ORAL at 19:04

## 2023-05-27 RX ADMIN — Medication 25 MCG: at 10:18

## 2023-05-27 RX ADMIN — CLOTRIMAZOLE 1% ANTIFUNGAL CREAM: 1 CREAM TOPICAL at 19:04

## 2023-05-27 RX ADMIN — Medication 25 MG: at 10:18

## 2023-05-27 ASSESSMENT — ACTIVITIES OF DAILY LIVING (ADL)
HYGIENE/GROOMING: HANDWASHING;INDEPENDENT
LAUNDRY: WITH SUPERVISION
DRESS: INDEPENDENT
ADLS_ACUITY_SCORE: 29
ADLS_ACUITY_SCORE: 29
LAUNDRY: WITH SUPERVISION
ADLS_ACUITY_SCORE: 28
ADLS_ACUITY_SCORE: 29
ADLS_ACUITY_SCORE: 28
DRESS: STREET CLOTHES;INDEPENDENT
ADLS_ACUITY_SCORE: 29
ADLS_ACUITY_SCORE: 28
ORAL_HYGIENE: PROMPTS;INDEPENDENT
ADLS_ACUITY_SCORE: 29
ADLS_ACUITY_SCORE: 28
ADLS_ACUITY_SCORE: 28
ADLS_ACUITY_SCORE: 29
HYGIENE/GROOMING: INDEPENDENT
ADLS_ACUITY_SCORE: 29

## 2023-05-27 NOTE — PROGRESS NOTES
Brief Medicine Follow Up Note    Following up regarding blood pressure trend and heart rate    Today's vital signs, medications, and nursing notes were reviewed. Labs reviewed most recent serum and urine laboratories.     /86 (BP Location: Left arm)   Pulse 92   Temp 97.6  F (36.4  C) (Tympanic)   Resp 18   Wt 76.3 kg (168 lb 3.2 oz)   SpO2 97%   BMI 22.19 kg/m        Vitals:    05/25/23 0934 05/25/23 1610 05/25/23 1612 05/26/23 0839   BP:  114/74 114/75 122/86   BP Location:  Right arm Right arm Left arm   Patient Position:  Sitting Standing    Cuff Size:  Adult Regular Adult Regular    Pulse: 101 89  92   Resp: 18 20  18   Temp: 98.4  F (36.9  C) 98.3  F (36.8  C)  97.6  F (36.4  C)   TempSrc: Oral Oral  Tympanic   SpO2: 96% 96%  97%   Weight:             A/P:  History of tachycardia-improved  Soft blood pressures-improved  Monitored in the setting of uptitrating clozapine.  Dose is being slowly uptitrated per psychiatry without ill effect  -Continue slow titration of clozapine and continue vitals monitoring  -Medicine will sign off at this time, please call with questions or concerns    Clara Trivedi PA-C  [facility]  Contact information available via [facility] Paging/Directory

## 2023-05-27 NOTE — PLAN OF CARE
Pt continues to be isolative in room with no social interaction, guarded and withdrawn, mood is quiet and calm, only comes out for meals and medication. . Pt seen pacing on the hallway occasionally. Refused Vital signs after 2 attempts from the PA and the Nurse. Pt denied SI, SIB, Hallucination and all psyche symptoms. Pt contracted for safety, intake was adequate, pt compliant with medication, no PRN given this shift     Problem: Anxiety  Goal: Anxiety Reduction or Resolution  Outcome: Progressing     Problem: Depression  Goal: Improved Mood  Outcome: Progressing   Goal Outcome Evaluation:    Plan of Care Reviewed With: patient

## 2023-05-27 NOTE — PLAN OF CARE
Goal Outcome Evaluation:    Plan of Care Reviewed With: patient          Problem: Adult Behavioral Health Plan of Care  Goal: Plan of Care Review  Outcome: Progressing  Flowsheets (Taken 5/27/2023 1018)  Patient Agreement with Plan of Care: agrees     Behavioral  Pt slept mostly during this shift. Up briefly for breakfast and went back to bed. Pt was encouraged to come out in the milieu and encouraged to push fluids. Later after lunch, pt was observed pacing on the hallway. Affect is flat/blunted, guarded, isolated, withdrawn and avoid social interactions. Compliant with medications. Pt hygiene appears unshaven and unkempt; Pt was encouraged to take a shower. No behavioral escalation or safety concerns noted this shift. Pt speech is paucity but able to make needs known. Pt denied SI, SIB, HI, and hallucinations; Pt appears to be responding to internal stimuli.    Medical    No complaints of physical pain/discomfort this shift. Blood pressure 124/63, pulse 114, temperature 97.6  F (36.4  C), temperature source Tympanic, resp. rate 18, weight 76.3 kg (168 lb 3.2 oz), SpO2 97 %.        PRNS:      Plan     Patient plans to return home to [place]. Patient in need of close Psychiatric follow up care/Clozaril mgmt     Barriers to Discharge   Ongoing symptoms- need for further stabilization.    Meds continue to be monitored/adjusted per MD     Referral Status  Referral being made to [facility] in [place].  MD will call patient's prior provider     Legal Status     Commitment and Holman order per [facility]- Provisional discharge revoked

## 2023-05-27 NOTE — PLAN OF CARE
"Pt isolative in room, withdrawn, and no social interactions with peers and staff, mood is quiet and calm. Came out for medication and meals. Pt denied anxiety, depression, SI, SIB, HI and all other mental illness. Pt appearance is unkempt with odor, was prompted X 2 to take a shower but refused and stated \"leave me alone\". No escalating behavior noted. Pt compliant with medication, safety contracted.    /77   Pulse 83   Temp 98.3  F (36.8  C) (Oral)   Resp 18    SpO2 97%        Problem: Anxiety  Goal: Anxiety Reduction or Resolution  Outcome: Progressing     Problem: Depression  Goal: Improved Mood  Outcome: Progressing   Goal Outcome Evaluation:    Plan of Care Reviewed With: patient                   "

## 2023-05-27 NOTE — PLAN OF CARE
Problem: Sleep Disturbance  Goal: Adequate Sleep/Rest  Outcome: Progressing   Goal Outcome Evaluation:  Patient appears to have slept for 7 hours tonight. No acute events during the night. No requests for prn's. No concerns noted during routine safety checks.

## 2023-05-28 PROCEDURE — 250N000013 HC RX MED GY IP 250 OP 250 PS 637: Performed by: STUDENT IN AN ORGANIZED HEALTH CARE EDUCATION/TRAINING PROGRAM

## 2023-05-28 PROCEDURE — 124N000002 HC R&B MH UMMC

## 2023-05-28 PROCEDURE — 250N000013 HC RX MED GY IP 250 OP 250 PS 637

## 2023-05-28 RX ADMIN — Medication 25 MG: at 09:45

## 2023-05-28 RX ADMIN — LITHIUM CARBONATE 1200 MG: 300 TABLET, EXTENDED RELEASE ORAL at 18:17

## 2023-05-28 RX ADMIN — IPRATROPIUM BROMIDE 2 SPRAY: 21 SPRAY, METERED NASAL at 19:05

## 2023-05-28 RX ADMIN — Medication 25 MCG: at 09:45

## 2023-05-28 RX ADMIN — CLOZAPINE 300 MG: 200 TABLET ORAL at 20:13

## 2023-05-28 ASSESSMENT — ACTIVITIES OF DAILY LIVING (ADL)
ADLS_ACUITY_SCORE: 29

## 2023-05-28 NOTE — PLAN OF CARE
"  Problem: Adult Behavioral Health Plan of Care  Goal: Plan of Care Review  Outcome: Progressing  Flowsheets (Taken 5/28/2023 1123)  Plan of Care Reviewed With: patient  Overall Patient Progress: improving  Patient Agreement with Plan of Care: agrees  Goal: Patient-Specific Goal (Individualization)  Description: You can add care plan individualizations to a care plan. Examples of Individualization might be:  \"Parent requests to be called daily at 9am for status\", \"I have a hard time hearing out of my right ear\", or \"Do not touch me to wake me up as it startles me\".  Outcome: Progressing  Flowsheets (Taken 5/28/2023 1123)  Patient Vulnerabilities:    lacks insight into illness    poor impulse control  Patient Personal Strengths:    intellectual cognitive skills    medication/treatment adherence    expressive of needs  Goal: Adheres to Safety Considerations for Self and Others  Outcome: Progressing  Flowsheets (Taken 5/28/2023 1123)  Adheres to Safety Considerations for Self and Others: making progress toward outcome   Goal Outcome Evaluation:      Plan of Care Reviewed With: patient    Overall Patient Progress: improving  Patient woke up after peers had finished breakfast.Patient able to request for his meal which was given to him & vitals were taken with no issues.Both nutrition and hydration are adequate.Patient observed sitting in the TV room reading his text book,no interaction with peers at all. Patient  Is mostly isolative to his room and withdrawn.Patient is medication compliant,no cheeking observed & no PRN this shift.Denies suicidal thoughts,AVH,anxiety and depression.No behavior escalation or safety concerns.Patient is contracted for safety.  Temp: 97.8  F (36.6  C) Temp src: Oral BP: 129/89 Pulse: 110   Resp: 18                 "

## 2023-05-28 NOTE — PLAN OF CARE
Problem: Sleep Disturbance  Goal: Adequate Sleep/Rest  Outcome: Progressing   Goal Outcome Evaluation:  Patient appears to have slept for 7 hours. Patient endorsed no acute concerns overnight. No complaints of pain or requests for prn's. Safety checks in place.

## 2023-05-29 PROCEDURE — H2032 ACTIVITY THERAPY, PER 15 MIN: HCPCS

## 2023-05-29 PROCEDURE — 250N000013 HC RX MED GY IP 250 OP 250 PS 637: Performed by: STUDENT IN AN ORGANIZED HEALTH CARE EDUCATION/TRAINING PROGRAM

## 2023-05-29 PROCEDURE — 250N000013 HC RX MED GY IP 250 OP 250 PS 637

## 2023-05-29 PROCEDURE — 124N000002 HC R&B MH UMMC

## 2023-05-29 RX ADMIN — CLOTRIMAZOLE 1% ANTIFUNGAL CREAM: 1 CREAM TOPICAL at 19:05

## 2023-05-29 RX ADMIN — CLOZAPINE 300 MG: 200 TABLET ORAL at 19:05

## 2023-05-29 RX ADMIN — Medication 25 MCG: at 09:28

## 2023-05-29 RX ADMIN — Medication 25 MG: at 09:29

## 2023-05-29 RX ADMIN — LITHIUM CARBONATE 1200 MG: 300 TABLET, EXTENDED RELEASE ORAL at 18:04

## 2023-05-29 ASSESSMENT — ACTIVITIES OF DAILY LIVING (ADL)
ADLS_ACUITY_SCORE: 29

## 2023-05-29 NOTE — PLAN OF CARE
Problem: Sleep Disturbance  Goal: Adequate Sleep/Rest  Outcome: Progressing   Goal Outcome Evaluation:  Patient appears to have slept for 7 hours. No acute events during the night. No requests for prn's. No concerns noted during routine safety checks.

## 2023-05-29 NOTE — PLAN OF CARE
Problem: Psychotic Signs/Symptoms  Goal: Improved Behavioral Control (Psychotic Signs/Symptoms)  Outcome: Progressing  Flowsheets (Taken 5/29/2023 1336)  Mutually Determined Action Steps (Improved Behavioral Control):   verbalizes personal treatment goal   identifies future-oriented goal  Goal: Increased Participation and Engagement (Psychotic Signs/Symptoms)  Outcome: Progressing  Flowsheets (Taken 5/29/2023 1336)  Mutually Determined Action Steps (Increased Participation and Engagement): verbalizes gratifying activity   Goal Outcome Evaluation:  Patient has been intermittently visible in the milieu.Patient is usually isolative/withdrawn to his room.Avoids social contact,he is not sociable with peers or staff.Attended some of the groups.Patient has not made delusional statement or paranoid.Mood has been calm.Affect flat,blunted.Patient denies all MH issues.Patient denies physical pain/discomfort.Temp: 98.4  F (36.9  C) Temp src: Oral BP: (!) 145/86 Pulse: 102   Resp: 18 SpO2: 97 % O2 Device: None (Room air)

## 2023-05-29 NOTE — PLAN OF CARE
Pt continues to be isolative, guarded and withdrawn, mood is calm and quiet, able to make needs known. Appears to come out for medication and meals, occasionally was seen pacing on the hallway. Pt responds to short answers, hygiene is unkempt, unshaven and odor. Pt was prompted to take a shower but in vain. Denied all mental illness, ate 100% in room, safety was contracted, compliant with medication will continue to monitor.     /75   Pulse 86   Temp 98.5    Resp 18    SpO2 97%    Problem: Anxiety  Goal: Anxiety Reduction or Resolution  Outcome: Progressing     Problem: Psychotic Signs/Symptoms  Goal: Improved Behavioral Control (Psychotic Signs/Symptoms)  Outcome: Progressing   Goal Outcome Evaluation:    Plan of Care Reviewed With: patient

## 2023-05-29 NOTE — PROGRESS NOTES
05/29/23 1500   Group Therapy Session   Time Session Began 1315   Time Session Ended 1400   Total Time (minutes) 15   Total # Attendees 7   Group Type expressive therapy   Group Topic Covered coping skills/lifestyle management;emotions/expression   Group Session Detail Instrument Clinic   Patient Response/Contribution cooperative with task   Patient Participation Detail Participated in Music Therapy Instrument Clinic today. By engaging in active music-making with a variety of instruments (ukulele, guitar, keyboard) patients worked on building mastery, socialization, and self-expression.    Pt engaged well with the guitar for 15 minutes, playing expressive and chord progressions and melodies that appeared to be a mixture of conventional and unconventional music-making.  Then left session and did not return.  Did not interact with others except for necessary interactions.

## 2023-05-30 ENCOUNTER — MEDICAL CORRESPONDENCE (OUTPATIENT)
Dept: HEALTH INFORMATION MANAGEMENT | Facility: CLINIC | Age: 28
End: 2023-05-30
Payer: MEDICARE

## 2023-05-30 PROCEDURE — 250N000013 HC RX MED GY IP 250 OP 250 PS 637: Performed by: STUDENT IN AN ORGANIZED HEALTH CARE EDUCATION/TRAINING PROGRAM

## 2023-05-30 PROCEDURE — 124N000002 HC R&B MH UMMC

## 2023-05-30 PROCEDURE — 99232 SBSQ HOSP IP/OBS MODERATE 35: CPT | Mod: GC | Performed by: PSYCHIATRY & NEUROLOGY

## 2023-05-30 PROCEDURE — 250N000013 HC RX MED GY IP 250 OP 250 PS 637

## 2023-05-30 RX ADMIN — IPRATROPIUM BROMIDE 2 SPRAY: 21 SPRAY, METERED NASAL at 19:01

## 2023-05-30 RX ADMIN — Medication 25 MG: at 08:50

## 2023-05-30 RX ADMIN — Medication 25 MCG: at 08:50

## 2023-05-30 RX ADMIN — CLOTRIMAZOLE 1% ANTIFUNGAL CREAM: 1 CREAM TOPICAL at 19:00

## 2023-05-30 RX ADMIN — CLOZAPINE 300 MG: 200 TABLET ORAL at 18:57

## 2023-05-30 RX ADMIN — CLOTRIMAZOLE 1% ANTIFUNGAL CREAM: 1 CREAM TOPICAL at 08:50

## 2023-05-30 RX ADMIN — LITHIUM CARBONATE 1200 MG: 300 TABLET, EXTENDED RELEASE ORAL at 18:20

## 2023-05-30 ASSESSMENT — ACTIVITIES OF DAILY LIVING (ADL)
ADLS_ACUITY_SCORE: 29

## 2023-05-30 NOTE — PLAN OF CARE
Assessment/Intervention/Current Symtoms and Care Coordination  The patient's care was discussed with the treatment team and chart notes were reviewed.   Patient met with team.    Patient reports he feels ready for discharge-   Answers questions with mostly 1 word answers.  Patient has not expressed any delusional thinking.  Has been eating, sleeping well. Compliant with meds. ADL's continue to fluctuate.    CTC will contact [facility] to discuss plan to return to [place] and inquire about  dismissing commitment.      PLan is for patient's Mom to have patient's car towed to the hospital.    Discharge Plan or Goal  Patient plans to return home to [place]. Patient in need of close Psychiatric follow up care/Clozaril mgmt     Barriers to Discharge   Ongoing symptoms- need for further stabilization.    Meds continue to be monitored/adjusted per MD     Referral Status  Referral being made to [facility] in [place].     Legal Status     Commitment and Holman order per [facility]- Provisional discharge revoked   [place]

## 2023-05-30 NOTE — PROGRESS NOTES
----------------------------------------------------------------------------------------------------------  [facility], [place]   Psychiatric Progress Note  Hospital Day #45    Identifier: Tello Steen is a 27 year old male with previous psychiatric diagnoses of schizophrenia vs schizoaffective disorder, bipolar type who presents with psychosis and álvaro in the context of recent hospitalizations 3/26-4/11/23 ([facility]) and 2/22-3/7/23 ([facility]) for the same. Assessment is that the current presentation is consistent with schizoaffective disorder, bipolar type, current álvaro and psychosis.      Interim History:   The patient's care was discussed with the treatment team and chart notes were reviewed.     Vitals: HR has decreased appropriately with atenolol, has not been significantly tachycardic   Sleep: 7 hours (05/29/23 0703)   Scheduled medications: Took all scheduled medications as prescribed  PRN medications: None    Interim events:  No acute events. Not requiring PRNs. Sleeping and eating well. Mediation compliant. Does not interact with peers. Hygiene remains unkempt and unshaven.      Subjective:   Tello was met with in the conference today. Denies feeling side effects in relation to his high blood pressure and tachycardia. Had an increase in clozapine over the weekend and felt that it affected his ability to sleep but did not elaborate further. When asked if the 'chip' was different after the increase, he shook his head as if responding to internal stimuli and remained unresponsive to this specific question. Is agreeable to plan of discharging potentially on Thursday. The follow up plan would include attending drop in appointments for clozapine.      Review of systems:   As stated above    Objective:   BP (!) 137/96 (BP Location: Right arm, Patient Position: Sitting, Cuff Size: Adult Regular)   Pulse 103   Temp 98.3  F (36.8  C) (Oral)   Resp 18   Wt 74.1 kg (163 lb  "4.8 oz)   SpO2 97%   BMI 21.54 kg/m    Weight is 163 lbs 4.8 oz  Body mass index is 21.54 kg/m .     Mental Status Exam:  Oriented to:  Grossly Oriented  General:  Awake and Alert  Appearance:  appears stated age, is malodorous and remains unkempt   Behavior/Attitude: Guarded and is unresponsive to some questions.  Eye Contact: poor, patient largely stares at hands and avoids eye contact;   Psychomotor: No tremors seen   Speech: Appropriate volume and tone; pronounced latency of speech, sometimes unresponsive  Language: Fluent in English and correct vocabulary   Mood:  \"fine\"  Affect: Blunted affect  Thought Process: Logical and linear   Thought Content:  No delusions stated in interview today, but patient intermittently appears to be RTIS  Insight: Fair- insight to history of mental illness and discharge plan is good.    Judgment:  Fair, - listens to \"chip in brain\" and seems to respond to it prior to responding to team.   Attention Span:  adequate for conversation  Concentration:  grossly intact   Recent and Remote Memory:  grossly intact  Fund of Knowledge: above average, and has above average medical knowledge   Muscle Strength and Tone: appears normal  Gait and Station: Normal gait     Allergies:   No Known Allergies     Labs and imaging:   Data this admission:  BMP normal  CBC normal  UDS negative  TSH 3/9/23 normal  Lipids 2/24/23 unremarkable  Hgb A1c 2/24/23 normal   UA - amorphous crystals, 10 protein albumin, elevated pH   HIV - negative   Ceruloplasmin normal   AILYN - normal   Lyme abs - normal   Treponema  - negative   Folate - normal   B12 - normal   ESR - normal Salicylate, alcohol, Tylenol negative  EKG 3/20: sinus rhythm, QTc 431  Brain MRI - normal     Lithium 4/25: 0.60                4/30: 0.60               5/08: 0.90     EKG 4/26 - normal  Trending ANC, has been stable to date     Psychiatric Assessment and Plan   Primary psychiatric diagnosis:   Schizoaffective disorder, bipolar type, current " álvaro    Diagnostic Impression:   Tello Steen is a 27 year old male with a past psychiatric diagnosis of schizophrenia admitted from the ED on 04/15/2023 due to pscyhosis in the context of recent hospitalization 3/26-4/11/23 for psychosis and álvaro. Significant symptoms on admission include delusions about a chip in their brain, delusions of grandeur, delusions of having a special mission with the [employer], and elevated mood. The MSE on admission was pertinent for elevated affect and delusions previously mentioned. Biological contributions to mental health presentation include previous psychiatric diagnosis of schizophrenia. On chart review, there are also mentions of Lithium trials, possibly indicating previous álvaro. Psychosocial contributions to mental health presentation include partial insight, unclear work, housing, and financial situation. Protective factors include medication adherence, lack of substance use, and supportive CCM, good frustration tolerance, and presence of perceived social supports.      In summary, the patient's reported symptoms of psychosis and álvaro in the context of historical schizophrenia diagnosis and recent hospitalization are consistent with schizoaffective disorder bipolar type.    Psychiatric Hospital course:  Tello Steen was admitted to [facility] committed with a Holman. All PTA medications were continued on admission.     Medications:     Neuroleptics: This is his third hospitalization within 4 months, indicating that his symptoms are not adequately controlled with Invega Sustenna.     Olanzapine: While awaiting clozapine to be added to his existing Holman, Tello was initially continued on PTA olanzapine.     Risperidone: Due to arm dystonia, Tello's olanzapine was switched to risperidone on 4/21. Added PRN cogentin and propranolol for any potential EPS. He tolerated risperidone titration well, although there were some concerns about akithisia due to patient's apparent restlessness  and psychomotor agitation. On further exploration, this movement appeared volitional and more related to álvaro rather than akithisia or dystonia. Risperidone discontinued 5/2 with planned cross taper to clozapine.    Clozapine: Started on 4/27. Continuing to titrate clozapine, has had some orthostasis    Clozapine titrated to 150 mg on 5/8 and increased on 5/9 to 175 mg d/t symptom persistence.     Propranolol discontinued d/t orthostasis with clozapine      Patient with postural tachycardia 5/9, encouraged fluids    Continued to titrate clozapine 25 mg q48h with dose increase on 05/11 to 200mg     Maintained clozapine at 200 d/t tachycardia    Increased to 225 on 5/24    Increased to 250 on 5/25    Increased to 275 on 5/26    Will increase to 300 on 5/28      Patient has had intermittent orthostasis and tachycardia on clozapine, but has remained asymptomatic. Continuing to monitor. EKG showing possible sinus tachycardia    Based on vitals can consider adding propranolol given no further orthostasis     Medicine consulted on 5/22-23 and saw patient on 5/22. Agreed that tachycardia was likely a Clozapine side effect. They agreed with adding atenolol 12.5 mg     5/24 increase atenolol to 25 mg daily d/t EPS    Lithium: started on 4/20 for álvaro, tolerating titration well thus far. Lithium level on 5/6 returned at 0.9. Therapeutic level indicating no need for continued titration at this time    Symptoms    Patient appears to be improving with less RTIS and improved ability to focus. However, patient continues to express delusions and AH with limited insight into current psychiatric symptoms and limited judgement.     Psychosocial    5/4/23 patient requests that team not discuss care/treatment with his mother and limit information sharing to discharge planning. Later stated that he is amenable to team discussing any element of treatment plan with mom.     Complicated discharge planning d/t patient living in [place].  Current plan is for patient's car to be towed to hospital from [place], and patient will drive himself home. Follow up will be through psych urgent care walk-in at [facility] with current inpatient provider Dr. Altman to monitor clozapine labs until patient establishes care in [place].      Today's changes:     EKG planned for tomorrow to clear cardiac side effects encountered this admission, prior to discharge Thursday 1. Psychotropic Medications:  Scheduled:  Current Facility-Administered Medications   Medication Dose Route Frequency     atenolol  25 mg Oral Daily     clotrimazole   Topical BID     cloZAPine  300 mg Oral At Bedtime     lithium ER  1,200 mg Oral At Bedtime     Vitamin D3  25 mcg Oral Daily     PRN:  Current Facility-Administered Medications   Medication Dose Route Frequency     acetaminophen  650 mg Oral Q4H PRN     alum & mag hydroxide-simethicone  30 mL Oral Q4H PRN     ipratropium  2 spray Sublingual At Bedtime PRN     melatonin  3 mg Oral At Bedtime PRN     OLANZapine  10 mg Intramuscular TID PRN     polyethylene glycol  17 g Oral Daily PRN     sennosides  8.6 mg Oral BID PRN       2. Pertinent Labs/Monitoring:   - Lithium 4/25: 0.60 -> 4/30: 0.60  - EKG 4/26 - normal sinus rhythm, QTc 417  - ANC 4/27 - 4.0  - Lithium 5/6: 0.9  - ANC 5/4: 4.5  - ANC 5/11: 5.3  - ANC 5/18: 4.1  - Clozapine level 5/17: 122  - ANC 5/25: stable at 4.1    3. Additional Plans:  Patient will be treated in therapeutic milieu with appropriate individual and group therapies as described.    Medical Assessment and Plan     Medical diagnoses to be addressed this admission:    None    Medical course: Patient was physically examined by the ED prior to being transferred to the unit and was found to be medically stable and appropriate for admission. Patient has been intermittently found to be orthostatic and with postural tachycardia since 5/2 but has remained asymptomatic. Encouraging fluids.     #Jock  itch  5/19 Patient concerned regarding jock itch. Education on hygeine provided and topical clotrimazole ordered.     Consults:   - Medicine for symptomatic postural tachycardia to the 120s: Recommended holding off on uptitrating clozapine dosing until resolved. Labs including Mag, CBC and BMP wnl. Agreed with adding atenolol 12.5 mg.     Checklist     Legal Status: Committed (PD revoked) with Holman. Holman: haldol, olanzapine, risperidone, paliperidone, and clozapine.    Safety Assessment:   Behavioral Orders   Procedures     Cheeking Precautions (behavioral units)     Patient Observed swallowing PO medications; Patient asked to drink water after swallowing medication; Patient in Staff line of sight for 15 minutes after medication given; Mouth checks after PO administration (patient asked to open mouth and stick out their tongue).     Code 1 - Restrict to Unit     Code 2     OK to leave the floor for imaging procedures at the discretion of floor staff     Routine Programming     As clinically indicated     Status 15     Every 15 minutes.       Risk Assessment:  Risk for harm is moderate-high.  Risk factors: impulsive and past behaviors, multiple recent pending  Protective factors: family and engaged in treatment     SIO: No    Disposition: Likely next week. On optimization of clozapine dose and safe discharge plan. Plan is for patient's car to be towed to hospital from [place], and patient will drive himself home. Follow up will be through psych urgent care walk-in at [facility] with current inpatient provider Dr. Altman to monitor clozapine labs until patient establishes care in [place].     Jayden Ryan PGY1       Attestations

## 2023-05-30 NOTE — PLAN OF CARE
Problem: Anxiety  Goal: Anxiety Reduction or Resolution  Outcome: Progressing     Problem: Depression  Goal: Improved Mood  Outcome: Progressing     Problem: Psychotic Signs/Symptoms  Goal: Improved Behavioral Control (Psychotic Signs/Symptoms)  Outcome: Progressing   Goal Outcome Evaluation:    Patient alert and oriented x 4, isolative to room most of this shift and out for meals only, patient presented  with flat, blunted affect, mood restricted, guarded and calm. He is dismissive with all mental health assessment questions and eye contact remains hesitant. Patient hygiene is poor appeared unshaved and unkept, writer brought up shower with patient and he did not respond. Patient compliant with medication regimen, has EKG ordered, vitals BP (!) 137/96 (BP Location: Right arm, Patient Position: Sitting, Cuff Size: Adult Regular)   Pulse 103   Temp 98.3  F  Resp 18   SpO2 97%

## 2023-05-30 NOTE — PLAN OF CARE
Pt continues to be isolative and withdrawn to self in room, avoids social interaction with peers and staff. Affect is flat and blunted, mood is calm and quiet. Pt denied pain and all other mental issues, hygiene is unkempt and unshaven. Pt came out for medication and meals, occasionally was pacing on the hallway. No inappropriate behaviors noted, pt compliant with medication.    /83   Pulse 102   Temp 98.1  F (36.7  C) (Oral)   Resp 18    SpO2 98%      Problem: Depression  Goal: Improved Mood  Outcome: Not Progressing     Problem: Psychotic Signs/Symptoms  Goal: Improved Behavioral Control (Psychotic Signs/Symptoms)  Outcome: Progressing   Goal Outcome Evaluation:    Plan of Care Reviewed With: patient

## 2023-05-30 NOTE — PLAN OF CARE
Problem: Psychotic Signs/Symptoms  Goal: Improved Behavioral Control (Psychotic Signs/Symptoms)  Outcome: Progressing  Goal: Improved Mood Symptoms  Outcome: Progressing   Goal Outcome Evaluation:    Patient continue to isolate to room, out for meals only and continue to dismiss mental health assessment questions, he took a shower this shift and continue to appear unkept. Patient vitals /81 (BP Location: Left arm, Patient Position: Sitting, Cuff Size: Adult Regular)   Pulse 90   Temp 98.8  F (37.1  C) (Temporal)   Resp 18    SpO2 95%, food and fluids intake adequate, patient compliant with medication regimen, no behavior concern noted this shift.

## 2023-05-30 NOTE — PLAN OF CARE
Problem: Sleep Disturbance  Goal: Adequate Sleep/Rest  Outcome: Progressing   Goal Outcome Evaluation:  Patient appears to have slept for 7 hours. No acute events during the night. No complaints of pain or requests for prn's. Safety checks in place.

## 2023-05-31 LAB
BASOPHILS # BLD AUTO: 0.1 10E3/UL (ref 0–0.2)
BASOPHILS NFR BLD AUTO: 1 %
EOSINOPHIL # BLD AUTO: 0.4 10E3/UL (ref 0–0.7)
EOSINOPHIL NFR BLD AUTO: 4 %
ERYTHROCYTE [DISTWIDTH] IN BLOOD BY AUTOMATED COUNT: 12.5 % (ref 10–15)
HCT VFR BLD AUTO: 42.1 % (ref 40–53)
HGB BLD-MCNC: 14.3 G/DL (ref 13.3–17.7)
IMM GRANULOCYTES # BLD: 0.1 10E3/UL
IMM GRANULOCYTES NFR BLD: 1 %
LYMPHOCYTES # BLD AUTO: 2 10E3/UL (ref 0.8–5.3)
LYMPHOCYTES NFR BLD AUTO: 21 %
MCH RBC QN AUTO: 30.5 PG (ref 26.5–33)
MCHC RBC AUTO-ENTMCNC: 34 G/DL (ref 31.5–36.5)
MCV RBC AUTO: 90 FL (ref 78–100)
MONOCYTES # BLD AUTO: 0.8 10E3/UL (ref 0–1.3)
MONOCYTES NFR BLD AUTO: 9 %
NEUTROPHILS # BLD AUTO: 6.1 10E3/UL (ref 1.6–8.3)
NEUTROPHILS NFR BLD AUTO: 64 %
NRBC # BLD AUTO: 0 10E3/UL
NRBC BLD AUTO-RTO: 0 /100
PLATELET # BLD AUTO: 230 10E3/UL (ref 150–450)
RBC # BLD AUTO: 4.69 10E6/UL (ref 4.4–5.9)
WBC # BLD AUTO: 9.3 10E3/UL (ref 4–11)

## 2023-05-31 PROCEDURE — 250N000013 HC RX MED GY IP 250 OP 250 PS 637

## 2023-05-31 PROCEDURE — 99231 SBSQ HOSP IP/OBS SF/LOW 25: CPT | Mod: GC | Performed by: PSYCHIATRY & NEUROLOGY

## 2023-05-31 PROCEDURE — 250N000013 HC RX MED GY IP 250 OP 250 PS 637: Performed by: STUDENT IN AN ORGANIZED HEALTH CARE EDUCATION/TRAINING PROGRAM

## 2023-05-31 PROCEDURE — 85025 COMPLETE CBC W/AUTO DIFF WBC: CPT | Performed by: PSYCHIATRY & NEUROLOGY

## 2023-05-31 PROCEDURE — 124N000002 HC R&B MH UMMC

## 2023-05-31 PROCEDURE — 36415 COLL VENOUS BLD VENIPUNCTURE: CPT | Performed by: PSYCHIATRY & NEUROLOGY

## 2023-05-31 PROCEDURE — 93005 ELECTROCARDIOGRAM TRACING: CPT

## 2023-05-31 RX ORDER — IPRATROPIUM BROMIDE 21 UG/1
2 SPRAY, METERED NASAL
Qty: 30 ML | Refills: 0 | Status: SHIPPED | OUTPATIENT
Start: 2023-05-31

## 2023-05-31 RX ORDER — LITHIUM CARBONATE 300 MG/1
1200 TABLET, FILM COATED, EXTENDED RELEASE ORAL AT BEDTIME
Qty: 30 TABLET | Refills: 0 | Status: SHIPPED | OUTPATIENT
Start: 2023-05-31

## 2023-05-31 RX ORDER — ATENOLOL 25 MG/1
25 TABLET ORAL DAILY
Qty: 30 TABLET | Refills: 0 | Status: SHIPPED | OUTPATIENT
Start: 2023-06-01 | End: 2023-07-01

## 2023-05-31 RX ORDER — CLOTRIMAZOLE 1 %
CREAM (GRAM) TOPICAL 2 TIMES DAILY
Qty: 40 G | Refills: 0 | Status: SHIPPED | OUTPATIENT
Start: 2023-05-31

## 2023-05-31 RX ORDER — VITAMIN B COMPLEX
25 TABLET ORAL DAILY
Qty: 30 TABLET | Refills: 0 | Status: SHIPPED | OUTPATIENT
Start: 2023-05-31

## 2023-05-31 RX ORDER — CLOZAPINE 100 MG/1
300 TABLET ORAL AT BEDTIME
Qty: 21 TABLET | Refills: 0 | Status: SHIPPED | OUTPATIENT
Start: 2023-05-31 | End: 2023-06-07

## 2023-05-31 RX ADMIN — IPRATROPIUM BROMIDE 2 SPRAY: 21 SPRAY, METERED NASAL at 19:02

## 2023-05-31 RX ADMIN — CLOTRIMAZOLE 1% ANTIFUNGAL CREAM: 1 CREAM TOPICAL at 07:58

## 2023-05-31 RX ADMIN — CLOZAPINE 300 MG: 200 TABLET ORAL at 19:02

## 2023-05-31 RX ADMIN — Medication 25 MG: at 07:58

## 2023-05-31 RX ADMIN — LITHIUM CARBONATE 1200 MG: 300 TABLET, EXTENDED RELEASE ORAL at 18:19

## 2023-05-31 RX ADMIN — Medication 25 MCG: at 07:58

## 2023-05-31 ASSESSMENT — ACTIVITIES OF DAILY LIVING (ADL)
ADLS_ACUITY_SCORE: 29
ORAL_HYGIENE: INDEPENDENT
ADLS_ACUITY_SCORE: 29
DRESS: STREET CLOTHES;INDEPENDENT
ADLS_ACUITY_SCORE: 29
HYGIENE/GROOMING: HANDWASHING;INDEPENDENT
ADLS_ACUITY_SCORE: 29

## 2023-05-31 NOTE — PROGRESS NOTES
----------------------------------------------------------------------------------------------------------  [facility], [place]   Psychiatric Progress Note  Hospital Day #46    Identifier: Tello Steen is a 27 year old male with previous psychiatric diagnoses of schizophrenia vs schizoaffective disorder, bipolar type who presents with psychosis and álvaro in the context of recent hospitalizations 3/26-4/11/23 ([facility]) and 2/22-3/7/23 ([facility]) for the same. Assessment is that the current presentation is consistent with schizoaffective disorder, bipolar type, current álvaro and psychosis.      Interim History:   The patient's care was discussed with the treatment team and chart notes were reviewed.     Vitals: HR has decreased appropriately with atenolol, has not been significantly tachycardic   Sleep: 6.5 hours (05/31/23 0600)   Scheduled medications: Took all scheduled medications as prescribed  PRN medications: None    Interim events:  No acute events. Not requiring PRNs. Sleeping and eating well. Mediation compliant. Does not interact with peers. Took a shower though remains unkempt and unshaven.      Subjective:   Tello was interviewed in the conference room today. States that he is feeling that he is  ready to go . Patient was asked about how he feels about discharging tomorrow. Patient noted to have very long pause and reacting to internal stimuli before stating  feeling well enough to enjoy the silence . Overnight was fine and patient stated that he had nothing specific he wanted to address today. Patient was informed that we would need to do an EKG today, to which he was agreeable. Patient was reminded of the importance of following up with the clinic in [place] on Monday at 8am.     Review of systems:   As stated above    Objective:   /81 (BP Location: Left arm, Patient Position: Sitting, Cuff Size: Adult Regular)   Pulse 90   Temp 97.9  F (36.6  C)   Resp 18   " Wt 74.1 kg (163 lb 4.8 oz)   SpO2 97%   BMI 21.54 kg/m    Weight is 163 lbs 4.8 oz  Body mass index is 21.54 kg/m .     Mental Status Exam:  Oriented to:  Grossly Oriented  General:  Awake and Alert  Appearance:  appears stated age, is malodorous and remains unkempt   Behavior/Attitude: Guarded and is unresponsive to some questions.  Eye Contact: poor, patient largely stares down and avoids eye contact;   Psychomotor: No tremors seen   Speech: Appropriate volume and tone; pronounced latency of speech, sometimes unresponsive  Language: Fluent in English and correct vocabulary   Mood:   ready to go   Affect: Blunted affect  Thought Process: Logical and linear   Thought Content:  No delusions stated in interview today, but patient intermittently appears to be RTIS  Insight: Fair- insight to history of mental illness and discharge plan is good.    Judgment:  Fair, - listens to \"chip in brain\" and seems to respond to it prior to responding to team.   Attention Span:  adequate for conversation  Concentration:  grossly intact   Recent and Remote Memory:  grossly intact  Fund of Knowledge: above average, and has above average medical knowledge   Muscle Strength and Tone: appears normal  Gait and Station: Normal gait     Allergies:   No Known Allergies     Labs and imaging:   Data this admission:  BMP normal  CBC normal  UDS negative  TSH 3/9/23 normal  Lipids 2/24/23 unremarkable  Hgb A1c 2/24/23 normal   UA - amorphous crystals, 10 protein albumin, elevated pH   HIV - negative   Ceruloplasmin normal   AILYN - normal   Lyme abs - normal   Treponema  - negative   Folate - normal   B12 - normal   ESR - normal Salicylate, alcohol, Tylenol negative  EKG 3/20: sinus rhythm, QTc 431  Brain MRI - normal     Lithium 4/25: 0.60                4/30: 0.60               5/08: 0.90     EKG 4/26, 5/31 - normal  Trending ANC, has been stable to date     Psychiatric Assessment and Plan   Primary psychiatric diagnosis:   Schizoaffective " disorder, bipolar type, current álvaro    Diagnostic Impression:   Tello Steen is a 27 year old male with a past psychiatric diagnosis of schizophrenia admitted from the ED on 04/15/2023 due to pscyhosis in the context of recent hospitalization 3/26-4/11/23 for psychosis and álvaro. Significant symptoms on admission include delusions about a chip in their brain, delusions of grandeur, delusions of having a special mission with the [employer], and elevated mood. The MSE on admission was pertinent for elevated affect and delusions previously mentioned. Biological contributions to mental health presentation include previous psychiatric diagnosis of schizophrenia. On chart review, there are also mentions of Lithium trials, possibly indicating previous álvaro. Psychosocial contributions to mental health presentation include partial insight, unclear work, housing, and financial situation. Protective factors include medication adherence, lack of substance use, and supportive CCM, good frustration tolerance, and presence of perceived social supports.      In summary, the patient's reported symptoms of psychosis and álvaro in the context of historical schizophrenia diagnosis and recent hospitalization are consistent with schizoaffective disorder bipolar type.    Psychiatric Hospital course:  Tello Steen was admitted to [facility] committed with a Holman. All PTA medications were continued on admission.     Medications:     Neuroleptics: This is his third hospitalization within 4 months, indicating that his symptoms are not adequately controlled with Invega Sustenna.     Olanzapine: While awaiting clozapine to be added to his existing Holman, Tello was initially continued on PTA olanzapine.     Risperidone: Due to arm dystonia, Tello's olanzapine was switched to risperidone on 4/21. Added PRN cogentin and propranolol for any potential EPS. He tolerated risperidone titration well, although there were some concerns about akithisia due to  patient's apparent restlessness and psychomotor agitation. On further exploration, this movement appeared volitional and more related to álvaro rather than akithisia or dystonia. Risperidone discontinued 5/2 with planned cross taper to clozapine.    Clozapine: Started on 4/27. Continuing to titrate clozapine, has had some orthostasis    Clozapine titrated to 150 mg on 5/8 and increased on 5/9 to 175 mg d/t symptom persistence.     Propranolol discontinued d/t orthostasis with clozapine      Patient with postural tachycardia 5/9, encouraged fluids    Continued to titrate clozapine 25 mg q48h with dose increase on 05/11 to 200mg     Maintained clozapine at 200 d/t tachycardia    Increased to 225 on 5/24    Increased to 250 on 5/25    Increased to 275 on 5/26    Will increase to 300 on 5/28      Patient has had intermittent orthostasis and tachycardia on clozapine, but has remained asymptomatic. Continuing to monitor. EKG showing possible sinus tachycardia    Based on vitals can consider adding propranolol given no further orthostasis     Medicine consulted on 5/22-23 and saw patient on 5/22. Agreed that tachycardia was likely a Clozapine side effect. They agreed with adding atenolol 12.5 mg     5/24 increase atenolol to 25 mg daily d/t EPS    Lithium: started on 4/20 for álvaro, tolerating titration well thus far. Lithium level on 5/6 returned at 0.9. Therapeutic level indicating no need for continued titration at this time    Symptoms    Patient appears to be improving with less RTIS and improved ability to focus. However, patient continues to express delusions and AH with limited insight into current psychiatric symptoms and limited judgement.     Psychosocial    5/4/23 patient requests that team not discuss care/treatment with his mother and limit information sharing to discharge planning. Later stated that he is amenable to team discussing any element of treatment plan with mom.     Complicated discharge planning  d/t patient living in [place]. Current plan is for patient's car to be towed to hospital from [place], and patient will drive himself home. Follow up will be through psych urgent care walk-in at [facility] with current inpatient provider Dr. Altman to monitor clozapine labs until patient establishes care in [place].      Today's changes:     None, discharge tomorrow       1. Psychotropic Medications:  Scheduled:  Current Facility-Administered Medications   Medication Dose Route Frequency     atenolol  25 mg Oral Daily     clotrimazole   Topical BID     cloZAPine  300 mg Oral At Bedtime     lithium ER  1,200 mg Oral At Bedtime     Vitamin D3  25 mcg Oral Daily     PRN:  Current Facility-Administered Medications   Medication Dose Route Frequency     acetaminophen  650 mg Oral Q4H PRN     alum & mag hydroxide-simethicone  30 mL Oral Q4H PRN     ipratropium  2 spray Sublingual At Bedtime PRN     melatonin  3 mg Oral At Bedtime PRN     OLANZapine  10 mg Intramuscular TID PRN     polyethylene glycol  17 g Oral Daily PRN     sennosides  8.6 mg Oral BID PRN       2. Pertinent Labs/Monitoring:   - Lithium 4/25: 0.60 -> 4/30: 0.60  - EKG 4/26 - normal sinus rhythm, QTc 417  - ANC 4/27 - 4.0  - Lithium 5/6: 0.9  - ANC 5/4: 4.5  - ANC 5/11: 5.3  - ANC 5/18: 4.1  - Clozapine level 5/17: 122  - ANC 5/25: stable at 4.1    3. Additional Plans:  Patient will be treated in therapeutic milieu with appropriate individual and group therapies as described.    Medical Assessment and Plan     Medical diagnoses to be addressed this admission:    None    Medical course: Patient was physically examined by the ED prior to being transferred to the unit and was found to be medically stable and appropriate for admission. Patient has been intermittently found to be orthostatic and with postural tachycardia since 5/2 but has remained asymptomatic. Encouraging fluids.     #Jock itch  5/19 Patient concerned regarding jock itch. Education on  hygeine provided and topical clotrimazole ordered.     Consults:   - Medicine for symptomatic postural tachycardia to the 120s: Recommended holding off on uptitrating clozapine dosing until resolved. Labs including Mag, CBC and BMP wnl. Agreed with adding atenolol 12.5 mg.     Checklist     Legal Status: Committed (PD revoked) with Holman. Holman: haldol, olanzapine, risperidone, paliperidone, and clozapine.    Safety Assessment:   Behavioral Orders   Procedures     Cheeking Precautions (behavioral units)     Patient Observed swallowing PO medications; Patient asked to drink water after swallowing medication; Patient in Staff line of sight for 15 minutes after medication given; Mouth checks after PO administration (patient asked to open mouth and stick out their tongue).     Code 1 - Restrict to Unit     Code 2     OK to leave the floor for imaging procedures at the discretion of floor staff     Routine Programming     As clinically indicated     Status 15     Every 15 minutes.       Risk Assessment:  Risk for harm is moderate-high.  Risk factors: impulsive and past behaviors, multiple recent pending  Protective factors: family and engaged in treatment     SIO: No    Disposition: Discharge tomorrow.  Plan is for patient's car to be towed to hospital from [place], and patient will drive himself home. Follow up will be through psych urgent care walk-in at [facility] with current inpatient provider Dr. Altman to monitor clozapine labs until patient establishes care in [place].     Patient seen and discussed with attending physician, Dr. Renate Wallace, who is in agreement with my assessment and plan.    Rosamaria Freeman MD  Psychiatry PGY1      Attestations

## 2023-05-31 NOTE — PLAN OF CARE
Problem: Psychotic Signs/Symptoms  Goal: Improved Behavioral Control (Psychotic Signs/Symptoms)  Outcome: Progressing     Problem: Psychotic Signs/Symptoms  Goal: Optimal Cognitive Function (Psychotic Signs/Symptoms)  Intervention: Support and Promote Cognitive Ability  Recent Flowsheet Documentation  Taken 5/31/2023 0900 by Lorenza Ruano, RN  Trust Relationship/Rapport:    care explained    choices provided    questions encouraged   Patient alert and able to make needs known, he denies all mental health symptoms, affect flat and blunted, mood guarded and restricted, he avoids social contact and keeps to self most  of the shift, observed pacing in room and steering out the window. Vitals /81 (BP Location: Left arm, Patient Position: Sitting, Cuff Size: Adult Regular)   Pulse 90   Temp 97.9  F (36.6  C)   Resp 18  SpO2 97% , denies pain, no further concern.

## 2023-05-31 NOTE — PLAN OF CARE
Problem: Sleep Disturbance  Goal: Adequate Sleep/Rest  Outcome: Progressing   Goal Outcome Evaluation:  Patient appears to have slept for 6.5 hours. No new events during the night. No requests for prn's. Safety checks in place.

## 2023-05-31 NOTE — PLAN OF CARE
Assessment/Intervention/Current Symtoms and Care Coordination  The patient's care was discussed with the treatment team and chart notes were reviewed.   Patient met with team.  PLan is to discharge patient tomorrow   Patient continues to reports he feels ready for discharge- Patient has not expressed any delusional thinking.  Has been eating, sleeping well. Compliant with meds. ADL's continue to fluctuate.     Writer spoke to patient's CM Kiko Dempsey who has been in contact with [place] Atty's office. He notified court that patient planned to leave the [place].  If patient does not return to [place], the commitment will be dismissed.      PLan is for patient's Mom to have patient's car towed to the hospital tomorrow ~ 12:00pm. (Car has been in [facility])     Discharge Plan or Goal  Patient plans to return home to [place] tomorrow.   Patient in need of close Psychiatric follow up care/Clozaril mgmt     Barriers to Discharge   Transportation back to [place] tomorrow     Referral Status  Referral made to [facility] in [place].     Legal Status     Commitment and Holman order per [facility]-

## 2023-06-01 VITALS
OXYGEN SATURATION: 98 % | DIASTOLIC BLOOD PRESSURE: 72 MMHG | SYSTOLIC BLOOD PRESSURE: 117 MMHG | HEART RATE: 100 BPM | RESPIRATION RATE: 20 BRPM | TEMPERATURE: 97.6 F | BODY MASS INDEX: 21.54 KG/M2 | WEIGHT: 163.3 LBS

## 2023-06-01 LAB
ATRIAL RATE - MUSE: 87 BPM
DIASTOLIC BLOOD PRESSURE - MUSE: NORMAL MMHG
INTERPRETATION ECG - MUSE: NORMAL
P AXIS - MUSE: 58 DEGREES
PR INTERVAL - MUSE: 150 MS
QRS DURATION - MUSE: 84 MS
QT - MUSE: 350 MS
QTC - MUSE: 421 MS
R AXIS - MUSE: 91 DEGREES
SYSTOLIC BLOOD PRESSURE - MUSE: NORMAL MMHG
T AXIS - MUSE: 63 DEGREES
VENTRICULAR RATE- MUSE: 87 BPM

## 2023-06-01 PROCEDURE — 99238 HOSP IP/OBS DSCHRG MGMT 30/<: CPT | Performed by: STUDENT IN AN ORGANIZED HEALTH CARE EDUCATION/TRAINING PROGRAM

## 2023-06-01 PROCEDURE — 250N000013 HC RX MED GY IP 250 OP 250 PS 637: Performed by: STUDENT IN AN ORGANIZED HEALTH CARE EDUCATION/TRAINING PROGRAM

## 2023-06-01 PROCEDURE — 250N000013 HC RX MED GY IP 250 OP 250 PS 637

## 2023-06-01 RX ADMIN — Medication 25 MCG: at 08:12

## 2023-06-01 RX ADMIN — Medication 25 MG: at 08:12

## 2023-06-01 ASSESSMENT — ACTIVITIES OF DAILY LIVING (ADL)
ADLS_ACUITY_SCORE: 29

## 2023-06-01 NOTE — PLAN OF CARE
Problem: Depression  Goal: Improved Mood  Outcome: Progressing     Problem: Anxiety  Goal: Anxiety Reduction or Resolution  Outcome: Progressing     Problem: Adult Behavioral Health Plan of Care  Goal: Absence of New-Onset Illness or Injury  Outcome: Progressing  Intervention: Identify and Manage Fall Risk  Recent Flowsheet Documentation  Taken 5/31/2023 1720 by Juan Guevara, RN  Safety Measures:    environmental rounds completed    safety rounds completed     Problem: Adult Behavioral Health Plan of Care  Goal: Adheres to Safety Considerations for Self and Others  Intervention: Develop and Maintain Individualized Safety Plan  Recent Flowsheet Documentation  Taken 5/31/2023 1720 by Juan Guevara, RN  Safety Measures:    environmental rounds completed    safety rounds completed   Goal Outcome Evaluation:    Plan of Care Reviewed With: patient        Alert and oriented x3, able to communicate needs. Occasionally visible in milieu, spent most of the time in his room, noted no interactions with peers, withdrawn while in milieu. Presented as flat, restricted but cooperative and compliant with medication. He denied any pain or discomfort, ADLs WNL, adequate PO intake. Denied anxiety or depression, no SI/HI, contracted for safety.

## 2023-06-01 NOTE — PLAN OF CARE
Patient appears to have slept for 7 hours this shift. Patient no complaints of pain and discomfort so far. Patient will be discharging today. Continues on q 15 minutes safety check. Will continue to monitor the patient and provide therapeutic intervention as needed. Will continue with current plan of care. Notify MD with any concerns.    Problem: Sleep Disturbance  Goal: Adequate Sleep/Rest  Outcome: Progressing

## 2023-06-01 NOTE — PLAN OF CARE
Goal Outcome Evaluation:      Plan of Care Reviewed With: patient    Overall Patient Progress: improving     Patient denies SI/SIB/AH/VH.Patient denies any anxiety & depression.Belongings returned to patient and discharge medication given to patient and he signed for both.Patient given education on taking medication without fail and he agrees.

## 2023-06-03 ENCOUNTER — PATIENT OUTREACH (OUTPATIENT)
Dept: CARE COORDINATION | Facility: CLINIC | Age: 28
End: 2023-06-03
Payer: MEDICARE

## 2023-06-03 NOTE — PROGRESS NOTES
Connected Care Resource Center Contact  RUST/Voicemail     Clinical Data: Transitional Care Management Outreach     Outreach attempted x 2.  Unable  to lvm     Plan:  [facility] will do no further outreaches at this time.       LISBETH Tadeo  [facility]    *Connected Care Resource Team does NOT follow patient ongoing. Referrals are identified based on internal discharge reports and the outreach is to ensure patient has an understanding of their discharge instructions.

## 2023-08-17 ENCOUNTER — TELEPHONE (OUTPATIENT)
Dept: BEHAVIORAL HEALTH | Facility: CLINIC | Age: 28
End: 2023-08-17
Payer: MEDICARE

## 2023-08-17 NOTE — TELEPHONE ENCOUNTER
"PROVIDER TELEPHONE Note    Received notification from unit staff on station 20 at [facility] that patient's mom called with a concern. Attempted call back on both numbers provided:  [phone] is mom Kal's number.  [phone] rang repeatedly without option of voicemail, attempted call twice. - this is [facility], [facility] in [place].     Reached mom on 3rd attempt:  The first night when he returned, he was \"going down\" and didn't sleep due to being scared. He asked mom to call Dr. Altman and she called station 20 without getting a call back, about June 1st 2023.    He left, returned 3 weeks ago, asked him where he goes, he said he was going to a hospital to get back his medication. Mom then realized he hadn't been taking his medication. He has been seeming depressed. He then said \"mom I love you you need to call my doctor.\"     She is very worried about him, she states he is at [facility] in [place]. She doesn't feel that he is being taken care of well there, they cannot get him the medicine we prescribed, she is worried he is getting too much medicine, that he's not talking or laughing them. He called her last night and told him to call [facility] Dr. Altman and myself to talk to his doctor's there.    Sophie's sister came on the phone and stated that she had spoken to the nurse there at [facility], they were in the process of switching over from haldol to clozapine. She is worried that this is too much medicine.     Plan:  This writer called the hospital where he is and left my pager in the event that the patient allows his provider to discuss his care with myself.       -------------------------------  Renate Wallace MD      "

## 2025-01-13 NOTE — PLAN OF CARE
Problem: Adult Behavioral Health Plan of Care  Goal: Patient-Specific Goal (Individualization)  Description: Patient will eat at least 50% of meals.   Patient will sleep at least 6 hours of sleep each NOC.   Patient will attend at least 50% of groups when able to wean out to open unit.   Patient will be compliant with treatment team recommendations.   Patient will maintain appropriate boundaries with peers and staff.    Outcome: Progressing  Note:     0050 Patient in bed with eyes closed and regular resps.    0600 Patient continues in bed asleep for a total of 7.5 hours.    Face to face end of shift report communicated to 7-3 shift RN.     Maritza Hale RN  4/7/2023  5:57 AM          Problem: Thought Process Alteration  Goal: Optimal Thought Clarity  Description: Patient will report a decrease in auditory hallucinations by discharge.  Patient will be able to hold a reality based conversation.  Outcome: Progressing      Goal Outcome Evaluation:    Plan of Care Reviewed With: patient                    General (Pediatric)